# Patient Record
Sex: FEMALE | Race: WHITE | Employment: OTHER | URBAN - METROPOLITAN AREA
[De-identification: names, ages, dates, MRNs, and addresses within clinical notes are randomized per-mention and may not be internally consistent; named-entity substitution may affect disease eponyms.]

---

## 2017-07-02 PROBLEM — J30.2 SEASONAL ALLERGIC RHINITIS: Status: ACTIVE | Noted: 2017-07-02

## 2017-07-02 PROBLEM — Z13.820 OSTEOPOROSIS SCREENING: Status: ACTIVE | Noted: 2017-07-02

## 2017-07-18 PROBLEM — L30.9 DERMATITIS: Status: ACTIVE | Noted: 2017-07-18

## 2017-07-18 PROBLEM — T78.40XA ALLERGIC DRUG REACTION: Status: ACTIVE | Noted: 2017-07-18

## 2018-06-18 DIAGNOSIS — E03.9 ACQUIRED HYPOTHYROIDISM: ICD-10-CM

## 2018-06-18 RX ORDER — LEVOTHYROXINE SODIUM 0.1 MG/1
TABLET ORAL
Qty: 90 TABLET | Refills: 0 | Status: SHIPPED | OUTPATIENT
Start: 2018-06-18 | End: 2018-07-16 | Stop reason: SDUPTHER

## 2018-07-16 ENCOUNTER — HOSPITAL ENCOUNTER (OUTPATIENT)
Age: 73
Discharge: HOME | End: 2018-07-18
Payer: MEDICARE

## 2018-07-16 ENCOUNTER — OFFICE VISIT (OUTPATIENT)
Dept: FAMILY MEDICINE CLINIC | Age: 73
End: 2018-07-16
Payer: MEDICARE

## 2018-07-16 VITALS
HEIGHT: 62 IN | SYSTOLIC BLOOD PRESSURE: 120 MMHG | WEIGHT: 161 LBS | HEART RATE: 88 BPM | OXYGEN SATURATION: 95 % | DIASTOLIC BLOOD PRESSURE: 78 MMHG | BODY MASS INDEX: 29.63 KG/M2 | RESPIRATION RATE: 18 BRPM

## 2018-07-16 DIAGNOSIS — E03.9 ACQUIRED HYPOTHYROIDISM: ICD-10-CM

## 2018-07-16 DIAGNOSIS — I10 ESSENTIAL HYPERTENSION: ICD-10-CM

## 2018-07-16 DIAGNOSIS — R53.83 FATIGUE, UNSPECIFIED TYPE: ICD-10-CM

## 2018-07-16 DIAGNOSIS — M19.90 OSTEOARTHRITIS, UNSPECIFIED OSTEOARTHRITIS TYPE, UNSPECIFIED SITE: ICD-10-CM

## 2018-07-16 DIAGNOSIS — I10 ESSENTIAL HYPERTENSION: Primary | ICD-10-CM

## 2018-07-16 DIAGNOSIS — R73.01 IFG (IMPAIRED FASTING GLUCOSE): ICD-10-CM

## 2018-07-16 DIAGNOSIS — E78.2 MIXED HYPERLIPIDEMIA: ICD-10-CM

## 2018-07-16 LAB
ALBUMIN SERPL-MCNC: 4 G/DL (ref 3.5–5.2)
ALP BLD-CCNC: 118 U/L (ref 35–104)
ALT SERPL-CCNC: 12 U/L (ref 0–32)
ANION GAP SERPL CALCULATED.3IONS-SCNC: 16 MMOL/L (ref 7–16)
AST SERPL-CCNC: 13 U/L (ref 0–31)
BASOPHILS ABSOLUTE: 0.04 E9/L (ref 0–0.2)
BASOPHILS RELATIVE PERCENT: 0.4 % (ref 0–2)
BILIRUB SERPL-MCNC: 0.4 MG/DL (ref 0–1.2)
BUN BLDV-MCNC: 10 MG/DL (ref 8–23)
CALCIUM SERPL-MCNC: 9.3 MG/DL (ref 8.6–10.2)
CHLORIDE BLD-SCNC: 98 MMOL/L (ref 98–107)
CHOLESTEROL, TOTAL: 176 MG/DL (ref 0–199)
CO2: 27 MMOL/L (ref 22–29)
CREAT SERPL-MCNC: 0.6 MG/DL (ref 0.5–1)
EOSINOPHILS ABSOLUTE: 0.11 E9/L (ref 0.05–0.5)
EOSINOPHILS RELATIVE PERCENT: 1.2 % (ref 0–6)
GFR AFRICAN AMERICAN: >60
GFR NON-AFRICAN AMERICAN: >60 ML/MIN/1.73
GLUCOSE BLD-MCNC: 105 MG/DL (ref 74–109)
HBA1C MFR BLD: 6 % (ref 4–5.6)
HCT VFR BLD CALC: 36.7 % (ref 34–48)
HDLC SERPL-MCNC: 49 MG/DL
HEMOGLOBIN: 11.5 G/DL (ref 11.5–15.5)
IMMATURE GRANULOCYTES #: 0.04 E9/L
IMMATURE GRANULOCYTES %: 0.4 % (ref 0–5)
LDL CHOLESTEROL CALCULATED: 103 MG/DL (ref 0–99)
LYMPHOCYTES ABSOLUTE: 1.56 E9/L (ref 1.5–4)
LYMPHOCYTES RELATIVE PERCENT: 16.7 % (ref 20–42)
MCH RBC QN AUTO: 26 PG (ref 26–35)
MCHC RBC AUTO-ENTMCNC: 31.3 % (ref 32–34.5)
MCV RBC AUTO: 83 FL (ref 80–99.9)
MONOCYTES ABSOLUTE: 0.77 E9/L (ref 0.1–0.95)
MONOCYTES RELATIVE PERCENT: 8.3 % (ref 2–12)
NEUTROPHILS ABSOLUTE: 6.8 E9/L (ref 1.8–7.3)
NEUTROPHILS RELATIVE PERCENT: 73 % (ref 43–80)
PDW BLD-RTO: 13.2 FL (ref 11.5–15)
PLATELET # BLD: 352 E9/L (ref 130–450)
PMV BLD AUTO: 10.4 FL (ref 7–12)
POTASSIUM SERPL-SCNC: 3.9 MMOL/L (ref 3.5–5)
RBC # BLD: 4.42 E12/L (ref 3.5–5.5)
SODIUM BLD-SCNC: 141 MMOL/L (ref 132–146)
TOTAL PROTEIN: 7.2 G/DL (ref 6.4–8.3)
TRIGL SERPL-MCNC: 119 MG/DL (ref 0–149)
TSH SERPL DL<=0.05 MIU/L-ACNC: 0.14 UIU/ML (ref 0.27–4.2)
VLDLC SERPL CALC-MCNC: 24 MG/DL
WBC # BLD: 9.3 E9/L (ref 4.5–11.5)

## 2018-07-16 PROCEDURE — 3017F COLORECTAL CA SCREEN DOC REV: CPT | Performed by: FAMILY MEDICINE

## 2018-07-16 PROCEDURE — 1036F TOBACCO NON-USER: CPT | Performed by: FAMILY MEDICINE

## 2018-07-16 PROCEDURE — 84443 ASSAY THYROID STIM HORMONE: CPT

## 2018-07-16 PROCEDURE — 99214 OFFICE O/P EST MOD 30 MIN: CPT | Performed by: FAMILY MEDICINE

## 2018-07-16 PROCEDURE — G8399 PT W/DXA RESULTS DOCUMENT: HCPCS | Performed by: FAMILY MEDICINE

## 2018-07-16 PROCEDURE — 4040F PNEUMOC VAC/ADMIN/RCVD: CPT | Performed by: FAMILY MEDICINE

## 2018-07-16 PROCEDURE — 83036 HEMOGLOBIN GLYCOSYLATED A1C: CPT

## 2018-07-16 PROCEDURE — G8417 CALC BMI ABV UP PARAM F/U: HCPCS | Performed by: FAMILY MEDICINE

## 2018-07-16 PROCEDURE — 1123F ACP DISCUSS/DSCN MKR DOCD: CPT | Performed by: FAMILY MEDICINE

## 2018-07-16 PROCEDURE — 3014F SCREEN MAMMO DOC REV: CPT | Performed by: FAMILY MEDICINE

## 2018-07-16 PROCEDURE — 1090F PRES/ABSN URINE INCON ASSESS: CPT | Performed by: FAMILY MEDICINE

## 2018-07-16 PROCEDURE — 85025 COMPLETE CBC W/AUTO DIFF WBC: CPT

## 2018-07-16 PROCEDURE — 1101F PT FALLS ASSESS-DOCD LE1/YR: CPT | Performed by: FAMILY MEDICINE

## 2018-07-16 PROCEDURE — G8427 DOCREV CUR MEDS BY ELIG CLIN: HCPCS | Performed by: FAMILY MEDICINE

## 2018-07-16 PROCEDURE — 80061 LIPID PANEL: CPT

## 2018-07-16 PROCEDURE — 80053 COMPREHEN METABOLIC PANEL: CPT

## 2018-07-16 RX ORDER — AMLODIPINE BESYLATE AND ATORVASTATIN CALCIUM 5; 10 MG/1; MG/1
TABLET, FILM COATED ORAL
Qty: 90 TABLET | Refills: 1 | Status: SHIPPED | OUTPATIENT
Start: 2018-07-16 | End: 2019-02-11 | Stop reason: SDUPTHER

## 2018-07-16 RX ORDER — LEVOTHYROXINE SODIUM 0.1 MG/1
TABLET ORAL
Qty: 90 TABLET | Refills: 1 | Status: SHIPPED | OUTPATIENT
Start: 2018-07-16 | End: 2019-03-11 | Stop reason: SDUPTHER

## 2018-07-16 RX ORDER — ENALAPRIL MALEATE AND HYDROCHLOROTHIAZIDE 10; 25 MG/1; MG/1
TABLET ORAL
Qty: 180 TABLET | Refills: 1 | Status: SHIPPED | OUTPATIENT
Start: 2018-07-16 | End: 2019-05-20 | Stop reason: SDUPTHER

## 2018-07-19 ENCOUNTER — TELEPHONE (OUTPATIENT)
Dept: FAMILY MEDICINE CLINIC | Age: 73
End: 2018-07-19

## 2018-07-19 ASSESSMENT — ENCOUNTER SYMPTOMS
HEMOPTYSIS: 0
EYES NEGATIVE: 1
BLURRED VISION: 0
RESPIRATORY NEGATIVE: 1
WHEEZING: 0
DOUBLE VISION: 0
SHORTNESS OF BREATH: 0
DIARRHEA: 0
BACK PAIN: 0
COUGH: 0
EYE DISCHARGE: 0
GASTROINTESTINAL NEGATIVE: 1
ORTHOPNEA: 0
ABDOMINAL PAIN: 0
NAUSEA: 0
SPUTUM PRODUCTION: 0
EYE REDNESS: 0
STRIDOR: 0
CONSTIPATION: 0
SINUS PAIN: 0
PHOTOPHOBIA: 0
VOMITING: 0
HEARTBURN: 0
BLOOD IN STOOL: 0
EYE PAIN: 0
SORE THROAT: 0

## 2018-07-19 NOTE — PROGRESS NOTES
Neurological: Negative. Negative for dizziness, tingling, tremors, sensory change, speech change, focal weakness, seizures, loss of consciousness, weakness and headaches. Endo/Heme/Allergies: Negative for environmental allergies and polydipsia. Does not bruise/bleed easily. Pt has hypothyroid. Psychiatric/Behavioral: Negative for depression, hallucinations, memory loss, substance abuse and suicidal ideas. The patient is nervous/anxious. The patient does not have insomnia. Past Medical/Surgical Hx;  Reviewed with patient      Diagnosis Date    Glaucoma     Hyperlipidemia     Hypertension     Hypothyroidism      Past Surgical History:   Procedure Laterality Date    CATARACT REMOVAL WITH IMPLANT      CHOLECYSTECTOMY      HYSTERECTOMY         Past Family Hx:  Reviewed with patient  History reviewed. No pertinent family history. Social Hx:  Reviewed with patient  Social History   Substance Use Topics    Smoking status: Never Smoker    Smokeless tobacco: Never Used    Alcohol use No       OBJECTIVE  /78   Pulse 88   Resp 18   Ht 5' 2\" (1.575 m)   Wt 161 lb (73 kg)   LMP  (LMP Unknown)   SpO2 95%   Breastfeeding? No   BMI 29.45 kg/m²     Problem List:  Blaine Morocho  does not have any pertinent problems on file. PHYS EX:  Physical Exam   Constitutional: She is oriented to person, place, and time. She appears well-developed and well-nourished. HENT:   Head: Normocephalic and atraumatic. Right Ear: External ear normal.   Left Ear: External ear normal.   Nose: Nose normal.   Mouth/Throat: Oropharynx is clear and moist. No oropharyngeal exudate. Eyes: Conjunctivae and EOM are normal. Pupils are equal, round, and reactive to light. Right eye exhibits no discharge. Left eye exhibits no discharge. No scleral icterus. Neck: Normal range of motion. Neck supple. No JVD present. No tracheal deviation present. No thyromegaly present.    Cardiovascular: Normal rate, regular rhythm and Differential; Future  -     Comprehensive Metabolic Panel; Future  -     Lipid Panel; Future  Not controlled. Lab, low chol. Diet, statin. IFG (impaired fasting glucose)  -     CBC Auto Differential; Future  -     Comprehensive Metabolic Panel; Future  -     Lipid Panel; Future  -     HEMOGLOBIN A1C; Future  Instructed on lab. Fatigue, unspecified type  -     CBC Auto Differential; Future  -     Comprehensive Metabolic Panel; Future  -     TSH without Reflex; Future  Not controlled. Lab. Outpatient Encounter Prescriptions as of 7/16/2018   Medication Sig Dispense Refill    amLODIPine-atorvastatatin (CADUET) 5-10 MG per tablet TAKE ONE TABLET BY MOUTH DAILY 90 tablet 1    levothyroxine (SYNTHROID) 100 MCG tablet TAKE ONE TABLET BY MOUTH DAILY 90 tablet 1    enalapril-hydrochlorothiazide (VASERETIC) 10-25 MG per tablet TAKE ONE TABLET BY MOUTH TWO TIMES A  tablet 1    aspirin 81 MG tablet Take 1 tablet by mouth daily 90 tablet 1    Cyanocobalamin (B-12) 2500 MCG TABS Take 1 tablet by mouth daily       vitamin D (CHOLECALCIFEROL) 5000 UNITS CAPS capsule Take 5,000 Units by mouth daily      [DISCONTINUED] amLODIPine-atorvastatatin (CADUET) 5-10 MG per tablet TAKE ONE TABLET BY MOUTH DAILY 60 tablet 0    [DISCONTINUED] levothyroxine (SYNTHROID) 100 MCG tablet TAKE ONE TABLET BY MOUTH DAILY 90 tablet 0    [DISCONTINUED] enalapril-hydrochlorothiazide (VASERETIC) 10-25 MG per tablet TAKE ONE TABLET BY MOUTH TWO TIMES A  tablet 1    [DISCONTINUED] aspirin 81 MG tablet Take 1 tablet by mouth daily 30 tablet 0     No facility-administered encounter medications on file as of 7/16/2018. Return in about 6 months (around 1/16/2019).         Reviewed recent labs related to Mahsa's current problems      Discussed importance of regular Health Maintenance follow up  Health Maintenance   Topic    Hepatitis C screen     DTaP/Tdap/Td vaccine (1 - Tdap)    Shingles Vaccine (1 of 2 - 2 Dose Series)  Colon Cancer Screen FIT/FOBT     Flu vaccine (1)    Breast cancer screen     A1C test (Diabetic or Prediabetic)     TSH testing     Potassium monitoring     Creatinine monitoring     Lipid screen     DEXA (modify frequency per FRAX score)

## 2018-09-26 PROBLEM — Z13.820 OSTEOPOROSIS SCREENING: Status: RESOLVED | Noted: 2017-07-02 | Resolved: 2018-09-26

## 2018-10-09 ENCOUNTER — NURSE ONLY (OUTPATIENT)
Dept: FAMILY MEDICINE CLINIC | Age: 73
End: 2018-10-09
Payer: MEDICARE

## 2018-10-09 DIAGNOSIS — Z23 NEED FOR INFLUENZA VACCINATION: Primary | ICD-10-CM

## 2018-10-09 PROCEDURE — G0008 ADMIN INFLUENZA VIRUS VAC: HCPCS | Performed by: FAMILY MEDICINE

## 2018-10-09 PROCEDURE — 90662 IIV NO PRSV INCREASED AG IM: CPT | Performed by: FAMILY MEDICINE

## 2019-01-21 ENCOUNTER — OFFICE VISIT (OUTPATIENT)
Dept: FAMILY MEDICINE CLINIC | Age: 74
End: 2019-01-21
Payer: MEDICARE

## 2019-01-21 ENCOUNTER — HOSPITAL ENCOUNTER (OUTPATIENT)
Age: 74
Discharge: HOME | End: 2019-01-23
Payer: MEDICARE

## 2019-01-21 VITALS
WEIGHT: 162.8 LBS | RESPIRATION RATE: 18 BRPM | BODY MASS INDEX: 29.96 KG/M2 | SYSTOLIC BLOOD PRESSURE: 130 MMHG | OXYGEN SATURATION: 95 % | HEIGHT: 62 IN | HEART RATE: 98 BPM | DIASTOLIC BLOOD PRESSURE: 76 MMHG | TEMPERATURE: 98 F

## 2019-01-21 DIAGNOSIS — I10 ESSENTIAL HYPERTENSION: Primary | ICD-10-CM

## 2019-01-21 DIAGNOSIS — E03.9 ACQUIRED HYPOTHYROIDISM: ICD-10-CM

## 2019-01-21 DIAGNOSIS — Z23 NEED FOR PROPHYLACTIC VACCINATION AND INOCULATION AGAINST VARICELLA: ICD-10-CM

## 2019-01-21 DIAGNOSIS — Z00.00 ROUTINE GENERAL MEDICAL EXAMINATION AT A HEALTH CARE FACILITY: ICD-10-CM

## 2019-01-21 DIAGNOSIS — H61.23 CERUMEN DEBRIS ON TYMPANIC MEMBRANE OF BOTH EARS: ICD-10-CM

## 2019-01-21 DIAGNOSIS — Z12.11 SCREENING FOR COLON CANCER: ICD-10-CM

## 2019-01-21 LAB — TSH SERPL DL<=0.05 MIU/L-ACNC: 1.56 UIU/ML (ref 0.27–4.2)

## 2019-01-21 PROCEDURE — 84443 ASSAY THYROID STIM HORMONE: CPT

## 2019-01-21 PROCEDURE — G8482 FLU IMMUNIZE ORDER/ADMIN: HCPCS | Performed by: FAMILY MEDICINE

## 2019-01-21 PROCEDURE — 69209 REMOVE IMPACTED EAR WAX UNI: CPT | Performed by: FAMILY MEDICINE

## 2019-01-21 PROCEDURE — 4040F PNEUMOC VAC/ADMIN/RCVD: CPT | Performed by: FAMILY MEDICINE

## 2019-01-21 PROCEDURE — G0438 PPPS, INITIAL VISIT: HCPCS | Performed by: FAMILY MEDICINE

## 2019-01-21 RX ORDER — OMEGA-3/DHA/EPA/FISH OIL 60 MG-90MG
1 CAPSULE ORAL DAILY
COMMUNITY

## 2019-01-21 RX ORDER — VITAMIN E (DL,TOCOPHERYL ACET) 180 MG
1 CAPSULE ORAL DAILY
COMMUNITY
End: 2019-11-12

## 2019-01-21 ASSESSMENT — PATIENT HEALTH QUESTIONNAIRE - PHQ9
SUM OF ALL RESPONSES TO PHQ QUESTIONS 1-9: 0
SUM OF ALL RESPONSES TO PHQ QUESTIONS 1-9: 0

## 2019-01-21 ASSESSMENT — ANXIETY QUESTIONNAIRES: GAD7 TOTAL SCORE: 0

## 2019-01-21 ASSESSMENT — LIFESTYLE VARIABLES: HOW OFTEN DO YOU HAVE A DRINK CONTAINING ALCOHOL: 0

## 2019-01-23 PROBLEM — Z12.11 SCREENING FOR COLON CANCER: Status: ACTIVE | Noted: 2019-01-23

## 2019-02-22 ENCOUNTER — TELEPHONE (OUTPATIENT)
Dept: FAMILY MEDICINE CLINIC | Age: 74
End: 2019-02-22

## 2019-02-22 PROBLEM — Z12.11 SCREENING FOR COLON CANCER: Status: RESOLVED | Noted: 2019-01-23 | Resolved: 2019-02-22

## 2019-02-25 ENCOUNTER — TELEPHONE (OUTPATIENT)
Dept: FAMILY MEDICINE CLINIC | Age: 74
End: 2019-02-25

## 2019-03-11 DIAGNOSIS — E03.9 ACQUIRED HYPOTHYROIDISM: ICD-10-CM

## 2019-03-11 RX ORDER — LEVOTHYROXINE SODIUM 0.1 MG/1
TABLET ORAL
Qty: 90 TABLET | Refills: 0 | Status: SHIPPED | OUTPATIENT
Start: 2019-03-11 | End: 2019-06-17 | Stop reason: SDUPTHER

## 2019-04-25 ENCOUNTER — TELEPHONE (OUTPATIENT)
Dept: FAMILY MEDICINE CLINIC | Age: 74
End: 2019-04-25

## 2019-04-25 NOTE — TELEPHONE ENCOUNTER
MA attempted to contact pt regarding OC Fit Test. Pt did not answer. MA left message for pt inquiring if she still had the FIT test to complete, and if she was still interested in returning it to the office for completion. MA advised pt that if she no longer had the FIT test and was interested in doing it, she may contact the office for another test kit. MA advised pt that if she did have test and wished to complete it, she may return it to the office at any time Monday-Friday 8:00AM-4:00PM or mail it to the office. MA requested return phone call for any questions or concerns.     Electronically signed by Santosh Alarcon MA on 4/25/19 at 10:57 AM

## 2019-05-20 ENCOUNTER — TELEPHONE (OUTPATIENT)
Dept: FAMILY MEDICINE CLINIC | Age: 74
End: 2019-05-20

## 2019-05-20 DIAGNOSIS — I10 ESSENTIAL HYPERTENSION: ICD-10-CM

## 2019-05-20 RX ORDER — ENALAPRIL MALEATE AND HYDROCHLOROTHIAZIDE 10; 25 MG/1; MG/1
TABLET ORAL
Qty: 180 TABLET | Refills: 0 | Status: SHIPPED | OUTPATIENT
Start: 2019-05-20 | End: 2019-05-20 | Stop reason: DRUGHIGH

## 2019-05-20 RX ORDER — ENALAPRIL MALEATE AND HYDROCHLOROTHIAZIDE 10; 25 MG/1; MG/1
TABLET ORAL
Qty: 90 TABLET | Refills: 0 | Status: SHIPPED | OUTPATIENT
Start: 2019-05-20 | End: 2019-05-22 | Stop reason: SDUPTHER

## 2019-05-20 RX ORDER — ENALAPRIL MALEATE 10 MG/1
10 TABLET ORAL 2 TIMES DAILY
Qty: 60 TABLET | Refills: 3 | Status: CANCELLED | OUTPATIENT
Start: 2019-05-20

## 2019-05-20 RX ORDER — HYDROCHLOROTHIAZIDE 25 MG/1
25 TABLET ORAL EVERY MORNING
Qty: 30 TABLET | Refills: 3 | Status: CANCELLED | OUTPATIENT
Start: 2019-05-20

## 2019-05-20 NOTE — TELEPHONE ENCOUNTER
----- Message from Kvng Beach DO sent at 5/20/2019 11:02 AM EDT -----  Call pt and ask her if she is taking vaseretic 10/25  One table 2 times a day     Tell her I want to change that to 2 medications    Enalapril 10 mg BID and HCTZ 25 mg qd   I dont want her to take HCTZ 25 mg BID

## 2019-05-20 NOTE — TELEPHONE ENCOUNTER
Patient returned call to the office stating she has been taking the Vasoretic once daily for several months now because her blood pressure has been good. She checks it every day at home. Spoke with Dr. Edwige Pike who wants patient to continue taking the Vasoretic daily and come in for checkup. Patient scheduled.   Electronically signed by Macario Goldstein on 5/20/2019 at 3:55 PM

## 2019-05-22 ENCOUNTER — TELEPHONE (OUTPATIENT)
Dept: FAMILY MEDICINE CLINIC | Age: 74
End: 2019-05-22

## 2019-05-22 DIAGNOSIS — I10 ESSENTIAL HYPERTENSION: ICD-10-CM

## 2019-05-22 RX ORDER — ENALAPRIL MALEATE AND HYDROCHLOROTHIAZIDE 10; 25 MG/1; MG/1
TABLET ORAL
Qty: 90 TABLET | Refills: 1 | Status: SHIPPED | OUTPATIENT
Start: 2019-05-22 | End: 2019-12-02 | Stop reason: SDUPTHER

## 2019-05-22 NOTE — TELEPHONE ENCOUNTER
Patient stopped in office stating [facility] did not receive script for Vaseretic

## 2019-05-30 ENCOUNTER — TELEPHONE (OUTPATIENT)
Dept: FAMILY MEDICINE CLINIC | Age: 74
End: 2019-05-30

## 2019-05-30 NOTE — TELEPHONE ENCOUNTER
MA attempted to contact pt regarding OC Fit Test. Pt did not answer. MA left message for pt inquiring if she still had the FIT test to complete, and if she was still interested in returning it to the office for completion. MA advised pt that if she no longer had the FIT test and was interested in doing it, she may contact the office for another test kit. MA advised pt that if she did have test and wished to complete it, she may return it to the office at any time Monday-Friday 8:00AM-4:00PM or mail it to the office. MA requested return phone call for any questions or concerns.     Electronically signed by Cadence Ernandez MA on 5/30/19 at 8:51 AM

## 2019-06-17 ENCOUNTER — HOSPITAL ENCOUNTER (OUTPATIENT)
Age: 74
Discharge: HOME | End: 2019-06-19
Payer: MEDICARE

## 2019-06-17 ENCOUNTER — OFFICE VISIT (OUTPATIENT)
Dept: FAMILY MEDICINE CLINIC | Age: 74
End: 2019-06-17
Payer: MEDICARE

## 2019-06-17 VITALS
BODY MASS INDEX: 29.44 KG/M2 | HEIGHT: 62 IN | WEIGHT: 160 LBS | SYSTOLIC BLOOD PRESSURE: 124 MMHG | HEART RATE: 82 BPM | OXYGEN SATURATION: 96 % | RESPIRATION RATE: 18 BRPM | DIASTOLIC BLOOD PRESSURE: 74 MMHG

## 2019-06-17 DIAGNOSIS — R73.01 IFG (IMPAIRED FASTING GLUCOSE): ICD-10-CM

## 2019-06-17 DIAGNOSIS — R53.83 FATIGUE, UNSPECIFIED TYPE: ICD-10-CM

## 2019-06-17 DIAGNOSIS — I10 ESSENTIAL HYPERTENSION: ICD-10-CM

## 2019-06-17 DIAGNOSIS — Z12.39 SCREENING FOR BREAST CANCER: ICD-10-CM

## 2019-06-17 DIAGNOSIS — E03.9 ACQUIRED HYPOTHYROIDISM: ICD-10-CM

## 2019-06-17 DIAGNOSIS — E78.2 MIXED HYPERLIPIDEMIA: ICD-10-CM

## 2019-06-17 DIAGNOSIS — I10 ESSENTIAL HYPERTENSION: Primary | ICD-10-CM

## 2019-06-17 LAB
ALBUMIN SERPL-MCNC: 4 G/DL (ref 3.5–5.2)
ALP BLD-CCNC: 119 U/L (ref 35–104)
ALT SERPL-CCNC: 11 U/L (ref 0–32)
ANION GAP SERPL CALCULATED.3IONS-SCNC: 14 MMOL/L (ref 7–16)
ANISOCYTOSIS: ABNORMAL
AST SERPL-CCNC: 16 U/L (ref 0–31)
BASOPHILS ABSOLUTE: 0.06 E9/L (ref 0–0.2)
BASOPHILS RELATIVE PERCENT: 0.8 % (ref 0–2)
BILIRUB SERPL-MCNC: 0.3 MG/DL (ref 0–1.2)
BUN BLDV-MCNC: 12 MG/DL (ref 8–23)
CALCIUM SERPL-MCNC: 9.5 MG/DL (ref 8.6–10.2)
CHLORIDE BLD-SCNC: 103 MMOL/L (ref 98–107)
CHOLESTEROL, TOTAL: 170 MG/DL (ref 0–199)
CO2: 28 MMOL/L (ref 22–29)
CREAT SERPL-MCNC: 0.7 MG/DL (ref 0.5–1)
EOSINOPHILS ABSOLUTE: 0.24 E9/L (ref 0.05–0.5)
EOSINOPHILS RELATIVE PERCENT: 3 % (ref 0–6)
GFR AFRICAN AMERICAN: >60
GFR NON-AFRICAN AMERICAN: >60 ML/MIN/1.73
GLUCOSE BLD-MCNC: 104 MG/DL (ref 74–99)
HBA1C MFR BLD: 6.2 % (ref 4–5.6)
HCT VFR BLD CALC: 33.9 % (ref 34–48)
HDLC SERPL-MCNC: 50 MG/DL
HEMOGLOBIN: 9.8 G/DL (ref 11.5–15.5)
HYPOCHROMIA: ABNORMAL
IMMATURE GRANULOCYTES #: 0.03 E9/L
IMMATURE GRANULOCYTES %: 0.4 % (ref 0–5)
LDL CHOLESTEROL CALCULATED: 93 MG/DL (ref 0–99)
LYMPHOCYTES ABSOLUTE: 1.56 E9/L (ref 1.5–4)
LYMPHOCYTES RELATIVE PERCENT: 19.6 % (ref 20–42)
MCH RBC QN AUTO: 23 PG (ref 26–35)
MCHC RBC AUTO-ENTMCNC: 28.9 % (ref 32–34.5)
MCV RBC AUTO: 79.6 FL (ref 80–99.9)
MONOCYTES ABSOLUTE: 0.66 E9/L (ref 0.1–0.95)
MONOCYTES RELATIVE PERCENT: 8.3 % (ref 2–12)
NEUTROPHILS ABSOLUTE: 5.4 E9/L (ref 1.8–7.3)
NEUTROPHILS RELATIVE PERCENT: 67.9 % (ref 43–80)
OVALOCYTES: ABNORMAL
PDW BLD-RTO: 16.3 FL (ref 11.5–15)
PLATELET # BLD: 323 E9/L (ref 130–450)
PMV BLD AUTO: 10.7 FL (ref 7–12)
POIKILOCYTES: ABNORMAL
POLYCHROMASIA: ABNORMAL
POTASSIUM SERPL-SCNC: 4.1 MMOL/L (ref 3.5–5)
RBC # BLD: 4.26 E12/L (ref 3.5–5.5)
SODIUM BLD-SCNC: 145 MMOL/L (ref 132–146)
TOTAL PROTEIN: 7.2 G/DL (ref 6.4–8.3)
TRIGL SERPL-MCNC: 137 MG/DL (ref 0–149)
TSH SERPL DL<=0.05 MIU/L-ACNC: 1.16 UIU/ML (ref 0.27–4.2)
VLDLC SERPL CALC-MCNC: 27 MG/DL
WBC # BLD: 8 E9/L (ref 4.5–11.5)

## 2019-06-17 PROCEDURE — 1123F ACP DISCUSS/DSCN MKR DOCD: CPT | Performed by: FAMILY MEDICINE

## 2019-06-17 PROCEDURE — 80061 LIPID PANEL: CPT

## 2019-06-17 PROCEDURE — 80053 COMPREHEN METABOLIC PANEL: CPT

## 2019-06-17 PROCEDURE — G8427 DOCREV CUR MEDS BY ELIG CLIN: HCPCS | Performed by: FAMILY MEDICINE

## 2019-06-17 PROCEDURE — G8399 PT W/DXA RESULTS DOCUMENT: HCPCS | Performed by: FAMILY MEDICINE

## 2019-06-17 PROCEDURE — 83036 HEMOGLOBIN GLYCOSYLATED A1C: CPT

## 2019-06-17 PROCEDURE — 4040F PNEUMOC VAC/ADMIN/RCVD: CPT | Performed by: FAMILY MEDICINE

## 2019-06-17 PROCEDURE — 3017F COLORECTAL CA SCREEN DOC REV: CPT | Performed by: FAMILY MEDICINE

## 2019-06-17 PROCEDURE — 1036F TOBACCO NON-USER: CPT | Performed by: FAMILY MEDICINE

## 2019-06-17 PROCEDURE — 1090F PRES/ABSN URINE INCON ASSESS: CPT | Performed by: FAMILY MEDICINE

## 2019-06-17 PROCEDURE — 85025 COMPLETE CBC W/AUTO DIFF WBC: CPT

## 2019-06-17 PROCEDURE — G8417 CALC BMI ABV UP PARAM F/U: HCPCS | Performed by: FAMILY MEDICINE

## 2019-06-17 PROCEDURE — 99214 OFFICE O/P EST MOD 30 MIN: CPT | Performed by: FAMILY MEDICINE

## 2019-06-17 PROCEDURE — 84443 ASSAY THYROID STIM HORMONE: CPT

## 2019-06-17 RX ORDER — LEVOTHYROXINE SODIUM 0.1 MG/1
100 TABLET ORAL DAILY
Qty: 90 TABLET | Refills: 1 | Status: ON HOLD | OUTPATIENT
Start: 2019-06-17 | End: 2019-08-31 | Stop reason: HOSPADM

## 2019-06-19 ASSESSMENT — ENCOUNTER SYMPTOMS
WHEEZING: 0
RESPIRATORY NEGATIVE: 1
DOUBLE VISION: 0
RECTAL PAIN: 0
COUGH: 0
SINUS PRESSURE: 0
STRIDOR: 0
SPUTUM PRODUCTION: 0
PHOTOPHOBIA: 0
TROUBLE SWALLOWING: 0
VOMITING: 0
BACK PAIN: 0
RHINORRHEA: 0
ANAL BLEEDING: 0
EYE DISCHARGE: 0
EYE PAIN: 0
NAUSEA: 0
EYE REDNESS: 0
COLOR CHANGE: 0
EYES NEGATIVE: 1
ABDOMINAL PAIN: 0
HEARTBURN: 0
SHORTNESS OF BREATH: 0
SINUS PAIN: 0
SORE THROAT: 0
EYE ITCHING: 0
ORTHOPNEA: 0
BLURRED VISION: 0
GASTROINTESTINAL NEGATIVE: 1
DIARRHEA: 0
VOICE CHANGE: 0
FACIAL SWELLING: 0
CHEST TIGHTNESS: 0
HEMOPTYSIS: 0
CONSTIPATION: 0
BLOOD IN STOOL: 0

## 2019-06-19 NOTE — PROGRESS NOTES
SUBJECTIVE  Bjorn Jamil is a 68 y.o. female. HPI/Chief C/O:  Chief Complaint   Patient presents with    Hypertension     Pt here for check up and medication refill      Allergies   Allergen Reactions    Prevnar 13 [Pneumococcal 13-Kassidy Conj Vacc] Swelling     Redness and swelling in arm at site   this 67year old female presents with hypertension, hyperlipidemia, hypothyroid, and fatigue. Pt refuses FIT and colonoscopy. Pt states she feels good. ROS:  Review of Systems   Constitutional: Positive for fatigue and malaise/fatigue. Negative for activity change, appetite change, chills, diaphoresis, fever, unexpected weight change and weight loss. HENT: Negative for congestion, ear discharge, ear pain, facial swelling, hearing loss, mouth sores, nosebleeds, postnasal drip, rhinorrhea, sinus pressure, sinus pain, sneezing, sore throat, tinnitus, trouble swallowing and voice change. Eyes: Negative. Negative for blurred vision, double vision, photophobia, pain, discharge, redness, itching and visual disturbance. Respiratory: Negative. Negative for cough, hemoptysis, sputum production, chest tightness, shortness of breath, wheezing and stridor. Cardiovascular: Negative for chest pain, palpitations, orthopnea, claudication, leg swelling and PND. Pt has hypertension, and hyperlipidemia. Gastrointestinal: Negative. Negative for abdominal pain, anal bleeding, blood in stool, constipation, diarrhea, heartburn, melena, nausea, rectal pain and vomiting. Genitourinary: Negative. Negative for dysuria, flank pain, frequency, hematuria and urgency. Musculoskeletal: Positive for arthralgias. Negative for back pain, falls, gait problem, joint pain, joint swelling, myalgias, neck pain and neck stiffness. Skin: Negative. Negative for color change, itching, pallor, rash and wound. Neurological: Negative.   Negative for dizziness, tingling, tremors, sensory change, speech change, focal weakness, seizures, loss of consciousness, syncope, facial asymmetry, speech difficulty, weakness, light-headedness, numbness and headaches. Endo/Heme/Allergies: Negative for environmental allergies and polydipsia. Does not bruise/bleed easily. Pt has hypothyroid. Psychiatric/Behavioral: Negative. Negative for agitation, behavioral problems, confusion, decreased concentration, depression, dysphoric mood, hallucinations, memory loss, self-injury, sleep disturbance, substance abuse and suicidal ideas. The patient is not nervous/anxious, does not have insomnia and is not hyperactive. Past Medical/Surgical Hx;  Reviewed with patient      Diagnosis Date    Glaucoma     Hyperlipidemia     Hypertension     Hypothyroidism      Past Surgical History:   Procedure Laterality Date    CATARACT REMOVAL WITH IMPLANT      CHOLECYSTECTOMY      HYSTERECTOMY         Past Family Hx:  Reviewed with patient  History reviewed. No pertinent family history. Social Hx:  Reviewed with patient  Social History     Tobacco Use    Smoking status: Never Smoker    Smokeless tobacco: Never Used   Substance Use Topics    Alcohol use: No     Alcohol/week: 0.0 oz       OBJECTIVE  /74   Pulse 82   Resp 18   Ht 5' 2\" (1.575 m)   Wt 160 lb (72.6 kg)   LMP  (LMP Unknown)   SpO2 96%   Breastfeeding? No   BMI 29.26 kg/m²     Problem List:  Keshia Stanford does not have any pertinent problems on file. PHYS EX:  Physical Exam   Constitutional: She is oriented to person, place, and time. She appears well-developed and well-nourished. No distress. HENT:   Head: Normocephalic and atraumatic. Right Ear: External ear normal.   Left Ear: External ear normal.   Nose: Nose normal.   Mouth/Throat: Oropharynx is clear and moist. No oropharyngeal exudate. Eyes: Pupils are equal, round, and reactive to light. Conjunctivae and EOM are normal. Right eye exhibits no discharge. Left eye exhibits no discharge. No scleral icterus.    Neck: Normal range of motion. Neck supple. No JVD present. No tracheal deviation present. No thyromegaly present. Cardiovascular: Normal rate, regular rhythm, normal heart sounds and intact distal pulses. Exam reveals no gallop and no friction rub. No murmur heard. Pulmonary/Chest: Effort normal and breath sounds normal. No stridor. No respiratory distress. She has no wheezes. She has no rales. She exhibits no tenderness. Abdominal: Soft. Bowel sounds are normal. She exhibits no distension and no mass. There is no tenderness. There is no rebound and no guarding. No hernia. Musculoskeletal: She exhibits tenderness. She exhibits no edema or deformity. Pain and decreased ROM multiple joints. Lymphadenopathy:     She has no cervical adenopathy. Neurological: She is alert and oriented to person, place, and time. She has normal reflexes. She displays normal reflexes. No cranial nerve deficit or sensory deficit. She exhibits normal muscle tone. Coordination normal.   Skin: Skin is warm. No rash noted. She is not diaphoretic. No erythema. No pallor. Psychiatric: She has a normal mood and affect. Her behavior is normal. Judgment and thought content normal.   Nursing note and vitals reviewed. ASSESSMENT/PLAN  Shayne was seen today for check-up and health maintenance. Diagnoses and all orders for this visit:    Essential hypertension  -     CBC Auto Differential; Future  -     Comprehensive Metabolic Panel; Future  Controlled. Lab, caduet, vaseretic, aspirin, statin, low salt diet. Mixed hyperlipidemia  -     CBC Auto Differential; Future  -     Comprehensive Metabolic Panel; Future  -     Lipid Panel; Future  Not controlled. Lab, low chol. Diet, statin. Fatigue, unspecified type  -     CBC Auto Differential; Future  -     Comprehensive Metabolic Panel; Future  -     Vitamin D 25 Hydrox, D2 & D3; Future  Not controlled. Lab.   Acquired hypothyroidism  -     CBC Auto Differential; Future  -     Comprehensive Metabolic Panel; Future  -     TSH without Reflex; Future  Controlled. Lab, synthroid. IFG (impaired fasting glucose)  -     CBC Auto Differential; Future  -     Comprehensive Metabolic Panel; Future  -     Hemoglobin A1C; Future  Instructed on lab. Pt instructed if any worse go ED ASAP. Outpatient Encounter Medications as of 6/17/2019   Medication Sig Dispense Refill    levothyroxine (SYNTHROID) 100 MCG tablet Take 1 tablet by mouth Daily 90 tablet 1    Multiple Vitamins-Minerals (PRESERVISION AREDS PO) Take by mouth      enalapril-hydrochlorothiazide (VASERETIC) 10-25 MG per tablet TAKE 1 TABLET BY MOUTH ONCE DAILY 90 tablet 1    amLODIPine-atorvastatatin (CADUET) 5-10 MG per tablet TAKE 1 TABLET BY MOUTH ONCE DAILY 90 tablet 1    Magnesium Oxide 500 MG CAPS Take 1 capsule by mouth daily      Omega-3 Fatty Acids (FISH OIL) 500 MG CAPS Take 1 capsule by mouth daily      aspirin 81 MG tablet Take 1 tablet by mouth daily 90 tablet 1    Cyanocobalamin (B-12) 2500 MCG TABS Take 1 tablet by mouth daily       vitamin D (CHOLECALCIFEROL) 5000 UNITS CAPS capsule Take 5,000 Units by mouth daily      [DISCONTINUED] levothyroxine (SYNTHROID) 100 MCG tablet TAKE 1 TABLET BY MOUTH ONCE DAILY 30 tablet 0    [DISCONTINUED] zoster recombinant adjuvanted vaccine (SHINGRIX) 50 MCG/0.5ML SUSR injection 50 MCG IM then repeat 2-6 months. 0.5 mL 1     No facility-administered encounter medications on file as of 6/17/2019. Return in about 6 months (around 12/17/2019).         Reviewed recent labs related to Mahsa's current problems      Discussed importance of regular Health Maintenance follow up  Health Maintenance   Topic    Hepatitis C screen     DTaP/Tdap/Td vaccine (1 - Tdap)    Shingles Vaccine (1 of 2)    Annual Wellness Visit (AWV)     Colon Cancer Screen FIT/FOBT     Breast cancer screen     A1C test (Diabetic or Prediabetic)     TSH testing     Potassium monitoring     Creatinine

## 2019-06-28 ENCOUNTER — OFFICE VISIT (OUTPATIENT)
Dept: FAMILY MEDICINE CLINIC | Age: 74
End: 2019-06-28
Payer: MEDICARE

## 2019-06-28 ENCOUNTER — HOSPITAL ENCOUNTER (OUTPATIENT)
Age: 74
Discharge: HOME | End: 2019-06-30
Payer: MEDICARE

## 2019-06-28 ENCOUNTER — HOSPITAL ENCOUNTER (OUTPATIENT)
Dept: GENERAL RADIOLOGY | Age: 74
Discharge: HOME | End: 2019-06-30
Payer: MEDICARE

## 2019-06-28 VITALS
TEMPERATURE: 97.3 F | DIASTOLIC BLOOD PRESSURE: 68 MMHG | HEART RATE: 100 BPM | SYSTOLIC BLOOD PRESSURE: 136 MMHG | OXYGEN SATURATION: 95 % | WEIGHT: 158 LBS | RESPIRATION RATE: 18 BRPM | BODY MASS INDEX: 29.08 KG/M2 | HEIGHT: 62 IN

## 2019-06-28 DIAGNOSIS — Z12.39 SCREENING FOR BREAST CANCER: ICD-10-CM

## 2019-06-28 DIAGNOSIS — D64.9 ANEMIA, UNSPECIFIED TYPE: Primary | ICD-10-CM

## 2019-06-28 DIAGNOSIS — Z12.11 SCREENING FOR COLON CANCER: ICD-10-CM

## 2019-06-28 DIAGNOSIS — D64.9 ANEMIA, UNSPECIFIED TYPE: ICD-10-CM

## 2019-06-28 DIAGNOSIS — I10 ESSENTIAL HYPERTENSION: ICD-10-CM

## 2019-06-28 PROCEDURE — 1036F TOBACCO NON-USER: CPT | Performed by: FAMILY MEDICINE

## 2019-06-28 PROCEDURE — G8399 PT W/DXA RESULTS DOCUMENT: HCPCS | Performed by: FAMILY MEDICINE

## 2019-06-28 PROCEDURE — 1123F ACP DISCUSS/DSCN MKR DOCD: CPT | Performed by: FAMILY MEDICINE

## 2019-06-28 PROCEDURE — 71046 X-RAY EXAM CHEST 2 VIEWS: CPT

## 2019-06-28 PROCEDURE — 99214 OFFICE O/P EST MOD 30 MIN: CPT | Performed by: FAMILY MEDICINE

## 2019-06-28 PROCEDURE — 1090F PRES/ABSN URINE INCON ASSESS: CPT | Performed by: FAMILY MEDICINE

## 2019-06-28 PROCEDURE — G8417 CALC BMI ABV UP PARAM F/U: HCPCS | Performed by: FAMILY MEDICINE

## 2019-06-28 PROCEDURE — G8427 DOCREV CUR MEDS BY ELIG CLIN: HCPCS | Performed by: FAMILY MEDICINE

## 2019-06-28 PROCEDURE — 4040F PNEUMOC VAC/ADMIN/RCVD: CPT | Performed by: FAMILY MEDICINE

## 2019-06-28 PROCEDURE — 3017F COLORECTAL CA SCREEN DOC REV: CPT | Performed by: FAMILY MEDICINE

## 2019-07-01 ASSESSMENT — ENCOUNTER SYMPTOMS
TROUBLE SWALLOWING: 0
DIARRHEA: 0
RECTAL PAIN: 0
HEARTBURN: 0
RESPIRATORY NEGATIVE: 1
EYE ITCHING: 0
NAUSEA: 0
EYE DISCHARGE: 0
COUGH: 0
WHEEZING: 0
ANAL BLEEDING: 0
STRIDOR: 0
GASTROINTESTINAL NEGATIVE: 1
FACIAL SWELLING: 0
BLURRED VISION: 0
SINUS PAIN: 0
ABDOMINAL PAIN: 0
SPUTUM PRODUCTION: 0
VOICE CHANGE: 0
EYE PAIN: 0
PHOTOPHOBIA: 0
SINUS PRESSURE: 0
DOUBLE VISION: 0
EYES NEGATIVE: 1
BACK PAIN: 0
BLOOD IN STOOL: 0
HEMOPTYSIS: 0
CHOKING: 0
COLOR CHANGE: 0
SORE THROAT: 0
EYE REDNESS: 0
RHINORRHEA: 0
VOMITING: 0
SHORTNESS OF BREATH: 0
ORTHOPNEA: 0
CONSTIPATION: 0
CHEST TIGHTNESS: 0

## 2019-07-15 ENCOUNTER — INITIAL CONSULT (OUTPATIENT)
Dept: SURGERY | Age: 74
End: 2019-07-15
Payer: MEDICARE

## 2019-07-15 VITALS
BODY MASS INDEX: 28.35 KG/M2 | WEIGHT: 160 LBS | HEART RATE: 105 BPM | HEIGHT: 63 IN | SYSTOLIC BLOOD PRESSURE: 138 MMHG | DIASTOLIC BLOOD PRESSURE: 76 MMHG | TEMPERATURE: 98.4 F

## 2019-07-15 DIAGNOSIS — D50.9 IRON DEFICIENCY ANEMIA, UNSPECIFIED IRON DEFICIENCY ANEMIA TYPE: Primary | ICD-10-CM

## 2019-07-15 PROCEDURE — G8417 CALC BMI ABV UP PARAM F/U: HCPCS | Performed by: SURGERY

## 2019-07-15 PROCEDURE — G8399 PT W/DXA RESULTS DOCUMENT: HCPCS | Performed by: SURGERY

## 2019-07-15 PROCEDURE — G8427 DOCREV CUR MEDS BY ELIG CLIN: HCPCS | Performed by: SURGERY

## 2019-07-15 PROCEDURE — 3017F COLORECTAL CA SCREEN DOC REV: CPT | Performed by: SURGERY

## 2019-07-15 PROCEDURE — 1123F ACP DISCUSS/DSCN MKR DOCD: CPT | Performed by: SURGERY

## 2019-07-15 PROCEDURE — 1036F TOBACCO NON-USER: CPT | Performed by: SURGERY

## 2019-07-15 PROCEDURE — 4040F PNEUMOC VAC/ADMIN/RCVD: CPT | Performed by: SURGERY

## 2019-07-15 PROCEDURE — 99204 OFFICE O/P NEW MOD 45 MIN: CPT | Performed by: SURGERY

## 2019-07-15 PROCEDURE — 1090F PRES/ABSN URINE INCON ASSESS: CPT | Performed by: SURGERY

## 2019-07-15 NOTE — PROGRESS NOTES
72 hours. No results for input(s): NA, K, CO2, PHOS, BUN, CREATININE in the last 72 hours. Invalid input(s): CA    No results for input(s): PROT, INR, LIPASE, AMYLASE in the last 72 hours.     Labs:  CBC:   Lab Results   Component Value Date    WBC 8.0 06/17/2019    RBC 4.26 06/17/2019    HGB 9.8 06/17/2019    HCT 33.9 06/17/2019    MCV 79.6 06/17/2019    MCH 23.0 06/17/2019    MCHC 28.9 06/17/2019    RDW 16.3 06/17/2019     06/17/2019    MPV 10.7 06/17/2019       Review of Systems  Review of Systems -  General ROS: negative for - chills, fatigue or malaise  ENT ROS: negative for - hearing change, nasal congestion or nasal discharge  Allergy and Immunology ROS: negative for - hives, itchy/watery eyes or nasal congestion  Hematological and Lymphatic ROS: negative for - blood clots, blood transfusions, bruising or fatigue  Endocrine ROS: negative for - malaise/lethargy, mood swings, palpitations or polydipsia/polyuria  Respiratory ROS: negative for - sputum changes, stridor, tachypnea or wheezing  Cardiovascular ROS: negative for - irregular heartbeat, loss of consciousness, murmur or orthopnea  Gastrointestinal ROS: negative for - constipation, diarrhea, gas/bloating, heartburn or hematemesis  Genito-Urinary ROS: negative for -  genital discharge, genital ulcers or hematuria  Musculoskeletal ROS: negative for - gait disturbance, muscle pain or muscular weakness  Psych/Neuro ROS: negative for - visual or auditory hallucinations, suicidal ideation      Physical exam:   /76 (Site: Left Upper Arm, Position: Sitting, Cuff Size: Small Adult)   Pulse 105   Temp 98.4 °F (36.9 °C) (Oral)   Ht 5' 3\" (1.6 m)   Wt 160 lb (72.6 kg)   LMP  (LMP Unknown)   BMI 28.34 kg/m²   General appearance: AAO, NAD  Eyes: PERRL, EOMI, red conjunctiva  Lungs: Clear bilateral  Chest wall: atraumatic, no tenderness, no echymosis or abrasions  Heart: reg rate, no murmur  Abdomen: soft, nondistended,nontender  Extremities:

## 2019-07-16 ENCOUNTER — TELEPHONE (OUTPATIENT)
Dept: SURGERY | Age: 74
End: 2019-07-16

## 2019-07-18 ENCOUNTER — HOSPITAL ENCOUNTER (OUTPATIENT)
Dept: MAMMOGRAPHY | Age: 74
Discharge: HOME | End: 2019-07-20
Payer: MEDICARE

## 2019-07-18 DIAGNOSIS — Z12.39 SCREENING FOR BREAST CANCER: ICD-10-CM

## 2019-07-18 PROCEDURE — 77063 BREAST TOMOSYNTHESIS BI: CPT

## 2019-07-22 ENCOUNTER — TELEPHONE (OUTPATIENT)
Dept: SURGERY | Age: 74
End: 2019-07-22

## 2019-07-26 RX ORDER — SODIUM CHLORIDE, SODIUM LACTATE, POTASSIUM CHLORIDE, CALCIUM CHLORIDE 600; 310; 30; 20 MG/100ML; MG/100ML; MG/100ML; MG/100ML
INJECTION, SOLUTION INTRAVENOUS CONTINUOUS
Status: CANCELLED | OUTPATIENT
Start: 2019-07-29

## 2019-07-26 NOTE — PROGRESS NOTES
[id number]                                                                                                                    PRE OP INSTRUCTIONS FOR  Shola Kahn        Date: 7/26/2019    Date of surgery:    Arrival Time: Hospital will call you between 5pm and 7pm with your final arrival time for surgery    1. Do not eat or drink anything after midnight prior to surgery. This includes no water, chewing gum, mints or ice chips. 2. Take the following medications with a small sip of water on the morning of Surgery:  Caduet    3. Diabetics may take evening dose of insulin but none after midnight. If you feel symptomatic or low blood sugar morning of surgery drink 1-2 ounces of apple juice only. 4. Aspirin, Ibuprofen, Advil, Naproxen, Vitamin E and other Anti-inflammatory products should be stopped  before surgery  as directed by your physician. Take Tylenol only unless instructed otherwise by your surgeon. 5. Check with your Doctor regarding stopping Plavix, Coumadin, Lovenox, Eliquis, Effient, or other blood thinners. 6. Do not smoke,use illicit drugs and do not drink any alcoholic beverages 24 hours prior to surgery. 7. You may brush your teeth the morning of surgery. DO NOT SWALLOW WATER    8. You MUST make arrangements for a responsible adult to take you home after your surgery. You will not be allowed to leave alone or drive yourself home. It is strongly suggested someone stay with you the first 24 hrs. Your surgery will be cancelled if you do not have a ride home. 9. PEDIATRIC PATIENTS ONLY:  A parent/legal guardian must accompany a child scheduled for surgery and plan to stay at the hospital until the child is discharged. Please do not bring other children with you.     10. Please wear simple, loose fitting clothing to the hospital.  Jerel Pineda not bring valuables (money, credit cards, checkbooks, etc.) Do not wear any makeup (including no eye makeup) or nail polish on your fingers or toes. 11. DO NOT wear any jewelry or piercings on day of surgery. All body piercing jewelry must be removed. 12. Shower the night before surgery with _x__Antibacterial soap /JANIE WIPES________    13. TOTAL JOINT REPLACEMENT/HYSTERECTOMY PATIENTS ONLY---Remember to bring Blood Bank bracelet to the hospital on the day of surgery. 14. If you have a Living Will and Durable Power of  for Healthcare, please bring in a copy. 15. If appropriate bring crutches, inspirex, WALKER, CANE etc... 12. Notify your Surgeon if you develop any illness between now and surgery time, cough, cold, fever, sore throat, nausea, vomiting, etc.  Please notify your surgeon if you experience dizziness, shortness of breath or blurred vision between now & the time of your surgery. 17. If you have ___dentures, they will be removed before going to the OR; we will provide you a container. If you wear ___contact lenses or ___glasses, they will be removed; please bring a case for them. 18. To provide excellent care visitors will be limited to 2 in the room at any given time. 19. Please bring picture ID and insurance card. 20. Sleep apnea patients need to bring CPAP AND SETTINGS to hospital on day of surgery. 21. During flu season no children under the age of 15 are permitted in the hospital for the safety of all patients. 22. Other                  Please call AMBULATORY CARE if you have any further questions.    [address] [facility]     [address]

## 2019-07-27 ENCOUNTER — ANESTHESIA EVENT (OUTPATIENT)
Dept: ENDOSCOPY | Age: 74
End: 2019-07-27
Payer: MEDICARE

## 2019-07-29 ENCOUNTER — HOSPITAL ENCOUNTER (OUTPATIENT)
Age: 74
Setting detail: OUTPATIENT SURGERY
Discharge: HOME | End: 2019-07-29
Attending: SURGERY | Admitting: SURGERY
Payer: MEDICARE

## 2019-07-29 ENCOUNTER — ANESTHESIA (OUTPATIENT)
Dept: ENDOSCOPY | Age: 74
End: 2019-07-29
Payer: MEDICARE

## 2019-07-29 ENCOUNTER — TELEPHONE (OUTPATIENT)
Dept: SURGERY | Age: 74
End: 2019-07-29

## 2019-07-29 VITALS
DIASTOLIC BLOOD PRESSURE: 63 MMHG | BODY MASS INDEX: 27.02 KG/M2 | TEMPERATURE: 97.4 F | RESPIRATION RATE: 16 BRPM | OXYGEN SATURATION: 94 % | HEART RATE: 89 BPM | WEIGHT: 152.5 LBS | SYSTOLIC BLOOD PRESSURE: 132 MMHG | HEIGHT: 63 IN

## 2019-07-29 VITALS
SYSTOLIC BLOOD PRESSURE: 128 MMHG | DIASTOLIC BLOOD PRESSURE: 62 MMHG | RESPIRATION RATE: 18 BRPM | OXYGEN SATURATION: 99 %

## 2019-07-29 DIAGNOSIS — C18.2 MALIGNANT NEOPLASM OF ASCENDING COLON (HCC): ICD-10-CM

## 2019-07-29 DIAGNOSIS — K63.89 MASS OF COLON: Primary | ICD-10-CM

## 2019-07-29 PROCEDURE — 88305 TISSUE EXAM BY PATHOLOGIST: CPT

## 2019-07-29 PROCEDURE — 3700000000 HC ANESTHESIA ATTENDED CARE: Performed by: SURGERY

## 2019-07-29 PROCEDURE — 6360000002 HC RX W HCPCS: Performed by: NURSE ANESTHETIST, CERTIFIED REGISTERED

## 2019-07-29 PROCEDURE — 7100000011 HC PHASE II RECOVERY - ADDTL 15 MIN: Performed by: SURGERY

## 2019-07-29 PROCEDURE — 3609010300 HC COLONOSCOPY W/BIOPSY SINGLE/MULTIPLE: Performed by: SURGERY

## 2019-07-29 PROCEDURE — 45380 COLONOSCOPY AND BIOPSY: CPT | Performed by: SURGERY

## 2019-07-29 PROCEDURE — 2709999900 HC NON-CHARGEABLE SUPPLY: Performed by: SURGERY

## 2019-07-29 PROCEDURE — 7100000010 HC PHASE II RECOVERY - FIRST 15 MIN: Performed by: SURGERY

## 2019-07-29 PROCEDURE — 2580000003 HC RX 258: Performed by: SURGERY

## 2019-07-29 PROCEDURE — 2580000003 HC RX 258: Performed by: NURSE ANESTHETIST, CERTIFIED REGISTERED

## 2019-07-29 PROCEDURE — 3700000001 HC ADD 15 MINUTES (ANESTHESIA): Performed by: SURGERY

## 2019-07-29 RX ORDER — SODIUM CHLORIDE 9 MG/ML
INJECTION, SOLUTION INTRAVENOUS CONTINUOUS PRN
Status: DISCONTINUED | OUTPATIENT
Start: 2019-07-29 | End: 2019-07-29 | Stop reason: SDUPTHER

## 2019-07-29 RX ORDER — SODIUM CHLORIDE 0.9 % (FLUSH) 0.9 %
10 SYRINGE (ML) INJECTION PRN
Status: DISCONTINUED | OUTPATIENT
Start: 2019-07-29 | End: 2019-07-29 | Stop reason: HOSPADM

## 2019-07-29 RX ORDER — SODIUM CHLORIDE 0.9 % (FLUSH) 0.9 %
10 SYRINGE (ML) INJECTION EVERY 12 HOURS SCHEDULED
Status: DISCONTINUED | OUTPATIENT
Start: 2019-07-29 | End: 2019-07-29 | Stop reason: HOSPADM

## 2019-07-29 RX ORDER — PROPOFOL 10 MG/ML
INJECTION, EMULSION INTRAVENOUS PRN
Status: DISCONTINUED | OUTPATIENT
Start: 2019-07-29 | End: 2019-07-29 | Stop reason: SDUPTHER

## 2019-07-29 RX ORDER — FENTANYL CITRATE 50 UG/ML
INJECTION, SOLUTION INTRAMUSCULAR; INTRAVENOUS PRN
Status: DISCONTINUED | OUTPATIENT
Start: 2019-07-29 | End: 2019-07-29 | Stop reason: SDUPTHER

## 2019-07-29 RX ORDER — MIDAZOLAM HYDROCHLORIDE 1 MG/ML
INJECTION INTRAMUSCULAR; INTRAVENOUS PRN
Status: DISCONTINUED | OUTPATIENT
Start: 2019-07-29 | End: 2019-07-29 | Stop reason: SDUPTHER

## 2019-07-29 RX ORDER — SODIUM CHLORIDE 9 MG/ML
INJECTION, SOLUTION INTRAVENOUS CONTINUOUS
Status: DISCONTINUED | OUTPATIENT
Start: 2019-07-29 | End: 2019-07-29 | Stop reason: HOSPADM

## 2019-07-29 RX ORDER — PROPOFOL 10 MG/ML
INJECTION, EMULSION INTRAVENOUS CONTINUOUS PRN
Status: DISCONTINUED | OUTPATIENT
Start: 2019-07-29 | End: 2019-07-29 | Stop reason: SDUPTHER

## 2019-07-29 RX ADMIN — MIDAZOLAM 2 MG: 1 INJECTION INTRAMUSCULAR; INTRAVENOUS at 13:47

## 2019-07-29 RX ADMIN — SODIUM CHLORIDE: 9 INJECTION, SOLUTION INTRAVENOUS at 13:45

## 2019-07-29 RX ADMIN — FENTANYL CITRATE 50 MCG: 50 INJECTION, SOLUTION INTRAMUSCULAR; INTRAVENOUS at 13:53

## 2019-07-29 RX ADMIN — SODIUM CHLORIDE: 9 INJECTION, SOLUTION INTRAVENOUS at 13:37

## 2019-07-29 RX ADMIN — PROPOFOL 100 MCG/KG/MIN: 10 INJECTION, EMULSION INTRAVENOUS at 13:49

## 2019-07-29 RX ADMIN — PROPOFOL 50 MG: 10 INJECTION, EMULSION INTRAVENOUS at 13:49

## 2019-07-29 RX ADMIN — FENTANYL CITRATE 50 MCG: 50 INJECTION, SOLUTION INTRAMUSCULAR; INTRAVENOUS at 13:49

## 2019-07-29 ASSESSMENT — PAIN SCALES - GENERAL: PAINLEVEL_OUTOF10: 0

## 2019-07-29 ASSESSMENT — PAIN - FUNCTIONAL ASSESSMENT: PAIN_FUNCTIONAL_ASSESSMENT: 0-10

## 2019-07-29 NOTE — ANESTHESIA POSTPROCEDURE EVALUATION
Department of Anesthesiology  Postprocedure Note    Patient: Kailee Celeste  MRN: [id number]  Birthdate: [birth year]  Date of evaluation: 7/29/2019  Time:  2:39 PM     Procedure Summary     Date:  07/29/19 Room / Location:  [facility] 01 / [facility] ENDOSCOPY    Anesthesia Start:  6558 Anesthesia Stop:  4874    Procedure:  COLONOSCOPY WITH BIOPSY (N/A ) Diagnosis:  (ANEMIA)    Surgeon:  Harper Devine MD Responsible Provider:  Litzy Varghese MD    Anesthesia Type:  MAC ASA Status:  2          Anesthesia Type: MAC    Lesli Phase I: Lesli Score: 10    Lesli Phase II: Lesli Score: 10    Last vitals: Reviewed and per EMR flowsheets.        Anesthesia Post Evaluation    Patient location during evaluation: PACU  Patient participation: complete - patient participated  Level of consciousness: awake and alert  Airway patency: patent  Nausea & Vomiting: no vomiting and no nausea  Complications: no  Cardiovascular status: hemodynamically stable  Respiratory status: acceptable  Hydration status: stable

## 2019-07-30 DIAGNOSIS — R92.8 ABNORMAL MAMMOGRAM: Primary | ICD-10-CM

## 2019-07-30 NOTE — H&P
General Surgery History and Physical    Patient's Name/Date of Birth: Jacinta Mirza / [birth year]    Date: 7/29/2019     Surgeon: Eulalia Munguia M.D.    PCP: Zuleima Rodas DO     Chief Complaint: Anemia    HPI:   Jacinta Mirza is a 68 y.o. female who presents for evaluation of anemia and diagnostic colonoscopy. No history of hemorrhoids, denies history of rectal bleeding, melena, constipation, abdominal pain, abdominal operations. Denies dark or black stools. Denies reflux, heart burn. Denies history of colon cancer in their family. No history of recent weight loss. They are a nonsmoker. She is very reluctant for colonoscopy although he cannot tell me her concerns for avoiding it. I discussed at length the risks and alternatives. Her hgb has downtrended since 2016 from 12 to 9.8 with no explanation for the loss. She has never had a colonoscopy    Allergies   Allergen Reactions    Prevnar 13 [Pneumococcal 13-Kassidy Conj Vacc] Swelling     Redness and swelling in arm at site       Prior to Admission medications    Medication Sig Start Date End Date Taking?  Authorizing Provider   levothyroxine (SYNTHROID) 100 MCG tablet Take 1 tablet by mouth Daily 6/17/19  Yes Carola Staley, DO   Multiple Vitamins-Minerals (PRESERVISION AREDS PO) Take by mouth 2 times daily    Yes Historical Provider, MD   enalapril-hydrochlorothiazide (VASERETIC) 10-25 MG per tablet TAKE 1 TABLET BY MOUTH ONCE DAILY 5/22/19  Yes Levon Staley DO   amLODIPine-atorvastatatin (CADUET) 5-10 MG per tablet TAKE 1 TABLET BY MOUTH ONCE DAILY 2/11/19  Yes Carola Staley DO   Magnesium Oxide 500 MG CAPS Take 1 capsule by mouth daily   Yes Historical Provider, MD   Omega-3 Fatty Acids (FISH OIL) 500 MG CAPS Take 1 capsule by mouth daily   Yes Historical Provider, MD   aspirin 81 MG tablet Take 1 tablet by mouth daily 7/16/18  Yes Carola Staley, DO   Cyanocobalamin (B-12) 2500 MCG TABS Take 1 tablet by mouth three times a hours.    Invalid input(s): CA    No results for input(s): PROT, INR, LIPASE, AMYLASE in the last 72 hours.     Labs:  CBC:   Lab Results   Component Value Date    WBC 8.0 06/17/2019    RBC 4.26 06/17/2019    HGB 9.8 06/17/2019    HCT 33.9 06/17/2019    MCV 79.6 06/17/2019    MCH 23.0 06/17/2019    MCHC 28.9 06/17/2019    RDW 16.3 06/17/2019     06/17/2019    MPV 10.7 06/17/2019       Review of Systems  Review of Systems -  General ROS: negative for - chills, fatigue or malaise  ENT ROS: negative for - hearing change, nasal congestion or nasal discharge  Allergy and Immunology ROS: negative for - hives, itchy/watery eyes or nasal congestion  Hematological and Lymphatic ROS: negative for - blood clots, blood transfusions, bruising or fatigue  Endocrine ROS: negative for - malaise/lethargy, mood swings, palpitations or polydipsia/polyuria  Respiratory ROS: negative for - sputum changes, stridor, tachypnea or wheezing  Cardiovascular ROS: negative for - irregular heartbeat, loss of consciousness, murmur or orthopnea  Gastrointestinal ROS: negative for - constipation, diarrhea, gas/bloating, heartburn or hematemesis  Genito-Urinary ROS: negative for -  genital discharge, genital ulcers or hematuria  Musculoskeletal ROS: negative for - gait disturbance, muscle pain or muscular weakness  Psych/Neuro ROS: negative for - visual or auditory hallucinations, suicidal ideation      Physical exam:   /63   Pulse 89   Temp 97.4 °F (36.3 °C) (Temporal)   Resp 16   Ht 5' 3\" (1.6 m)   Wt 152 lb 8 oz (69.2 kg)   LMP  (LMP Unknown)   SpO2 94%   BMI 27.01 kg/m²   General appearance: AAO, NAD  Eyes: PERRL, EOMI, red conjunctiva  Lungs: Clear bilateral  Chest wall: atraumatic, no tenderness, no echymosis or abrasions  Heart: reg rate, no murmur  Abdomen: soft, nondistended,nontender  Extremities: full ROM all 4 ext  Pulses: 2+ distal  Skin: warm and dry  Neurologic: spontanous eye opening, purposeful, follows complex

## 2019-07-31 ENCOUNTER — HOSPITAL ENCOUNTER (OUTPATIENT)
Dept: CT IMAGING | Age: 74
Discharge: HOME | End: 2019-08-02
Payer: MEDICARE

## 2019-07-31 ENCOUNTER — TELEPHONE (OUTPATIENT)
Dept: GENERAL RADIOLOGY | Age: 74
End: 2019-07-31

## 2019-07-31 DIAGNOSIS — C18.2 MALIGNANT NEOPLASM OF ASCENDING COLON (HCC): ICD-10-CM

## 2019-07-31 DIAGNOSIS — K63.89 MASS OF COLON: ICD-10-CM

## 2019-07-31 PROCEDURE — 2580000003 HC RX 258: Performed by: RADIOLOGY

## 2019-07-31 PROCEDURE — 74177 CT ABD & PELVIS W/CONTRAST: CPT

## 2019-07-31 PROCEDURE — 71260 CT THORAX DX C+: CPT

## 2019-07-31 PROCEDURE — 6360000004 HC RX CONTRAST MEDICATION: Performed by: RADIOLOGY

## 2019-07-31 RX ORDER — SODIUM CHLORIDE 0.9 % (FLUSH) 0.9 %
10 SYRINGE (ML) INJECTION PRN
Status: DISCONTINUED | OUTPATIENT
Start: 2019-07-31 | End: 2019-08-03 | Stop reason: HOSPADM

## 2019-07-31 RX ADMIN — IOPAMIDOL 110 ML: 755 INJECTION, SOLUTION INTRAVENOUS at 19:30

## 2019-07-31 RX ADMIN — Medication 10 ML: at 19:30

## 2019-07-31 RX ADMIN — IOHEXOL 50 ML: 240 INJECTION, SOLUTION INTRATHECAL; INTRAVASCULAR; INTRAVENOUS; ORAL at 19:30

## 2019-07-31 NOTE — TELEPHONE ENCOUNTER
I notified Mary Son that her previous mammogram images arrived today and were reviewed by the radiologist.  Upon review, no additional imaging is recommended now. Instructed that she should have a screening mammogram in 1 year. She verbalized understanding and relief.  Electronically signed by Carlos Kirkpatrick RN, BSN on 7/31/2019 at 3:12 PM

## 2019-08-02 ENCOUNTER — TELEPHONE (OUTPATIENT)
Dept: FAMILY MEDICINE CLINIC | Age: 74
End: 2019-08-02

## 2019-08-02 DIAGNOSIS — F41.9 ANXIETY: Primary | ICD-10-CM

## 2019-08-02 RX ORDER — ALPRAZOLAM 0.25 MG/1
0.25 TABLET ORAL 3 TIMES DAILY PRN
Qty: 50 TABLET | Refills: 0 | Status: SHIPPED | OUTPATIENT
Start: 2019-08-02 | End: 2019-09-01

## 2019-08-05 ENCOUNTER — TELEPHONE (OUTPATIENT)
Dept: SURGERY | Age: 74
End: 2019-08-05

## 2019-08-05 ENCOUNTER — HOSPITAL ENCOUNTER (OUTPATIENT)
Age: 74
Discharge: HOME | End: 2019-08-05
Payer: MEDICARE

## 2019-08-05 ENCOUNTER — OFFICE VISIT (OUTPATIENT)
Dept: SURGERY | Age: 74
End: 2019-08-05
Payer: MEDICARE

## 2019-08-05 VITALS
SYSTOLIC BLOOD PRESSURE: 144 MMHG | TEMPERATURE: 98 F | HEIGHT: 63 IN | RESPIRATION RATE: 18 BRPM | WEIGHT: 152 LBS | HEART RATE: 118 BPM | DIASTOLIC BLOOD PRESSURE: 71 MMHG | BODY MASS INDEX: 26.93 KG/M2 | OXYGEN SATURATION: 97 %

## 2019-08-05 DIAGNOSIS — K63.89 MASS OF COLON: ICD-10-CM

## 2019-08-05 DIAGNOSIS — C18.2 MALIGNANT NEOPLASM OF ASCENDING COLON (HCC): ICD-10-CM

## 2019-08-05 DIAGNOSIS — C18.2 MALIGNANT NEOPLASM OF ASCENDING COLON (HCC): Primary | ICD-10-CM

## 2019-08-05 LAB — CEA: 3.8 NG/ML (ref 0–5.2)

## 2019-08-05 PROCEDURE — 1090F PRES/ABSN URINE INCON ASSESS: CPT | Performed by: SURGERY

## 2019-08-05 PROCEDURE — G8399 PT W/DXA RESULTS DOCUMENT: HCPCS | Performed by: SURGERY

## 2019-08-05 PROCEDURE — 82378 CARCINOEMBRYONIC ANTIGEN: CPT

## 2019-08-05 PROCEDURE — G8427 DOCREV CUR MEDS BY ELIG CLIN: HCPCS | Performed by: SURGERY

## 2019-08-05 PROCEDURE — 4040F PNEUMOC VAC/ADMIN/RCVD: CPT | Performed by: SURGERY

## 2019-08-05 PROCEDURE — 99213 OFFICE O/P EST LOW 20 MIN: CPT | Performed by: SURGERY

## 2019-08-05 PROCEDURE — 36415 COLL VENOUS BLD VENIPUNCTURE: CPT

## 2019-08-05 PROCEDURE — 1036F TOBACCO NON-USER: CPT | Performed by: SURGERY

## 2019-08-05 PROCEDURE — 1123F ACP DISCUSS/DSCN MKR DOCD: CPT | Performed by: SURGERY

## 2019-08-05 PROCEDURE — 3017F COLORECTAL CA SCREEN DOC REV: CPT | Performed by: SURGERY

## 2019-08-05 PROCEDURE — G8417 CALC BMI ABV UP PARAM F/U: HCPCS | Performed by: SURGERY

## 2019-08-05 NOTE — PROGRESS NOTES
swings, palpitations or polydipsia/polyuria  Respiratory ROS: negative for - sputum changes, stridor, tachypnea or wheezing  Cardiovascular ROS: negative for - irregular heartbeat, loss of consciousness, murmur or orthopnea  Gastrointestinal ROS: negative for - constipation, gas/bloating, heartburn or hematemesis  Genito-Urinary ROS: negative for -  genital discharge, genital ulcers or hematuria  Musculoskeletal ROS: negative for - gait disturbance, muscle pain or muscular weakness    Time spent reviewing past medical, surgical, social and family history, vitals, nursing assessment and images. CT abd/chest:  Impression   Solid pulmonary parenchymal nodule in the left lower lobe measures 12   x 11 mm, and lies just above the diaphragm. Size is suitable for PET   CT imaging correlation, which would also BE beneficial for evaluating   any potential hypermetabolic/metastatic findings elsewhere in the   body. Metastatic change in the left lower lung is the diagnosis of   exclusion. Colonoscopy: Total Colonoscopy to the cecum biopsy cecal mass, forecep polypectomy hepatic flexure    Pathology: Diagnosis:  A. Mass, cecum, biopsy: Invasive moderately differentiated  adenocarcinoma. B. Polyp, hepatic flexure of colon, biopsy: Tubular adenoma.     Assessment/Plan:  David Rodgers is a 68 y.o. female s/p colonoscopy with findings of large cecal cancer, poss lung metastasis    Referral to Oncology  Findings concerning for lung metastasis  PET scan per oncology  CEA ordered  Poss surgery next week pending PET finding for lap robot right hemicolectomy  She is having diarrhea and concerns for poss near obstructing lesion she may need resected regardless of metastatic findings  Await oncology eval    Physician Signature: Electronically signed by Dr. Gee Vivar  [phone] (p)  8/5/2019  9:30 AM

## 2019-08-12 ENCOUNTER — HOSPITAL ENCOUNTER (OUTPATIENT)
Dept: INFUSION THERAPY | Age: 74
Discharge: HOME | End: 2019-08-12
Payer: MEDICARE

## 2019-08-12 ENCOUNTER — OFFICE VISIT (OUTPATIENT)
Dept: ONCOLOGY | Age: 74
End: 2019-08-12
Payer: MEDICARE

## 2019-08-12 VITALS
HEART RATE: 117 BPM | WEIGHT: 154.6 LBS | DIASTOLIC BLOOD PRESSURE: 72 MMHG | HEIGHT: 63 IN | SYSTOLIC BLOOD PRESSURE: 150 MMHG | TEMPERATURE: 98.9 F | BODY MASS INDEX: 27.39 KG/M2

## 2019-08-12 DIAGNOSIS — I10 ESSENTIAL HYPERTENSION: ICD-10-CM

## 2019-08-12 DIAGNOSIS — C18.0 CECAL CANCER (HCC): Primary | ICD-10-CM

## 2019-08-12 DIAGNOSIS — E78.2 MIXED HYPERLIPIDEMIA: ICD-10-CM

## 2019-08-12 PROCEDURE — G8417 CALC BMI ABV UP PARAM F/U: HCPCS | Performed by: INTERNAL MEDICINE

## 2019-08-12 PROCEDURE — G8427 DOCREV CUR MEDS BY ELIG CLIN: HCPCS | Performed by: INTERNAL MEDICINE

## 2019-08-12 PROCEDURE — 99204 OFFICE O/P NEW MOD 45 MIN: CPT | Performed by: INTERNAL MEDICINE

## 2019-08-12 PROCEDURE — 1090F PRES/ABSN URINE INCON ASSESS: CPT | Performed by: INTERNAL MEDICINE

## 2019-08-12 PROCEDURE — 99214 OFFICE O/P EST MOD 30 MIN: CPT | Performed by: INTERNAL MEDICINE

## 2019-08-12 RX ORDER — AMLODIPINE BESYLATE AND ATORVASTATIN CALCIUM 5; 10 MG/1; MG/1
TABLET, FILM COATED ORAL
Qty: 90 TABLET | Refills: 1 | Status: SHIPPED
Start: 2019-08-12 | End: 2020-02-10

## 2019-08-12 SDOH — ECONOMIC STABILITY: HOUSING INSECURITY: PLEASE ASSESS YOUR PATIENT'S LEVEL OF DISTRESS CONCERNING HOUSING (SCALE FROM 1-10): 0 (NONE)

## 2019-08-12 NOTE — PROGRESS NOTES
Department of [facility] Oncology  Attending Consult Note    Reason for Visit:  Consultation on a patient with Cecal Cancer    Referring Physician: Mraio Rosario MD    PCP:  Carlos Rich DO    History of Present Illness:  67 y/o female, never smoker, hx of HTN, hyperlipidemia, hypothyroidism, who presented to see general surgery team for evaluation of anemia and diagnostic colonoscopy. Colonoscopy on 07/29/2019: The ileocecal valve was obscured by large cecal fungating mass, 5cm in diameter. 1cm polyp was at the proximal transverse just passed the hepatic flexure  A. Mass, cecum, biopsy: Invasive moderately differentiated  adenocarcinoma. B. Polyp, hepatic flexure of colon, biopsy: Tubular adenoma. CT chest 07/31/2019:   Solid pulmonary parenchymal nodule in the left lower lobe measures 12 x 11 mm, and lies just above diaphragm. CT abdomen/pelvis 07/31/2019: There is a cecal mural mass on the medial aspect of the cecum and proximal ascending colon with perpendicular diameter measurements of .2 x 5.7 x 2.8 cm. It appears to be confined to the bowel wall. There is an enlarged right adrenal lesion measuring 2 cm diameter    CEA 3.8 on 08/05/2019. Referred to our clinic for further evaluation and treatment. Review of Systems;  CONSTITUTIONAL: No fever, chills. Fair appetite. Positive for fatigue. ENMT: Eyes: No diplopia; Nose: No epistaxis. Mouth: No sore throat. RESPIRATORY: No hemoptysis, shortness of breath, cough. CARDIOVASCULAR: No chest pain, palpitations. GASTROINTESTINAL: No nausea/vomiting, abdominal pain. GENITOURINARY: No dysuria, urinary frequency, hematuria. NEURO: No syncope, presyncope, headache.   Remainder:  ROS NEGATIVE    Past Medical History:      Diagnosis Date    Glaucoma     Hyperlipidemia     Hypertension     Hypothyroidism      Past Surgical History:      Procedure Laterality Date    CATARACT REMOVAL WITH IMPLANT      CHOLECYSTECTOMY     

## 2019-08-14 ENCOUNTER — TELEPHONE (OUTPATIENT)
Dept: ADMINISTRATIVE | Age: 74
End: 2019-08-14

## 2019-08-14 DIAGNOSIS — C18.0 CECAL CANCER (HCC): Primary | ICD-10-CM

## 2019-08-15 ENCOUNTER — HOSPITAL ENCOUNTER (OUTPATIENT)
Dept: PET IMAGING | Age: 74
Discharge: HOME | End: 2019-08-17
Payer: MEDICARE

## 2019-08-15 DIAGNOSIS — C18.0 CECAL CANCER (HCC): ICD-10-CM

## 2019-08-15 LAB — METER GLUCOSE: 101 MG/DL (ref 74–99)

## 2019-08-15 PROCEDURE — A9552 F18 FDG: HCPCS | Performed by: RADIOLOGY

## 2019-08-15 PROCEDURE — 3430000000 HC RX DIAGNOSTIC RADIOPHARMACEUTICAL: Performed by: RADIOLOGY

## 2019-08-15 PROCEDURE — 78815 PET IMAGE W/CT SKULL-THIGH: CPT

## 2019-08-15 PROCEDURE — 82962 GLUCOSE BLOOD TEST: CPT

## 2019-08-15 RX ORDER — FLUDEOXYGLUCOSE F 18 200 MCI/ML
15 INJECTION, SOLUTION INTRAVENOUS
Status: COMPLETED | OUTPATIENT
Start: 2019-08-15 | End: 2019-08-15

## 2019-08-15 RX ADMIN — FLUDEOXYGLUCOSE F 18 15 MILLICURIE: 200 INJECTION, SOLUTION INTRAVENOUS at 08:16

## 2019-08-16 ENCOUNTER — OFFICE VISIT (OUTPATIENT)
Dept: PULMONOLOGY | Age: 74
End: 2019-08-16
Payer: MEDICARE

## 2019-08-16 DIAGNOSIS — R91.1 LUNG NODULE: ICD-10-CM

## 2019-08-16 PROCEDURE — 1123F ACP DISCUSS/DSCN MKR DOCD: CPT | Performed by: INTERNAL MEDICINE

## 2019-08-16 PROCEDURE — 99204 OFFICE O/P NEW MOD 45 MIN: CPT | Performed by: INTERNAL MEDICINE

## 2019-08-16 PROCEDURE — G8399 PT W/DXA RESULTS DOCUMENT: HCPCS | Performed by: INTERNAL MEDICINE

## 2019-08-16 PROCEDURE — G8427 DOCREV CUR MEDS BY ELIG CLIN: HCPCS | Performed by: INTERNAL MEDICINE

## 2019-08-16 PROCEDURE — G8417 CALC BMI ABV UP PARAM F/U: HCPCS | Performed by: INTERNAL MEDICINE

## 2019-08-16 PROCEDURE — 4040F PNEUMOC VAC/ADMIN/RCVD: CPT | Performed by: INTERNAL MEDICINE

## 2019-08-16 PROCEDURE — 1036F TOBACCO NON-USER: CPT | Performed by: INTERNAL MEDICINE

## 2019-08-16 PROCEDURE — 3017F COLORECTAL CA SCREEN DOC REV: CPT | Performed by: INTERNAL MEDICINE

## 2019-08-16 PROCEDURE — 1090F PRES/ABSN URINE INCON ASSESS: CPT | Performed by: INTERNAL MEDICINE

## 2019-08-16 PROCEDURE — 99203 OFFICE O/P NEW LOW 30 MIN: CPT | Performed by: INTERNAL MEDICINE

## 2019-08-19 ENCOUNTER — OFFICE VISIT (OUTPATIENT)
Dept: ENDOCRINOLOGY | Age: 74
End: 2019-08-19
Payer: MEDICARE

## 2019-08-19 ENCOUNTER — TELEPHONE (OUTPATIENT)
Dept: ENDOCRINOLOGY | Age: 74
End: 2019-08-19

## 2019-08-19 VITALS
RESPIRATION RATE: 16 BRPM | OXYGEN SATURATION: 96 % | WEIGHT: 155.2 LBS | DIASTOLIC BLOOD PRESSURE: 90 MMHG | BODY MASS INDEX: 27.5 KG/M2 | SYSTOLIC BLOOD PRESSURE: 148 MMHG | HEIGHT: 63 IN | HEART RATE: 110 BPM

## 2019-08-19 DIAGNOSIS — E55.9 VITAMIN D DEFICIENCY: ICD-10-CM

## 2019-08-19 DIAGNOSIS — E04.2 MULTINODULAR GOITER: ICD-10-CM

## 2019-08-19 DIAGNOSIS — E27.8 ADRENAL INCIDENTALOMA (HCC): Primary | ICD-10-CM

## 2019-08-19 PROCEDURE — 1036F TOBACCO NON-USER: CPT | Performed by: INTERNAL MEDICINE

## 2019-08-19 PROCEDURE — 3017F COLORECTAL CA SCREEN DOC REV: CPT | Performed by: INTERNAL MEDICINE

## 2019-08-19 PROCEDURE — G8427 DOCREV CUR MEDS BY ELIG CLIN: HCPCS | Performed by: INTERNAL MEDICINE

## 2019-08-19 PROCEDURE — 99205 OFFICE O/P NEW HI 60 MIN: CPT | Performed by: INTERNAL MEDICINE

## 2019-08-19 PROCEDURE — 1123F ACP DISCUSS/DSCN MKR DOCD: CPT | Performed by: INTERNAL MEDICINE

## 2019-08-19 PROCEDURE — 1090F PRES/ABSN URINE INCON ASSESS: CPT | Performed by: INTERNAL MEDICINE

## 2019-08-19 PROCEDURE — G8399 PT W/DXA RESULTS DOCUMENT: HCPCS | Performed by: INTERNAL MEDICINE

## 2019-08-19 PROCEDURE — G8417 CALC BMI ABV UP PARAM F/U: HCPCS | Performed by: INTERNAL MEDICINE

## 2019-08-19 PROCEDURE — 4040F PNEUMOC VAC/ADMIN/RCVD: CPT | Performed by: INTERNAL MEDICINE

## 2019-08-19 RX ORDER — DEXAMETHASONE 1 MG
1 TABLET ORAL ONCE
Qty: 1 TABLET | Refills: 0 | Status: SHIPPED | OUTPATIENT
Start: 2019-08-19 | End: 2019-08-19

## 2019-08-20 ENCOUNTER — HOSPITAL ENCOUNTER (OUTPATIENT)
Age: 74
Discharge: HOME | End: 2019-08-20
Payer: MEDICARE

## 2019-08-20 DIAGNOSIS — E27.8 ADRENAL INCIDENTALOMA (HCC): ICD-10-CM

## 2019-08-20 LAB — CORTISOL TOTAL: 3.32 MCG/DL (ref 2.68–18.4)

## 2019-08-20 PROCEDURE — 36415 COLL VENOUS BLD VENIPUNCTURE: CPT

## 2019-08-20 PROCEDURE — 82533 TOTAL CORTISOL: CPT

## 2019-08-21 ENCOUNTER — HOSPITAL ENCOUNTER (OUTPATIENT)
Dept: ULTRASOUND IMAGING | Age: 74
Discharge: HOME | End: 2019-08-23
Payer: MEDICARE

## 2019-08-21 DIAGNOSIS — E04.2 MULTINODULAR GOITER: ICD-10-CM

## 2019-08-21 PROCEDURE — 76536 US EXAM OF HEAD AND NECK: CPT

## 2019-08-25 PROBLEM — E27.8 ADRENAL INCIDENTALOMA (HCC): Status: ACTIVE | Noted: 2019-08-25

## 2019-08-25 PROBLEM — E55.9 VITAMIN D DEFICIENCY: Status: ACTIVE | Noted: 2019-08-25

## 2019-08-25 PROBLEM — E04.2 MULTINODULAR GOITER: Status: ACTIVE | Noted: 2019-08-25

## 2019-08-26 ENCOUNTER — HOSPITAL ENCOUNTER (OUTPATIENT)
Dept: INFUSION THERAPY | Age: 74
Discharge: HOME | End: 2019-08-26
Payer: MEDICARE

## 2019-08-26 ENCOUNTER — OFFICE VISIT (OUTPATIENT)
Dept: ONCOLOGY | Age: 74
End: 2019-08-26
Payer: MEDICARE

## 2019-08-26 VITALS
WEIGHT: 154.2 LBS | TEMPERATURE: 97.7 F | DIASTOLIC BLOOD PRESSURE: 73 MMHG | HEIGHT: 63 IN | HEART RATE: 110 BPM | SYSTOLIC BLOOD PRESSURE: 165 MMHG | BODY MASS INDEX: 27.32 KG/M2

## 2019-08-26 DIAGNOSIS — C18.0 CECAL CANCER (HCC): Primary | ICD-10-CM

## 2019-08-26 DIAGNOSIS — Z09 FOLLOW UP: Primary | ICD-10-CM

## 2019-08-26 DIAGNOSIS — C18.0 CECAL CANCER (HCC): ICD-10-CM

## 2019-08-26 LAB
ALBUMIN SERPL-MCNC: 4 G/DL (ref 3.5–5.2)
ALP BLD-CCNC: 114 U/L (ref 35–104)
ALT SERPL-CCNC: 15 U/L (ref 0–32)
ANION GAP SERPL CALCULATED.3IONS-SCNC: 12 MMOL/L (ref 7–16)
AST SERPL-CCNC: 18 U/L (ref 0–31)
BASOPHILS ABSOLUTE: 0.04 E9/L (ref 0–0.2)
BASOPHILS RELATIVE PERCENT: 0.3 % (ref 0–2)
BILIRUB SERPL-MCNC: <0.2 MG/DL (ref 0–1.2)
BUN BLDV-MCNC: 9 MG/DL (ref 8–23)
CALCIUM SERPL-MCNC: 9.2 MG/DL (ref 8.6–10.2)
CHLORIDE BLD-SCNC: 100 MMOL/L (ref 98–107)
CO2: 30 MMOL/L (ref 22–29)
CREAT SERPL-MCNC: 0.7 MG/DL (ref 0.5–1)
EOSINOPHILS ABSOLUTE: 0.08 E9/L (ref 0.05–0.5)
EOSINOPHILS RELATIVE PERCENT: 0.7 % (ref 0–6)
GFR AFRICAN AMERICAN: >60
GFR NON-AFRICAN AMERICAN: >60 ML/MIN/1.73
GLUCOSE BLD-MCNC: 119 MG/DL (ref 74–99)
HCT VFR BLD CALC: 33.3 % (ref 34–48)
HEMOGLOBIN: 9.9 G/DL (ref 11.5–15.5)
IMMATURE GRANULOCYTES #: 0.08 E9/L
IMMATURE GRANULOCYTES %: 0.7 % (ref 0–5)
LYMPHOCYTES ABSOLUTE: 1.46 E9/L (ref 1.5–4)
LYMPHOCYTES RELATIVE PERCENT: 12.5 % (ref 20–42)
MCH RBC QN AUTO: 22.4 PG (ref 26–35)
MCHC RBC AUTO-ENTMCNC: 29.7 % (ref 32–34.5)
MCV RBC AUTO: 75.5 FL (ref 80–99.9)
MONOCYTES ABSOLUTE: 0.91 E9/L (ref 0.1–0.95)
MONOCYTES RELATIVE PERCENT: 7.8 % (ref 2–12)
NEUTROPHILS ABSOLUTE: 9.13 E9/L (ref 1.8–7.3)
NEUTROPHILS RELATIVE PERCENT: 78 % (ref 43–80)
PDW BLD-RTO: 16.9 FL (ref 11.5–15)
PLATELET # BLD: 360 E9/L (ref 130–450)
PMV BLD AUTO: 10.1 FL (ref 7–12)
POTASSIUM SERPL-SCNC: 4.2 MMOL/L (ref 3.5–5)
RBC # BLD: 4.41 E12/L (ref 3.5–5.5)
SODIUM BLD-SCNC: 142 MMOL/L (ref 132–146)
TOTAL PROTEIN: 7.5 G/DL (ref 6.4–8.3)
WBC # BLD: 11.7 E9/L (ref 4.5–11.5)

## 2019-08-26 PROCEDURE — 99214 OFFICE O/P EST MOD 30 MIN: CPT | Performed by: INTERNAL MEDICINE

## 2019-08-26 PROCEDURE — 99212 OFFICE O/P EST SF 10 MIN: CPT

## 2019-08-26 PROCEDURE — G8417 CALC BMI ABV UP PARAM F/U: HCPCS | Performed by: INTERNAL MEDICINE

## 2019-08-26 PROCEDURE — 1036F TOBACCO NON-USER: CPT | Performed by: INTERNAL MEDICINE

## 2019-08-26 PROCEDURE — 36415 COLL VENOUS BLD VENIPUNCTURE: CPT

## 2019-08-26 PROCEDURE — G8399 PT W/DXA RESULTS DOCUMENT: HCPCS | Performed by: INTERNAL MEDICINE

## 2019-08-26 PROCEDURE — 80053 COMPREHEN METABOLIC PANEL: CPT

## 2019-08-26 PROCEDURE — 1123F ACP DISCUSS/DSCN MKR DOCD: CPT | Performed by: INTERNAL MEDICINE

## 2019-08-26 PROCEDURE — 3017F COLORECTAL CA SCREEN DOC REV: CPT | Performed by: INTERNAL MEDICINE

## 2019-08-26 PROCEDURE — G8427 DOCREV CUR MEDS BY ELIG CLIN: HCPCS | Performed by: INTERNAL MEDICINE

## 2019-08-26 PROCEDURE — 85025 COMPLETE CBC W/AUTO DIFF WBC: CPT

## 2019-08-26 PROCEDURE — 1090F PRES/ABSN URINE INCON ASSESS: CPT | Performed by: INTERNAL MEDICINE

## 2019-08-26 PROCEDURE — 4040F PNEUMOC VAC/ADMIN/RCVD: CPT | Performed by: INTERNAL MEDICINE

## 2019-08-26 NOTE — PROGRESS NOTES
SW met with pt, daughter, and  today to introduce self and role. Distress screening high with specific concerns related to financial and family issues. Pt presents as overwhelmed today but cites excellent family support and independence. She is awaiting treatment plan, but is primarily concerned about her finances in affording treatment. SW advised her of financial navigator and that she has already completed a benefits investigation. SW provided brief overview of financial assistance program and advised that when treatment plan is determined, financial navigator and other key staff would be updated if assistance is needed. Pt appreciative of this. She does not have any immediate concerns at this time as she is waiting for plan from oncologist during visit today. SW answered questions to her satisfaction and provided contact information should future concerns arise.  Brigido Liang, MSW, LISW-S, OSW-C  Oncology Social Worker
measuring 2 cm diameter    CEA 3.8 on 08/05/2019. PET/CT scan 08/15/2019  No FDG avid uptake is identified which exceeds the threshold SUV in the left pulmonary nodule. Pulmonary team (Dr. Chikis Lyons) consult appreciated; repeat CT chest in 3 months. No uptake in adrenal glands. Abnormal tracer uptake in the right thyroid which is focal. Thyroid U/S 08/21/2019 noted thyroid nodules. Endocrinology team consult (Dr. Niki Rose) appreciated. Abnormal tracer uptake at the level the cecum which exceeds the threshold SUV. Proceed with Right hemicolectomy by Dr. Praneeth Lin at this time. RTC 2-3 weeks post-operatively to discuss surgical pathology.     Cecilio Pa MD   5/21/9355  Board Certified Medical Oncologist

## 2019-08-27 ENCOUNTER — TELEPHONE (OUTPATIENT)
Dept: ENDOCRINOLOGY | Age: 74
End: 2019-08-27

## 2019-08-27 ENCOUNTER — TELEPHONE (OUTPATIENT)
Dept: SURGERY | Age: 74
End: 2019-08-27

## 2019-08-27 DIAGNOSIS — C18.2 MALIGNANT NEOPLASM OF ASCENDING COLON (HCC): Primary | ICD-10-CM

## 2019-08-27 RX ORDER — NEOMYCIN SULFATE 500 MG/1
1000 TABLET ORAL SEE ADMIN INSTRUCTIONS
Qty: 6 TABLET | Refills: 0 | Status: SHIPPED | OUTPATIENT
Start: 2019-08-27 | End: 2019-08-28

## 2019-08-27 RX ORDER — ERYTHROMYCIN 500 MG/1
1000 TABLET, COATED ORAL SEE ADMIN INSTRUCTIONS
Qty: 6 TABLET | Refills: 0 | Status: ON HOLD | OUTPATIENT
Start: 2019-08-28 | End: 2019-08-30 | Stop reason: HOSPADM

## 2019-08-28 ENCOUNTER — TELEPHONE (OUTPATIENT)
Dept: ENDOCRINOLOGY | Age: 74
End: 2019-08-28

## 2019-08-28 ENCOUNTER — HOSPITAL ENCOUNTER (OUTPATIENT)
Dept: PREADMISSION TESTING | Age: 74
Discharge: HOME | DRG: 331 | End: 2019-08-28
Payer: MEDICARE

## 2019-08-28 VITALS
HEIGHT: 63 IN | BODY MASS INDEX: 26.58 KG/M2 | DIASTOLIC BLOOD PRESSURE: 68 MMHG | WEIGHT: 150 LBS | RESPIRATION RATE: 16 BRPM | OXYGEN SATURATION: 97 % | HEART RATE: 97 BPM | SYSTOLIC BLOOD PRESSURE: 137 MMHG | TEMPERATURE: 98.8 F

## 2019-08-28 DIAGNOSIS — I10 HYPERTENSION, UNSPECIFIED TYPE: Primary | ICD-10-CM

## 2019-08-28 DIAGNOSIS — Z01.818 PRE-OP TESTING: ICD-10-CM

## 2019-08-28 PROCEDURE — 87081 CULTURE SCREEN ONLY: CPT

## 2019-08-28 PROCEDURE — 93005 ELECTROCARDIOGRAM TRACING: CPT | Performed by: ANESTHESIOLOGY

## 2019-08-28 ASSESSMENT — PAIN SCALES - GENERAL: PAINLEVEL_OUTOF10: 0

## 2019-08-29 ENCOUNTER — ANESTHESIA EVENT (OUTPATIENT)
Dept: OPERATING ROOM | Age: 74
DRG: 331 | End: 2019-08-29
Payer: MEDICARE

## 2019-08-29 ENCOUNTER — HOSPITAL ENCOUNTER (INPATIENT)
Age: 74
LOS: 2 days | Discharge: HOME | DRG: 331 | End: 2019-08-31
Attending: SURGERY | Admitting: SURGERY
Payer: MEDICARE

## 2019-08-29 ENCOUNTER — ANESTHESIA (OUTPATIENT)
Dept: OPERATING ROOM | Age: 74
DRG: 331 | End: 2019-08-29
Payer: MEDICARE

## 2019-08-29 VITALS
DIASTOLIC BLOOD PRESSURE: 91 MMHG | SYSTOLIC BLOOD PRESSURE: 156 MMHG | TEMPERATURE: 96.8 F | OXYGEN SATURATION: 100 % | RESPIRATION RATE: 4 BRPM

## 2019-08-29 PROBLEM — C18.9 COLON CANCER (HCC): Status: ACTIVE | Noted: 2019-08-29

## 2019-08-29 LAB — MRSA CULTURE ONLY: NORMAL

## 2019-08-29 PROCEDURE — 1200000000 HC SEMI PRIVATE

## 2019-08-29 PROCEDURE — 2580000003 HC RX 258: Performed by: SURGERY

## 2019-08-29 PROCEDURE — 2709999900 HC NON-CHARGEABLE SUPPLY: Performed by: SURGERY

## 2019-08-29 PROCEDURE — 6370000000 HC RX 637 (ALT 250 FOR IP): Performed by: SURGERY

## 2019-08-29 PROCEDURE — 3700000000 HC ANESTHESIA ATTENDED CARE: Performed by: SURGERY

## 2019-08-29 PROCEDURE — 3600000009 HC SURGERY ROBOT BASE: Performed by: SURGERY

## 2019-08-29 PROCEDURE — 88309 TISSUE EXAM BY PATHOLOGIST: CPT

## 2019-08-29 PROCEDURE — S2900 ROBOTIC SURGICAL SYSTEM: HCPCS | Performed by: SURGERY

## 2019-08-29 PROCEDURE — 0DNB4ZZ RELEASE ILEUM, PERCUTANEOUS ENDOSCOPIC APPROACH: ICD-10-PCS | Performed by: SURGERY

## 2019-08-29 PROCEDURE — 2500000003 HC RX 250 WO HCPCS

## 2019-08-29 PROCEDURE — 7100000000 HC PACU RECOVERY - FIRST 15 MIN: Performed by: SURGERY

## 2019-08-29 PROCEDURE — 2500000003 HC RX 250 WO HCPCS: Performed by: SURGERY

## 2019-08-29 PROCEDURE — 7100000001 HC PACU RECOVERY - ADDTL 15 MIN: Performed by: SURGERY

## 2019-08-29 PROCEDURE — 2720000010 HC SURG SUPPLY STERILE: Performed by: SURGERY

## 2019-08-29 PROCEDURE — 6360000002 HC RX W HCPCS: Performed by: SURGERY

## 2019-08-29 PROCEDURE — 0DTF4ZZ RESECTION OF RIGHT LARGE INTESTINE, PERCUTANEOUS ENDOSCOPIC APPROACH: ICD-10-PCS | Performed by: SURGERY

## 2019-08-29 PROCEDURE — 8E0W4CZ ROBOTIC ASSISTED PROCEDURE OF TRUNK REGION, PERCUTANEOUS ENDOSCOPIC APPROACH: ICD-10-PCS | Performed by: SURGERY

## 2019-08-29 PROCEDURE — 44207 L COLECTOMY/COLOPROCTOSTOMY: CPT | Performed by: SURGERY

## 2019-08-29 PROCEDURE — 3600000019 HC SURGERY ROBOT ADDTL 15MIN: Performed by: SURGERY

## 2019-08-29 PROCEDURE — 6360000002 HC RX W HCPCS

## 2019-08-29 PROCEDURE — 2580000003 HC RX 258

## 2019-08-29 PROCEDURE — 3700000001 HC ADD 15 MINUTES (ANESTHESIA): Performed by: SURGERY

## 2019-08-29 RX ORDER — OXYCODONE HYDROCHLORIDE 5 MG/1
10 TABLET ORAL EVERY 4 HOURS PRN
Status: DISCONTINUED | OUTPATIENT
Start: 2019-08-29 | End: 2019-08-31 | Stop reason: HOSPADM

## 2019-08-29 RX ORDER — MEPERIDINE HYDROCHLORIDE 25 MG/ML
12.5 INJECTION INTRAMUSCULAR; INTRAVENOUS; SUBCUTANEOUS EVERY 5 MIN PRN
Status: DISCONTINUED | OUTPATIENT
Start: 2019-08-29 | End: 2019-08-29 | Stop reason: HOSPADM

## 2019-08-29 RX ORDER — KETOROLAC TROMETHAMINE 30 MG/ML
INJECTION, SOLUTION INTRAMUSCULAR; INTRAVENOUS PRN
Status: DISCONTINUED | OUTPATIENT
Start: 2019-08-29 | End: 2019-08-29 | Stop reason: SDUPTHER

## 2019-08-29 RX ORDER — DEXAMETHASONE SODIUM PHOSPHATE 10 MG/ML
INJECTION, SOLUTION INTRAMUSCULAR; INTRAVENOUS PRN
Status: DISCONTINUED | OUTPATIENT
Start: 2019-08-29 | End: 2019-08-29 | Stop reason: SDUPTHER

## 2019-08-29 RX ORDER — GLYCOPYRROLATE 1 MG/5 ML
SYRINGE (ML) INTRAVENOUS PRN
Status: DISCONTINUED | OUTPATIENT
Start: 2019-08-29 | End: 2019-08-29 | Stop reason: SDUPTHER

## 2019-08-29 RX ORDER — LEVOTHYROXINE SODIUM 0.1 MG/1
100 TABLET ORAL DAILY
Status: DISCONTINUED | OUTPATIENT
Start: 2019-08-29 | End: 2019-08-31

## 2019-08-29 RX ORDER — SODIUM CHLORIDE 0.9 % (FLUSH) 0.9 %
10 SYRINGE (ML) INJECTION PRN
Status: DISCONTINUED | OUTPATIENT
Start: 2019-08-29 | End: 2019-08-31 | Stop reason: HOSPADM

## 2019-08-29 RX ORDER — PROPOFOL 10 MG/ML
INJECTION, EMULSION INTRAVENOUS PRN
Status: DISCONTINUED | OUTPATIENT
Start: 2019-08-29 | End: 2019-08-29 | Stop reason: SDUPTHER

## 2019-08-29 RX ORDER — SODIUM CHLORIDE, SODIUM LACTATE, POTASSIUM CHLORIDE, CALCIUM CHLORIDE 600; 310; 30; 20 MG/100ML; MG/100ML; MG/100ML; MG/100ML
INJECTION, SOLUTION INTRAVENOUS CONTINUOUS PRN
Status: DISCONTINUED | OUTPATIENT
Start: 2019-08-29 | End: 2019-08-29 | Stop reason: SDUPTHER

## 2019-08-29 RX ORDER — ONDANSETRON 2 MG/ML
4 INJECTION INTRAMUSCULAR; INTRAVENOUS EVERY 6 HOURS PRN
Status: DISCONTINUED | OUTPATIENT
Start: 2019-08-29 | End: 2019-08-31 | Stop reason: HOSPADM

## 2019-08-29 RX ORDER — NEOSTIGMINE METHYLSULFATE 1 MG/ML
INJECTION, SOLUTION INTRAVENOUS PRN
Status: DISCONTINUED | OUTPATIENT
Start: 2019-08-29 | End: 2019-08-29 | Stop reason: SDUPTHER

## 2019-08-29 RX ORDER — SODIUM CHLORIDE, SODIUM LACTATE, POTASSIUM CHLORIDE, CALCIUM CHLORIDE 600; 310; 30; 20 MG/100ML; MG/100ML; MG/100ML; MG/100ML
INJECTION, SOLUTION INTRAVENOUS CONTINUOUS
Status: DISCONTINUED | OUTPATIENT
Start: 2019-08-29 | End: 2019-08-30

## 2019-08-29 RX ORDER — SODIUM CHLORIDE 0.9 % (FLUSH) 0.9 %
10 SYRINGE (ML) INJECTION EVERY 12 HOURS SCHEDULED
Status: DISCONTINUED | OUTPATIENT
Start: 2019-08-29 | End: 2019-08-29 | Stop reason: HOSPADM

## 2019-08-29 RX ORDER — SODIUM CHLORIDE 0.9 % (FLUSH) 0.9 %
10 SYRINGE (ML) INJECTION PRN
Status: DISCONTINUED | OUTPATIENT
Start: 2019-08-29 | End: 2019-08-29 | Stop reason: HOSPADM

## 2019-08-29 RX ORDER — BUPIVACAINE HYDROCHLORIDE 5 MG/ML
INJECTION, SOLUTION EPIDURAL; INTRACAUDAL PRN
Status: DISCONTINUED | OUTPATIENT
Start: 2019-08-29 | End: 2019-08-29 | Stop reason: ALTCHOICE

## 2019-08-29 RX ORDER — HYDROMORPHONE HYDROCHLORIDE 1 MG/ML
0.25 INJECTION, SOLUTION INTRAMUSCULAR; INTRAVENOUS; SUBCUTANEOUS EVERY 5 MIN PRN
Status: DISCONTINUED | OUTPATIENT
Start: 2019-08-29 | End: 2019-08-29 | Stop reason: HOSPADM

## 2019-08-29 RX ORDER — ALPRAZOLAM 0.25 MG/1
0.25 TABLET ORAL 3 TIMES DAILY PRN
Status: DISCONTINUED | OUTPATIENT
Start: 2019-08-29 | End: 2019-08-31 | Stop reason: HOSPADM

## 2019-08-29 RX ORDER — HYDROMORPHONE HYDROCHLORIDE 1 MG/ML
0.5 INJECTION, SOLUTION INTRAMUSCULAR; INTRAVENOUS; SUBCUTANEOUS EVERY 5 MIN PRN
Status: DISCONTINUED | OUTPATIENT
Start: 2019-08-29 | End: 2019-08-29 | Stop reason: HOSPADM

## 2019-08-29 RX ORDER — ACETAMINOPHEN 325 MG/1
650 TABLET ORAL EVERY 4 HOURS PRN
Status: DISCONTINUED | OUTPATIENT
Start: 2019-08-29 | End: 2019-08-31 | Stop reason: HOSPADM

## 2019-08-29 RX ORDER — FENTANYL CITRATE 50 UG/ML
INJECTION, SOLUTION INTRAMUSCULAR; INTRAVENOUS PRN
Status: DISCONTINUED | OUTPATIENT
Start: 2019-08-29 | End: 2019-08-29 | Stop reason: SDUPTHER

## 2019-08-29 RX ORDER — SODIUM CHLORIDE, SODIUM LACTATE, POTASSIUM CHLORIDE, CALCIUM CHLORIDE 600; 310; 30; 20 MG/100ML; MG/100ML; MG/100ML; MG/100ML
INJECTION, SOLUTION INTRAVENOUS CONTINUOUS
Status: DISCONTINUED | OUTPATIENT
Start: 2019-08-29 | End: 2019-08-29

## 2019-08-29 RX ORDER — MORPHINE SULFATE 2 MG/ML
2 INJECTION, SOLUTION INTRAMUSCULAR; INTRAVENOUS
Status: DISCONTINUED | OUTPATIENT
Start: 2019-08-29 | End: 2019-08-31 | Stop reason: HOSPADM

## 2019-08-29 RX ORDER — ROCURONIUM BROMIDE 10 MG/ML
INJECTION, SOLUTION INTRAVENOUS PRN
Status: DISCONTINUED | OUTPATIENT
Start: 2019-08-29 | End: 2019-08-29 | Stop reason: SDUPTHER

## 2019-08-29 RX ORDER — MIDAZOLAM HYDROCHLORIDE 1 MG/ML
INJECTION INTRAMUSCULAR; INTRAVENOUS PRN
Status: DISCONTINUED | OUTPATIENT
Start: 2019-08-29 | End: 2019-08-29 | Stop reason: SDUPTHER

## 2019-08-29 RX ORDER — SODIUM CHLORIDE 0.9 % (FLUSH) 0.9 %
10 SYRINGE (ML) INJECTION EVERY 12 HOURS SCHEDULED
Status: DISCONTINUED | OUTPATIENT
Start: 2019-08-29 | End: 2019-08-31 | Stop reason: HOSPADM

## 2019-08-29 RX ORDER — DOCUSATE SODIUM 100 MG/1
100 CAPSULE, LIQUID FILLED ORAL 2 TIMES DAILY
Status: DISCONTINUED | OUTPATIENT
Start: 2019-08-29 | End: 2019-08-31 | Stop reason: HOSPADM

## 2019-08-29 RX ORDER — LABETALOL 20 MG/4 ML (5 MG/ML) INTRAVENOUS SYRINGE
5 EVERY 10 MIN PRN
Status: DISCONTINUED | OUTPATIENT
Start: 2019-08-29 | End: 2019-08-29 | Stop reason: HOSPADM

## 2019-08-29 RX ORDER — OXYCODONE HYDROCHLORIDE 5 MG/1
5 TABLET ORAL EVERY 4 HOURS PRN
Status: DISCONTINUED | OUTPATIENT
Start: 2019-08-29 | End: 2019-08-31 | Stop reason: HOSPADM

## 2019-08-29 RX ORDER — LIDOCAINE HYDROCHLORIDE 20 MG/ML
INJECTION, SOLUTION INTRAVENOUS PRN
Status: DISCONTINUED | OUTPATIENT
Start: 2019-08-29 | End: 2019-08-29 | Stop reason: SDUPTHER

## 2019-08-29 RX ORDER — ONDANSETRON 2 MG/ML
INJECTION INTRAMUSCULAR; INTRAVENOUS PRN
Status: DISCONTINUED | OUTPATIENT
Start: 2019-08-29 | End: 2019-08-29 | Stop reason: SDUPTHER

## 2019-08-29 RX ORDER — PROMETHAZINE HYDROCHLORIDE 25 MG/ML
25 INJECTION, SOLUTION INTRAMUSCULAR; INTRAVENOUS PRN
Status: DISCONTINUED | OUTPATIENT
Start: 2019-08-29 | End: 2019-08-29 | Stop reason: HOSPADM

## 2019-08-29 RX ORDER — HEPARIN SODIUM 5000 [USP'U]/ML
5000 INJECTION, SOLUTION INTRAVENOUS; SUBCUTANEOUS ONCE
Status: COMPLETED | OUTPATIENT
Start: 2019-08-29 | End: 2019-08-29

## 2019-08-29 RX ADMIN — DOCUSATE SODIUM 100 MG: 100 CAPSULE, LIQUID FILLED ORAL at 21:22

## 2019-08-29 RX ADMIN — Medication 3 MG: at 12:45

## 2019-08-29 RX ADMIN — Medication 0.6 MG: at 12:45

## 2019-08-29 RX ADMIN — DEXAMETHASONE SODIUM PHOSPHATE 10 MG: 10 INJECTION, SOLUTION INTRAMUSCULAR; INTRAVENOUS at 11:11

## 2019-08-29 RX ADMIN — KETOROLAC TROMETHAMINE 30 MG: 30 INJECTION, SOLUTION INTRAMUSCULAR; INTRAVENOUS at 12:25

## 2019-08-29 RX ADMIN — SODIUM CHLORIDE, POTASSIUM CHLORIDE, SODIUM LACTATE AND CALCIUM CHLORIDE: 600; 310; 30; 20 INJECTION, SOLUTION INTRAVENOUS at 15:54

## 2019-08-29 RX ADMIN — PROPOFOL 180 MG: 10 INJECTION, EMULSION INTRAVENOUS at 10:59

## 2019-08-29 RX ADMIN — PHENYLEPHRINE HYDROCHLORIDE 50 MCG: 10 INJECTION INTRAVENOUS at 11:11

## 2019-08-29 RX ADMIN — SODIUM CHLORIDE, POTASSIUM CHLORIDE, SODIUM LACTATE AND CALCIUM CHLORIDE: 600; 310; 30; 20 INJECTION, SOLUTION INTRAVENOUS at 09:33

## 2019-08-29 RX ADMIN — FAMOTIDINE 20 MG: 10 INJECTION, SOLUTION INTRAVENOUS at 21:23

## 2019-08-29 RX ADMIN — FENTANYL CITRATE 50 MCG: 50 INJECTION, SOLUTION INTRAMUSCULAR; INTRAVENOUS at 11:42

## 2019-08-29 RX ADMIN — SODIUM CHLORIDE, POTASSIUM CHLORIDE, SODIUM LACTATE AND CALCIUM CHLORIDE: 600; 310; 30; 20 INJECTION, SOLUTION INTRAVENOUS at 12:26

## 2019-08-29 RX ADMIN — SODIUM CHLORIDE, POTASSIUM CHLORIDE, SODIUM LACTATE AND CALCIUM CHLORIDE: 600; 310; 30; 20 INJECTION, SOLUTION INTRAVENOUS at 10:56

## 2019-08-29 RX ADMIN — Medication 5 MG: at 12:01

## 2019-08-29 RX ADMIN — PHENYLEPHRINE HYDROCHLORIDE 100 MCG: 10 INJECTION INTRAVENOUS at 12:39

## 2019-08-29 RX ADMIN — FENTANYL CITRATE 50 MCG: 50 INJECTION, SOLUTION INTRAMUSCULAR; INTRAVENOUS at 10:59

## 2019-08-29 RX ADMIN — HEPARIN SODIUM 5000 UNITS: 5000 INJECTION, SOLUTION INTRAVENOUS; SUBCUTANEOUS at 09:26

## 2019-08-29 RX ADMIN — PHENYLEPHRINE HYDROCHLORIDE 100 MCG: 10 INJECTION INTRAVENOUS at 12:10

## 2019-08-29 RX ADMIN — Medication 35 MG: at 11:00

## 2019-08-29 RX ADMIN — MIDAZOLAM 1 MG: 1 INJECTION INTRAMUSCULAR; INTRAVENOUS at 10:56

## 2019-08-29 RX ADMIN — PHENYLEPHRINE HYDROCHLORIDE 100 MCG: 10 INJECTION INTRAVENOUS at 11:15

## 2019-08-29 RX ADMIN — PHENYLEPHRINE HYDROCHLORIDE 100 MCG: 10 INJECTION INTRAVENOUS at 11:38

## 2019-08-29 RX ADMIN — LIDOCAINE HYDROCHLORIDE 100 MG: 20 INJECTION, SOLUTION INTRAVENOUS at 10:59

## 2019-08-29 RX ADMIN — ONDANSETRON HYDROCHLORIDE 4 MG: 2 INJECTION, SOLUTION INTRAMUSCULAR; INTRAVENOUS at 12:25

## 2019-08-29 RX ADMIN — ENOXAPARIN SODIUM 40 MG: 40 INJECTION SUBCUTANEOUS at 21:23

## 2019-08-29 RX ADMIN — PHENYLEPHRINE HYDROCHLORIDE 100 MCG: 10 INJECTION INTRAVENOUS at 12:00

## 2019-08-29 RX ADMIN — PHENYLEPHRINE HYDROCHLORIDE 150 MCG: 10 INJECTION INTRAVENOUS at 11:18

## 2019-08-29 RX ADMIN — PHENYLEPHRINE HYDROCHLORIDE 100 MCG: 10 INJECTION INTRAVENOUS at 12:50

## 2019-08-29 RX ADMIN — CEFEPIME HYDROCHLORIDE 2 G: 2 INJECTION, POWDER, FOR SOLUTION INTRAVENOUS at 11:04

## 2019-08-29 RX ADMIN — PHENYLEPHRINE HYDROCHLORIDE 100 MCG: 10 INJECTION INTRAVENOUS at 11:20

## 2019-08-29 RX ADMIN — PHENYLEPHRINE HYDROCHLORIDE 100 MCG: 10 INJECTION INTRAVENOUS at 11:55

## 2019-08-29 ASSESSMENT — PULMONARY FUNCTION TESTS
PIF_VALUE: 23
PIF_VALUE: 14
PIF_VALUE: 23
PIF_VALUE: 19
PIF_VALUE: 25
PIF_VALUE: 22
PIF_VALUE: 22
PIF_VALUE: 14
PIF_VALUE: 22
PIF_VALUE: 23
PIF_VALUE: 14
PIF_VALUE: 22
PIF_VALUE: 13
PIF_VALUE: 21
PIF_VALUE: 15
PIF_VALUE: 22
PIF_VALUE: 23
PIF_VALUE: 13
PIF_VALUE: 23
PIF_VALUE: 2
PIF_VALUE: 23
PIF_VALUE: 24
PIF_VALUE: 25
PIF_VALUE: 17
PIF_VALUE: 22
PIF_VALUE: 23
PIF_VALUE: 17
PIF_VALUE: 1
PIF_VALUE: 14
PIF_VALUE: 15
PIF_VALUE: 14
PIF_VALUE: 2
PIF_VALUE: 15
PIF_VALUE: 23
PIF_VALUE: 2
PIF_VALUE: 22
PIF_VALUE: 23
PIF_VALUE: 14
PIF_VALUE: 2
PIF_VALUE: 24
PIF_VALUE: 23
PIF_VALUE: 13
PIF_VALUE: 15
PIF_VALUE: 13
PIF_VALUE: 13
PIF_VALUE: 22
PIF_VALUE: 23
PIF_VALUE: 15
PIF_VALUE: 14
PIF_VALUE: 23
PIF_VALUE: 15
PIF_VALUE: 24
PIF_VALUE: 24
PIF_VALUE: 1
PIF_VALUE: 23
PIF_VALUE: 3
PIF_VALUE: 23
PIF_VALUE: 15
PIF_VALUE: 3
PIF_VALUE: 23
PIF_VALUE: 22
PIF_VALUE: 13
PIF_VALUE: 13
PIF_VALUE: 23
PIF_VALUE: 13
PIF_VALUE: 17
PIF_VALUE: 23
PIF_VALUE: 23
PIF_VALUE: 1
PIF_VALUE: 24
PIF_VALUE: 16
PIF_VALUE: 14
PIF_VALUE: 23
PIF_VALUE: 24
PIF_VALUE: 17
PIF_VALUE: 13
PIF_VALUE: 23
PIF_VALUE: 25
PIF_VALUE: 13
PIF_VALUE: 2
PIF_VALUE: 17
PIF_VALUE: 13
PIF_VALUE: 22
PIF_VALUE: 22
PIF_VALUE: 13
PIF_VALUE: 17
PIF_VALUE: 17
PIF_VALUE: 23
PIF_VALUE: 21
PIF_VALUE: 15
PIF_VALUE: 23
PIF_VALUE: 23
PIF_VALUE: 22
PIF_VALUE: 23
PIF_VALUE: 21
PIF_VALUE: 24
PIF_VALUE: 14
PIF_VALUE: 24
PIF_VALUE: 22
PIF_VALUE: 13
PIF_VALUE: 2
PIF_VALUE: 2
PIF_VALUE: 13
PIF_VALUE: 4
PIF_VALUE: 12
PIF_VALUE: 15
PIF_VALUE: 15
PIF_VALUE: 24
PIF_VALUE: 23
PIF_VALUE: 19
PIF_VALUE: 1
PIF_VALUE: 13
PIF_VALUE: 17
PIF_VALUE: 16
PIF_VALUE: 24
PIF_VALUE: 2
PIF_VALUE: 25
PIF_VALUE: 24
PIF_VALUE: 23
PIF_VALUE: 17
PIF_VALUE: 14
PIF_VALUE: 14
PIF_VALUE: 23
PIF_VALUE: 17
PIF_VALUE: 2
PIF_VALUE: 24
PIF_VALUE: 22
PIF_VALUE: 22

## 2019-08-29 ASSESSMENT — PAIN SCALES - GENERAL
PAINLEVEL_OUTOF10: 0

## 2019-08-29 ASSESSMENT — PAIN - FUNCTIONAL ASSESSMENT: PAIN_FUNCTIONAL_ASSESSMENT: 0-10

## 2019-08-29 NOTE — BRIEF OP NOTE
Brief Postoperative Note  ______________________________________________________________    Patient: David Rodgers  YOB: 1945  MRN: [id number]  Date of Procedure: 8/29/2019    Pre-Op Diagnosis: MALIGNANT NEOPLASM OF ASCENDING COLON    Post-Op Diagnosis: Same       Procedure(s):  RIGHT HEMICOLECTOMY, LAPAROSCOPIC, ROBOTIC XI ASSISTED    Anesthesia: General    Surgeon(s):  Fletcher Berkowitz MD    Assistant: Garrett Fatima    Estimated Blood Loss (mL): 50    Complications: None    Specimens:   ID Type Source Tests Collected by Time Destination   A : RIGHT COLON Tissue Tissue SURGICAL PATHOLOGY Fletcher Berkowitz MD 8/29/2019 1234        Implants:  * No implants in log *      Drains:   Urethral Catheter Straight-tip; Non-latex 16 fr (Active)       Findings: see op note        [id number]    Fletcher Berkowitz MD  Date: 8/29/2019  Time: 12:52 PM

## 2019-08-29 NOTE — CONSULTS
Name: Kailee Celeste  :   MRN: [id number]  Room: [facility] [place]/0314-01  DOS: 2019  PCP: Art Kelly DO  Admitting Physician: Harper Devine MD        Internal Medicine Resident Consult    Chief Complaint:  S/p hemicolectomy    History of Present Illness:  Doyle Bell is a 76year old female presenting after a right hemicolectomy for adenocarcinoma of the colon. Patient is drinking and reports that abdominal pain is well controlled. Patient was still groggy after surgery but otherwise doing well. Patient has no other concerns or questions. Labs:  Lab Results   Component Value Date    WBC 11.7 (H) 2019    HGB 9.9 (L) 2019    HCT 33.3 (L) 2019     2019     2019    K 4.2 2019     2019    CREATININE 0.7 2019    BUN 9 2019    CO2 30 (H) 2019    GLUCOSE 119 (H) 2019    ALT 15 2019    AST 18 2019     No results found for: CKTOTAL, CKMB, CKMBINDEX, TROPONINI  No results for input(s): BNP in the last 72 hours. Radiology:  Ct Chest W Contrast    Result Date: 2019  Patient Name:  Jovana Harden Patient MRN:  [id number] Patient :  1945 Patient Age:  68 years Patient Gender:  Female Order Date:2019 5:50 PM EXAM:  CT CHEST W CONTRAST, CT ABDOMEN PELVIS W IV CONTRAST CT CHEST: NUMBER OF IMAGES:  276 INDICATION:  Colon cancer staging COMPARISON: None. TECHNIQUE:  Axial images were obtained from the apices of the lungs through the upper abdomen in conjunction with the intravenous administration of 110 mL of Isovue-370. Sagittal and coronal reconstructions performed for aid in interpretation of the study. Low-dose CT  acquisition technique included one of following options; 1 . Automated exposure control, 2. Adjustment of MA and or KV according to patient's size or 3. Use of iterative reconstruction.  FINDINGS: Lung window images: Lung window images show subtle interstitial granulomatous dimension of 6.2 cm. There is no definite extraluminal extension, and only small cecal mesenteric lymph nodes are identified. PELVIS: There is no dependent free pelvic fluid or pelvic adenopathy. LYMPHATICS: No retroperitoneal adenopathy is identified. Morna Rout VASCULAR: There is no evidence of acute vascular pathology involving mesenteric or retroperitoneal great vessels. Atherosclerotic aortoiliac calcification is documented. SKELETAL: Mild degenerative changes of the hips and spine are noted. IMPRESSION: There is a cecal mural mass on the medial aspect of the cecum and proximal ascending colon with perpendicular diameter measurements of 6.2 x 5.7 x 2.8 cm. It appears to be confined to the bowel wall. There is an enlarged adrenal measuring 2 cm diameter. It could be further characterized by PET CT baseline imaging, which could identify other foci of possible metastatic disease, including a 12 mm diameter soft tissue nodule in the left lower lung. ALERT:  THIS IS AN ABNORMAL REPORT-cecal colon cancer with enlarged right adrenal and left lower lobe soft tissue nodule that could be characterized by PET CT imaging. Pet Ct Skull Base To Mid Thigh    Addendum Date: 8/15/2019    This study was interpreted without the benefit of reconstruction software \"Oasis\". The system was unavailable due to hardware failure. Result Date: 8/15/2019  Patient MRN: [id number] : 1945 Age:  68 years Gender: Female Order Date: 8/15/2019 8:16 AM Exam: PET CT SKULL BASE TO MID THIGH Number of Images: [id number] views Indication:  C18.0 Cecal cancer (HCC) cecal cancer Comparison: CT scan of the chest 2019 and CT scan of the abdomen 2019 Findings: PET images were obtained from skull base to the mid thigh. Nondiagnostic CT images were obtained for the purposes of attenuation correction. 15.1 mCi of F-18 glucose was injected. Images reveal abnormal tracer uptake in the right thyroid.  This is asymmetric and focal. The pulmonary nodule CATARACT REMOVAL WITH IMPLANT Bilateral     CHOLECYSTECTOMY      COLONOSCOPY N/A 7/29/2019    COLONOSCOPY WITH BIOPSY performed by Marian Sherman MD at [address]         Family Hx:  Family History   Problem Relation Age of Onset    Early Death Father    Santiago Cancer Sister     Lung Cancer Sister     Cancer Brother     Lung Cancer Brother         Home Medications:  Prior to Admission medications    Medication Sig Start Date End Date Taking? Authorizing Provider   erythromycin base (E-MYCIN) 500 MG tablet Take 2 tablets by mouth See Admin Instructions for 1 day Take 2 tablets by mouth at 1:00pm, 2:00pm, and 10:00pm the day before surgery 8/28/19 8/29/19 Yes Marian Sherman MD   amLODIPine-atorvastatatin (CADUET) 5-10 MG per tablet TAKE 1 TABLET BY MOUTH ONCE DAILY 8/12/19  Yes Carola Staley, DO   ALPRAZolam (XANAX) 0.25 MG tablet Take 1 tablet by mouth 3 times daily as needed for Anxiety for up to 30 days.  8/2/19 9/1/19 Yes Carola Staley, DO   levothyroxine (SYNTHROID) 100 MCG tablet Take 1 tablet by mouth Daily 6/17/19  Yes Carola Peñaterlin, DO   Multiple Vitamins-Minerals (PRESERVISION AREDS PO) Take by mouth 2 times daily    Yes Historical Provider, MD   enalapril-hydrochlorothiazide (VASERETIC) 10-25 MG per tablet TAKE 1 TABLET BY MOUTH ONCE DAILY 5/22/19  Yes Connor Staley, DO   Magnesium Oxide 500 MG CAPS Take 1 capsule by mouth daily   Yes Historical Provider, MD   Omega-3 Fatty Acids (FISH OIL) 500 MG CAPS Take 1 capsule by mouth daily   Yes Historical Provider, MD   aspirin 81 MG tablet Take 1 tablet by mouth daily 7/16/18  Yes Carola Staley, DO   vitamin D (CHOLECALCIFEROL) 5000 UNITS CAPS capsule Take 5,000 Units by mouth daily   Yes Historical Provider, MD   magnesium citrate solution Take 296 mLs by mouth See Admin Instructions for 2 days Drink one bottle between 4:00 and 6:00 pm followed by at least 8oz water 2 days before procedure  Drink one bottle

## 2019-08-29 NOTE — ANESTHESIA PRE PROCEDURE
auscultation                             Cardiovascular:  Exercise tolerance: good (>4 METS),   (+) hypertension: moderate, hyperlipidemia        Rhythm: regular  Rate: normal           Beta Blocker:  Not on Beta Blocker         Neuro/Psych:   Negative Neuro/Psych ROS              GI/Hepatic/Renal: Neg GI/Hepatic/Renal ROS            Endo/Other:    (+) hypothyroidism, blood dyscrasia: anemia:., malignancy/cancer (MALIGNANT NEOPLASM OF ASCENDING COLON). ROS comment: Glaucoma. Abdominal:           Vascular: negative vascular ROS. Anesthesia Plan      general     ASA 3       Induction: intravenous. MIPS: Postoperative opioids intended and Prophylactic antiemetics administered. Anesthetic plan and risks discussed with patient. Plan discussed with CRNA.                   Kym Gutierrez MD   8/29/2019

## 2019-08-30 LAB
ALBUMIN SERPL-MCNC: 3.4 G/DL (ref 3.5–5.2)
ALP BLD-CCNC: 101 U/L (ref 35–104)
ALT SERPL-CCNC: 10 U/L (ref 0–32)
ANION GAP SERPL CALCULATED.3IONS-SCNC: 11 MMOL/L (ref 7–16)
AST SERPL-CCNC: 12 U/L (ref 0–31)
BASOPHILS ABSOLUTE: 0.02 E9/L (ref 0–0.2)
BASOPHILS RELATIVE PERCENT: 0.1 % (ref 0–2)
BILIRUB SERPL-MCNC: 0.3 MG/DL (ref 0–1.2)
BILIRUBIN DIRECT: <0.2 MG/DL (ref 0–0.3)
BILIRUBIN, INDIRECT: ABNORMAL MG/DL (ref 0–1)
BUN BLDV-MCNC: 12 MG/DL (ref 8–23)
CALCIUM SERPL-MCNC: 8.7 MG/DL (ref 8.6–10.2)
CHLORIDE BLD-SCNC: 102 MMOL/L (ref 98–107)
CO2: 23 MMOL/L (ref 22–29)
CREAT SERPL-MCNC: 0.6 MG/DL (ref 0.5–1)
EKG ATRIAL RATE: 87 BPM
EKG P AXIS: 50 DEGREES
EKG P-R INTERVAL: 138 MS
EKG Q-T INTERVAL: 404 MS
EKG QRS DURATION: 94 MS
EKG QTC CALCULATION (BAZETT): 486 MS
EKG R AXIS: 0 DEGREES
EKG T AXIS: -10 DEGREES
EKG VENTRICULAR RATE: 87 BPM
EOSINOPHILS ABSOLUTE: 0 E9/L (ref 0.05–0.5)
EOSINOPHILS RELATIVE PERCENT: 0 % (ref 0–6)
GFR AFRICAN AMERICAN: >60
GFR NON-AFRICAN AMERICAN: >60 ML/MIN/1.73
GLUCOSE BLD-MCNC: 147 MG/DL (ref 74–99)
HCT VFR BLD CALC: 27.3 % (ref 34–48)
HEMOGLOBIN: 8.4 G/DL (ref 11.5–15.5)
IMMATURE GRANULOCYTES #: 0.09 E9/L
IMMATURE GRANULOCYTES %: 0.6 % (ref 0–5)
LYMPHOCYTES ABSOLUTE: 0.73 E9/L (ref 1.5–4)
LYMPHOCYTES RELATIVE PERCENT: 4.9 % (ref 20–42)
MAGNESIUM: 1.7 MG/DL (ref 1.6–2.6)
MCH RBC QN AUTO: 22.4 PG (ref 26–35)
MCHC RBC AUTO-ENTMCNC: 30.8 % (ref 32–34.5)
MCV RBC AUTO: 72.8 FL (ref 80–99.9)
MONOCYTES ABSOLUTE: 0.45 E9/L (ref 0.1–0.95)
MONOCYTES RELATIVE PERCENT: 3 % (ref 2–12)
NEUTROPHILS ABSOLUTE: 13.71 E9/L (ref 1.8–7.3)
NEUTROPHILS RELATIVE PERCENT: 91.4 % (ref 43–80)
PDW BLD-RTO: 16.5 FL (ref 11.5–15)
PHOSPHORUS: 3.4 MG/DL (ref 2.5–4.5)
PLATELET # BLD: 325 E9/L (ref 130–450)
PMV BLD AUTO: 9 FL (ref 7–12)
POTASSIUM REFLEX MAGNESIUM: 4.2 MMOL/L (ref 3.5–5)
RBC # BLD: 3.75 E12/L (ref 3.5–5.5)
SODIUM BLD-SCNC: 136 MMOL/L (ref 132–146)
TOTAL PROTEIN: 6.2 G/DL (ref 6.4–8.3)
TSH SERPL DL<=0.05 MIU/L-ACNC: 0.21 UIU/ML (ref 0.27–4.2)
WBC # BLD: 15 E9/L (ref 4.5–11.5)

## 2019-08-30 PROCEDURE — 84443 ASSAY THYROID STIM HORMONE: CPT

## 2019-08-30 PROCEDURE — 1200000000 HC SEMI PRIVATE

## 2019-08-30 PROCEDURE — 36415 COLL VENOUS BLD VENIPUNCTURE: CPT

## 2019-08-30 PROCEDURE — 80048 BASIC METABOLIC PNL TOTAL CA: CPT

## 2019-08-30 PROCEDURE — 84100 ASSAY OF PHOSPHORUS: CPT

## 2019-08-30 PROCEDURE — 85025 COMPLETE CBC W/AUTO DIFF WBC: CPT

## 2019-08-30 PROCEDURE — 83735 ASSAY OF MAGNESIUM: CPT

## 2019-08-30 PROCEDURE — 80076 HEPATIC FUNCTION PANEL: CPT

## 2019-08-30 PROCEDURE — 2580000003 HC RX 258: Performed by: SURGERY

## 2019-08-30 PROCEDURE — 6370000000 HC RX 637 (ALT 250 FOR IP): Performed by: SURGERY

## 2019-08-30 PROCEDURE — 2500000003 HC RX 250 WO HCPCS: Performed by: SURGERY

## 2019-08-30 PROCEDURE — 6360000002 HC RX W HCPCS: Performed by: SURGERY

## 2019-08-30 RX ORDER — SODIUM CHLORIDE, SODIUM LACTATE, POTASSIUM CHLORIDE, CALCIUM CHLORIDE 600; 310; 30; 20 MG/100ML; MG/100ML; MG/100ML; MG/100ML
INJECTION, SOLUTION INTRAVENOUS CONTINUOUS
Status: DISCONTINUED | OUTPATIENT
Start: 2019-08-30 | End: 2019-08-30

## 2019-08-30 RX ORDER — KETOROLAC TROMETHAMINE 15 MG/ML
15 INJECTION, SOLUTION INTRAMUSCULAR; INTRAVENOUS EVERY 6 HOURS PRN
Status: DISCONTINUED | OUTPATIENT
Start: 2019-08-30 | End: 2019-08-31 | Stop reason: HOSPADM

## 2019-08-30 RX ORDER — IBUPROFEN 800 MG/1
800 TABLET ORAL EVERY 6 HOURS PRN
Qty: 90 TABLET | Refills: 1 | Status: SHIPPED | OUTPATIENT
Start: 2019-08-30 | End: 2019-12-02 | Stop reason: CLARIF

## 2019-08-30 RX ADMIN — DOCUSATE SODIUM 100 MG: 100 CAPSULE, LIQUID FILLED ORAL at 08:12

## 2019-08-30 RX ADMIN — ENOXAPARIN SODIUM 40 MG: 40 INJECTION SUBCUTANEOUS at 18:40

## 2019-08-30 RX ADMIN — FAMOTIDINE 20 MG: 10 INJECTION, SOLUTION INTRAVENOUS at 08:12

## 2019-08-30 RX ADMIN — DOCUSATE SODIUM 100 MG: 100 CAPSULE, LIQUID FILLED ORAL at 19:50

## 2019-08-30 RX ADMIN — SODIUM CHLORIDE, POTASSIUM CHLORIDE, SODIUM LACTATE AND CALCIUM CHLORIDE: 600; 310; 30; 20 INJECTION, SOLUTION INTRAVENOUS at 00:28

## 2019-08-30 RX ADMIN — FAMOTIDINE 20 MG: 10 INJECTION, SOLUTION INTRAVENOUS at 19:50

## 2019-08-30 RX ADMIN — Medication 10 ML: at 19:51

## 2019-08-30 RX ADMIN — Medication 10 ML: at 08:12

## 2019-08-30 RX ADMIN — LEVOTHYROXINE SODIUM 100 MCG: 100 TABLET ORAL at 06:35

## 2019-08-30 RX ADMIN — SODIUM CHLORIDE, POTASSIUM CHLORIDE, SODIUM LACTATE AND CALCIUM CHLORIDE: 600; 310; 30; 20 INJECTION, SOLUTION INTRAVENOUS at 08:17

## 2019-08-30 ASSESSMENT — PAIN SCALES - GENERAL
PAINLEVEL_OUTOF10: 0

## 2019-08-30 NOTE — OP NOTE
[address]                                OPERATIVE REPORT    PATIENT NAME: Alisha Gregg                    :        1945  MED REC NO:   [id number]                            ROOM:       3199  ACCOUNT NO:   [de-identified]                           ADMIT DATE: 2019  PROVIDER:     Fletcher Berkowitz MD    DATE OF PROCEDURE:  2019    PREOPERATIVE DIAGNOSES:  Malignant neoplasm of the ascending colon,  adenocarcinoma. POSTOPERATIVE DIAGNOSES:  Malignant neoplasm of the ascending colon,  adenocarcinoma. PROCEDURE PERFORMED:  Laparoscopic robotic-assisted right hemicolectomy  with intracorporeal anastomosis. ANESTHESIA:  General endotracheal.    SURGEON:  Fletcher Berkowitz MD    ASSISTANT:  Dr. Garrett Fatima. COMPLICATIONS:  None. ESTIMATED BLOOD LOSS:  50 mL. FLUIDS:  Crystalloid. SPECIMEN:  Right colon. DESCRIPTION OF PROCEDURE:  This is a 59-year-old female with a history  of a large cecal mass identified in colonoscopy, biopsy-proven  adenocarcinoma. She had a staging workup that was consistent with the  potential thyroid and lung lesions, the fact that it was likely near  obstructing and the fact that it had created such significant anemia,  the patient was consented for colectomy. After being explained risks  and benefits, she agreed. She was taken to operating room, placed  supine, administered general anesthesia, intubated. Once airway was  secured, she was adequately sedated, prepped and draped in normal  sterile fashion. Time-out was performed to confirm surgical site and  the patient's name. She received preoperative antibiotics in the form  of cefepime. She also had a mechanical and chemical bowel prep. She  had heparin subcu as well. Collins catheter was placed by the surgical  team.  We then prepped and draped.   I initially made an 8-mm incision  just to the left layered  fashion with 3-0 Vicryl dermal stitches and surgical glue, each one of  the suture and trocar sites were closed with 4-0 Monocryl and surgical  glue. The patient was then awoken, extubated, transferred to  postoperative care unit in stable condition. All instrument counts, lap  counts, needle counts were correct at the completion of procedure.         Kenroy Maldonado MD    D: 08/29/2019 14:04:17       T: 08/29/2019 14:12:15     [email or web address]  Job#: [id number]     Doc#: [id number]    CC:  MD Marlon Danielle MD

## 2019-08-30 NOTE — PLAN OF CARE
Problem: Falls - Risk of:  Goal: Will remain free from falls  Description  Will remain free from falls  8/30/2019 1802 by Hong De RN  Outcome: Met This Shift     Problem: Falls - Risk of:  Goal: Absence of physical injury  Description  Absence of physical injury  Outcome: Met This Shift     Problem: Bowel/Gastric:  Goal: Control of bowel function will improve  Description  Control of bowel function will improve  8/30/2019 1802 by Hong De RN  Outcome: Met This Shift     Problem:  Bowel/Gastric:  Goal: Ability to achieve a regular elimination pattern will improve  Description  Ability to achieve a regular elimination pattern will improve  Outcome: Met This Shift     Problem: Pain:  Goal: Pain level will decrease  Description  Pain level will decrease  8/30/2019 1802 by Hong De RN  Outcome: Met This Shift     Problem: Skin Integrity:  Goal: Risk for impaired skin integrity will decrease  Description  Risk for impaired skin integrity will decrease  8/30/2019 1802 by Hong De RN  Outcome: Met This Shift

## 2019-08-30 NOTE — PLAN OF CARE
Problem: Falls - Risk of:  Goal: Will remain free from falls  Description  Will remain free from falls  8/30/2019 1033 by Alka De Guzman RN  Outcome: Met This Shift  Note:   No falls  8/30/2019 0106 by Dara Baca RN  Outcome: Met This Shift     Problem: Bowel/Gastric:  Goal: Control of bowel function will improve  Description  Control of bowel function will improve  Outcome: Met This Shift  Note:   Able to pass flatus, have active bowel sounds     Problem: Skin Integrity:  Goal: Risk for impaired skin integrity will decrease  Description  Risk for impaired skin integrity will decrease  Outcome: Met This Shift  Note:   No skin breakdown, incisions will show signs of healing     Problem: Pain:  Description  Pain management should include both nonpharmacologic and pharmacologic interventions.   Goal: Pain level will decrease  Description  Pain level will decrease  Outcome: Met This Shift  Note:   Rate pain less than 5

## 2019-08-31 VITALS
DIASTOLIC BLOOD PRESSURE: 69 MMHG | SYSTOLIC BLOOD PRESSURE: 135 MMHG | WEIGHT: 148 LBS | HEART RATE: 76 BPM | RESPIRATION RATE: 16 BRPM | TEMPERATURE: 98.5 F | HEIGHT: 63 IN | OXYGEN SATURATION: 94 % | BODY MASS INDEX: 26.22 KG/M2

## 2019-08-31 LAB — T4 FREE: 1.98 NG/DL (ref 0.93–1.7)

## 2019-08-31 PROCEDURE — 84439 ASSAY OF FREE THYROXINE: CPT

## 2019-08-31 PROCEDURE — 2500000003 HC RX 250 WO HCPCS: Performed by: SURGERY

## 2019-08-31 PROCEDURE — 6370000000 HC RX 637 (ALT 250 FOR IP): Performed by: SURGERY

## 2019-08-31 PROCEDURE — 36415 COLL VENOUS BLD VENIPUNCTURE: CPT

## 2019-08-31 PROCEDURE — 2580000003 HC RX 258: Performed by: SURGERY

## 2019-08-31 RX ORDER — LEVOTHYROXINE SODIUM 0.07 MG/1
75 TABLET ORAL DAILY
Qty: 30 TABLET | Refills: 3 | Status: SHIPPED | OUTPATIENT
Start: 2019-09-01 | End: 2019-12-02 | Stop reason: SDUPTHER

## 2019-08-31 RX ORDER — LEVOTHYROXINE SODIUM 0.07 MG/1
75 TABLET ORAL DAILY
Status: DISCONTINUED | OUTPATIENT
Start: 2019-09-01 | End: 2019-08-31 | Stop reason: HOSPADM

## 2019-08-31 RX ADMIN — FAMOTIDINE 20 MG: 10 INJECTION, SOLUTION INTRAVENOUS at 10:39

## 2019-08-31 RX ADMIN — Medication 10 ML: at 10:40

## 2019-08-31 RX ADMIN — DOCUSATE SODIUM 100 MG: 100 CAPSULE, LIQUID FILLED ORAL at 10:38

## 2019-08-31 RX ADMIN — LEVOTHYROXINE SODIUM 100 MCG: 100 TABLET ORAL at 07:45

## 2019-08-31 ASSESSMENT — PAIN SCALES - GENERAL
PAINLEVEL_OUTOF10: 0

## 2019-08-31 NOTE — PROGRESS NOTES
[facility] Quality Flow/Interdisciplinary Rounds Progress Note        Quality Flow Rounds held on August 30, 2019    Disciplines Attending:  Bedside Nurse, ,  and Nursing Unit Leadership    Jose Enrique Montoya was admitted on 8/29/2019  7:58 AM    Anticipated Discharge Date:  Expected Discharge Date: 09/05/19    Disposition:    Wilton Score:  Wilton Scale Score: 20    Readmission Risk              Risk of Unplanned Readmission:        10           Discussed patient goal for the day, patient clinical progression, and barriers to discharge.   The following Goal(s) of the Day/Commitment(s) have been identified:  Continue diet and increase activity      Aureliano Dacosta  August 30, 2019
advanced at the discretion of the surgical team.  Home medications will be resumed. Her postoperative pain is well controlled. We continue to encourage the use of the incentive spirometer as well as working with the physical therapy team.  She will continue to follow-up with the oncologic team as an outpatient. Continue current therapy. See orders for further plan of care. More than 50% of my time was spent at the bedside counseling/coordinating care with the patient and/or family with face to face contact. This time was spent reviewing notes and laboratory data as well as instructing and counseling the patient. Time I spent with the family or surrogate(s) is included only if the patient was incapable of providing the necessary information or participating in medical decisions. I also discussed the differential diagnosis and all of the proposed management plans with the patient and individuals accompanying the patient. I am readily available for any further decision-making and intervention.        Laci Hutchison DO, F.A.C.O.I.  8/30/2019  8:37 AM
complication. She is acceptable for discharge home from internal medicine standpoint. I have cut back her Synthroid therapy in the setting of her thyroid indices. This will need to be monitored closely as an outpatient. More than 50% of my time was spent at the bedside counseling/coordinating care with the patient and/or family with face to face contact. This time was spent reviewing notes and laboratory data as well as instructing and counseling the patient. Time I spent with the family or surrogate(s) is included only if the patient was incapable of providing the necessary information or participating in medical decisions. I also discussed the differential diagnosis and all of the proposed management plans with the patient and individuals accompanying the patient. I am readily available for any further decision-making and intervention.        Yvon Hernández DO, ADRIAN.C.O.I.  8/31/2019  7:57 AM

## 2019-09-05 ENCOUNTER — TELEPHONE (OUTPATIENT)
Dept: INFUSION THERAPY | Age: 74
End: 2019-09-05

## 2019-09-11 ENCOUNTER — TELEPHONE (OUTPATIENT)
Dept: SURGERY | Age: 74
End: 2019-09-11

## 2019-09-11 ENCOUNTER — OFFICE VISIT (OUTPATIENT)
Dept: SURGERY | Age: 74
End: 2019-09-11

## 2019-09-11 VITALS
TEMPERATURE: 98.2 F | WEIGHT: 143 LBS | OXYGEN SATURATION: 97 % | BODY MASS INDEX: 25.34 KG/M2 | RESPIRATION RATE: 18 BRPM | HEIGHT: 63 IN | DIASTOLIC BLOOD PRESSURE: 65 MMHG | SYSTOLIC BLOOD PRESSURE: 138 MMHG | HEART RATE: 103 BPM

## 2019-09-11 DIAGNOSIS — R91.1 LUNG NODULE: Primary | ICD-10-CM

## 2019-09-11 DIAGNOSIS — C18.9 ADENOCARCINOMA, COLON (HCC): ICD-10-CM

## 2019-09-11 PROCEDURE — 99024 POSTOP FOLLOW-UP VISIT: CPT | Performed by: SURGERY

## 2019-09-13 ENCOUNTER — OFFICE VISIT (OUTPATIENT)
Dept: FAMILY MEDICINE CLINIC | Age: 74
End: 2019-09-13
Payer: MEDICARE

## 2019-09-13 VITALS
DIASTOLIC BLOOD PRESSURE: 66 MMHG | HEIGHT: 63 IN | BODY MASS INDEX: 27.32 KG/M2 | TEMPERATURE: 97.8 F | HEART RATE: 103 BPM | SYSTOLIC BLOOD PRESSURE: 120 MMHG | WEIGHT: 154.2 LBS | OXYGEN SATURATION: 96 % | RESPIRATION RATE: 18 BRPM

## 2019-09-13 DIAGNOSIS — E04.1 THYROID NODULE: ICD-10-CM

## 2019-09-13 DIAGNOSIS — C18.2 MALIGNANT NEOPLASM OF ASCENDING COLON (HCC): ICD-10-CM

## 2019-09-13 DIAGNOSIS — R91.1 PULMONARY NODULE: ICD-10-CM

## 2019-09-13 DIAGNOSIS — E78.2 MIXED HYPERLIPIDEMIA: ICD-10-CM

## 2019-09-13 DIAGNOSIS — E03.9 HYPOTHYROIDISM, UNSPECIFIED TYPE: ICD-10-CM

## 2019-09-13 DIAGNOSIS — I10 ESSENTIAL HYPERTENSION: Primary | ICD-10-CM

## 2019-09-13 PROCEDURE — 3017F COLORECTAL CA SCREEN DOC REV: CPT | Performed by: FAMILY MEDICINE

## 2019-09-13 PROCEDURE — G8417 CALC BMI ABV UP PARAM F/U: HCPCS | Performed by: FAMILY MEDICINE

## 2019-09-13 PROCEDURE — 1090F PRES/ABSN URINE INCON ASSESS: CPT | Performed by: FAMILY MEDICINE

## 2019-09-13 PROCEDURE — G8399 PT W/DXA RESULTS DOCUMENT: HCPCS | Performed by: FAMILY MEDICINE

## 2019-09-13 PROCEDURE — 99214 OFFICE O/P EST MOD 30 MIN: CPT | Performed by: FAMILY MEDICINE

## 2019-09-13 PROCEDURE — 1123F ACP DISCUSS/DSCN MKR DOCD: CPT | Performed by: FAMILY MEDICINE

## 2019-09-13 PROCEDURE — 1111F DSCHRG MED/CURRENT MED MERGE: CPT | Performed by: FAMILY MEDICINE

## 2019-09-13 PROCEDURE — G8427 DOCREV CUR MEDS BY ELIG CLIN: HCPCS | Performed by: FAMILY MEDICINE

## 2019-09-13 PROCEDURE — 4040F PNEUMOC VAC/ADMIN/RCVD: CPT | Performed by: FAMILY MEDICINE

## 2019-09-13 PROCEDURE — 1036F TOBACCO NON-USER: CPT | Performed by: FAMILY MEDICINE

## 2019-09-17 ENCOUNTER — INITIAL CONSULT (OUTPATIENT)
Dept: CARDIOTHORACIC SURGERY | Age: 74
End: 2019-09-17
Payer: MEDICARE

## 2019-09-17 VITALS
WEIGHT: 143 LBS | HEART RATE: 92 BPM | BODY MASS INDEX: 25.34 KG/M2 | HEIGHT: 63 IN | SYSTOLIC BLOOD PRESSURE: 142 MMHG | DIASTOLIC BLOOD PRESSURE: 63 MMHG

## 2019-09-17 DIAGNOSIS — C18.2 MALIGNANT NEOPLASM OF ASCENDING COLON (HCC): Primary | ICD-10-CM

## 2019-09-17 DIAGNOSIS — R91.1 LEFT LOWER LOBE PULMONARY NODULE: ICD-10-CM

## 2019-09-17 PROCEDURE — 99204 OFFICE O/P NEW MOD 45 MIN: CPT | Performed by: THORACIC SURGERY (CARDIOTHORACIC VASCULAR SURGERY)

## 2019-09-17 PROCEDURE — G8417 CALC BMI ABV UP PARAM F/U: HCPCS | Performed by: THORACIC SURGERY (CARDIOTHORACIC VASCULAR SURGERY)

## 2019-09-17 PROCEDURE — G8427 DOCREV CUR MEDS BY ELIG CLIN: HCPCS | Performed by: THORACIC SURGERY (CARDIOTHORACIC VASCULAR SURGERY)

## 2019-09-17 PROCEDURE — 1123F ACP DISCUSS/DSCN MKR DOCD: CPT | Performed by: THORACIC SURGERY (CARDIOTHORACIC VASCULAR SURGERY)

## 2019-09-17 PROCEDURE — 1036F TOBACCO NON-USER: CPT | Performed by: THORACIC SURGERY (CARDIOTHORACIC VASCULAR SURGERY)

## 2019-09-17 PROCEDURE — 1090F PRES/ABSN URINE INCON ASSESS: CPT | Performed by: THORACIC SURGERY (CARDIOTHORACIC VASCULAR SURGERY)

## 2019-09-17 PROCEDURE — 4040F PNEUMOC VAC/ADMIN/RCVD: CPT | Performed by: THORACIC SURGERY (CARDIOTHORACIC VASCULAR SURGERY)

## 2019-09-17 PROCEDURE — G8399 PT W/DXA RESULTS DOCUMENT: HCPCS | Performed by: THORACIC SURGERY (CARDIOTHORACIC VASCULAR SURGERY)

## 2019-09-17 PROCEDURE — 1111F DSCHRG MED/CURRENT MED MERGE: CPT | Performed by: THORACIC SURGERY (CARDIOTHORACIC VASCULAR SURGERY)

## 2019-09-17 PROCEDURE — 3017F COLORECTAL CA SCREEN DOC REV: CPT | Performed by: THORACIC SURGERY (CARDIOTHORACIC VASCULAR SURGERY)

## 2019-09-17 ASSESSMENT — ENCOUNTER SYMPTOMS
SHORTNESS OF BREATH: 0
COUGH: 0

## 2019-09-18 ASSESSMENT — ENCOUNTER SYMPTOMS
RHINORRHEA: 0
ALLERGIC/IMMUNOLOGIC NEGATIVE: 1
BACK PAIN: 0
STRIDOR: 0
SINUS PRESSURE: 0
PHOTOPHOBIA: 0
SHORTNESS OF BREATH: 0
EYE ITCHING: 0
RESPIRATORY NEGATIVE: 1
BLOOD IN STOOL: 0
CONSTIPATION: 0
WHEEZING: 0
VOICE CHANGE: 0
EYES NEGATIVE: 1
EYE PAIN: 0
NAUSEA: 0
VOMITING: 0
FACIAL SWELLING: 0
ABDOMINAL PAIN: 0
RECTAL PAIN: 0
CHEST TIGHTNESS: 0
EYE REDNESS: 0
COUGH: 0
CHOKING: 0
ABDOMINAL DISTENTION: 0
EYE DISCHARGE: 0
ANAL BLEEDING: 0
DIARRHEA: 0
COLOR CHANGE: 0
TROUBLE SWALLOWING: 0
SINUS PAIN: 0
SORE THROAT: 0

## 2019-09-19 ENCOUNTER — TELEPHONE (OUTPATIENT)
Dept: CARDIOTHORACIC SURGERY | Age: 74
End: 2019-09-19

## 2019-09-19 ENCOUNTER — HOSPITAL ENCOUNTER (OUTPATIENT)
Dept: INFUSION THERAPY | Age: 74
Discharge: HOME | End: 2019-09-19
Payer: MEDICARE

## 2019-09-19 ENCOUNTER — OFFICE VISIT (OUTPATIENT)
Dept: ONCOLOGY | Age: 74
End: 2019-09-19
Payer: MEDICARE

## 2019-09-19 ENCOUNTER — HOSPITAL ENCOUNTER (OUTPATIENT)
Age: 74
Discharge: HOME | End: 2019-09-19
Payer: MEDICARE

## 2019-09-19 VITALS
BODY MASS INDEX: 27.05 KG/M2 | HEIGHT: 63 IN | DIASTOLIC BLOOD PRESSURE: 65 MMHG | TEMPERATURE: 97.6 F | SYSTOLIC BLOOD PRESSURE: 143 MMHG | WEIGHT: 152.7 LBS | HEART RATE: 99 BPM

## 2019-09-19 DIAGNOSIS — E55.9 VITAMIN D DEFICIENCY: ICD-10-CM

## 2019-09-19 DIAGNOSIS — C18.0 CECAL CANCER (HCC): Primary | ICD-10-CM

## 2019-09-19 DIAGNOSIS — C18.0 CECAL CANCER (HCC): ICD-10-CM

## 2019-09-19 DIAGNOSIS — E27.8 ADRENAL INCIDENTALOMA (HCC): ICD-10-CM

## 2019-09-19 LAB
ALBUMIN SERPL-MCNC: 4.5 G/DL (ref 3.5–5.2)
ALP BLD-CCNC: 135 U/L (ref 35–104)
ALT SERPL-CCNC: 14 U/L (ref 0–32)
ANION GAP SERPL CALCULATED.3IONS-SCNC: 11 MMOL/L (ref 7–16)
ANION GAP SERPL CALCULATED.3IONS-SCNC: 13 MMOL/L (ref 7–16)
AST SERPL-CCNC: 14 U/L (ref 0–31)
BASOPHILS ABSOLUTE: 0.04 E9/L (ref 0–0.2)
BASOPHILS RELATIVE PERCENT: 0.4 % (ref 0–2)
BILIRUB SERPL-MCNC: 0.4 MG/DL (ref 0–1.2)
BUN BLDV-MCNC: 8 MG/DL (ref 8–23)
BUN BLDV-MCNC: 9 MG/DL (ref 8–23)
CALCIUM SERPL-MCNC: 9.4 MG/DL (ref 8.6–10.2)
CALCIUM SERPL-MCNC: 9.9 MG/DL (ref 8.6–10.2)
CEA: 1.2 NG/ML (ref 0–5.2)
CHLORIDE BLD-SCNC: 97 MMOL/L (ref 98–107)
CHLORIDE BLD-SCNC: 98 MMOL/L (ref 98–107)
CO2: 28 MMOL/L (ref 22–29)
CO2: 32 MMOL/L (ref 22–29)
CREAT SERPL-MCNC: 0.6 MG/DL (ref 0.5–1)
CREAT SERPL-MCNC: 0.6 MG/DL (ref 0.5–1)
EOSINOPHILS ABSOLUTE: 0.05 E9/L (ref 0.05–0.5)
EOSINOPHILS RELATIVE PERCENT: 0.5 % (ref 0–6)
GFR AFRICAN AMERICAN: >60
GFR AFRICAN AMERICAN: >60
GFR NON-AFRICAN AMERICAN: >60 ML/MIN/1.73
GFR NON-AFRICAN AMERICAN: >60 ML/MIN/1.73
GLUCOSE BLD-MCNC: 112 MG/DL (ref 74–99)
GLUCOSE BLD-MCNC: 113 MG/DL (ref 74–99)
HCT VFR BLD CALC: 33.3 % (ref 34–48)
HEMOGLOBIN: 9.8 G/DL (ref 11.5–15.5)
IMMATURE GRANULOCYTES #: 0.05 E9/L
IMMATURE GRANULOCYTES %: 0.5 % (ref 0–5)
LYMPHOCYTES ABSOLUTE: 1.3 E9/L (ref 1.5–4)
LYMPHOCYTES RELATIVE PERCENT: 13.2 % (ref 20–42)
MCH RBC QN AUTO: 22 PG (ref 26–35)
MCHC RBC AUTO-ENTMCNC: 29.4 % (ref 32–34.5)
MCV RBC AUTO: 74.7 FL (ref 80–99.9)
MONOCYTES ABSOLUTE: 0.86 E9/L (ref 0.1–0.95)
MONOCYTES RELATIVE PERCENT: 8.7 % (ref 2–12)
NEUTROPHILS ABSOLUTE: 7.56 E9/L (ref 1.8–7.3)
NEUTROPHILS RELATIVE PERCENT: 76.7 % (ref 43–80)
PDW BLD-RTO: 17.1 FL (ref 11.5–15)
PLATELET # BLD: 378 E9/L (ref 130–450)
PMV BLD AUTO: 10 FL (ref 7–12)
POTASSIUM SERPL-SCNC: 3.6 MMOL/L (ref 3.5–5)
POTASSIUM SERPL-SCNC: 3.8 MMOL/L (ref 3.5–5)
RBC # BLD: 4.46 E12/L (ref 3.5–5.5)
SODIUM BLD-SCNC: 138 MMOL/L (ref 132–146)
SODIUM BLD-SCNC: 141 MMOL/L (ref 132–146)
TOTAL PROTEIN: 8.1 G/DL (ref 6.4–8.3)
VITAMIN D 25-HYDROXY: 37 NG/ML (ref 30–100)
WBC # BLD: 9.9 E9/L (ref 4.5–11.5)

## 2019-09-19 PROCEDURE — 1090F PRES/ABSN URINE INCON ASSESS: CPT | Performed by: INTERNAL MEDICINE

## 2019-09-19 PROCEDURE — 4040F PNEUMOC VAC/ADMIN/RCVD: CPT | Performed by: INTERNAL MEDICINE

## 2019-09-19 PROCEDURE — 82306 VITAMIN D 25 HYDROXY: CPT

## 2019-09-19 PROCEDURE — 1036F TOBACCO NON-USER: CPT | Performed by: INTERNAL MEDICINE

## 2019-09-19 PROCEDURE — 99215 OFFICE O/P EST HI 40 MIN: CPT | Performed by: INTERNAL MEDICINE

## 2019-09-19 PROCEDURE — 84244 ASSAY OF RENIN: CPT

## 2019-09-19 PROCEDURE — 80048 BASIC METABOLIC PNL TOTAL CA: CPT

## 2019-09-19 PROCEDURE — 36415 COLL VENOUS BLD VENIPUNCTURE: CPT | Performed by: INTERNAL MEDICINE

## 2019-09-19 PROCEDURE — G8417 CALC BMI ABV UP PARAM F/U: HCPCS | Performed by: INTERNAL MEDICINE

## 2019-09-19 PROCEDURE — 83835 ASSAY OF METANEPHRINES: CPT

## 2019-09-19 PROCEDURE — 1123F ACP DISCUSS/DSCN MKR DOCD: CPT | Performed by: INTERNAL MEDICINE

## 2019-09-19 PROCEDURE — 36415 COLL VENOUS BLD VENIPUNCTURE: CPT

## 2019-09-19 PROCEDURE — G8399 PT W/DXA RESULTS DOCUMENT: HCPCS | Performed by: INTERNAL MEDICINE

## 2019-09-19 PROCEDURE — 80053 COMPREHEN METABOLIC PANEL: CPT

## 2019-09-19 PROCEDURE — 82378 CARCINOEMBRYONIC ANTIGEN: CPT

## 2019-09-19 PROCEDURE — 99212 OFFICE O/P EST SF 10 MIN: CPT | Performed by: INTERNAL MEDICINE

## 2019-09-19 PROCEDURE — 1111F DSCHRG MED/CURRENT MED MERGE: CPT | Performed by: INTERNAL MEDICINE

## 2019-09-19 PROCEDURE — 82088 ASSAY OF ALDOSTERONE: CPT

## 2019-09-19 PROCEDURE — 85025 COMPLETE CBC W/AUTO DIFF WBC: CPT

## 2019-09-19 PROCEDURE — G8427 DOCREV CUR MEDS BY ELIG CLIN: HCPCS | Performed by: INTERNAL MEDICINE

## 2019-09-19 PROCEDURE — 3017F COLORECTAL CA SCREEN DOC REV: CPT | Performed by: INTERNAL MEDICINE

## 2019-09-19 NOTE — PROGRESS NOTES
SW met with pt and spouse today to follow up on tx plan by Dr. Estelle Lawson and their financial concerns. Pt informed today that she would be observation and not require treatment. Pt pleased with this information. She did confirm that she still has questions on outstanding bills from other [employer]. Will refer to financial navigator. She does not currently have bills but will contact when she does receive these.  Juliane Perez, MSW, LISW-S, OSW-C  Oncology Social Worker
chemotherapy; To be followed with History & Physical and blood work including CEA every 3 months for 2 years, then every 6 months for a total of 5 years. CT scan chest/abdomen/pelvis yearly for 5 years; colonoscopy in one year    RTC 3 months with labs (CBC, CMP CEA).     Mili Islas MD   [id number]  Board Certified Medical Oncologist

## 2019-09-24 LAB
Lab: NORMAL
METANEPH/PLASMA INTERP: NORMAL
METANEPHRINE FREE PLASMA: 0.21 NMOL/L (ref 0–0.49)
NORMETANEPHRINE FREE PLASMA: 0.74 NMOL/L (ref 0–0.89)
REPORT: NORMAL
THIS TEST SENT TO: NORMAL

## 2019-10-03 ENCOUNTER — NURSE ONLY (OUTPATIENT)
Dept: FAMILY MEDICINE CLINIC | Age: 74
End: 2019-10-03
Payer: MEDICARE

## 2019-10-03 DIAGNOSIS — Z23 NEED FOR INFLUENZA VACCINATION: Primary | ICD-10-CM

## 2019-10-03 PROCEDURE — G0008 ADMIN INFLUENZA VIRUS VAC: HCPCS | Performed by: FAMILY MEDICINE

## 2019-10-03 PROCEDURE — 90653 IIV ADJUVANT VACCINE IM: CPT | Performed by: FAMILY MEDICINE

## 2019-10-07 ENCOUNTER — TELEPHONE (OUTPATIENT)
Dept: PULMONOLOGY | Age: 74
End: 2019-10-07

## 2019-10-10 ENCOUNTER — TELEPHONE (OUTPATIENT)
Dept: FAMILY MEDICINE CLINIC | Age: 74
End: 2019-10-10

## 2019-11-11 ENCOUNTER — HOSPITAL ENCOUNTER (OUTPATIENT)
Dept: CT IMAGING | Age: 74
Discharge: HOME | End: 2019-11-13
Payer: MEDICARE

## 2019-11-11 DIAGNOSIS — R91.1 LUNG NODULE: ICD-10-CM

## 2019-11-11 PROCEDURE — 71250 CT THORAX DX C-: CPT

## 2019-11-12 ENCOUNTER — OFFICE VISIT (OUTPATIENT)
Dept: ENDOCRINOLOGY | Age: 74
End: 2019-11-12
Payer: MEDICARE

## 2019-11-12 VITALS
HEIGHT: 63 IN | BODY MASS INDEX: 26.51 KG/M2 | SYSTOLIC BLOOD PRESSURE: 136 MMHG | RESPIRATION RATE: 16 BRPM | DIASTOLIC BLOOD PRESSURE: 82 MMHG | WEIGHT: 149.6 LBS | HEART RATE: 93 BPM | OXYGEN SATURATION: 96 %

## 2019-11-12 DIAGNOSIS — E27.8 ADRENAL INCIDENTALOMA (HCC): Primary | ICD-10-CM

## 2019-11-12 DIAGNOSIS — E04.2 MULTINODULAR GOITER: ICD-10-CM

## 2019-11-12 PROCEDURE — 4040F PNEUMOC VAC/ADMIN/RCVD: CPT | Performed by: INTERNAL MEDICINE

## 2019-11-12 PROCEDURE — G8399 PT W/DXA RESULTS DOCUMENT: HCPCS | Performed by: INTERNAL MEDICINE

## 2019-11-12 PROCEDURE — 1123F ACP DISCUSS/DSCN MKR DOCD: CPT | Performed by: INTERNAL MEDICINE

## 2019-11-12 PROCEDURE — 1036F TOBACCO NON-USER: CPT | Performed by: INTERNAL MEDICINE

## 2019-11-12 PROCEDURE — 1090F PRES/ABSN URINE INCON ASSESS: CPT | Performed by: INTERNAL MEDICINE

## 2019-11-12 PROCEDURE — G8427 DOCREV CUR MEDS BY ELIG CLIN: HCPCS | Performed by: INTERNAL MEDICINE

## 2019-11-12 PROCEDURE — 99214 OFFICE O/P EST MOD 30 MIN: CPT | Performed by: INTERNAL MEDICINE

## 2019-11-12 PROCEDURE — G8482 FLU IMMUNIZE ORDER/ADMIN: HCPCS | Performed by: INTERNAL MEDICINE

## 2019-11-12 PROCEDURE — 3017F COLORECTAL CA SCREEN DOC REV: CPT | Performed by: INTERNAL MEDICINE

## 2019-11-12 PROCEDURE — G8417 CALC BMI ABV UP PARAM F/U: HCPCS | Performed by: INTERNAL MEDICINE

## 2019-11-19 ENCOUNTER — OFFICE VISIT (OUTPATIENT)
Dept: PULMONOLOGY | Age: 74
End: 2019-11-19
Payer: MEDICARE

## 2019-11-19 VITALS
DIASTOLIC BLOOD PRESSURE: 78 MMHG | OXYGEN SATURATION: 97 % | SYSTOLIC BLOOD PRESSURE: 157 MMHG | RESPIRATION RATE: 18 BRPM | HEART RATE: 100 BPM

## 2019-11-19 DIAGNOSIS — R91.1 LUNG NODULE: ICD-10-CM

## 2019-11-19 PROCEDURE — 1090F PRES/ABSN URINE INCON ASSESS: CPT | Performed by: INTERNAL MEDICINE

## 2019-11-19 PROCEDURE — 4040F PNEUMOC VAC/ADMIN/RCVD: CPT | Performed by: INTERNAL MEDICINE

## 2019-11-19 PROCEDURE — G8482 FLU IMMUNIZE ORDER/ADMIN: HCPCS | Performed by: INTERNAL MEDICINE

## 2019-11-19 PROCEDURE — 3017F COLORECTAL CA SCREEN DOC REV: CPT | Performed by: INTERNAL MEDICINE

## 2019-11-19 PROCEDURE — G8417 CALC BMI ABV UP PARAM F/U: HCPCS | Performed by: INTERNAL MEDICINE

## 2019-11-19 PROCEDURE — 99213 OFFICE O/P EST LOW 20 MIN: CPT | Performed by: INTERNAL MEDICINE

## 2019-11-19 PROCEDURE — G8427 DOCREV CUR MEDS BY ELIG CLIN: HCPCS | Performed by: INTERNAL MEDICINE

## 2019-11-19 PROCEDURE — 1036F TOBACCO NON-USER: CPT | Performed by: INTERNAL MEDICINE

## 2019-11-19 PROCEDURE — G8399 PT W/DXA RESULTS DOCUMENT: HCPCS | Performed by: INTERNAL MEDICINE

## 2019-11-19 PROCEDURE — 1123F ACP DISCUSS/DSCN MKR DOCD: CPT | Performed by: INTERNAL MEDICINE

## 2019-11-19 PROCEDURE — 99214 OFFICE O/P EST MOD 30 MIN: CPT | Performed by: INTERNAL MEDICINE

## 2019-11-20 ENCOUNTER — TELEPHONE (OUTPATIENT)
Dept: FAMILY MEDICINE CLINIC | Age: 74
End: 2019-11-20

## 2019-12-02 ENCOUNTER — OFFICE VISIT (OUTPATIENT)
Dept: FAMILY MEDICINE CLINIC | Age: 74
End: 2019-12-02
Payer: MEDICARE

## 2019-12-02 ENCOUNTER — HOSPITAL ENCOUNTER (OUTPATIENT)
Age: 74
Discharge: HOME | End: 2019-12-04
Payer: MEDICARE

## 2019-12-02 VITALS
HEIGHT: 63 IN | BODY MASS INDEX: 26.58 KG/M2 | OXYGEN SATURATION: 96 % | RESPIRATION RATE: 18 BRPM | WEIGHT: 150 LBS | SYSTOLIC BLOOD PRESSURE: 124 MMHG | HEART RATE: 80 BPM | TEMPERATURE: 98.5 F | DIASTOLIC BLOOD PRESSURE: 74 MMHG

## 2019-12-02 DIAGNOSIS — E03.9 ACQUIRED HYPOTHYROIDISM: ICD-10-CM

## 2019-12-02 DIAGNOSIS — R73.01 IFG (IMPAIRED FASTING GLUCOSE): ICD-10-CM

## 2019-12-02 DIAGNOSIS — C18.2 MALIGNANT NEOPLASM OF ASCENDING COLON (HCC): Primary | ICD-10-CM

## 2019-12-02 DIAGNOSIS — E78.2 MIXED HYPERLIPIDEMIA: ICD-10-CM

## 2019-12-02 DIAGNOSIS — I10 ESSENTIAL HYPERTENSION: ICD-10-CM

## 2019-12-02 LAB
HBA1C MFR BLD: 5.8 % (ref 4–5.6)
TSH SERPL DL<=0.05 MIU/L-ACNC: 2.1 UIU/ML (ref 0.27–4.2)

## 2019-12-02 PROCEDURE — 1123F ACP DISCUSS/DSCN MKR DOCD: CPT | Performed by: FAMILY MEDICINE

## 2019-12-02 PROCEDURE — 4040F PNEUMOC VAC/ADMIN/RCVD: CPT | Performed by: FAMILY MEDICINE

## 2019-12-02 PROCEDURE — G8417 CALC BMI ABV UP PARAM F/U: HCPCS | Performed by: FAMILY MEDICINE

## 2019-12-02 PROCEDURE — G8482 FLU IMMUNIZE ORDER/ADMIN: HCPCS | Performed by: FAMILY MEDICINE

## 2019-12-02 PROCEDURE — 83036 HEMOGLOBIN GLYCOSYLATED A1C: CPT

## 2019-12-02 PROCEDURE — 3017F COLORECTAL CA SCREEN DOC REV: CPT | Performed by: FAMILY MEDICINE

## 2019-12-02 PROCEDURE — 1036F TOBACCO NON-USER: CPT | Performed by: FAMILY MEDICINE

## 2019-12-02 PROCEDURE — G8399 PT W/DXA RESULTS DOCUMENT: HCPCS | Performed by: FAMILY MEDICINE

## 2019-12-02 PROCEDURE — 84443 ASSAY THYROID STIM HORMONE: CPT

## 2019-12-02 PROCEDURE — G8427 DOCREV CUR MEDS BY ELIG CLIN: HCPCS | Performed by: FAMILY MEDICINE

## 2019-12-02 PROCEDURE — 99214 OFFICE O/P EST MOD 30 MIN: CPT | Performed by: FAMILY MEDICINE

## 2019-12-02 PROCEDURE — 1090F PRES/ABSN URINE INCON ASSESS: CPT | Performed by: FAMILY MEDICINE

## 2019-12-02 RX ORDER — LEVOTHYROXINE SODIUM 0.07 MG/1
75 TABLET ORAL DAILY
Qty: 90 TABLET | Refills: 1 | Status: ON HOLD
Start: 2019-12-02 | End: 2020-02-22 | Stop reason: SDUPTHER

## 2019-12-02 RX ORDER — ENALAPRIL MALEATE AND HYDROCHLOROTHIAZIDE 10; 25 MG/1; MG/1
1 TABLET ORAL DAILY
Qty: 90 TABLET | Refills: 1 | Status: SHIPPED
Start: 2019-12-02 | End: 2020-05-06 | Stop reason: SDUPTHER

## 2019-12-04 ASSESSMENT — ENCOUNTER SYMPTOMS
RESPIRATORY NEGATIVE: 1
EYE DISCHARGE: 0
SINUS PRESSURE: 0
EYE ITCHING: 0
STRIDOR: 0
EYE REDNESS: 0
COLOR CHANGE: 0
EYE PAIN: 0
CONSTIPATION: 0
ANAL BLEEDING: 0
CHOKING: 0
VOMITING: 0
COUGH: 0
SINUS PAIN: 0
SORE THROAT: 0
ABDOMINAL DISTENTION: 0
BACK PAIN: 0
RHINORRHEA: 0
FACIAL SWELLING: 0
ABDOMINAL PAIN: 0
ALLERGIC/IMMUNOLOGIC NEGATIVE: 1
NAUSEA: 0
TROUBLE SWALLOWING: 0
EYES NEGATIVE: 1
SHORTNESS OF BREATH: 0
VOICE CHANGE: 0
DIARRHEA: 0
RECTAL PAIN: 0
BLOOD IN STOOL: 0
CHEST TIGHTNESS: 0
PHOTOPHOBIA: 0
WHEEZING: 0

## 2019-12-10 ENCOUNTER — HOSPITAL ENCOUNTER (OUTPATIENT)
Dept: ULTRASOUND IMAGING | Age: 74
Discharge: HOME | End: 2019-12-12
Payer: MEDICARE

## 2019-12-10 DIAGNOSIS — E04.2 MULTINODULAR GOITER: ICD-10-CM

## 2019-12-10 PROCEDURE — 88173 CYTOPATH EVAL FNA REPORT: CPT

## 2019-12-10 PROCEDURE — 10005 FNA BX W/US GDN 1ST LES: CPT

## 2019-12-10 PROCEDURE — 10006 FNA BX W/US GDN EA ADDL: CPT

## 2019-12-10 PROCEDURE — 88305 TISSUE EXAM BY PATHOLOGIST: CPT

## 2019-12-13 ENCOUNTER — TELEPHONE (OUTPATIENT)
Dept: ENDOCRINOLOGY | Age: 74
End: 2019-12-13

## 2019-12-19 ENCOUNTER — TELEPHONE (OUTPATIENT)
Dept: ENDOCRINOLOGY | Age: 74
End: 2019-12-19

## 2019-12-19 ENCOUNTER — OFFICE VISIT (OUTPATIENT)
Dept: ONCOLOGY | Age: 74
End: 2019-12-19
Payer: MEDICARE

## 2019-12-19 ENCOUNTER — HOSPITAL ENCOUNTER (OUTPATIENT)
Dept: INFUSION THERAPY | Age: 74
Discharge: HOME | End: 2019-12-19
Payer: MEDICARE

## 2019-12-19 ENCOUNTER — TELEPHONE (OUTPATIENT)
Dept: CASE MANAGEMENT | Age: 74
End: 2019-12-19

## 2019-12-19 VITALS
HEIGHT: 63 IN | DIASTOLIC BLOOD PRESSURE: 79 MMHG | BODY MASS INDEX: 26.44 KG/M2 | SYSTOLIC BLOOD PRESSURE: 166 MMHG | HEART RATE: 98 BPM | OXYGEN SATURATION: 97 % | TEMPERATURE: 98.3 F | WEIGHT: 149.2 LBS

## 2019-12-19 DIAGNOSIS — C18.0 CECAL CANCER (HCC): ICD-10-CM

## 2019-12-19 DIAGNOSIS — C73 THYROID CANCER (HCC): Primary | ICD-10-CM

## 2019-12-19 DIAGNOSIS — C18.0 CECAL CANCER (HCC): Primary | ICD-10-CM

## 2019-12-19 LAB
ALBUMIN SERPL-MCNC: 4.2 G/DL (ref 3.5–5.2)
ALP BLD-CCNC: 123 U/L (ref 35–104)
ALT SERPL-CCNC: 11 U/L (ref 0–32)
ANION GAP SERPL CALCULATED.3IONS-SCNC: 13 MMOL/L (ref 7–16)
AST SERPL-CCNC: 13 U/L (ref 0–31)
BASOPHILS ABSOLUTE: 0.05 E9/L (ref 0–0.2)
BASOPHILS RELATIVE PERCENT: 0.7 % (ref 0–2)
BILIRUB SERPL-MCNC: 0.3 MG/DL (ref 0–1.2)
BUN BLDV-MCNC: 12 MG/DL (ref 8–23)
CALCIUM SERPL-MCNC: 9.7 MG/DL (ref 8.6–10.2)
CEA: 0.7 NG/ML (ref 0–5.2)
CHLORIDE BLD-SCNC: 99 MMOL/L (ref 98–107)
CO2: 29 MMOL/L (ref 22–29)
CREAT SERPL-MCNC: 0.6 MG/DL (ref 0.5–1)
EOSINOPHILS ABSOLUTE: 0.1 E9/L (ref 0.05–0.5)
EOSINOPHILS RELATIVE PERCENT: 1.4 % (ref 0–6)
GFR AFRICAN AMERICAN: >60
GFR NON-AFRICAN AMERICAN: >60 ML/MIN/1.73
GLUCOSE BLD-MCNC: 95 MG/DL (ref 74–99)
HCT VFR BLD CALC: 37.5 % (ref 34–48)
HEMOGLOBIN: 11.1 G/DL (ref 11.5–15.5)
IMMATURE GRANULOCYTES #: 0.03 E9/L
IMMATURE GRANULOCYTES %: 0.4 % (ref 0–5)
LYMPHOCYTES ABSOLUTE: 1.43 E9/L (ref 1.5–4)
LYMPHOCYTES RELATIVE PERCENT: 19.6 % (ref 20–42)
MCH RBC QN AUTO: 22.3 PG (ref 26–35)
MCHC RBC AUTO-ENTMCNC: 29.6 % (ref 32–34.5)
MCV RBC AUTO: 75.3 FL (ref 80–99.9)
MONOCYTES ABSOLUTE: 0.75 E9/L (ref 0.1–0.95)
MONOCYTES RELATIVE PERCENT: 10.3 % (ref 2–12)
NEUTROPHILS ABSOLUTE: 4.93 E9/L (ref 1.8–7.3)
NEUTROPHILS RELATIVE PERCENT: 67.6 % (ref 43–80)
PDW BLD-RTO: 18.8 FL (ref 11.5–15)
PLATELET # BLD: 296 E9/L (ref 130–450)
PMV BLD AUTO: 10.5 FL (ref 7–12)
POTASSIUM SERPL-SCNC: 3.8 MMOL/L (ref 3.5–5)
RBC # BLD: 4.98 E12/L (ref 3.5–5.5)
SODIUM BLD-SCNC: 141 MMOL/L (ref 132–146)
TOTAL PROTEIN: 7.9 G/DL (ref 6.4–8.3)
WBC # BLD: 7.3 E9/L (ref 4.5–11.5)

## 2019-12-19 PROCEDURE — 99212 OFFICE O/P EST SF 10 MIN: CPT | Performed by: INTERNAL MEDICINE

## 2019-12-19 PROCEDURE — 82378 CARCINOEMBRYONIC ANTIGEN: CPT

## 2019-12-19 PROCEDURE — 85025 COMPLETE CBC W/AUTO DIFF WBC: CPT

## 2019-12-19 PROCEDURE — 36415 COLL VENOUS BLD VENIPUNCTURE: CPT | Performed by: INTERNAL MEDICINE

## 2019-12-19 PROCEDURE — G8399 PT W/DXA RESULTS DOCUMENT: HCPCS | Performed by: INTERNAL MEDICINE

## 2019-12-19 PROCEDURE — G8482 FLU IMMUNIZE ORDER/ADMIN: HCPCS | Performed by: INTERNAL MEDICINE

## 2019-12-19 PROCEDURE — 1090F PRES/ABSN URINE INCON ASSESS: CPT | Performed by: INTERNAL MEDICINE

## 2019-12-19 PROCEDURE — 1123F ACP DISCUSS/DSCN MKR DOCD: CPT | Performed by: INTERNAL MEDICINE

## 2019-12-19 PROCEDURE — G8417 CALC BMI ABV UP PARAM F/U: HCPCS | Performed by: INTERNAL MEDICINE

## 2019-12-19 PROCEDURE — 99215 OFFICE O/P EST HI 40 MIN: CPT | Performed by: INTERNAL MEDICINE

## 2019-12-19 PROCEDURE — 4040F PNEUMOC VAC/ADMIN/RCVD: CPT | Performed by: INTERNAL MEDICINE

## 2019-12-19 PROCEDURE — 80053 COMPREHEN METABOLIC PANEL: CPT

## 2019-12-19 PROCEDURE — 1036F TOBACCO NON-USER: CPT | Performed by: INTERNAL MEDICINE

## 2019-12-19 PROCEDURE — G8427 DOCREV CUR MEDS BY ELIG CLIN: HCPCS | Performed by: INTERNAL MEDICINE

## 2019-12-19 PROCEDURE — 3017F COLORECTAL CA SCREEN DOC REV: CPT | Performed by: INTERNAL MEDICINE

## 2019-12-20 ENCOUNTER — OFFICE VISIT (OUTPATIENT)
Dept: ENT CLINIC | Age: 74
End: 2019-12-20
Payer: MEDICARE

## 2019-12-20 VITALS
HEIGHT: 62 IN | SYSTOLIC BLOOD PRESSURE: 130 MMHG | RESPIRATION RATE: 20 BRPM | WEIGHT: 149.7 LBS | HEART RATE: 96 BPM | BODY MASS INDEX: 27.55 KG/M2 | DIASTOLIC BLOOD PRESSURE: 60 MMHG

## 2019-12-20 DIAGNOSIS — C73 PAPILLARY THYROID CARCINOMA (HCC): Primary | ICD-10-CM

## 2019-12-20 PROCEDURE — 3017F COLORECTAL CA SCREEN DOC REV: CPT | Performed by: OTOLARYNGOLOGY

## 2019-12-20 PROCEDURE — 1036F TOBACCO NON-USER: CPT | Performed by: OTOLARYNGOLOGY

## 2019-12-20 PROCEDURE — 99204 OFFICE O/P NEW MOD 45 MIN: CPT | Performed by: OTOLARYNGOLOGY

## 2019-12-20 PROCEDURE — G8399 PT W/DXA RESULTS DOCUMENT: HCPCS | Performed by: OTOLARYNGOLOGY

## 2019-12-20 PROCEDURE — G8427 DOCREV CUR MEDS BY ELIG CLIN: HCPCS | Performed by: OTOLARYNGOLOGY

## 2019-12-20 PROCEDURE — 1090F PRES/ABSN URINE INCON ASSESS: CPT | Performed by: OTOLARYNGOLOGY

## 2019-12-20 PROCEDURE — G8482 FLU IMMUNIZE ORDER/ADMIN: HCPCS | Performed by: OTOLARYNGOLOGY

## 2019-12-20 PROCEDURE — G8417 CALC BMI ABV UP PARAM F/U: HCPCS | Performed by: OTOLARYNGOLOGY

## 2019-12-20 PROCEDURE — 4040F PNEUMOC VAC/ADMIN/RCVD: CPT | Performed by: OTOLARYNGOLOGY

## 2019-12-20 PROCEDURE — 1123F ACP DISCUSS/DSCN MKR DOCD: CPT | Performed by: OTOLARYNGOLOGY

## 2019-12-29 ASSESSMENT — ENCOUNTER SYMPTOMS
SHORTNESS OF BREATH: 0
RHINORRHEA: 0
COUGH: 0
VOMITING: 0
VOICE CHANGE: 0
TROUBLE SWALLOWING: 0

## 2019-12-30 ENCOUNTER — TELEPHONE (OUTPATIENT)
Dept: ENDOCRINOLOGY | Age: 74
End: 2019-12-30

## 2019-12-30 NOTE — TELEPHONE ENCOUNTER
Did you want any further testing (affirma) on pt's thyroid aspiration specimen? [facility] called to check , they still have her specimen on hold from  dec 10th.

## 2020-02-06 ENCOUNTER — TELEPHONE (OUTPATIENT)
Dept: ENT CLINIC | Age: 75
End: 2020-02-06

## 2020-02-06 NOTE — PROGRESS NOTES
[address] PRE-ADMISSION TESTING GENERAL INSTRUCTIONS- [facility]-phone number:[phone]    GENERAL INSTRUCTIONS  [x] Antibacterial Soap shower Night before and/or AM of Surger  [x] Nothing by mouth after midnight, including gum, candy, mints, or water. [x] You may brush your teeth, gargle, but do NOT swallow water. [x]No smoking, chewing tobacco, illegal drugs, or alcohol within 24 hours of your surgery. [x] Jewelry, valuables or body piercing's should not be brought to the hospital. All body and/or tongue piercing's must be removed prior to arriving to hospital.  ALL hair pins must be removed. [x] Do not wear makeup, lotions, powders, deodorant. Nail polish as directed by the nurse. [x] Arrange transportation with a responsible adult  to and from the hospital. If you do not have a responsible adult  to transport you, you will need to make arrangements with a medical transportation company (i.e. [employer]. A Uber/taxi/bus is not appropriate unless you are accompanied by a responsible adult ). Arrange for someone to be with you for the remainder of the day and for 24 hours after your procedure due to having had anesthesia. Who will be your  for transportation?___MERLYN_______________   Who will be staying with you for 24 hrs after your procedure? __FAMILY________________  [x] Bring insurance card and photo ID. [x] Transfusion Bracelet: Please bring with you to hospital, day of surgery  [x] Bring copy of living will or healthcare power of  papers to be placed in your electronic record. PARKING INSTRUCTIONS:   [x] Arrival Time:___02/20/2020 AT 1000__________  · [x] Parking lot '\"I\"  is located on [facility] (the corner of [facility]). To enter, press the button and the gate will lift. A free token will be provided to exit the lot. One car per patient is allowed to park in this lot.  All other cars are to park on [address] [place] [address] either in the parking garage or the handicap lot. [] To reach the [place] [facility] from [address] [place] [address], upon entering the hospital, take elevator B to the 3rd floor. EDUCATION INSTRUCTIONS        [x] Pre-admission Testing educational folder given  [x] Incentive Spirometry,coughing & deep breathing exercises reviewed. [x]Medication information sheet(s)   [x]Fluoroscopy-Xray used in surgery reviewed with patient. Educational pamphlet placed in chart. [x]Pain: Post-op pain is normal and to be expected. You will be asked to rate your pain from 0-10(a zero is not acceptable-education is needed). Your post-op pain goal is:  [x] Ask your nurse for your pain medication. _________________    MEDICATION INSTRUCTIONS:   [x]Bring a complete list of your medications, please write the last time you took the medicine, give this list to the nurse. [x] Take the following medications the morning of surgery with 1-2 ounces of water:  REFER TO MED SHEET                                                                                                                                                            [x] Stop herbal supplements and vitamins 5 days before your surgery      [x] Follow physician instructions regarding any blood thinners you may be taking. WHAT TO EXPECT:  [x] The day of surgery you will be greeted and checked in by the [employer].  In addition, you will be registered in the [facility] by a Patient Access Representative. Please bring your photo ID and insurance card. A nurse will greet you in accordance to the time you are needed in the pre-op area to prepare you for surgery. Please do not be discouraged if you are not greeted in the order you arrive as there are many variables that are involved in patient preparation. Your patience is greatly appreciated as you wait for your nurse.   Please bring in items such as: books, magazines, newspapers, electronics, or any other items  to occupy your time in the waiting area. [x]  Delays may occur with surgery and staff will make a sincere effort to keep you informed of delays. If any delays occur with your procedure, we apologize ahead of time for your inconvenience as we recognize the value of your time.

## 2020-02-10 RX ORDER — AMLODIPINE BESYLATE AND ATORVASTATIN CALCIUM 5; 10 MG/1; MG/1
TABLET, FILM COATED ORAL
Qty: 90 TABLET | Refills: 0 | Status: SHIPPED
Start: 2020-02-10 | End: 2020-05-06 | Stop reason: SDUPTHER

## 2020-02-13 ENCOUNTER — TELEPHONE (OUTPATIENT)
Dept: ENT CLINIC | Age: 75
End: 2020-02-13

## 2020-02-13 ENCOUNTER — TELEPHONE (OUTPATIENT)
Dept: FAMILY MEDICINE CLINIC | Age: 75
End: 2020-02-13

## 2020-02-13 ENCOUNTER — ANESTHESIA EVENT (OUTPATIENT)
Dept: OPERATING ROOM | Age: 75
End: 2020-02-13
Payer: MEDICARE

## 2020-02-13 ENCOUNTER — HOSPITAL ENCOUNTER (OUTPATIENT)
Dept: PREADMISSION TESTING | Age: 75
Discharge: HOME | End: 2020-02-13
Payer: MEDICARE

## 2020-02-13 VITALS
WEIGHT: 151 LBS | RESPIRATION RATE: 20 BRPM | OXYGEN SATURATION: 96 % | HEIGHT: 62 IN | HEART RATE: 90 BPM | TEMPERATURE: 97.9 F | BODY MASS INDEX: 27.79 KG/M2

## 2020-02-13 LAB
ABO/RH: NORMAL
ANION GAP SERPL CALCULATED.3IONS-SCNC: 16 MMOL/L (ref 7–16)
ANTIBODY SCREEN: NORMAL
BUN BLDV-MCNC: 11 MG/DL (ref 8–23)
CALCIUM SERPL-MCNC: 9.6 MG/DL (ref 8.6–10.2)
CHLORIDE BLD-SCNC: 98 MMOL/L (ref 98–107)
CO2: 27 MMOL/L (ref 22–29)
CREAT SERPL-MCNC: 0.6 MG/DL (ref 0.5–1)
GFR AFRICAN AMERICAN: >60
GFR NON-AFRICAN AMERICAN: >60 ML/MIN/1.73
GLUCOSE BLD-MCNC: 132 MG/DL (ref 74–99)
HCT VFR BLD CALC: 40.3 % (ref 34–48)
HEMOGLOBIN: 12.6 G/DL (ref 11.5–15.5)
MCH RBC QN AUTO: 24.5 PG (ref 26–35)
MCHC RBC AUTO-ENTMCNC: 31.3 % (ref 32–34.5)
MCV RBC AUTO: 78.4 FL (ref 80–99.9)
PDW BLD-RTO: 18 FL (ref 11.5–15)
PLATELET # BLD: 262 E9/L (ref 130–450)
PMV BLD AUTO: 10.3 FL (ref 7–12)
POTASSIUM SERPL-SCNC: 2.8 MMOL/L (ref 3.5–5)
RBC # BLD: 5.14 E12/L (ref 3.5–5.5)
SODIUM BLD-SCNC: 141 MMOL/L (ref 132–146)
WBC # BLD: 6.8 E9/L (ref 4.5–11.5)

## 2020-02-13 PROCEDURE — 87081 CULTURE SCREEN ONLY: CPT

## 2020-02-13 PROCEDURE — 36415 COLL VENOUS BLD VENIPUNCTURE: CPT

## 2020-02-13 PROCEDURE — 85027 COMPLETE CBC AUTOMATED: CPT

## 2020-02-13 PROCEDURE — 86850 RBC ANTIBODY SCREEN: CPT

## 2020-02-13 PROCEDURE — 86900 BLOOD TYPING SEROLOGIC ABO: CPT

## 2020-02-13 PROCEDURE — 86901 BLOOD TYPING SEROLOGIC RH(D): CPT

## 2020-02-13 PROCEDURE — 80048 BASIC METABOLIC PNL TOTAL CA: CPT

## 2020-02-13 NOTE — TELEPHONE ENCOUNTER
Dr Shawn Trimble office called stating the patients potassium was low, 2.8. Would like for you to monitor and handle it.

## 2020-02-13 NOTE — TELEPHONE ENCOUNTER
Called the office and spoke to [facility] and gave her the information on this patients  lab results. K being 2.8 and that Dr Cherie Brenner needs to be aware of this and to take care of it. Zuleima said that she would make sure she tells Dr Cherie Brenner.

## 2020-02-13 NOTE — ANESTHESIA PRE PROCEDURE
Department of Anesthesiology  Preprocedure Note       Name:  Radha Sánchez   Age:  76 y.o.  :  1945                                          MRN:  [id number]         Date:  2020      Surgeon: Christa Boast):  Leatha Corbett DO    Procedure: TOTAL THYROIDECTOMY WITH ANTERIOR NECK DISSECTION-NEEDS NERVE INTEGRITY MONITOR (Bilateral )    Medications prior to admission:   Prior to Admission medications    Medication Sig Start Date End Date Taking?  Authorizing Provider   amLODIPine-atorvastatatin (CADUET) 5-10 MG per tablet TAKE 1 TABLET BY MOUTH ONCE DAILY 2/10/20   Isreal Sutton Catterlin, DO   levothyroxine (SYNTHROID) 75 MCG tablet Take 1 tablet by mouth Daily 19  Carola P Catterlin, DO   enalapril-hydrochlorothiazide (VASERETIC) 10-25 MG per tablet Take 1 tablet by mouth daily  Patient taking differently: Take 1 tablet by mouth every morning  19  Carola P Rebeka, DO   Multiple Vitamins-Minerals (PRESERVISION AREDS PO) Take by mouth 2 times daily     Historical Provider, MD   Omega-3 Fatty Acids (FISH OIL) 500 MG CAPS Take 1 capsule by mouth daily    Historical Provider, MD   aspirin 81 MG tablet Take 1 tablet by mouth daily 18   Carola Staley, DO   Cyanocobalamin (B-12) 2500 MCG TABS Take 1 tablet by mouth three times a week     Historical Provider, MD   vitamin D (CHOLECALCIFEROL) 5000 UNITS CAPS capsule Take 5,000 Units by mouth daily    Historical Provider, MD       Current medications:    Current Outpatient Medications   Medication Sig Dispense Refill    amLODIPine-atorvastatatin (CADUET) 5-10 MG per tablet TAKE 1 TABLET BY MOUTH ONCE DAILY 90 tablet 0    levothyroxine (SYNTHROID) 75 MCG tablet Take 1 tablet by mouth Daily 90 tablet 1    enalapril-hydrochlorothiazide (VASERETIC) 10-25 MG per tablet Take 1 tablet by mouth daily (Patient taking differently: Take 1 tablet by mouth every morning ) 90 tablet 1    Multiple Vitamins-Minerals (PRESERVISION AREDS PO) Take (68.5 kg)   Height: 5' 2\" (1.575 m)                                              BP Readings from Last 3 Encounters:   12/20/19 130/60   12/19/19 (!) 166/79   12/02/19 124/74       NPO Status:  instructed to be NPO at MN before OR                                                                               BMI:   Wt Readings from Last 3 Encounters:   02/13/20 151 lb (68.5 kg)   12/20/19 149 lb 11.2 oz (67.9 kg)   12/19/19 149 lb 3.2 oz (67.7 kg)     Body mass index is 27.62 kg/m². CBC:   Lab Results   Component Value Date    WBC 7.3 12/19/2019    RBC 4.98 12/19/2019    HGB 11.1 12/19/2019    HCT 37.5 12/19/2019    MCV 75.3 12/19/2019    RDW 18.8 12/19/2019     12/19/2019       CMP:   Lab Results   Component Value Date     12/19/2019    K 3.8 12/19/2019    K 4.2 08/30/2019    CL 99 12/19/2019    CO2 29 12/19/2019    BUN 12 12/19/2019    CREATININE 0.6 12/19/2019    GFRAA >60 12/19/2019    LABGLOM >60 12/19/2019    GLUCOSE 95 12/19/2019    PROT 7.9 12/19/2019    CALCIUM 9.7 12/19/2019    BILITOT 0.3 12/19/2019    ALKPHOS 123 12/19/2019    AST 13 12/19/2019    ALT 11 12/19/2019       POC Tests: No results for input(s): POCGLU, POCNA, POCK, POCCL, POCBUN, POCHEMO, POCHCT in the last 72 hours.     Coags: No results found for: PROTIME, INR, APTT    HCG (If Applicable): No results found for: PREGTESTUR, PREGSERUM, HCG, HCGQUANT     ABGs: No results found for: PHART, PO2ART, IJW6DID, MIW1JFL, BEART, V2QACAZZ     Type & Screen (If Applicable):  No results found for: LABABO, LABRH     EKG 8/28/19  Normal sinus rhythm  Minimal voltage criteria for LVH, may be normal variant  Nonspecific ST and T wave abnormality  Prolonged QT  Abnormal ECG    Anesthesia Evaluation  Patient summary reviewed and Nursing notes reviewed no history of anesthetic complications:   Airway: Mallampati: III  TM distance: >3 FB   Neck ROM: full  Mouth opening: > = 3 FB Dental:          Pulmonary: breath sounds clear to

## 2020-02-14 LAB — MRSA CULTURE ONLY: NORMAL

## 2020-02-14 RX ORDER — POTASSIUM CHLORIDE 750 MG/1
10 TABLET, EXTENDED RELEASE ORAL 2 TIMES DAILY
Qty: 180 TABLET | Refills: 0 | Status: SHIPPED
Start: 2020-02-14 | End: 2020-05-06 | Stop reason: SDUPTHER

## 2020-02-19 NOTE — H&P
[facility] Otolaryngology  Dr. Micah Rock. DESMOND Moreno Ms.Ed. New Consult         Patient Name:  Berta Larkin  :        CHIEF C/O:         Chief Complaint   Patient presents with    New Patient       thyroid         HISTORY OBTAINED FROM:  patient     HISTORY OF PRESENT ILLNESS:       Kashmir Rod is a 76y.o. year old female, here today for well from endocrinology for history of biopsy-proven papillary thyroid carcinoma. Patient states she was undergoing a routine work-up and physical exam found to have thyroid nodularity on ultrasound and subsequently underwent FNA biopsy proving papillary thyroid carcinoma largest nodule at 1.4 cm. Patient has no family history of thyroid carcinoma, no shortness of breath stridor or change in voice no prior history of neck head or neck surgery.   Thyroid laboratory findings have been consistent with hypothyroidism, no history of hyperthyroidism no other complaints today of fevers chills nausea vomiting neck swelling hearing loss tinnitus vertigo or hoarseness.        Past Medical History        Past Medical History:   Diagnosis Date    Cancer ([address] 2019     colon    Glaucoma      Hyperlipidemia      Hypertension      Hypothyroidism           Past Surgical History         Past Surgical History:   Procedure Laterality Date    CATARACT REMOVAL WITH IMPLANT Bilateral      CHOLECYSTECTOMY        COLONOSCOPY N/A 2019     COLONOSCOPY WITH BIOPSY performed by Candace Schwartz [facility] at [facility] 2019     RIGHT HEMICOLECTOMY, LAPAROSCOPIC, ROBOTIC XI ASSISTED performed by Candace Schwartz MD at [facility] [address]           Current Medication      Current Outpatient Medications:     levothyroxine (SYNTHROID) 75 MCG tablet, Take 1 tablet by mouth Daily, Disp: 90 tablet, Rfl: 1    enalapril-hydrochlorothiazide (VASERETIC) 10-25 MG per tablet, Take 1 tablet by mouth daily, Disp: 90 tablet, Rfl: 1   amLODIPine-atorvastatatin (CADUET) 5-10 MG per tablet, TAKE 1 TABLET BY MOUTH ONCE DAILY, Disp: 90 tablet, Rfl: 1    Multiple Vitamins-Minerals (PRESERVISION AREDS PO), Take by mouth 2 times daily , Disp: , Rfl:     Omega-3 Fatty Acids (FISH OIL) 500 MG CAPS, Take 1 capsule by mouth daily, Disp: , Rfl:     aspirin 81 MG tablet, Take 1 tablet by mouth daily, Disp: 90 tablet, Rfl: 1    Cyanocobalamin (B-12) 2500 MCG TABS, Take 1 tablet by mouth three times a week , Disp: , Rfl:     vitamin D (CHOLECALCIFEROL) 5000 UNITS CAPS capsule, Take 5,000 Units by mouth daily, Disp: , Rfl:      Prevnar 13 [pneumococcal 13-sera conj vacc]  Social History            Tobacco Use    Smoking status: Never Smoker    Smokeless tobacco: Never Used   Substance Use Topics    Alcohol use: No       Alcohol/week: 0.0 standard drinks    Drug use: No      Family History         Family History   Problem Relation Age of Onset    Early Death Father      Cancer Sister      Lung Cancer Sister      Cancer Brother      Lung Cancer Brother              Review of Systems   Constitutional: Negative for activity change, chills and fever. HENT: Negative for congestion, dental problem, ear discharge, hearing loss, postnasal drip, rhinorrhea, sneezing, trouble swallowing and voice change. Respiratory: Negative for cough and shortness of breath. Cardiovascular: Negative for chest pain and palpitations. Gastrointestinal: Negative for vomiting. Skin: Negative for rash. Allergic/Immunologic: Negative for environmental allergies. Neurological: Negative for dizziness and headaches. Hematological: Does not bruise/bleed easily. All other systems reviewed and are negative.        /60   Pulse 96   Resp 20   Ht 5' 2\" (1.575 m)   Wt 149 lb 11.2 oz (67.9 kg)   LMP  (LMP Unknown)   BMI 27.38 kg/m²   Physical Exam  Vitals signs and nursing note reviewed. Constitutional:       Appearance: She is well-developed.    HENT: examined for history of papillary thyroid carcinoma status post FNA biopsy proven. Recommendation for the patient undergo total thyroidectomy with ear neck dissection. Risk and benefits of procedure including bleeding, infection, recurrent laryngeal nerve injury, hoarseness, tracheostomy, hypoparathyroidism and permanent hypocalcemia. Patient understands risk and benefits, and follow-up in accordance.     Electronically signed by Greg Grewal DO on 2/19/2020 at 12:28 PM

## 2020-02-20 ENCOUNTER — HOSPITAL ENCOUNTER (OUTPATIENT)
Age: 75
Setting detail: OBSERVATION
Discharge: HOME | End: 2020-02-22
Attending: OTOLARYNGOLOGY | Admitting: OTOLARYNGOLOGY
Payer: MEDICARE

## 2020-02-20 ENCOUNTER — ANESTHESIA (OUTPATIENT)
Dept: OPERATING ROOM | Age: 75
End: 2020-02-20
Payer: MEDICARE

## 2020-02-20 VITALS — OXYGEN SATURATION: 98 % | SYSTOLIC BLOOD PRESSURE: 126 MMHG | TEMPERATURE: 97.3 F | DIASTOLIC BLOOD PRESSURE: 69 MMHG

## 2020-02-20 PROBLEM — E89.0 S/P COMPLETE THYROIDECTOMY: Status: ACTIVE | Noted: 2020-02-20

## 2020-02-20 PROBLEM — Z98.890 S/P COMPLETE THYROIDECTOMY: Status: ACTIVE | Noted: 2020-02-20

## 2020-02-20 PROBLEM — Z90.89 S/P COMPLETE THYROIDECTOMY: Status: ACTIVE | Noted: 2020-02-20

## 2020-02-20 LAB
CALCIUM IONIZED: 1.18 MMOL/L (ref 1.15–1.33)
CALCIUM SERPL-MCNC: 8.5 MG/DL (ref 8.6–10.2)
PARATHYROID HORMONE INTACT: 4 PG/ML (ref 15–65)
PARATHYROID HORMONE INTACT: 5 PG/ML (ref 15–65)
POTASSIUM SERPL-SCNC: 3.1 MMOL/L (ref 3.5–5)

## 2020-02-20 PROCEDURE — G0378 HOSPITAL OBSERVATION PER HR: HCPCS

## 2020-02-20 PROCEDURE — 6360000002 HC RX W HCPCS

## 2020-02-20 PROCEDURE — 7100000001 HC PACU RECOVERY - ADDTL 15 MIN: Performed by: OTOLARYNGOLOGY

## 2020-02-20 PROCEDURE — 2580000003 HC RX 258: Performed by: STUDENT IN AN ORGANIZED HEALTH CARE EDUCATION/TRAINING PROGRAM

## 2020-02-20 PROCEDURE — 84132 ASSAY OF SERUM POTASSIUM: CPT

## 2020-02-20 PROCEDURE — 7100000000 HC PACU RECOVERY - FIRST 15 MIN: Performed by: OTOLARYNGOLOGY

## 2020-02-20 PROCEDURE — 6360000002 HC RX W HCPCS: Performed by: OTOLARYNGOLOGY

## 2020-02-20 PROCEDURE — 83970 ASSAY OF PARATHORMONE: CPT

## 2020-02-20 PROCEDURE — 2720000010 HC SURG SUPPLY STERILE: Performed by: OTOLARYNGOLOGY

## 2020-02-20 PROCEDURE — 3600000004 HC SURGERY LEVEL 4 BASE: Performed by: OTOLARYNGOLOGY

## 2020-02-20 PROCEDURE — 82310 ASSAY OF CALCIUM: CPT

## 2020-02-20 PROCEDURE — 36415 COLL VENOUS BLD VENIPUNCTURE: CPT

## 2020-02-20 PROCEDURE — 82330 ASSAY OF CALCIUM: CPT

## 2020-02-20 PROCEDURE — 2500000003 HC RX 250 WO HCPCS

## 2020-02-20 PROCEDURE — 88307 TISSUE EXAM BY PATHOLOGIST: CPT

## 2020-02-20 PROCEDURE — 6370000000 HC RX 637 (ALT 250 FOR IP): Performed by: STUDENT IN AN ORGANIZED HEALTH CARE EDUCATION/TRAINING PROGRAM

## 2020-02-20 PROCEDURE — 2500000003 HC RX 250 WO HCPCS: Performed by: OTOLARYNGOLOGY

## 2020-02-20 PROCEDURE — 2709999900 HC NON-CHARGEABLE SUPPLY: Performed by: OTOLARYNGOLOGY

## 2020-02-20 PROCEDURE — 3700000001 HC ADD 15 MINUTES (ANESTHESIA): Performed by: OTOLARYNGOLOGY

## 2020-02-20 PROCEDURE — 3600000014 HC SURGERY LEVEL 4 ADDTL 15MIN: Performed by: OTOLARYNGOLOGY

## 2020-02-20 PROCEDURE — 3700000000 HC ANESTHESIA ATTENDED CARE: Performed by: OTOLARYNGOLOGY

## 2020-02-20 PROCEDURE — 2580000003 HC RX 258

## 2020-02-20 RX ORDER — CEFAZOLIN SODIUM 2 G/50ML
2 SOLUTION INTRAVENOUS
Status: COMPLETED | OUTPATIENT
Start: 2020-02-20 | End: 2020-02-20

## 2020-02-20 RX ORDER — SUCCINYLCHOLINE/SOD CL,ISO/PF 200MG/10ML
SYRINGE (ML) INTRAVENOUS PRN
Status: DISCONTINUED | OUTPATIENT
Start: 2020-02-20 | End: 2020-02-20 | Stop reason: SDUPTHER

## 2020-02-20 RX ORDER — ONDANSETRON 2 MG/ML
INJECTION INTRAMUSCULAR; INTRAVENOUS PRN
Status: DISCONTINUED | OUTPATIENT
Start: 2020-02-20 | End: 2020-02-20 | Stop reason: SDUPTHER

## 2020-02-20 RX ORDER — PROPOFOL 10 MG/ML
INJECTION, EMULSION INTRAVENOUS PRN
Status: DISCONTINUED | OUTPATIENT
Start: 2020-02-20 | End: 2020-02-20 | Stop reason: SDUPTHER

## 2020-02-20 RX ORDER — MIDAZOLAM HYDROCHLORIDE 1 MG/ML
INJECTION INTRAMUSCULAR; INTRAVENOUS PRN
Status: DISCONTINUED | OUTPATIENT
Start: 2020-02-20 | End: 2020-02-20 | Stop reason: SDUPTHER

## 2020-02-20 RX ORDER — MORPHINE SULFATE 4 MG/ML
4 INJECTION, SOLUTION INTRAMUSCULAR; INTRAVENOUS
Status: DISCONTINUED | OUTPATIENT
Start: 2020-02-20 | End: 2020-02-22 | Stop reason: HOSPADM

## 2020-02-20 RX ORDER — LIDOCAINE HYDROCHLORIDE 20 MG/ML
INJECTION, SOLUTION INTRAVENOUS PRN
Status: DISCONTINUED | OUTPATIENT
Start: 2020-02-20 | End: 2020-02-20 | Stop reason: SDUPTHER

## 2020-02-20 RX ORDER — SODIUM CHLORIDE 0.9 % (FLUSH) 0.9 %
10 SYRINGE (ML) INJECTION PRN
Status: DISCONTINUED | OUTPATIENT
Start: 2020-02-20 | End: 2020-02-20 | Stop reason: HOSPADM

## 2020-02-20 RX ORDER — ONDANSETRON 2 MG/ML
4 INJECTION INTRAMUSCULAR; INTRAVENOUS EVERY 6 HOURS PRN
Status: DISCONTINUED | OUTPATIENT
Start: 2020-02-20 | End: 2020-02-22 | Stop reason: HOSPADM

## 2020-02-20 RX ORDER — ONDANSETRON 4 MG/1
4 TABLET, ORALLY DISINTEGRATING ORAL EVERY 8 HOURS PRN
Status: DISCONTINUED | OUTPATIENT
Start: 2020-02-20 | End: 2020-02-22 | Stop reason: HOSPADM

## 2020-02-20 RX ORDER — FENTANYL CITRATE 50 UG/ML
INJECTION, SOLUTION INTRAMUSCULAR; INTRAVENOUS PRN
Status: DISCONTINUED | OUTPATIENT
Start: 2020-02-20 | End: 2020-02-20 | Stop reason: SDUPTHER

## 2020-02-20 RX ORDER — LIDOCAINE HYDROCHLORIDE AND EPINEPHRINE 10; 10 MG/ML; UG/ML
INJECTION, SOLUTION INFILTRATION; PERINEURAL PRN
Status: DISCONTINUED | OUTPATIENT
Start: 2020-02-20 | End: 2020-02-20 | Stop reason: ALTCHOICE

## 2020-02-20 RX ORDER — ACETAMINOPHEN 325 MG/1
650 TABLET ORAL EVERY 6 HOURS
Status: DISCONTINUED | OUTPATIENT
Start: 2020-02-20 | End: 2020-02-22 | Stop reason: HOSPADM

## 2020-02-20 RX ORDER — SODIUM CHLORIDE 0.9 % (FLUSH) 0.9 %
10 SYRINGE (ML) INJECTION EVERY 12 HOURS SCHEDULED
Status: DISCONTINUED | OUTPATIENT
Start: 2020-02-20 | End: 2020-02-22 | Stop reason: HOSPADM

## 2020-02-20 RX ORDER — LEVOTHYROXINE SODIUM 0.07 MG/1
75 TABLET ORAL DAILY
Status: DISCONTINUED | OUTPATIENT
Start: 2020-02-20 | End: 2020-02-22 | Stop reason: HOSPADM

## 2020-02-20 RX ORDER — SODIUM CHLORIDE 0.9 % (FLUSH) 0.9 %
10 SYRINGE (ML) INJECTION PRN
Status: DISCONTINUED | OUTPATIENT
Start: 2020-02-20 | End: 2020-02-22 | Stop reason: HOSPADM

## 2020-02-20 RX ORDER — SENNA AND DOCUSATE SODIUM 50; 8.6 MG/1; MG/1
1 TABLET, FILM COATED ORAL 2 TIMES DAILY
Status: DISCONTINUED | OUTPATIENT
Start: 2020-02-20 | End: 2020-02-22 | Stop reason: HOSPADM

## 2020-02-20 RX ORDER — SODIUM CHLORIDE 0.9 % (FLUSH) 0.9 %
10 SYRINGE (ML) INJECTION EVERY 12 HOURS SCHEDULED
Status: DISCONTINUED | OUTPATIENT
Start: 2020-02-20 | End: 2020-02-20 | Stop reason: HOSPADM

## 2020-02-20 RX ORDER — TRAMADOL HYDROCHLORIDE 50 MG/1
100 TABLET ORAL EVERY 6 HOURS PRN
Status: DISCONTINUED | OUTPATIENT
Start: 2020-02-20 | End: 2020-02-22 | Stop reason: HOSPADM

## 2020-02-20 RX ORDER — SODIUM CHLORIDE 9 MG/ML
INJECTION, SOLUTION INTRAVENOUS CONTINUOUS PRN
Status: DISCONTINUED | OUTPATIENT
Start: 2020-02-20 | End: 2020-02-20 | Stop reason: SDUPTHER

## 2020-02-20 RX ORDER — TRAMADOL HYDROCHLORIDE 50 MG/1
50 TABLET ORAL EVERY 6 HOURS PRN
Status: DISCONTINUED | OUTPATIENT
Start: 2020-02-20 | End: 2020-02-22 | Stop reason: HOSPADM

## 2020-02-20 RX ORDER — TRAMADOL HYDROCHLORIDE 50 MG/1
50 TABLET ORAL EVERY 6 HOURS PRN
Qty: 20 TABLET | Refills: 0 | Status: SHIPPED | OUTPATIENT
Start: 2020-02-20 | End: 2020-02-25

## 2020-02-20 RX ORDER — DEXAMETHASONE SODIUM PHOSPHATE 10 MG/ML
INJECTION INTRAMUSCULAR; INTRAVENOUS PRN
Status: DISCONTINUED | OUTPATIENT
Start: 2020-02-20 | End: 2020-02-20 | Stop reason: SDUPTHER

## 2020-02-20 RX ORDER — MORPHINE SULFATE 2 MG/ML
2 INJECTION, SOLUTION INTRAMUSCULAR; INTRAVENOUS
Status: DISCONTINUED | OUTPATIENT
Start: 2020-02-20 | End: 2020-02-22 | Stop reason: HOSPADM

## 2020-02-20 RX ORDER — SODIUM CHLORIDE, SODIUM LACTATE, POTASSIUM CHLORIDE, CALCIUM CHLORIDE 600; 310; 30; 20 MG/100ML; MG/100ML; MG/100ML; MG/100ML
INJECTION, SOLUTION INTRAVENOUS CONTINUOUS PRN
Status: DISCONTINUED | OUTPATIENT
Start: 2020-02-20 | End: 2020-02-20 | Stop reason: SDUPTHER

## 2020-02-20 RX ADMIN — DEXAMETHASONE SODIUM PHOSPHATE 10 MG: 10 INJECTION INTRAMUSCULAR; INTRAVENOUS at 10:34

## 2020-02-20 RX ADMIN — CEFAZOLIN SODIUM 2 G: 2 SOLUTION INTRAVENOUS at 10:30

## 2020-02-20 RX ADMIN — SODIUM CHLORIDE: 9 INJECTION, SOLUTION INTRAVENOUS at 11:10

## 2020-02-20 RX ADMIN — SODIUM CHLORIDE: 9 INJECTION, SOLUTION INTRAVENOUS at 10:23

## 2020-02-20 RX ADMIN — PHENYLEPHRINE HYDROCHLORIDE 100 MCG: 10 INJECTION INTRAVENOUS at 11:06

## 2020-02-20 RX ADMIN — PHENYLEPHRINE HYDROCHLORIDE 20 MCG/MIN: 10 INJECTION, SOLUTION INTRAMUSCULAR; INTRAVENOUS; SUBCUTANEOUS at 11:41

## 2020-02-20 RX ADMIN — PHENYLEPHRINE HYDROCHLORIDE 200 MCG: 10 INJECTION INTRAVENOUS at 10:58

## 2020-02-20 RX ADMIN — SODIUM CHLORIDE: 9 INJECTION, SOLUTION INTRAVENOUS at 11:50

## 2020-02-20 RX ADMIN — LIDOCAINE HYDROCHLORIDE 100 MG: 20 INJECTION, SOLUTION INTRAVENOUS at 10:34

## 2020-02-20 RX ADMIN — Medication 10 ML: at 22:59

## 2020-02-20 RX ADMIN — SENNOSIDES AND DOCUSATE SODIUM 1 TABLET: 8.6; 5 TABLET ORAL at 22:16

## 2020-02-20 RX ADMIN — ACETAMINOPHEN 650 MG: 325 TABLET ORAL at 22:16

## 2020-02-20 RX ADMIN — FENTANYL CITRATE 50 MCG: 50 INJECTION, SOLUTION INTRAMUSCULAR; INTRAVENOUS at 10:50

## 2020-02-20 RX ADMIN — PROPOFOL 130 MG: 10 INJECTION, EMULSION INTRAVENOUS at 10:34

## 2020-02-20 RX ADMIN — MIDAZOLAM 2 MG: 1 INJECTION INTRAMUSCULAR; INTRAVENOUS at 10:23

## 2020-02-20 RX ADMIN — FENTANYL CITRATE 50 MCG: 50 INJECTION, SOLUTION INTRAMUSCULAR; INTRAVENOUS at 10:52

## 2020-02-20 RX ADMIN — FENTANYL CITRATE 100 MCG: 50 INJECTION, SOLUTION INTRAMUSCULAR; INTRAVENOUS at 10:34

## 2020-02-20 RX ADMIN — ONDANSETRON HYDROCHLORIDE 4 MG: 2 INJECTION, SOLUTION INTRAMUSCULAR; INTRAVENOUS at 13:04

## 2020-02-20 RX ADMIN — PHENYLEPHRINE HYDROCHLORIDE 100 MCG: 10 INJECTION INTRAVENOUS at 11:16

## 2020-02-20 RX ADMIN — Medication 150 MG: at 10:34

## 2020-02-20 RX ADMIN — SODIUM CHLORIDE, POTASSIUM CHLORIDE, SODIUM LACTATE AND CALCIUM CHLORIDE: 600; 310; 30; 20 INJECTION, SOLUTION INTRAVENOUS at 10:52

## 2020-02-20 ASSESSMENT — PULMONARY FUNCTION TESTS
PIF_VALUE: 18
PIF_VALUE: 18
PIF_VALUE: 6
PIF_VALUE: 18
PIF_VALUE: 18
PIF_VALUE: 1
PIF_VALUE: 18
PIF_VALUE: 17
PIF_VALUE: 18
PIF_VALUE: 1
PIF_VALUE: 20
PIF_VALUE: 18
PIF_VALUE: 18
PIF_VALUE: 21
PIF_VALUE: 19
PIF_VALUE: 18
PIF_VALUE: 16
PIF_VALUE: 0
PIF_VALUE: 18
PIF_VALUE: 1
PIF_VALUE: 18
PIF_VALUE: 29
PIF_VALUE: 17
PIF_VALUE: 18
PIF_VALUE: 21
PIF_VALUE: 19
PIF_VALUE: 21
PIF_VALUE: 18
PIF_VALUE: 16
PIF_VALUE: 35
PIF_VALUE: 0
PIF_VALUE: 18
PIF_VALUE: 21
PIF_VALUE: 18
PIF_VALUE: 21
PIF_VALUE: 18
PIF_VALUE: 21
PIF_VALUE: 0
PIF_VALUE: 17
PIF_VALUE: 27
PIF_VALUE: 18
PIF_VALUE: 18
PIF_VALUE: 16
PIF_VALUE: 18
PIF_VALUE: 3
PIF_VALUE: 17
PIF_VALUE: 18
PIF_VALUE: 17
PIF_VALUE: 15
PIF_VALUE: 17
PIF_VALUE: 23
PIF_VALUE: 21
PIF_VALUE: 17
PIF_VALUE: 18
PIF_VALUE: 19
PIF_VALUE: 18
PIF_VALUE: 13
PIF_VALUE: 18
PIF_VALUE: 18
PIF_VALUE: 19
PIF_VALUE: 17
PIF_VALUE: 13
PIF_VALUE: 16
PIF_VALUE: 18
PIF_VALUE: 15
PIF_VALUE: 18
PIF_VALUE: 18
PIF_VALUE: 19
PIF_VALUE: 18
PIF_VALUE: 18
PIF_VALUE: 19
PIF_VALUE: 18
PIF_VALUE: 1
PIF_VALUE: 18
PIF_VALUE: 18
PIF_VALUE: 17
PIF_VALUE: 18
PIF_VALUE: 19
PIF_VALUE: 18
PIF_VALUE: 16
PIF_VALUE: 18
PIF_VALUE: 16
PIF_VALUE: 18
PIF_VALUE: 18
PIF_VALUE: 19
PIF_VALUE: 16
PIF_VALUE: 1
PIF_VALUE: 20
PIF_VALUE: 18
PIF_VALUE: 13
PIF_VALUE: 18
PIF_VALUE: 24
PIF_VALUE: 1
PIF_VALUE: 17
PIF_VALUE: 16
PIF_VALUE: 18
PIF_VALUE: 17
PIF_VALUE: 19
PIF_VALUE: 18
PIF_VALUE: 18
PIF_VALUE: 19
PIF_VALUE: 17
PIF_VALUE: 21
PIF_VALUE: 18
PIF_VALUE: 18
PIF_VALUE: 16
PIF_VALUE: 16
PIF_VALUE: 18
PIF_VALUE: 18
PIF_VALUE: 7
PIF_VALUE: 35
PIF_VALUE: 16
PIF_VALUE: 19
PIF_VALUE: 18
PIF_VALUE: 14
PIF_VALUE: 18
PIF_VALUE: 20
PIF_VALUE: 18
PIF_VALUE: 16
PIF_VALUE: 21
PIF_VALUE: 18
PIF_VALUE: 3
PIF_VALUE: 18
PIF_VALUE: 21
PIF_VALUE: 18
PIF_VALUE: 17
PIF_VALUE: 16
PIF_VALUE: 18
PIF_VALUE: 18
PIF_VALUE: 16
PIF_VALUE: 18
PIF_VALUE: 18
PIF_VALUE: 19
PIF_VALUE: 18
PIF_VALUE: 22
PIF_VALUE: 18
PIF_VALUE: 18
PIF_VALUE: 19
PIF_VALUE: 18
PIF_VALUE: 17
PIF_VALUE: 18
PIF_VALUE: 21
PIF_VALUE: 18
PIF_VALUE: 19
PIF_VALUE: 4
PIF_VALUE: 16
PIF_VALUE: 17
PIF_VALUE: 18
PIF_VALUE: 19
PIF_VALUE: 1
PIF_VALUE: 18

## 2020-02-20 ASSESSMENT — PAIN SCALES - GENERAL
PAINLEVEL_OUTOF10: 0
PAINLEVEL_OUTOF10: 3
PAINLEVEL_OUTOF10: 0
PAINLEVEL_OUTOF10: 0

## 2020-02-20 ASSESSMENT — PAIN DESCRIPTION - PAIN TYPE: TYPE: SURGICAL PAIN

## 2020-02-20 ASSESSMENT — PAIN DESCRIPTION - LOCATION: LOCATION: NECK

## 2020-02-20 ASSESSMENT — PAIN DESCRIPTION - DESCRIPTORS: DESCRIPTORS: DISCOMFORT;SORE

## 2020-02-20 ASSESSMENT — PAIN - FUNCTIONAL ASSESSMENT
PAIN_FUNCTIONAL_ASSESSMENT: 0-10
PAIN_FUNCTIONAL_ASSESSMENT: ACTIVITIES ARE NOT PREVENTED

## 2020-02-20 NOTE — BRIEF OP NOTE
Brief Postoperative Note  ______________________________________________________________    Patient: Joanna Vargas  YOB: 1945  MRN: [id number]  Date of Procedure: 2/20/2020    Pre-Op Diagnosis: PAPILLARY THYROID CARCINOMA    Post-Op Diagnosis: Same       Procedure(s):  TOTAL THYROIDECTOMY WITH ANTERIOR NECK DISSECTION-NEEDS NERVE INTEGRITY MONITOR    Anesthesia: General    Surgeon(s):  Sarah Jewell DO    Assistant: DO Kallie Torres DO    Estimated Blood Loss (mL): less than 50     Complications: None    Specimens:   ID Type Source Tests Collected by Time Destination   A : TOTAL THYROID (SUTURE SERGEY RIGHT SUPERIOR) Tissue Tissue SURGICAL PATHOLOGY Sarah Jewell DO 2/20/2020 1250    B : LEVEL 6 NECK DISSECTION Tissue Tissue SURGICAL PATHOLOGY Sarah Jewell DO 2/20/2020 1259        Implants:  * No implants in log *      Drains:   Closed/Suction Drain Anterior Neck Bulb 15 [place] (Active)       Findings: Multiple thyroid nodules     Bobby Cui DO  Date: 2/20/2020  Time: 1:15 PM

## 2020-02-20 NOTE — PROGRESS NOTES
Patient signed out per anesthesia and waiting for a general floor bed. Family updated on patient status and no bed available at this time.

## 2020-02-20 NOTE — PROGRESS NOTES
Patient admitted to PACU and placed on appropriate monitors. Patient on 40% face mask. Airway patent at this time. Report obtained from CRNA. Warm blankets applied.

## 2020-02-21 LAB
CALCIUM IONIZED: 1.08 MMOL/L (ref 1.15–1.33)
CALCIUM IONIZED: 1.11 MMOL/L (ref 1.15–1.33)
CALCIUM IONIZED: 1.11 MMOL/L (ref 1.15–1.33)
CALCIUM IONIZED: 1.13 MMOL/L (ref 1.15–1.33)
CALCIUM SERPL-MCNC: 7.9 MG/DL (ref 8.6–10.2)
CALCIUM SERPL-MCNC: 8 MG/DL (ref 8.6–10.2)
CALCIUM SERPL-MCNC: 8 MG/DL (ref 8.6–10.2)
CALCIUM SERPL-MCNC: 8.5 MG/DL (ref 8.6–10.2)

## 2020-02-21 PROCEDURE — 6370000000 HC RX 637 (ALT 250 FOR IP): Performed by: STUDENT IN AN ORGANIZED HEALTH CARE EDUCATION/TRAINING PROGRAM

## 2020-02-21 PROCEDURE — 82330 ASSAY OF CALCIUM: CPT

## 2020-02-21 PROCEDURE — 82310 ASSAY OF CALCIUM: CPT

## 2020-02-21 PROCEDURE — G0378 HOSPITAL OBSERVATION PER HR: HCPCS

## 2020-02-21 PROCEDURE — 2580000003 HC RX 258: Performed by: STUDENT IN AN ORGANIZED HEALTH CARE EDUCATION/TRAINING PROGRAM

## 2020-02-21 PROCEDURE — 36415 COLL VENOUS BLD VENIPUNCTURE: CPT

## 2020-02-21 PROCEDURE — 6370000000 HC RX 637 (ALT 250 FOR IP): Performed by: OTOLARYNGOLOGY

## 2020-02-21 RX ORDER — CALCITRIOL 0.25 UG/1
0.25 CAPSULE, LIQUID FILLED ORAL 2 TIMES DAILY
Status: DISCONTINUED | OUTPATIENT
Start: 2020-02-21 | End: 2020-02-22 | Stop reason: HOSPADM

## 2020-02-21 RX ORDER — OYSTER SHELL CALCIUM WITH VITAMIN D 500; 200 MG/1; [IU]/1
2 TABLET, FILM COATED ORAL 2 TIMES DAILY
Status: DISCONTINUED | OUTPATIENT
Start: 2020-02-21 | End: 2020-02-22 | Stop reason: HOSPADM

## 2020-02-21 RX ADMIN — SENNOSIDES AND DOCUSATE SODIUM 1 TABLET: 8.6; 5 TABLET ORAL at 08:33

## 2020-02-21 RX ADMIN — CALCITRIOL 0.25 MCG: 0.25 CAPSULE ORAL at 20:17

## 2020-02-21 RX ADMIN — CALCIUM CARBONATE-VITAMIN D TAB 500 MG-200 UNIT 2 TABLET: 500-200 TAB at 08:32

## 2020-02-21 RX ADMIN — ACETAMINOPHEN 650 MG: 325 TABLET ORAL at 20:17

## 2020-02-21 RX ADMIN — LEVOTHYROXINE SODIUM 75 MCG: 0.07 TABLET ORAL at 05:29

## 2020-02-21 RX ADMIN — SENNOSIDES AND DOCUSATE SODIUM 1 TABLET: 8.6; 5 TABLET ORAL at 20:17

## 2020-02-21 RX ADMIN — ACETAMINOPHEN 650 MG: 325 TABLET ORAL at 08:33

## 2020-02-21 RX ADMIN — CALCITRIOL 0.25 MCG: 0.25 CAPSULE ORAL at 08:32

## 2020-02-21 RX ADMIN — CALCIUM CARBONATE-VITAMIN D TAB 500 MG-200 UNIT 2 TABLET: 500-200 TAB at 20:17

## 2020-02-21 RX ADMIN — Medication 10 ML: at 08:33

## 2020-02-21 RX ADMIN — ACETAMINOPHEN 650 MG: 325 TABLET ORAL at 13:21

## 2020-02-21 RX ADMIN — Medication 10 ML: at 20:17

## 2020-02-21 ASSESSMENT — PAIN DESCRIPTION - ORIENTATION: ORIENTATION: MID

## 2020-02-21 ASSESSMENT — PAIN SCALES - GENERAL
PAINLEVEL_OUTOF10: 0
PAINLEVEL_OUTOF10: 2
PAINLEVEL_OUTOF10: 0
PAINLEVEL_OUTOF10: 0
PAINLEVEL_OUTOF10: 3

## 2020-02-21 ASSESSMENT — PAIN DESCRIPTION - PAIN TYPE: TYPE: SURGICAL PAIN

## 2020-02-21 ASSESSMENT — PAIN DESCRIPTION - LOCATION: LOCATION: NECK

## 2020-02-21 ASSESSMENT — PAIN DESCRIPTION - DESCRIPTORS: DESCRIPTORS: ACHING;DISCOMFORT;SORE;TENDER

## 2020-02-21 NOTE — CARE COORDINATION
· Patient is POD#1 Total thyroidectomy with central neck dissection with nerve integrity monitor. Labs - calcium and PTH low. Per otolaryngology note today, Will pull Drain likely tomorrow; Continue hospitalization for Calcium monitoring and treatment. Met with patient and  in room to explain role and discuss transition of care. Patient said she is tolerating her general diet and has no needs for discharge. Her  said he will transport his wife home at time of discharge.   Geovanna Castillo RN CM

## 2020-02-21 NOTE — PROGRESS NOTES
OTOLARYNGOLOGY  DAILY PROGRESS NOTE  2020    Subjective:     Patient is doing well today. Tolerating  diet. Afebrile over night. No issues overnight. Labs - calcium and PTH low. Questions answered. Objective:     BP (!) 142/65   Pulse 77   Temp 97.9 °F (36.6 °C)   Resp 18   Ht 5' 2\" (1.575 m)   Wt 151 lb (68.5 kg)   LMP  (LMP Unknown)   SpO2 94%   BMI 27.62 kg/m²   PULSE OXIMETRY RANGE: SpO2  Av.6 %  Min: 92 %  Max: 100 %  I/O last 3 completed shifts: In: 1100 [I.V.:1100]  Out: 25 [Blood:25]    GENERAL:  Laying in bed, awake, alert, cooperative, no apparent distress  HENT: Normocephalic, no nasal drainage, mucous membranes are pink and mouist   NEURO: CN II-XII intact, minimal hoarseness, nochange in voice   NECK: Incision is clean, dry, and intact, drain with 50 cc serosanguinous ouput  EYES: No sclera icterus, pupils equal round reactive  LUNGS:  No increased work of breathing, no respiratory distress or stridor   CARDIOVASCULAR: Normal rate     CBC  No results for input(s): WBC, HGB, HCT, PLT in the last 72 hours. BMP  Recent Labs     20  0855  20  0226   K 3.1*  --   --    CALCIUM  --    < > 7.9*    < > = values in this interval not displayed. PT-INR  No results for input(s): INR, PTT in the last 72 hours. Invalid input(s): PT  CALCIUM  Recent Labs     20  1355 20  0226   CALCIUM 8.5* 7.9*       Assessment/Plan:     76 y.o. female POD #1 s/p total thyroidectomy with central neck dissection for papillary thyroid carincoma     - pathology pending, further recs to follow in clinic  · Ca and PTH low.  Will continue to monitor Q6hrs and replace calcium with Calcitriol and [place].  · General diet as tolerated, encourage hydration   · Pain and nausea control PRN  · Ambulate and out of bed   · SCDs   · Will pull Drain likely tomorrow    Continue inpatient stay for Calcium monitoring and treatment       Will discuss with attending    Electronically signed by Yoseph Whitmore DO Rk on 2/21/2020 at 5:37 AM

## 2020-02-21 NOTE — PLAN OF CARE
Problem: Falls - Risk of:  Goal: Will remain free from falls  Description  Will remain free from falls  2/21/2020 1619 by Basilio Casas RN  Outcome: Met This Shift     Problem: Pain:  Goal: Pain level will decrease  Description  Pain level will decrease  2/21/2020 1619 by Basilio Casas RN  Outcome: Met This Shift

## 2020-02-21 NOTE — PROGRESS NOTES
Tramadol Script given to patient's  to take to [facility] to be filled for discharge possible tomorrow.

## 2020-02-21 NOTE — ANESTHESIA POSTPROCEDURE EVALUATION
Department of Anesthesiology  Postprocedure Note    Patient: Papi Castillo  MRN: [id number]  Birthdate: [birth year]  Date of evaluation: 2/21/2020  Time:  8:58 AM     Procedure Summary     Date:  02/20/20 Room / Location:  [address] [place] [address] OR 06 / [facility]    Anesthesia Start:  3715 Anesthesia Stop:  2336    Procedure:  TOTAL THYROIDECTOMY WITH ANTERIOR NECK DISSECTION-NEEDS NERVE INTEGRITY MONITOR (Bilateral Neck) Diagnosis:  (PAPILLARY THYROID CARCINOMA)    Surgeon:  Levon Granado DO Responsible Provider:  Helga Asif MD    Anesthesia Type:  general ASA Status:  3          Anesthesia Type: general    Lesli Phase I: Lesli Score: 8    Elsli Phase II:      Last vitals: Reviewed and per EMR flowsheets.        Anesthesia Post Evaluation    Patient location during evaluation: PACU  Patient participation: complete - patient participated  Level of consciousness: awake and alert  Airway patency: patent  Nausea & Vomiting: no nausea and no vomiting  Complications: no  Cardiovascular status: hemodynamically stable  Respiratory status: acceptable  Hydration status: euvolemic

## 2020-02-21 NOTE — OP NOTE
[address]                  [address] [facility],  [facility]                                OPERATIVE REPORT    PATIENT NAME: Jonelle Barbour                    :        1945  MED REC NO:   [id number]                            ROOM:       84  ACCOUNT NO:   [de-identified]                           ADMIT DATE: 2020  PROVIDER:     Anatoly Brantley    DATE OF PROCEDURE:  2020    PREOPERATIVE DIAGNOSIS:  Papillary thyroid carcinoma. POSTOPERATIVE DIAGNOSIS:  Papillary thyroid carcinoma. OPERATION PERFORMED:  Total thyroidectomy with central neck dissection  with nerve integrity monitor. SURGEON:  Oneil Rodriguez DO    ASSISTANTS:  Stephanie Jonas DO; and Anatoly Brantley. ANESTHESIA:  General.    EBL:  Less than 50 mL. COMPLICATIONS:  None. SPECIMENS:  1. Total thyroid. 2.  Central neck contents. BRIEF MEDICAL HISTORY:  This is a 44-year-old female who had undergone  prior evaluations for multiple nodules. Had several FNAs performed, one  that was suspicious for malignancy and one that was positive for  papillary thyroid carcinoma. All benefits, risks, alternatives, and  questions were answered to her satisfaction in the clinical setting  prior to being consented appropriately for total thyroidectomy and  central neck dissection. DESCRIPTION OF THE PROCEDURE:  The patient was seen and examined in the  preop holding area by Dr. Oneil Rodriguez. All questions and concerns  were answered to her satisfaction. She was brought back into the OR  under the care of the anesthesia team, placed in the supine position,  underwent general anesthesia with GlideScope and an endotracheal nerve  integrity monitor tube without complications. SCDs were placed and  functioning. Perioperative antibiotics were administered. Nerve  integrity monitor was hooked up and functioning appropriately.   Timeout  was performed in which the patient's Next, attention was then placed at the inferior pole where  there appeared to be a parathyroid. This was then able to be dissected  on the lateral margin and the inferior pole vessels were then isolated  and ligated with Harmonic. Prior to any excision of tissue, the nerve  integrity monitor was used to stimulate the tissue to confirm no  presence of recurrent laryngeal nerve. I was able to dissect along the  lateral border where the middle thyroid vein was identified, isolated  and ligated and dissection just deep to this plane and what appeared to  be a parathyroid as well was the recurrent laryngeal nerve. This was  kept intact and dissection was then carried down just superficial to  this and allowed the dissection from Flynn's ligament. The remaining  portion of Flynn's ligament was then dissected off the trachea and the  specimen was given for permanent pathology with a marking stitch. Recurrent laryngeal nerves bilaterally were identified and stimulated  with the nerve integrity monitor. There appeared to be two parathyroids  on the right and one on the left, were intact and within the tissue bed. Next, attention was then placed to the central neck contents where the  anterior border of the sternocleidomastoid was dissected free. The  tissue medial to this was then skeletonized and dissected. Careful  attention not to disrupt the recurrent laryngeal nerve as well as  of the parathyroid. This was dissected down to the thymus and all  tissue was then given for permanent pathology. Hemostasis was  controlled using bipolar cautery. Valsalva was placed. A closed  suction drain was placed. The shafts were reapproximated using a  running 2-0 Vicryl stitch. The platysma was then closed in a 3-0 deep  dermal Vicryl stitch as well as the dermal layer. Skin was then closed  using a running subcuticular 4-0 Monocryl stitch with Mastisol and  Steri-Strips.   The patient was handed back to Anesthesia for appropriate  awakening. All counts, sharps, and laps were correct. Dr. Román Kim was  present and scrubbed for the entire case.         Jabari Mcnally    D: 02/20/2020 13:22:27       T: 02/20/2020 16:09:01     [email or web address]  Job#: [id number]     Doc#: [id number]    CC:

## 2020-02-22 VITALS
WEIGHT: 151 LBS | HEART RATE: 74 BPM | RESPIRATION RATE: 18 BRPM | OXYGEN SATURATION: 93 % | BODY MASS INDEX: 27.79 KG/M2 | TEMPERATURE: 98 F | HEIGHT: 62 IN | SYSTOLIC BLOOD PRESSURE: 167 MMHG | DIASTOLIC BLOOD PRESSURE: 81 MMHG

## 2020-02-22 PROBLEM — G89.18 POST-OPERATIVE PAIN: Status: ACTIVE | Noted: 2020-02-22

## 2020-02-22 LAB
CALCIUM IONIZED: 1.21 MMOL/L (ref 1.15–1.33)
CALCIUM SERPL-MCNC: 8.5 MG/DL (ref 8.6–10.2)

## 2020-02-22 PROCEDURE — 2580000003 HC RX 258: Performed by: STUDENT IN AN ORGANIZED HEALTH CARE EDUCATION/TRAINING PROGRAM

## 2020-02-22 PROCEDURE — 82310 ASSAY OF CALCIUM: CPT

## 2020-02-22 PROCEDURE — G0378 HOSPITAL OBSERVATION PER HR: HCPCS

## 2020-02-22 PROCEDURE — 36415 COLL VENOUS BLD VENIPUNCTURE: CPT

## 2020-02-22 PROCEDURE — 6370000000 HC RX 637 (ALT 250 FOR IP): Performed by: OTOLARYNGOLOGY

## 2020-02-22 PROCEDURE — 6370000000 HC RX 637 (ALT 250 FOR IP): Performed by: STUDENT IN AN ORGANIZED HEALTH CARE EDUCATION/TRAINING PROGRAM

## 2020-02-22 PROCEDURE — 82330 ASSAY OF CALCIUM: CPT

## 2020-02-22 RX ORDER — CEPHALEXIN 500 MG/1
500 CAPSULE ORAL 3 TIMES DAILY
Qty: 21 CAPSULE | Refills: 0 | Status: SHIPPED | OUTPATIENT
Start: 2020-02-22 | End: 2020-02-29

## 2020-02-22 RX ORDER — LEVOTHYROXINE SODIUM 0.1 MG/1
75 TABLET ORAL DAILY
Qty: 30 TABLET | Refills: 2 | Status: SHIPPED | OUTPATIENT
Start: 2020-02-22 | End: 2020-04-21 | Stop reason: ALTCHOICE

## 2020-02-22 RX ORDER — OYSTER SHELL CALCIUM WITH VITAMIN D 500; 200 MG/1; [IU]/1
2 TABLET, FILM COATED ORAL 2 TIMES DAILY
Qty: 30 TABLET | Refills: 1 | Status: SHIPPED | OUTPATIENT
Start: 2020-02-22 | End: 2020-03-09 | Stop reason: CLARIF

## 2020-02-22 RX ADMIN — ACETAMINOPHEN 650 MG: 325 TABLET ORAL at 08:51

## 2020-02-22 RX ADMIN — CALCITRIOL 0.25 MCG: 0.25 CAPSULE ORAL at 08:52

## 2020-02-22 RX ADMIN — CALCIUM CARBONATE-VITAMIN D TAB 500 MG-200 UNIT 2 TABLET: 500-200 TAB at 08:52

## 2020-02-22 RX ADMIN — ACETAMINOPHEN 650 MG: 325 TABLET ORAL at 03:07

## 2020-02-22 RX ADMIN — Medication 10 ML: at 08:51

## 2020-02-22 RX ADMIN — SENNOSIDES AND DOCUSATE SODIUM 1 TABLET: 8.6; 5 TABLET ORAL at 08:52

## 2020-02-22 RX ADMIN — LEVOTHYROXINE SODIUM 75 MCG: 0.07 TABLET ORAL at 06:32

## 2020-02-22 ASSESSMENT — PAIN SCALES - GENERAL
PAINLEVEL_OUTOF10: 3
PAINLEVEL_OUTOF10: 0
PAINLEVEL_OUTOF10: 2

## 2020-02-22 NOTE — DISCHARGE SUMMARY
Physician Discharge Summary     Rosie Wilson  [id number]    Admit date: 2/20/2020    Discharge date and time: No discharge date for patient encounter. Admitting Physician: Lonnie Haider DO     Admission Diagnoses:   Patient Active Problem List   Diagnosis    Essential hypertension    Mixed hyperlipidemia    Acquired hypothyroidism    Fatigue    Seasonal allergic rhinitis    Allergic drug reaction    Dermatitis    [facility]    Multinodular goiter    Vitamin D deficiency    Colon cancer ([facility]    S/P complete thyroidectomy    Post-operative pain       Discharge Diagnoses:   Patient Active Problem List   Diagnosis    Essential hypertension    Mixed hyperlipidemia    Acquired hypothyroidism    Fatigue    Seasonal allergic rhinitis    Allergic drug reaction    Dermatitis    Adrenal incidentaloma ([facility]    Multinodular goiter    Vitamin D deficiency    Colon cancer ([facility]    S/P complete thyroidectomy    Post-operative pain         Hospital Course: Rosie Wilson is a 76 y.o. female who ENT performed total thyroidectomy. Some mild hypocalcemia post-operatively. She had an otherwise uneventful course and progressed well. Pain was controlled. She was tolerating a regular diet with no nausea or vomiting, was ambulating well, and was in a suitable condition for discharge to home.      Lab Results   Component Value Date    WBC 6.8 02/13/2020    HGB 12.6 02/13/2020     02/13/2020     02/13/2020    CL 98 02/13/2020    K 3.1 02/20/2020    K 4.2 08/30/2019    BUN 11 02/13/2020    CREATININE 0.6 02/13/2020    GLUCOSE 132 02/13/2020    LABGLOM >60 02/13/2020    LABALBU 4.2 12/19/2019    PROT 7.9 12/19/2019    CALCIUM 8.5 02/21/2020    MG 1.7 08/30/2019    PHOS 3.4 08/30/2019    BILITOT 0.3 12/19/2019    BILIDIR <0.2 08/30/2019    ALKPHOS 123 12/19/2019    AST 13 12/19/2019    ALT 11 12/19/2019       Discharge Exam:   VITALS: BP (!) 167/81   Pulse 74   Temp 98 °F (36.7 °C) (Temporal)   Resp 18   Ht 5' 2\" (1.575 m)   Wt 151 lb (68.5 kg)   LMP  (LMP Unknown)   SpO2 93%   BMI 27.62 kg/m²     General appearance: alert, appears stated age and cooperative  Head: Normocephalic, without obvious abnormality, atraumatic  Neck: Incision is clean, dry, intact. No evidence of hematoma or infection. Lungs: clear to auscultation bilaterally  Heart: regular rate and rhythm  Abdomen: soft, non-tender; bowel sounds normal; no masses,  no organomegaly  Extremities: extremities normal, atraumatic, no cyanosis or edema    Disposition: home       Medication List      START taking these medications    calcium-vitamin D 500-200 MG-UNIT per tablet  Commonly known as:  OSCAL-500  Take 2 tablets by mouth 2 times daily     cephALEXin 500 MG capsule  Commonly known as:  KEFLEX  Take 1 capsule by mouth 3 times daily for 7 days     traMADol 50 MG tablet  Commonly known as:  Ultram  Take 1 tablet by mouth every 6 hours as needed for Pain for up to 5 days.         CHANGE how you take these medications    enalapril-hydrochlorothiazide 10-25 MG per tablet  Commonly known as:  VASERETIC  Take 1 tablet by mouth daily  What changed:  when to take this     levothyroxine 100 MCG tablet  Commonly known as:  SYNTHROID  Take 1 tablet by mouth Daily  What changed:    · medication strength  · how much to take        CONTINUE taking these medications    amLODIPine-atorvastatatin 5-10 MG per tablet  Commonly known as:  CADUET  TAKE 1 TABLET BY MOUTH ONCE DAILY     aspirin 81 MG tablet  Take 1 tablet by mouth daily     B-12 2500 MCG Tabs     Fish Oil 500 MG Caps     potassium chloride 10 MEQ extended release tablet  Commonly known as:  KLOR-CON M  Take 1 tablet by mouth 2 times daily     PRESERVISION AREDS PO     vitamin D 125 MCG (5000 UT) Caps capsule  Commonly known as:  CHOLECALCIFEROL           Where to Get Your Medications      These medications were sent to [facility] [address] [facility] [address] [facility], [address] [place] [address] AVENUE - P [phone] - F [phone]  [address] [facility] [address]    Phone:  [phone]   · calcium-vitamin D 500-200 MG-UNIT per tablet  · cephALEXin 500 MG capsule  · levothyroxine 100 MCG tablet     You can get these medications from any pharmacy    Bring a paper prescription for each of these medications  · traMADol 50 MG tablet         Patient Instructions: Activity: no strenuous activity until cleared by physician at post-op appointment   Diet: regular diet  Wound Care: If incision is open to air, okay to use antibiotic ointment. If there are Steri-Strips, there is we will follow-up in 1 week. Do not soak incision, okay to shower 48 hours after surgery.     Follow-up with Dr. Jeet Miles in 1 week        Signed:  Jesus Davison  2/22/2020  8:48 AM

## 2020-02-22 NOTE — DISCHARGE INSTR - COC
Continuity of Care Form    Patient Name: Sunil August   :    MRN:  [id number]    Admit date:  2020  Discharge date:  ***    Code Status Order: Full Code   Advance Directives:   Advance Care Flowsheet Documentation     Date/Time Healthcare Directive Type of Healthcare Directive Copy in [address] Agent's Name Healthcare Agent's Phone Number    20  Yes, patient has an advance directive for healthcare treatment  --  No, copy requested from family  --  --  --    20 1054  Yes, patient has an advance directive for healthcare treatment  --  No, copy requested from family  --  --  --          Admitting Physician:  Pieter Browning DO  PCP: Kan Cifuentes DO    Discharging Nurse: [facility] Unit/Room#: 8784/9157-J  Discharging Unit Phone Number: ***    Emergency Contact:   Extended Emergency Contact Information  [facility] [address] Phone: [phone]  Mobile Phone: [phone]  Relation: Child  Secondary Emergency Contact: Zev Serrano  Address: [address] [place] [address], [address] [place] [address] [address] Phone: [phone]  Relation: Other    Past Surgical History:  Past Surgical History:   Procedure Laterality Date    CATARACT REMOVAL WITH IMPLANT Bilateral     CHOLECYSTECTOMY      COLONOSCOPY N/A 2019    [facility]    RIGHT HEMICOLECTOMY, LAPAROSCOPIC, ROBOTIC XI ASSISTED performed by Jimbo Finley MD at [address] [facility] Bilateral 2020    [address] performed by Pieter Browning DO at [facility] OR       Immunization History:   Immunization History   Administered Date(s) Administered    Influenza, High Dose (Fluzone 65 yrs and older) 01/23/2018, 10/09/2018    Influenza, Triv, inactivated, subunit, adjuvanted, IM (Fluad 65 yrs and older) 10/03/2019    Pneumococcal Conjugate 13-valent (Olamide Urrutia) 07/11/2017       Active Problems:  Patient Active Problem List   Diagnosis Code    Essential hypertension I10    Mixed hyperlipidemia E78.2    Acquired hypothyroidism E03.9    Fatigue R53.83    Seasonal allergic rhinitis J30.2    Allergic drug reaction T78.40XA    Dermatitis L30.9    Adrenal incidentaloma (HCC) E27.8    Multinodular goiter E04.2    Vitamin D deficiency E55.9    Colon cancer (HCC) C18.9    S/P complete thyroidectomy E89.0    Post-operative pain G89.18       Isolation/Infection:   Isolation          No Isolation        Patient Infection Status     None to display          Nurse Assessment:  Last Vital Signs: BP (!) 167/81   Pulse 74   Temp 98 °F (36.7 °C) (Temporal)   Resp 18   Ht 5' 2\" (1.575 m)   Wt 151 lb (68.5 kg)   LMP  (LMP Unknown)   SpO2 93%   BMI 27.62 kg/m²     Last documented pain score (0-10 scale): Pain Level: 0  Last Weight:   Wt Readings from Last 1 Encounters:   02/20/20 151 lb (68.5 kg)     Mental Status:  {IP PT MENTAL STATUS:85476}    IV Access:  { [facility] IV ACCESS:214212695}    Nursing Mobility/ADLs:  Walking   {[facility] DME FYLO:698124009}  Transfer  {[facility] DME CZUT:707556619}  Bathing  {[facility] DME HFEQ:352037914}  Dressing  {[facility] DME QQTK:098237108}  Toileting  {[facility] DME NLCN:860102873}  Feeding  {[facility] DME IHHE:956457748}  Med Admin  {[facility] DME DWNB:635920496}  Med Delivery   { GU[facility] MED Delivery:111075171}    Wound Care Documentation and Therapy:        Elimination:  Continence:   · Bowel: {YES / JJ:78151}  · Bladder: {YES / QO:88416}  Urinary Catheter: {Urinary Catheter:448586002}   Colostomy/Ileostomy/Ileal Conduit: {YES / BK:12403}       Date of Last BM: ***    Intake/Output Summary (Last 24 hours) at 2/22/2020 1023  Last data filed at 2/22/2020 0635  Gross per 24 hour   Intake 590 ml   Output 41 ml   Net 549 ml     I/O last 3 completed shifts:   In: 0 [P.O.:590]  Out: 39 [Drains:40; Stool:1]    Safety Concerns:     [address] GUAD[facility]PE Safety Concerns:728784424}    Impairments/Disabilities:      [address] GUAD[facility]PE Impairments/Disabilities:841978954}    Nutrition Therapy:  Current Nutrition Therapy:   [address] GUAD[facility]PE Diet List:967003429}    Routes of Feeding: {CHP DME Other Feedings:019436654}  Liquids: {Slp liquid thickness:61233}  Daily Fluid Restriction: {CHP DME Yes amt example:713709570}  Last Modified Barium Swallow with Video (Video Swallowing Test): {Done Not Done EAAW:051406851}    Treatments at the Time of Hospital Discharge:   Respiratory Treatments: ***  Oxygen Therapy:  {Therapy; copd oxygen:00876}  Ventilator:    { CC Vent PQFM:233508358}    Rehab Therapies: {THERAPEUTIC INTERVENTION:4368211016}  Weight Bearing Status/Restrictions: {[facility] Weight Bearin}  Other Medical Equipment (for information only, NOT a DME order):  {EQUIPMENT:246742450}  Other Treatments: ***    Patient's personal belongings (please select all that are sent with patient):  {[facility] DME Belongings:275702593}    RN SIGNATURE:  {Esignature:568017785}    CASE MANAGEMENT/SOCIAL WORK SECTION    Inpatient Status Date: ***    Readmission Risk Assessment Score:  Readmission Risk              Risk of Unplanned Readmission:        11           Discharging to Facility/ Agency   · Name:   · Address:  · Phone:  · Fax:    Dialysis Facility (if applicable)   · Name:  · Address:  · Dialysis Schedule:  · Phone:  · Fax:    / signature: {Esignature:324000456}    PHYSICIAN SECTION    Prognosis: {Prognosis:8268576939}    Condition at Discharge: [address] Patient Condition:800093069}    Rehab Potential (if transferring to Rehab): {Prognosis:3415215064}    Recommended Labs or Other Treatments After Discharge: ***    Physician Certification: I certify the above information and transfer of Charlie Leung  is necessary for the continuing treatment of the diagnosis listed and that she requires {Admit to Appropriate Level of Care:88832} for {GREATER/LESS:926240624} 30 days.      Update Admission H&P: {CHP DME Changes in FCDVE:219358413}    PHYSICIAN SIGNATURE:  {Esignature:131675444}

## 2020-02-22 NOTE — PROGRESS NOTES
OTOLARYNGOLOGY  DAILY PROGRESS NOTE  2020    Subjective:     Patient is doing well today. Tolerating  diet. Afebrile over night. No issues overnight. Questions answered. Objective:     BP (!) 167/81   Pulse 74   Temp 98 °F (36.7 °C) (Temporal)   Resp 18   Ht 5' 2\" (1.575 m)   Wt 151 lb (68.5 kg)   LMP  (LMP Unknown)   SpO2 93%   BMI 27.62 kg/m²   PULSE OXIMETRY RANGE: SpO2  Av.3 %  Min: 93 %  Max: 97 %  I/O last 3 completed shifts: In: 0 [P.O.:590]  Out: 39 [Drains:40; Stool:1]    GENERAL:  Laying in bed, awake, alert, cooperative, no apparent distress  HENT: Normocephalic, no nasal drainage, mucous membranes are pink and mouist   NEURO: CN II-XII intact, minimal hoarseness, nochange in voice   NECK: Incision is clean, dry, and intact, drain with 40 cc serosanguinous ouput  EYES: No sclera icterus, pupils equal round reactive  LUNGS:  No increased work of breathing, no respiratory distress or stridor   CARDIOVASCULAR: Normal rate     CBC  No results for input(s): WBC, HGB, HCT, PLT in the last 72 hours. BMP  Recent Labs     20  0855  20  1425   K 3.1*  --   --    CALCIUM  --    < > 8.5*    < > = values in this interval not displayed. PT-INR  No results for input(s): INR, PTT in the last 72 hours.     Invalid input(s): PT  CALCIUM  Recent Labs     20  0756 20  1142 20  1425   CALCIUM 8.0* 8.0* 8.5*       Assessment/Plan:     76 y.o. female POD #2 s/p total thyroidectomy with central neck dissection for papillary thyroid carincoma     - pathology pending, further recs to follow in clinic  · Ca stablized, asymptomatic  · General diet as tolerated, encourage hydration   · Pain and nausea control PRN  · Ambulate and out of bed   · SCDs   · Drain pulled  · DC today      Will discuss with attending    Electronically signed by Farhana Gould DO on 2020 at 9:58 AM

## 2020-02-26 ENCOUNTER — TELEPHONE (OUTPATIENT)
Dept: ENT CLINIC | Age: 75
End: 2020-02-26

## 2020-02-28 PROBLEM — C73 HURTHLE CELL CARCINOMA OF THYROID (HCC): Status: ACTIVE | Noted: 2020-02-28

## 2020-03-03 ENCOUNTER — HOSPITAL ENCOUNTER (OUTPATIENT)
Age: 75
Discharge: HOME | End: 2020-03-05
Payer: MEDICARE

## 2020-03-03 ENCOUNTER — TELEPHONE (OUTPATIENT)
Dept: ENT CLINIC | Age: 75
End: 2020-03-03

## 2020-03-03 ENCOUNTER — OFFICE VISIT (OUTPATIENT)
Dept: ENT CLINIC | Age: 75
End: 2020-03-03

## 2020-03-03 VITALS
BODY MASS INDEX: 27.98 KG/M2 | WEIGHT: 153 LBS | HEART RATE: 85 BPM | SYSTOLIC BLOOD PRESSURE: 150 MMHG | DIASTOLIC BLOOD PRESSURE: 80 MMHG

## 2020-03-03 LAB
CALCIUM IONIZED: 1.08 MMOL/L (ref 1.15–1.33)
PARATHYROID HORMONE INTACT: 16 PG/ML (ref 15–65)

## 2020-03-03 PROCEDURE — 99024 POSTOP FOLLOW-UP VISIT: CPT | Performed by: OTOLARYNGOLOGY

## 2020-03-03 PROCEDURE — 83970 ASSAY OF PARATHORMONE: CPT

## 2020-03-03 PROCEDURE — 36415 COLL VENOUS BLD VENIPUNCTURE: CPT

## 2020-03-03 PROCEDURE — 82330 ASSAY OF CALCIUM: CPT

## 2020-03-03 RX ORDER — ACETAMINOPHEN 325 MG/1
650 TABLET ORAL EVERY 6 HOURS PRN
COMMUNITY
End: 2020-03-09 | Stop reason: CLARIF

## 2020-03-03 RX ORDER — B-COMPLEX WITH VITAMIN C
1 TABLET ORAL DAILY
Qty: 30 TABLET | Refills: 0 | Status: SHIPPED
Start: 2020-03-03 | End: 2020-03-09 | Stop reason: CLARIF

## 2020-03-03 ASSESSMENT — ENCOUNTER SYMPTOMS
EYES NEGATIVE: 1
ALLERGIC/IMMUNOLOGIC NEGATIVE: 1

## 2020-03-03 NOTE — PROGRESS NOTES
Department of Otolaryngology  Office Consult Note  3/3/20          Subjective:        Chief Complaint:  had concerns including Post-Op Check (thyroid- no problems). Patient ID: Denise Easley is a 76 y.o. female. HPI: Patient presents as  post-op for 1 week for total thyroidectomy with central neck dissection. Patient doing well post-op, pain controlled. Denies dysphagia, dyspnea, change in voice, hoarseness of voice, fever. Post-op course complicated by  Hypocalcemia/hypoparathyroidism,took calcium supplement with no symptoms. Review of Systems   Constitutional: Negative. HENT: Negative. Eyes: Negative. Skin: Negative. Allergic/Immunologic: Negative. Neurological: Negative. Hematological: Negative. Psychiatric/Behavioral: Negative.           Past Medical History:   Diagnosis Date    Cancer ([facility] 07/29/2019    colon    Glaucoma     Hyperlipidemia     Hypertension     Hypothyroidism      Past Surgical History:   Procedure Laterality Date    CATARACT REMOVAL WITH IMPLANT Bilateral     CHOLECYSTECTOMY      COLONOSCOPY N/A 7/29/2019    COLONOSCOPY WITH BIOPSY performed by Aminah Stone MD at [address] Right 8/29/2019    RIGHT HEMICOLECTOMY, LAPAROSCOPIC, ROBOTIC XI ASSISTED performed by Aminah Stone MD at [address] [facility] Bilateral 2/20/2020    TOTAL THYROIDECTOMY WITH ANTERIOR NECK DISSECTION-NEEDS NERVE INTEGRITY MONITOR performed by Kobe Celis DO at [facility] OR       Current Outpatient Medications:     acetaminophen (TYLENOL) 325 MG tablet, Take 650 mg by mouth every 6 hours as needed for Pain, Disp: , Rfl:     Calcium Carbonate-Vitamin D (OYSTER SHELL CALCIUM/D) 500-200 MG-UNIT TABS, Take 1 tablet by mouth daily, Disp: 30 tablet, Rfl: 0    calcium-vitamin D (OSCAL-500) 500-200 MG-UNIT per tablet, Take 2 tablets by mouth 2 times daily, Disp: 30 tablet, Rfl: 1    levothyroxine (SYNTHROID) 100 MCG tablet, Take 1 tablet by mouth Daily, Disp: 30 tablet, Rfl: 2    potassium chloride (KLOR-CON M) 10 MEQ extended release tablet, Take 1 tablet by mouth 2 times daily, Disp: 180 tablet, Rfl: 0    amLODIPine-atorvastatatin (CADUET) 5-10 MG per tablet, TAKE 1 TABLET BY MOUTH ONCE DAILY, Disp: 90 tablet, Rfl: 0    enalapril-hydrochlorothiazide (VASERETIC) 10-25 MG per tablet, Take 1 tablet by mouth daily (Patient taking differently: Take 1 tablet by mouth every morning ), Disp: 90 tablet, Rfl: 1    Multiple Vitamins-Minerals (PRESERVISION AREDS PO), Take by mouth 2 times daily , Disp: , Rfl:     Omega-3 Fatty Acids (FISH OIL) 500 MG CAPS, Take 1 capsule by mouth daily, Disp: , Rfl:     aspirin 81 MG tablet, Take 1 tablet by mouth daily, Disp: 90 tablet, Rfl: 1    Cyanocobalamin (B-12) 2500 MCG TABS, Take 1 tablet by mouth three times a week , Disp: , Rfl:     vitamin D (CHOLECALCIFEROL) 5000 UNITS CAPS capsule, Take 5,000 Units by mouth daily, Disp: , Rfl:   5-alpha reductase inhibitors and Prevnar 13 [pneumococcal 13-sera conj vacc]  Social History     Tobacco Use    Smoking status: Never Smoker    Smokeless tobacco: Never Used   Substance Use Topics    Alcohol use: No     Alcohol/week: 0.0 standard drinks    Drug use: No     Family History   Problem Relation Age of Onset    Early Death Father     Cancer Sister     Lung Cancer Sister     Cancer Brother     Lung Cancer Brother            Objective:   BP (!) 150/80 (Site: Left Upper Arm, Position: Sitting, Cuff Size: Medium Adult)   Pulse 85   Wt 153 lb (69.4 kg)   LMP  (LMP Unknown)   BMI 27.98 kg/m²     Physical Exam  Constitutional:       Appearance: Normal appearance. HENT:      Head: Normocephalic.       Right Ear: Tympanic membrane, ear canal and external ear normal.      Left Ear: Tympanic membrane, ear canal and external ear normal.      Nose: Nose normal.      Mouth/Throat:      Mouth: Mucous membranes are moist.   Eyes:      Extraocular

## 2020-03-09 ENCOUNTER — OFFICE VISIT (OUTPATIENT)
Dept: FAMILY MEDICINE CLINIC | Age: 75
End: 2020-03-09
Payer: MEDICARE

## 2020-03-09 VITALS
BODY MASS INDEX: 27.6 KG/M2 | DIASTOLIC BLOOD PRESSURE: 84 MMHG | SYSTOLIC BLOOD PRESSURE: 126 MMHG | RESPIRATION RATE: 18 BRPM | TEMPERATURE: 98 F | HEART RATE: 87 BPM | OXYGEN SATURATION: 97 % | WEIGHT: 150 LBS | HEIGHT: 62 IN

## 2020-03-09 PROCEDURE — 3017F COLORECTAL CA SCREEN DOC REV: CPT | Performed by: FAMILY MEDICINE

## 2020-03-09 PROCEDURE — G0439 PPPS, SUBSEQ VISIT: HCPCS | Performed by: FAMILY MEDICINE

## 2020-03-09 PROCEDURE — 4040F PNEUMOC VAC/ADMIN/RCVD: CPT | Performed by: FAMILY MEDICINE

## 2020-03-09 PROCEDURE — 1123F ACP DISCUSS/DSCN MKR DOCD: CPT | Performed by: FAMILY MEDICINE

## 2020-03-09 PROCEDURE — G8482 FLU IMMUNIZE ORDER/ADMIN: HCPCS | Performed by: FAMILY MEDICINE

## 2020-03-09 ASSESSMENT — PATIENT HEALTH QUESTIONNAIRE - PHQ9
SUM OF ALL RESPONSES TO PHQ QUESTIONS 1-9: 1
SUM OF ALL RESPONSES TO PHQ QUESTIONS 1-9: 1

## 2020-03-09 ASSESSMENT — LIFESTYLE VARIABLES: HOW OFTEN DO YOU HAVE A DRINK CONTAINING ALCOHOL: 0

## 2020-03-11 ENCOUNTER — TELEPHONE (OUTPATIENT)
Dept: FAMILY MEDICINE CLINIC | Age: 75
End: 2020-03-11

## 2020-03-13 NOTE — PROGRESS NOTES
Medicare Annual Wellness Visit  Name: Kalin Handy Date: 3/13/2020   MRN: [id number] Sex: Female   Age: 76 y.o. Ethnicity: Non-/Non    : 1945 Race: Lauren Villarreal is here for Medicare AWV (AWV); Post-Op Check (Pt here to follow up from thyroidectomy on ); and Health Maintenance (Reviewed with pt - declined Rx for shingrix)     Screenings for behavioral, psychosocial and functional/safety risks, and cognitive dysfunction are all negative except as indicated below. These results, as well as other patient data from the [address] form, are documented in Flowsheets linked to this Encounter. Allergies   Allergen Reactions    5-Alpha Reductase Inhibitors     Prevnar 13 [Pneumococcal 13-Kassidy Conj Vacc] Swelling     Redness and swelling in arm at site       Prior to Visit Medications    Medication Sig Taking?  Authorizing Provider   levothyroxine (SYNTHROID) 100 MCG tablet Take 1 tablet by mouth Daily Yes Mesha Hernadez, DO   potassium chloride (KLOR-CON M) 10 MEQ extended release tablet Take 1 tablet by mouth 2 times daily Yes Carola P Catterlin, DO   amLODIPine-atorvastatatin (CADUET) 5-10 MG per tablet TAKE 1 TABLET BY MOUTH ONCE DAILY Yes Carola P Catterlin, DO   enalapril-hydrochlorothiazide (VASERETIC) 10-25 MG per tablet Take 1 tablet by mouth daily  Patient taking differently: Take 1 tablet by mouth every morning  Yes Carola NORAMN Catterlin, DO   Multiple Vitamins-Minerals (PRESERVISION AREDS PO) Take by mouth 2 times daily  Yes Historical Provider, MD   Omega-3 Fatty Acids (FISH OIL) 500 MG CAPS Take 1 capsule by mouth daily Yes Historical Provider, MD   aspirin 81 MG tablet Take 1 tablet by mouth daily Yes Carola AUSTIN Cattermilan, DO   Cyanocobalamin (B-12) 2500 MCG TABS Take 1 tablet by mouth three times a week  Yes Historical Provider, MD   vitamin D (CHOLECALCIFEROL) 5000 UNITS CAPS capsule Take 5,000 Units by mouth daily Yes Historical Provider, MD ear and ear canal normal bilaterally, oropharynx clear and moist with normal mucous membranes pt has broken dentures. 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 Pulmonary/Chest: clear to auscultation bilaterally- no wheezes, rales or rhonchi, normal air movement, no respiratory distress  Cardiovascular: normal rate, regular rhythm and no murmurs  Abdomen: pt has malignant neoplasm of ascending colon ([address] Pt follows with general surgeon and oncology. Extremities: no cyanosis and no clubbing  Musculoskeletal: Pain and decreased ROM multiple joints. Neurologic: gait and coordination normal and speech normal    Patient's complete Health Risk Assessment and screening values have been reviewed and are found in Flowsheets. The following problems were reviewed today and where indicated follow up appointments were made and/or referrals ordered. Positive Risk Factor Screenings with Interventions:     Health Habits/Nutrition:  Health Habits/Nutrition  Do you exercise for at least 20 minutes 2-3 times per week?: (!) No  Have you lost any weight without trying in the past 3 months?: (!) Yes  Do you eat fewer than 2 meals per day?: No  Have you seen a dentist within the past year?: (!) No  Body mass index is 27.44 kg/m². Health Habits/Nutrition Interventions:  · pt instructed on healthy diet. Pt has dental appointment set up.      Personalized Preventive Plan   Current Health Maintenance Status  Immunization History   Administered Date(s) Administered    Influenza, High Dose (Fluzone 65 yrs and older) 01/23/2018, 10/09/2018    Influenza, Triv, inactivated, subunit, adjuvanted, IM (Fluad 65 yrs and older) 10/03/2019    Pneumococcal Conjugate 13-valent (Glenetta Oh) 07/11/2017        Health Maintenance   Topic Date Due    Hepatitis C screen  1945    DTaP/Tdap/Td vaccine (1 - Tdap) 08/16/1964    Shingles Vaccine (1 of 2) 08/16/1995    Annual Wellness Visit (AWV) 05/29/2019    Lipid screen  06/17/2020    A1C test (Diabetic or Prediabetic)  12/02/2020    TSH testing  12/02/2020    Creatinine monitoring  02/13/2021    Potassium monitoring  02/20/2021    Breast cancer screen  07/18/2021    Colon cancer screen colonoscopy  07/29/2029    DEXA (modify frequency per FRAX score)  Completed    Flu vaccine  Completed    Hepatitis A vaccine  Aged Out    Hepatitis B vaccine  Aged Out    Hib vaccine  Aged Out    Meningococcal (ACWY) vaccine  Aged Out     Recommendations for [employer] Due: see orders and patient instructions/AVS.  . Recommended screening schedule for the next 5-10 years is provided to the patient in written form: see Patient Sandy Cortez was seen today for medicare awv, post-op check and health maintenance. Diagnoses and all orders for this visit:    Malignant neoplasm of ascending colon ([facility]  -     CBC WITH AUTO DIFFERENTIAL; Future  -     Comprehensive Metabolic Panel; Future    Hurthle cell carcinoma of thyroid (HCC)  -     CBC WITH AUTO DIFFERENTIAL; Future  -     Comprehensive Metabolic Panel; Future    Essential hypertension  -     CBC WITH AUTO DIFFERENTIAL; Future  -     Comprehensive Metabolic Panel; Future    S/P complete thyroidectomy  -     CBC WITH AUTO DIFFERENTIAL; Future  -     Comprehensive Metabolic Panel;  Future

## 2020-03-19 ENCOUNTER — OFFICE VISIT (OUTPATIENT)
Dept: ONCOLOGY | Age: 75
End: 2020-03-19
Payer: MEDICARE

## 2020-03-19 ENCOUNTER — HOSPITAL ENCOUNTER (OUTPATIENT)
Dept: INFUSION THERAPY | Age: 75
Discharge: HOME | End: 2020-03-19
Payer: MEDICARE

## 2020-03-19 VITALS
SYSTOLIC BLOOD PRESSURE: 160 MMHG | BODY MASS INDEX: 27.6 KG/M2 | TEMPERATURE: 98.2 F | WEIGHT: 150 LBS | OXYGEN SATURATION: 97 % | DIASTOLIC BLOOD PRESSURE: 73 MMHG | HEIGHT: 62 IN | HEART RATE: 90 BPM

## 2020-03-19 DIAGNOSIS — C18.0 CECAL CANCER (HCC): ICD-10-CM

## 2020-03-19 DIAGNOSIS — C73 HURTHLE CELL CARCINOMA OF THYROID (HCC): ICD-10-CM

## 2020-03-19 LAB
ALBUMIN SERPL-MCNC: 4.3 G/DL (ref 3.5–5.2)
ALP BLD-CCNC: 109 U/L (ref 35–104)
ALT SERPL-CCNC: 11 U/L (ref 0–32)
ANION GAP SERPL CALCULATED.3IONS-SCNC: 11 MMOL/L (ref 7–16)
AST SERPL-CCNC: 14 U/L (ref 0–31)
BASOPHILS ABSOLUTE: 0.03 E9/L (ref 0–0.2)
BASOPHILS RELATIVE PERCENT: 0.5 % (ref 0–2)
BILIRUB SERPL-MCNC: 0.4 MG/DL (ref 0–1.2)
BUN BLDV-MCNC: 7 MG/DL (ref 8–23)
CALCIUM SERPL-MCNC: 9.1 MG/DL (ref 8.6–10.2)
CEA: 1.1 NG/ML (ref 0–5.2)
CHLORIDE BLD-SCNC: 98 MMOL/L (ref 98–107)
CO2: 30 MMOL/L (ref 22–29)
CREAT SERPL-MCNC: 0.6 MG/DL (ref 0.5–1)
EOSINOPHILS ABSOLUTE: 0.13 E9/L (ref 0.05–0.5)
EOSINOPHILS RELATIVE PERCENT: 2.1 % (ref 0–6)
GFR AFRICAN AMERICAN: >60
GFR NON-AFRICAN AMERICAN: >60 ML/MIN/1.73
GLUCOSE BLD-MCNC: 115 MG/DL (ref 74–99)
HCT VFR BLD CALC: 38.6 % (ref 34–48)
HEMOGLOBIN: 12.2 G/DL (ref 11.5–15.5)
IMMATURE GRANULOCYTES #: 0.02 E9/L
IMMATURE GRANULOCYTES %: 0.3 % (ref 0–5)
LYMPHOCYTES ABSOLUTE: 1.13 E9/L (ref 1.5–4)
LYMPHOCYTES RELATIVE PERCENT: 18 % (ref 20–42)
MCH RBC QN AUTO: 25.6 PG (ref 26–35)
MCHC RBC AUTO-ENTMCNC: 31.6 % (ref 32–34.5)
MCV RBC AUTO: 80.9 FL (ref 80–99.9)
MONOCYTES ABSOLUTE: 0.56 E9/L (ref 0.1–0.95)
MONOCYTES RELATIVE PERCENT: 8.9 % (ref 2–12)
NEUTROPHILS ABSOLUTE: 4.42 E9/L (ref 1.8–7.3)
NEUTROPHILS RELATIVE PERCENT: 70.2 % (ref 43–80)
PDW BLD-RTO: 16.5 FL (ref 11.5–15)
PLATELET # BLD: 239 E9/L (ref 130–450)
PMV BLD AUTO: 10.2 FL (ref 7–12)
POTASSIUM SERPL-SCNC: 3.6 MMOL/L (ref 3.5–5)
RBC # BLD: 4.77 E12/L (ref 3.5–5.5)
SODIUM BLD-SCNC: 139 MMOL/L (ref 132–146)
TOTAL PROTEIN: 7.5 G/DL (ref 6.4–8.3)
WBC # BLD: 6.3 E9/L (ref 4.5–11.5)

## 2020-03-19 PROCEDURE — 99214 OFFICE O/P EST MOD 30 MIN: CPT | Performed by: INTERNAL MEDICINE

## 2020-03-19 PROCEDURE — 4040F PNEUMOC VAC/ADMIN/RCVD: CPT | Performed by: INTERNAL MEDICINE

## 2020-03-19 PROCEDURE — 80053 COMPREHEN METABOLIC PANEL: CPT

## 2020-03-19 PROCEDURE — 1123F ACP DISCUSS/DSCN MKR DOCD: CPT | Performed by: INTERNAL MEDICINE

## 2020-03-19 PROCEDURE — 82378 CARCINOEMBRYONIC ANTIGEN: CPT

## 2020-03-19 PROCEDURE — G8399 PT W/DXA RESULTS DOCUMENT: HCPCS | Performed by: INTERNAL MEDICINE

## 2020-03-19 PROCEDURE — 36415 COLL VENOUS BLD VENIPUNCTURE: CPT

## 2020-03-19 PROCEDURE — 3017F COLORECTAL CA SCREEN DOC REV: CPT | Performed by: INTERNAL MEDICINE

## 2020-03-19 PROCEDURE — G8417 CALC BMI ABV UP PARAM F/U: HCPCS | Performed by: INTERNAL MEDICINE

## 2020-03-19 PROCEDURE — 99212 OFFICE O/P EST SF 10 MIN: CPT

## 2020-03-19 PROCEDURE — 1036F TOBACCO NON-USER: CPT | Performed by: INTERNAL MEDICINE

## 2020-03-19 PROCEDURE — G8482 FLU IMMUNIZE ORDER/ADMIN: HCPCS | Performed by: INTERNAL MEDICINE

## 2020-03-19 PROCEDURE — 85025 COMPLETE CBC W/AUTO DIFF WBC: CPT

## 2020-03-19 PROCEDURE — 1090F PRES/ABSN URINE INCON ASSESS: CPT | Performed by: INTERNAL MEDICINE

## 2020-03-19 PROCEDURE — G8428 CUR MEDS NOT DOCUMENT: HCPCS | Performed by: INTERNAL MEDICINE

## 2020-03-19 NOTE — PROGRESS NOTES
thyroidectomy: Hurthle cell carcinoma involving surgical margin and papillary carcinoma  B.  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  5 lymph nodes negative for malignancy  CANCER CASE SUMMARY (PAPILLARY CARCINOMA)  Procedure-total thyroidectomy  Tumor focality-unifocal  Tumor site-right lobe  Tumor size-1.2 cm  Histologic type-papillary carcinoma, classic  Margins-uninvolved by carcinoma  Angioinvasion-not identified  Lymphatic invasion-not identified  Extrathyroidal extension-not identified  Regional lymph nodes-negative for malignancy       Number of lymph nodes involved: 0       Number of lymph nodes examined: 5       Lucas levels examined: Level 6  TNM classification (AJCC eighth edition)-pT1b N0 MX       CANCER CASE SUMMARY (HURTHLE CELL CARCINOMA)  Procedure-total thyroidectomy  Tumor focality-multifocal  Tumor site-right and left lobes  Tumor size-cannot be determined (see above comment)  Histologic type-Hurthle cell carcinoma  Margins-right and left inferior margins involved by carcinoma; left  superior margin involved by carcinoma  Angioinvasion-present  Lymphatic invasion-not identified  Extrathyroidal extension-not identified  Regional lymph nodes-negative for malignancy       Number of lymph nodes involved: 0       Number of lymph nodes examined: 5       Lucas levels examined: Level 6  TNM classification (AJCC eighth edition)-at least pT2 N0 MX    Referred back to endocrine team for radioactive iodine. Presented today 03/19/2020 for follow-up. No fever chills. No nausea vomiting abdominal pain. No diarrhea. No melena or hematochezia. Fair appetite and energy level. No chest pain or SOB. Review of Systems;  CONSTITUTIONAL: No fever, chills. Fair appetite and energy level. ENMT: Eyes: No diplopia; Nose: No epistaxis. Mouth: No sore throat. RESPIRATORY: No hemoptysis, shortness of breath, cough.    CARDIOVASCULAR: No chest pain, palpitations. GASTROINTESTINAL: No nausea/vomiting, abdominal pain. No melena or hematochezia. GENITOURINARY: No dysuria, urinary frequency, hematuria. NEURO: No syncope, presyncope, headache. Remainder:  ROS NEGATIVE    Past Medical History:      Diagnosis Date    Cancer ([address] 07/29/2019    colon    Glaucoma     Hyperlipidemia     Hypertension     Hypothyroidism      Medications:  Reviewed and reconciled. Allergies: Allergies   Allergen Reactions    5-Alpha Reductase Inhibitors     Prevnar 13 [Pneumococcal 13-Kassidy Conj Vacc] Swelling     Redness and swelling in arm at site     Physical Exam:  BP (!) 160/73   Pulse 90   Temp 98.2 °F (36.8 °C) (Temporal)   Ht 5' 2\" (1.575 m)   Wt 150 lb (68 kg)   LMP  (LMP Unknown)   SpO2 97%   BMI 27.44 kg/m²   GENERAL: Alert, oriented x 3, not in acute distress  HEENT: PERRLA; EOMI. Oropharynx clear. NECK: Supple. Without lymphadenopathy. LUNGS: Good air entry bilaterally. No wheezing, crackles or ronchi. CARDIOVASCULAR: Regular rate. No murmurs, rubs or gallops. ABDOMEN: Soft. Non-tender, non-distended. Positive bowel sounds. EXTREMITIES: Without clubbing, cyanosis, or edema. NEUROLOGIC: No focal deficits. 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 1cm polyp was at the proximal transverse just passed the hepatic flexure  A. Mass, cecum, biopsy: Invasive moderately differentiated adenocarcinoma. B. Polyp, hepatic flexure of colon, biopsy: Tubular adenoma. CT chest 07/31/2019:   Solid pulmonary parenchymal nodule in the left lower lobe measures 12 x 11 mm, and lies just above diaphragm. CT abdomen/pelvis 07/31/2019: There is a cecal mural mass on the medial aspect of the cecum and proximal ascending colon with perpendicular diameter measurements of 6.2 x 5.7 x 2.8 cm.  It appears to be confined to

## 2020-03-31 ENCOUNTER — TELEPHONE (OUTPATIENT)
Dept: PULMONOLOGY | Age: 75
End: 2020-03-31

## 2020-03-31 NOTE — TELEPHONE ENCOUNTER
Mailed a letter to patient informing her that her CT Chest is scheduled for 5-15-20 at 11:00 am at the [facility].  She must arrive by 10:30 am. There is no prep for this test

## 2020-04-15 ENCOUNTER — HOSPITAL ENCOUNTER (OUTPATIENT)
Age: 75
Discharge: HOME | End: 2020-04-15
Payer: MEDICARE

## 2020-04-15 LAB
ALBUMIN SERPL-MCNC: 4.6 G/DL (ref 3.5–5.2)
ALP BLD-CCNC: 109 U/L (ref 35–104)
ALT SERPL-CCNC: 10 U/L (ref 0–32)
ANION GAP SERPL CALCULATED.3IONS-SCNC: 15 MMOL/L (ref 7–16)
AST SERPL-CCNC: 16 U/L (ref 0–31)
BILIRUB SERPL-MCNC: 0.5 MG/DL (ref 0–1.2)
BUN BLDV-MCNC: 13 MG/DL (ref 8–23)
CALCIUM SERPL-MCNC: 9.1 MG/DL (ref 8.6–10.2)
CHLORIDE BLD-SCNC: 95 MMOL/L (ref 98–107)
CO2: 28 MMOL/L (ref 22–29)
CREAT SERPL-MCNC: 0.7 MG/DL (ref 0.5–1)
GFR AFRICAN AMERICAN: >60
GFR NON-AFRICAN AMERICAN: >60 ML/MIN/1.73
GLUCOSE BLD-MCNC: 152 MG/DL (ref 74–99)
POTASSIUM SERPL-SCNC: 3.5 MMOL/L (ref 3.5–5)
SODIUM BLD-SCNC: 138 MMOL/L (ref 132–146)
T4 FREE: 1.38 NG/DL (ref 0.93–1.7)
TOTAL PROTEIN: 8 G/DL (ref 6.4–8.3)
TSH SERPL DL<=0.05 MIU/L-ACNC: 6.26 UIU/ML (ref 0.27–4.2)

## 2020-04-15 PROCEDURE — 84439 ASSAY OF FREE THYROXINE: CPT

## 2020-04-15 PROCEDURE — 36415 COLL VENOUS BLD VENIPUNCTURE: CPT

## 2020-04-15 PROCEDURE — 80053 COMPREHEN METABOLIC PANEL: CPT

## 2020-04-15 PROCEDURE — 84443 ASSAY THYROID STIM HORMONE: CPT

## 2020-04-17 ENCOUNTER — TELEPHONE (OUTPATIENT)
Dept: ENT CLINIC | Age: 75
End: 2020-04-17

## 2020-04-20 RX ORDER — LEVOTHYROXINE SODIUM 112 MCG
112 TABLET ORAL DAILY
Qty: 30 TABLET | Refills: 3
Start: 2020-04-20 | End: 2020-04-20 | Stop reason: CLARIF

## 2020-04-20 RX ORDER — LEVOTHYROXINE SODIUM 112 UG/1
112 TABLET ORAL DAILY
Qty: 30 TABLET | Refills: 3 | OUTPATIENT
Start: 2020-04-20 | End: 2020-08-26

## 2020-04-20 NOTE — TELEPHONE ENCOUNTER
Pt informed of dosage and where it went. also that we do lab work 6 weeks after dosage change. Rx did not go to the pharmacy .  Called Rx into [facility] at the [facility]

## 2020-04-21 ENCOUNTER — OFFICE VISIT (OUTPATIENT)
Dept: ENT CLINIC | Age: 75
End: 2020-04-21

## 2020-04-21 VITALS — WEIGHT: 153 LBS | HEIGHT: 62 IN | BODY MASS INDEX: 28.16 KG/M2

## 2020-04-21 PROCEDURE — 99024 POSTOP FOLLOW-UP VISIT: CPT | Performed by: OTOLARYNGOLOGY

## 2020-04-21 ASSESSMENT — ENCOUNTER SYMPTOMS
EYE REDNESS: 0
ALLERGIC/IMMUNOLOGIC NEGATIVE: 1
EYE DISCHARGE: 0
COLOR CHANGE: 0
SHORTNESS OF BREATH: 0
SORE THROAT: 0
COUGH: 0
NAUSEA: 0
VOMITING: 0
TROUBLE SWALLOWING: 0
STRIDOR: 0
VOICE CHANGE: 0
EYES NEGATIVE: 1

## 2020-04-21 NOTE — PROGRESS NOTES
Department of Otolaryngology  Office Note  3/3/20          Subjective:        Chief Complaint:  had concerns including Post-Op Check (6 week thyroidectomy and neck dissection no problems labs done 4/15). Patient ID: Edwin Laura is a 76 y.o. female. HPI: Patient presentsfor 6 week follow up for total thyroidectomy with central neck dissection. Patient doing well post-op, pain controlled. Denies dysphagia, dyspnea, change in voice, hoarseness of voice, fever. Post-op course complicated by  Hypocalcemia/hypoparathyroidism,took calcium supplement with no symptoms. Recently adjusted Synthroid to 112 mcg. Review of Systems   Constitutional: Negative. Negative for chills, fatigue and fever. HENT: Negative. Negative for sore throat, trouble swallowing and voice change. Eyes: Negative. Negative for discharge and redness. Respiratory: Negative for cough, shortness of breath and stridor. Gastrointestinal: Negative for nausea and vomiting. Endocrine: Negative. Genitourinary: Negative. Musculoskeletal: Negative. Skin: Negative. Negative for color change and rash. Allergic/Immunologic: Negative. Neurological: Negative. Negative for dizziness, speech difficulty and headaches. Hematological: Negative. Psychiatric/Behavioral: Negative. Negative for agitation and confusion. All other systems reviewed and are negative.         Past Medical History:   Diagnosis Date    Cancer ([facility] [address] 07/29/2019    colon    Glaucoma     Hyperlipidemia     Hypertension     Hypothyroidism      Past Surgical History:   Procedure Laterality Date    CATARACT REMOVAL WITH IMPLANT Bilateral     CHOLECYSTECTOMY      COLONOSCOPY N/A 7/29/2019    COLONOSCOPY WITH BIOPSY performed by Conner Collazo MD at [address] [facility] Right 8/29/2019    RIGHT HEMICOLECTOMY, LAPAROSCOPIC, ROBOTIC XI ASSISTED performed by Conner Collazo MD at [facility] Left Ear: Tympanic membrane, ear canal and external ear normal.      Nose: Nose normal.      Mouth/Throat:      Mouth: Mucous membranes are moist.   Eyes:      Extraocular Movements: Extraocular movements intact. Pupils: Pupils are equal, round, and reactive to light. Neck:      Musculoskeletal: Normal range of motion and neck supple. Cardiovascular:      Rate and Rhythm: Normal rate. Lymphadenopathy:      Cervical: No cervical adenopathy. Skin:     Capillary Refill: Capillary refill takes less than 2 seconds. Neurological:      Mental Status: She is alert and oriented to person, place, and time. CANCER CASE SUMMARY (PAPILLARY CARCINOMA)  Procedure-total thyroidectomy  Tumor focality-unifocal  Tumor site-right lobe  Tumor size-1.2 cm  Histologic type-papillary carcinoma, classic  Margins-uninvolved by carcinoma  Angioinvasion-not identified  Lymphatic invasion-not identified  Extrathyroidal extension-not identified  Regional lymph nodes-negative for malignancy       Number of lymph nodes involved: 0       Number of lymph nodes examined: 5       Lucas levels examined: Level 6  TNM classification (AJCC eighth edition)-pT1b N0 MX      CANCER CASE SUMMARY (HURTHLE CELL CARCINOMA)  Procedure-total thyroidectomy  Tumor focality-multifocal  Tumor site-right and left lobes  Tumor size-cannot be determined (see above comment)  Histologic type-Hurthle cell carcinoma  Margins-right and left inferior margins involved by carcinoma; left  superior margin involved by carcinoma  Angioinvasion-present  Lymphatic invasion-not identified  Extrathyroidal extension-not identified  Regional lymph nodes-negative for malignancy       Number of lymph nodes involved: 0       Number of lymph nodes examined: 5       Lucas levels examined: Level 6  TNM classification (AJCC eighth edition)-at least pT2 N0 MX    Assessment:            Plan:         Total thyroidectomy with central neck dissection 2/20/20  1.2 cm right papillary thyroid carcinoma, [id number]- complete excision with negative margin  Hurthle cell carcinoma with angioinvasion, bilateral lobes, T2 N0M0- Endocrine appointment next in few weeks to discuss radioactive iodine   Recheck TSH in 6 weeks  Neck U/S In 4 months  Follow up in 4 months   Seen, examined, discussed with Dr. Shar Camacho    Electronically signed by Andrzej Crowell DO on 4/21/2020 at 8:51 AM                Travis Chavez  [birth year]      I have discussed the case, including pertinent history and exam findings with the resident. I have seen and examined the patient and the key elements of the encounter have been performed by me. I agree with the assessment, plan and orders as documented by the resident. Patient here for follow up of medical problems. Remainder of medical problems as per resident note.       [address],   4/27/20

## 2020-05-05 ENCOUNTER — TELEPHONE (OUTPATIENT)
Dept: ADMINISTRATIVE | Age: 75
End: 2020-05-05

## 2020-05-05 NOTE — TELEPHONE ENCOUNTER
Dr. Fransisco Palencia is seeing patients in the office now and her boyfriend can be scheduled in office as long as he is not sick. Thanks.

## 2020-05-05 NOTE — TELEPHONE ENCOUNTER
Pt called in and wants to make an appointment for a check-up with Dr Ino Vazquez, I can make the appointment however, pt wants to have boyfriend pt Silvana Peters come in at the same time to see Dr Elaina Flores I explained that Dr Elaina Flores is only seeing pt's via vv visit and she said that they have no way to do that, and that she does not want to make an appointment until they both can come in together for check-ups. I asked if he would want to see Dr Ino Vazquez and pt said no his Dr is Dr Elaina Flores. Please advise.

## 2020-05-06 ENCOUNTER — OFFICE VISIT (OUTPATIENT)
Dept: FAMILY MEDICINE CLINIC | Age: 75
End: 2020-05-06
Payer: MEDICARE

## 2020-05-06 VITALS
BODY MASS INDEX: 28.34 KG/M2 | HEIGHT: 62 IN | DIASTOLIC BLOOD PRESSURE: 84 MMHG | WEIGHT: 154 LBS | HEART RATE: 97 BPM | OXYGEN SATURATION: 97 % | RESPIRATION RATE: 18 BRPM | TEMPERATURE: 98 F | SYSTOLIC BLOOD PRESSURE: 144 MMHG

## 2020-05-06 PROCEDURE — 99214 OFFICE O/P EST MOD 30 MIN: CPT | Performed by: FAMILY MEDICINE

## 2020-05-06 PROCEDURE — 1090F PRES/ABSN URINE INCON ASSESS: CPT | Performed by: FAMILY MEDICINE

## 2020-05-06 PROCEDURE — 3017F COLORECTAL CA SCREEN DOC REV: CPT | Performed by: FAMILY MEDICINE

## 2020-05-06 PROCEDURE — G8427 DOCREV CUR MEDS BY ELIG CLIN: HCPCS | Performed by: FAMILY MEDICINE

## 2020-05-06 PROCEDURE — 4040F PNEUMOC VAC/ADMIN/RCVD: CPT | Performed by: FAMILY MEDICINE

## 2020-05-06 PROCEDURE — 1036F TOBACCO NON-USER: CPT | Performed by: FAMILY MEDICINE

## 2020-05-06 PROCEDURE — G8417 CALC BMI ABV UP PARAM F/U: HCPCS | Performed by: FAMILY MEDICINE

## 2020-05-06 PROCEDURE — G8399 PT W/DXA RESULTS DOCUMENT: HCPCS | Performed by: FAMILY MEDICINE

## 2020-05-06 PROCEDURE — 1123F ACP DISCUSS/DSCN MKR DOCD: CPT | Performed by: FAMILY MEDICINE

## 2020-05-06 RX ORDER — AMLODIPINE BESYLATE AND ATORVASTATIN CALCIUM 5; 10 MG/1; MG/1
TABLET, FILM COATED ORAL
Qty: 90 TABLET | Refills: 1 | Status: SHIPPED
Start: 2020-05-06 | End: 2020-10-05

## 2020-05-06 RX ORDER — POTASSIUM CHLORIDE 750 MG/1
10 TABLET, EXTENDED RELEASE ORAL 2 TIMES DAILY
Qty: 180 TABLET | Refills: 1 | Status: SHIPPED
Start: 2020-05-06 | End: 2020-11-16

## 2020-05-06 RX ORDER — ENALAPRIL MALEATE AND HYDROCHLOROTHIAZIDE 10; 25 MG/1; MG/1
1 TABLET ORAL DAILY
Qty: 90 TABLET | Refills: 1 | Status: SHIPPED
Start: 2020-05-06 | End: 2020-11-16

## 2020-05-11 ASSESSMENT — ENCOUNTER SYMPTOMS
RESPIRATORY NEGATIVE: 1
NAUSEA: 0
STRIDOR: 0
ANAL BLEEDING: 0
RHINORRHEA: 0
TROUBLE SWALLOWING: 0
BLOOD IN STOOL: 0
BACK PAIN: 0
ABDOMINAL PAIN: 0
DIARRHEA: 0
EYE DISCHARGE: 0
EYE PAIN: 0
FACIAL SWELLING: 0
PHOTOPHOBIA: 0
WHEEZING: 0
SINUS PAIN: 0
EYE ITCHING: 0
EYES NEGATIVE: 1
RECTAL PAIN: 0
EYE REDNESS: 0
SORE THROAT: 0
CONSTIPATION: 0
SINUS PRESSURE: 0
ABDOMINAL DISTENTION: 0
COLOR CHANGE: 0
VOMITING: 0
CHEST TIGHTNESS: 0
CHOKING: 0
COUGH: 0
SHORTNESS OF BREATH: 0
ALLERGIC/IMMUNOLOGIC NEGATIVE: 1
VOICE CHANGE: 0

## 2020-05-11 NOTE — PROGRESS NOTES
abdominal tenderness. There is no guarding or rebound. Hernia: No hernia is present. Musculoskeletal: Normal range of motion. General: No swelling, tenderness, deformity or signs of injury. Lymphadenopathy:      Cervical: No cervical adenopathy. Skin:     General: Skin is warm. Coloration: Skin is not jaundiced or pale. Findings: No bruising, erythema, lesion or rash. Neurological:      General: No focal deficit present. Mental Status: She is alert and oriented to person, place, and time. Cranial Nerves: No cranial nerve deficit. Sensory: No sensory deficit. Motor: No weakness or abnormal muscle tone. Coordination: Coordination normal.      Gait: Gait normal.      Deep Tendon Reflexes: Reflexes are normal and symmetric. Reflexes normal.   Psychiatric:         Mood and Affect: Mood normal.         Behavior: Behavior normal.         Thought Content: Thought content normal.         Judgment: Judgment normal.         ASSESSMENT/PLAN  Winn Aschoff was seen today for post-op check. Diagnoses and all orders for this visit:    Essential hypertension  Controlled. Caduet, vaseretic, fish oil, aspirin, low salt diet. Malignant neoplasm of ascending colon (HCC)  Stable. General surgeon. Mixed hyperlipidemia  Stable. Low chol. Diet, statin. Hypothyroidism, unspecified type  -     TSH without Reflex; Future  Stable. Synthroid, specialist.   Pulmonary nodule  Stable. Pulmonology. Hurtle thyroid cancer ([facility] [address]  Stable. Specialist.     Pt instructed if any worse go ED ASAP.           Outpatient Encounter Medications as of 5/6/2020   Medication Sig Dispense Refill    potassium chloride (KLOR-CON M) 10 MEQ extended release tablet Take 1 tablet by mouth 2 times daily 180 tablet 1    amLODIPine-atorvastatatin (CADUET) 5-10 MG per tablet TAKE 1 TABLET BY MOUTH ONCE DAILY 90 tablet 1    enalapril-hydrochlorothiazide (VASERETIC) 10-25 MG per tablet Take 1 tablet by mouth daily 90 tablet 1    levothyroxine (SYNTHROID) 112 MCG tablet Take 1 tablet by mouth daily (Patient taking differently: Take 110 mcg by mouth daily ) 30 tablet 3    Multiple Vitamins-Minerals (PRESERVISION AREDS PO) Take by mouth 2 times daily       Omega-3 Fatty Acids (FISH OIL) 500 MG CAPS Take 1 capsule by mouth daily      aspirin 81 MG tablet Take 1 tablet by mouth daily 90 tablet 1    Cyanocobalamin (B-12) 2500 MCG TABS Take 1 tablet by mouth three times a week       vitamin D (CHOLECALCIFEROL) 5000 UNITS CAPS capsule Take 5,000 Units by mouth daily      [DISCONTINUED] potassium chloride (KLOR-CON M) 10 MEQ extended release tablet Take 1 tablet by mouth 2 times daily 180 tablet 0    [DISCONTINUED] amLODIPine-atorvastatatin (CADUET) 5-10 MG per tablet TAKE 1 TABLET BY MOUTH ONCE DAILY 90 tablet 0    [DISCONTINUED] enalapril-hydrochlorothiazide (VASERETIC) 10-25 MG per tablet Take 1 tablet by mouth daily (Patient taking differently: Take 1 tablet by mouth every morning ) 90 tablet 1     No facility-administered encounter medications on file as of 5/6/2020. No follow-ups on file.         Reviewed recent labs related to Mahsa's current problems      Discussed importance of regular Health Maintenance follow up  Health Maintenance   Topic    Hepatitis C screen     DTaP/Tdap/Td vaccine (1 - Tdap)    Shingles Vaccine (1 of 2)    Lipid screen     A1C test (Diabetic or Prediabetic)     Annual Wellness Visit (AWV)     TSH testing     Potassium monitoring     Creatinine monitoring     Breast cancer screen     Colon cancer screen colonoscopy     DEXA (modify frequency per FRAX score)     Flu vaccine     Hepatitis A vaccine     Hepatitis B vaccine     Hib vaccine     Meningococcal (ACWY) vaccine

## 2020-05-15 ENCOUNTER — HOSPITAL ENCOUNTER (OUTPATIENT)
Dept: CT IMAGING | Age: 75
Discharge: HOME | End: 2020-05-17
Payer: MEDICARE

## 2020-05-15 PROCEDURE — 2580000003 HC RX 258: Performed by: RADIOLOGY

## 2020-05-15 PROCEDURE — 74177 CT ABD & PELVIS W/CONTRAST: CPT

## 2020-05-15 PROCEDURE — 6360000004 HC RX CONTRAST MEDICATION: Performed by: RADIOLOGY

## 2020-05-15 PROCEDURE — 71250 CT THORAX DX C-: CPT

## 2020-05-15 RX ORDER — SODIUM CHLORIDE 0.9 % (FLUSH) 0.9 %
10 SYRINGE (ML) INJECTION PRN
Status: DISCONTINUED | OUTPATIENT
Start: 2020-05-15 | End: 2020-05-18 | Stop reason: HOSPADM

## 2020-05-15 RX ADMIN — Medication 10 ML: at 12:18

## 2020-05-15 RX ADMIN — IOPAMIDOL 100 ML: 755 INJECTION, SOLUTION INTRAVENOUS at 12:18

## 2020-05-15 RX ADMIN — IOHEXOL 50 ML: 240 INJECTION, SOLUTION INTRATHECAL; INTRAVASCULAR; INTRAVENOUS; ORAL at 12:18

## 2020-05-18 ENCOUNTER — TELEPHONE (OUTPATIENT)
Dept: FAMILY MEDICINE CLINIC | Age: 75
End: 2020-05-18

## 2020-05-18 NOTE — TELEPHONE ENCOUNTER
Patient wishes to know what type of thyroid cancer she was diagnosed with. Advised her problem list shows Hurthle cell carcinoma of the thyroid.   Electronically signed by  Even on 5/18/2020 at 11:59 AM

## 2020-06-05 ENCOUNTER — TELEPHONE (OUTPATIENT)
Dept: ENDOCRINOLOGY | Age: 75
End: 2020-06-05

## 2020-06-05 NOTE — TELEPHONE ENCOUNTER
Why she cancelled her cliff. She is a thyroid cancer and had surgery in 3/2020.  I need to see her soon (next week) for thyroid cancer

## 2020-06-12 ENCOUNTER — VIRTUAL VISIT (OUTPATIENT)
Dept: ENDOCRINOLOGY | Age: 75
End: 2020-06-12
Payer: MEDICARE

## 2020-06-12 PROCEDURE — 99442 PR PHYS/QHP TELEPHONE EVALUATION 11-20 MIN: CPT | Performed by: INTERNAL MEDICINE

## 2020-06-12 NOTE — PROGRESS NOTES
Virtual visit Phone call     Date of Service: 6/12/2020  Patient name: Elvin Mantilla  YOB: 1945  Primary Care Physician: Alina Cortes DO  Provider: Duke Shukla MD    Consent:  Patient is aware that may receive a bill for this telephone service, depending on the insurance coverage, and has provided verbal consent to proceed:     Documentation:  I communicated with the patient about thyroid cancer     Pt underwent total thyroidectomy on 2/20/2020  Path report --> two morphologically distinct carcinomas within the thyroid.  The papillary thyroid carcinoma is located in the right lobe and measures 1.2 cm in maximum dimension.  This tumor is completely excised and is limited to the thyroid.  The second tumor is a Hurthle cell carcinoma which shows extensive evidence of angioinvasion.  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  The approximate width of tissue in each block is 4 mm. The overall tumor size cannot be accurately assessed due to the presence of tumor in nonsequential sections but may be considerably larger than what is measured on 1 slide     Will proceed with DAN ablation and see her again at my office in mid July     The above issues were reviewed with the patient who understood and agreed with the plan    I affirm this is a Patient Initiated Episode with an Established Patient who has not had a related appointment within my department in the past 7 days or scheduled within the next 24 hours. Phone call duration  15 minutes     Duke Shukla MD  Endocrinologist, [facility]   [address] [place] [id number]   Phone: [phone]  Fax: [phone]  --------------------------------  An electronic signature was used to authenticate this note.  Alonso Arboleda MD on 6/12/2020 at 9:08 AM

## 2020-06-16 ENCOUNTER — TELEPHONE (OUTPATIENT)
Dept: PULMONOLOGY | Age: 75
End: 2020-06-16

## 2020-06-18 ENCOUNTER — HOSPITAL ENCOUNTER (OUTPATIENT)
Dept: INFUSION THERAPY | Age: 75
Discharge: HOME | End: 2020-06-18
Payer: MEDICARE

## 2020-06-18 ENCOUNTER — OFFICE VISIT (OUTPATIENT)
Dept: ONCOLOGY | Age: 75
End: 2020-06-18
Payer: MEDICARE

## 2020-06-18 VITALS
HEART RATE: 100 BPM | WEIGHT: 150 LBS | TEMPERATURE: 98 F | HEIGHT: 62 IN | DIASTOLIC BLOOD PRESSURE: 78 MMHG | BODY MASS INDEX: 27.6 KG/M2 | OXYGEN SATURATION: 97 % | SYSTOLIC BLOOD PRESSURE: 166 MMHG

## 2020-06-18 DIAGNOSIS — C18.0 CECAL CANCER (HCC): ICD-10-CM

## 2020-06-18 DIAGNOSIS — C73 HURTHLE CELL CARCINOMA OF THYROID (HCC): ICD-10-CM

## 2020-06-18 LAB
ALBUMIN SERPL-MCNC: 4.5 G/DL (ref 3.5–5.2)
ALP BLD-CCNC: 106 U/L (ref 35–104)
ALT SERPL-CCNC: 12 U/L (ref 0–32)
ANION GAP SERPL CALCULATED.3IONS-SCNC: 16 MMOL/L (ref 7–16)
AST SERPL-CCNC: 16 U/L (ref 0–31)
BASOPHILS ABSOLUTE: 0.05 E9/L (ref 0–0.2)
BASOPHILS RELATIVE PERCENT: 0.6 % (ref 0–2)
BILIRUB SERPL-MCNC: 0.5 MG/DL (ref 0–1.2)
BUN BLDV-MCNC: 5 MG/DL (ref 8–23)
CALCIUM SERPL-MCNC: 9.2 MG/DL (ref 8.6–10.2)
CEA: 1.1 NG/ML (ref 0–5.2)
CHLORIDE BLD-SCNC: 94 MMOL/L (ref 98–107)
CO2: 28 MMOL/L (ref 22–29)
CREAT SERPL-MCNC: 0.7 MG/DL (ref 0.5–1)
EOSINOPHILS ABSOLUTE: 0.08 E9/L (ref 0.05–0.5)
EOSINOPHILS RELATIVE PERCENT: 1 % (ref 0–6)
GFR AFRICAN AMERICAN: >60
GFR NON-AFRICAN AMERICAN: >60 ML/MIN/1.73
GLUCOSE BLD-MCNC: 128 MG/DL (ref 74–99)
HCT VFR BLD CALC: 43 % (ref 34–48)
HEMOGLOBIN: 14.2 G/DL (ref 11.5–15.5)
IMMATURE GRANULOCYTES #: 0.03 E9/L
IMMATURE GRANULOCYTES %: 0.4 % (ref 0–5)
LYMPHOCYTES ABSOLUTE: 1.56 E9/L (ref 1.5–4)
LYMPHOCYTES RELATIVE PERCENT: 18.7 % (ref 20–42)
MCH RBC QN AUTO: 27.7 PG (ref 26–35)
MCHC RBC AUTO-ENTMCNC: 33 % (ref 32–34.5)
MCV RBC AUTO: 84 FL (ref 80–99.9)
MONOCYTES ABSOLUTE: 0.72 E9/L (ref 0.1–0.95)
MONOCYTES RELATIVE PERCENT: 8.6 % (ref 2–12)
NEUTROPHILS ABSOLUTE: 5.89 E9/L (ref 1.8–7.3)
NEUTROPHILS RELATIVE PERCENT: 70.7 % (ref 43–80)
PDW BLD-RTO: 14.1 FL (ref 11.5–15)
PLATELET # BLD: 304 E9/L (ref 130–450)
PMV BLD AUTO: 9.8 FL (ref 7–12)
POTASSIUM SERPL-SCNC: 3.5 MMOL/L (ref 3.5–5)
RBC # BLD: 5.12 E12/L (ref 3.5–5.5)
SODIUM BLD-SCNC: 138 MMOL/L (ref 132–146)
TOTAL PROTEIN: 8 G/DL (ref 6.4–8.3)
WBC # BLD: 8.3 E9/L (ref 4.5–11.5)

## 2020-06-18 PROCEDURE — 3017F COLORECTAL CA SCREEN DOC REV: CPT | Performed by: INTERNAL MEDICINE

## 2020-06-18 PROCEDURE — 4040F PNEUMOC VAC/ADMIN/RCVD: CPT | Performed by: INTERNAL MEDICINE

## 2020-06-18 PROCEDURE — 85025 COMPLETE CBC W/AUTO DIFF WBC: CPT

## 2020-06-18 PROCEDURE — 80053 COMPREHEN METABOLIC PANEL: CPT

## 2020-06-18 PROCEDURE — 99214 OFFICE O/P EST MOD 30 MIN: CPT | Performed by: INTERNAL MEDICINE

## 2020-06-18 PROCEDURE — 1123F ACP DISCUSS/DSCN MKR DOCD: CPT | Performed by: INTERNAL MEDICINE

## 2020-06-18 PROCEDURE — G8417 CALC BMI ABV UP PARAM F/U: HCPCS | Performed by: INTERNAL MEDICINE

## 2020-06-18 PROCEDURE — 1090F PRES/ABSN URINE INCON ASSESS: CPT | Performed by: INTERNAL MEDICINE

## 2020-06-18 PROCEDURE — 99212 OFFICE O/P EST SF 10 MIN: CPT

## 2020-06-18 PROCEDURE — 36415 COLL VENOUS BLD VENIPUNCTURE: CPT

## 2020-06-18 PROCEDURE — G8427 DOCREV CUR MEDS BY ELIG CLIN: HCPCS | Performed by: INTERNAL MEDICINE

## 2020-06-18 PROCEDURE — G8399 PT W/DXA RESULTS DOCUMENT: HCPCS | Performed by: INTERNAL MEDICINE

## 2020-06-18 PROCEDURE — 82378 CARCINOEMBRYONIC ANTIGEN: CPT

## 2020-06-18 PROCEDURE — 1036F TOBACCO NON-USER: CPT | Performed by: INTERNAL MEDICINE

## 2020-06-18 NOTE — PROGRESS NOTES
measurements of 6.2 x 5.7 x 2.8 cm. It appears to be confined to the bowel wall. Only small cecal mesenteric LN are identified. There is an enlarged right adrenal lesion measuring 2 cm diameter    CEA 3.8 on 08/05/2019. PET/CT scan 08/15/2019  No FDG avid uptake is identified which exceeds the threshold SUV in the left pulmonary nodule. Pulmonary team (Dr. James Sánchez) consult appreciated; repeat CT chest in 3 months. No uptake in adrenal glands. Abnormal tracer uptake in the right thyroid which is focal. Thyroid U/S 08/21/2019 noted thyroid nodules. Endocrinology team consult (Dr. Merlin Pleas) appreciated. Abnormal tracer uptake at the level the cecum which exceeds the threshold SUV. Right hemicolectomy on 08/29/2019. Procedure: Right hemicolectomy. Tumor site: Cecum. Tumor size: Greatest dimension is 6.5 cm; additional dimensions 4.5 x 1.8 cm. Macroscopic tumor perforation: Not identified. Histologic type: Adenocarcinoma  Histologic grade: G2 (moderately differentiated). Tumor extension: Tumor invades through the muscularis propria into pericolorectal tissue. Margin status: All margins are uninvolved by invasive carcinoma, high grade dysplasia, intramucosal adenocarcinoma and adenoma.     - Margins examined: Proximal, distal and radial/soft tissue surgical margins.     - Distance of invasive carcinoma from closest margin:  7 cm (radial/soft tissue surgical margin). Treatment effect: No known presurgical therapy. Lymphovascular invasion:  Not identified. Perineural invasion: Not identified. Tumor deposits:  Not identified. Regional lymph nodes:     - Number of lymph nodes involved: 0     - Number of lymph nodes examined: 18  Pathologic stage classification (pTNM, AJCC 8th edition)     - Primary tumor (pT): pT3 -- tumor invades through the muscularis       propria into pericolorectal tissues.     - Regional lymph nodes (pN):  pN0 -- no regional lymph node metastasis.     Mismatch Repair (MMR) IHC

## 2020-06-19 ENCOUNTER — TELEPHONE (OUTPATIENT)
Dept: PULMONOLOGY | Age: 75
End: 2020-06-19

## 2020-06-24 ENCOUNTER — TELEPHONE (OUTPATIENT)
Dept: PULMONOLOGY | Age: 75
End: 2020-06-24

## 2020-06-24 NOTE — TELEPHONE ENCOUNTER
Patient declined virtual video visit and requested a virtual phone call visit on 6/25/2020 with Dr Jannet Vargas      We want to confirm that, for purposes of billing, this is a virtual visit with your provider for which we will submit a claim for reimbursement with your insurance company. You will be responsible for any copays, coinsurance amounts or other amounts not covered by your insurance company. If you do not accept this, unfortunately we will not be able to schedule a virtual visit with the provider. Do you accept? yes    Patient verbalized understanding

## 2020-06-25 ENCOUNTER — VIRTUAL VISIT (OUTPATIENT)
Dept: PULMONOLOGY | Age: 75
End: 2020-06-25
Payer: MEDICARE

## 2020-06-25 PROCEDURE — 99214 OFFICE O/P EST MOD 30 MIN: CPT | Performed by: INTERNAL MEDICINE

## 2020-06-25 NOTE — PROGRESS NOTES
Department of Internal Medicine  Division of Pulmonary, Critical Care & Sleep Medicine  Pulmonary [address] [facility]                                             Pulmonary Clinic Consult     I had the pleasure of seeing  Blas Romo in the [facility] regarding their Lung nodule     Follow up of lung nodule       HISTORY OF PRESENT ILLNESS:    Blas Romo is a 76y.o. year old  Who never smoke       She has been doing ok until she developed anemia and she underwent colonoscopy which shows cecal mass for which she requested to have surgery    During the work-up for colon cancer she had an CAT scan of the chest, the chest CAT scan shows left lower lobe lung nodule about 1.1 x 1 cm, which in further evaluation did not light up on the PET scan and it was not avid to FDG    The patient also has some thyroid    She denies any shortness of breath, hemoptysis, weight loss, or any other associated symptoms    She never smoke or exposure to smoking, no industrial exposure    Today Visit  She has been doing great  She was diagnosed with thyroid cancer since last visit   No Complain  Had CT chest and shows stable Lung nodule LLL since July 2019     ALLERGIES:    Allergies   Allergen Reactions    5-Alpha Reductase Inhibitors     Prevnar 13 [Pneumococcal 13-Kassidy Conj Vacc] Swelling     Redness and swelling in arm at site       PAST MEDICAL HISTORY:       Diagnosis Date    Cancer ([facility] [address] 07/29/2019    colon    Glaucoma     Hyperlipidemia     Hypertension     Hypothyroidism        MEDICATIONS:   Current Outpatient Medications   Medication Sig Dispense Refill    potassium chloride (KLOR-CON M) 10 MEQ extended release tablet Take 1 tablet by mouth 2 times daily 180 tablet 1    amLODIPine-atorvastatatin (CADUET) 5-10 MG per tablet TAKE 1 TABLET BY MOUTH ONCE DAILY 90 tablet 1    enalapril-hydrochlorothiazide (VASERETIC) 10-25 MG per tablet Take 1 tablet by mouth daily 90

## 2020-06-29 ENCOUNTER — TELEPHONE (OUTPATIENT)
Dept: FAMILY MEDICINE CLINIC | Age: 75
End: 2020-06-29

## 2020-06-29 ENCOUNTER — TELEPHONE (OUTPATIENT)
Dept: ENDOCRINOLOGY | Age: 75
End: 2020-06-29

## 2020-06-29 NOTE — TELEPHONE ENCOUNTER
The pt is scheduled for a DAN treatment. They will stop their thyroid medication and start a low iodine  on 20. The labs and diagnostic dose will be on 20. The diagnostic scan will be on 20, and the treatment will be on Friday 7/10/20. The patient will start back on their normal diet and thyroid medication on 20. The follow-up whole body scan will be on 20. All of these will be done at [place] [address] at 1:00pm in their nuclear medicine department. The pt is aware. I also mailed the patient the schedule as well as the low iodine diet and DAN precautions.

## 2020-07-06 ENCOUNTER — HOSPITAL ENCOUNTER (OUTPATIENT)
Age: 75
Discharge: HOME | End: 2020-07-06
Payer: MEDICARE

## 2020-07-06 ENCOUNTER — HOSPITAL ENCOUNTER (OUTPATIENT)
Dept: NUCLEAR MEDICINE | Age: 75
Discharge: HOME | End: 2020-07-08
Payer: MEDICARE

## 2020-07-06 LAB
T4 FREE: 0.63 NG/DL (ref 0.93–1.7)
TSH SERPL DL<=0.05 MIU/L-ACNC: 22.77 UIU/ML (ref 0.27–4.2)

## 2020-07-06 PROCEDURE — 86800 THYROGLOBULIN ANTIBODY: CPT

## 2020-07-06 PROCEDURE — 78018 THYROID MET IMAGING BODY: CPT

## 2020-07-06 PROCEDURE — 84439 ASSAY OF FREE THYROXINE: CPT

## 2020-07-06 PROCEDURE — A9517 I131 IODIDE CAP, RX: HCPCS | Performed by: RADIOLOGY

## 2020-07-06 PROCEDURE — 83018 HEAVY METAL QUAN EACH NES: CPT

## 2020-07-06 PROCEDURE — 3430000000 HC RX DIAGNOSTIC RADIOPHARMACEUTICAL: Performed by: RADIOLOGY

## 2020-07-06 PROCEDURE — 36415 COLL VENOUS BLD VENIPUNCTURE: CPT

## 2020-07-06 PROCEDURE — 84443 ASSAY THYROID STIM HORMONE: CPT

## 2020-07-06 PROCEDURE — 84432 ASSAY OF THYROGLOBULIN: CPT

## 2020-07-06 RX ADMIN — SODIUM IODIDE I 131 2.9 MILLICURIE: 1 CAPSULE, GELATIN COATED ORAL at 11:03

## 2020-07-08 ENCOUNTER — HOSPITAL ENCOUNTER (OUTPATIENT)
Dept: NUCLEAR MEDICINE | Age: 75
Discharge: HOME | End: 2020-07-10
Payer: MEDICARE

## 2020-07-08 LAB
THYROGLOBULIN AB: <0.9 IU/ML (ref 0–4)
THYROGLOBULIN BY LC-MS/MS, SERUM/PLASMA: ABNORMAL NG/ML (ref 1.3–31.8)
THYROGLOBULIN, SERUM OR PLASMA: 1.1 NG/ML (ref 1.3–31.8)

## 2020-07-10 ENCOUNTER — HOSPITAL ENCOUNTER (OUTPATIENT)
Dept: NUCLEAR MEDICINE | Age: 75
Discharge: HOME | End: 2020-07-12
Payer: MEDICARE

## 2020-07-10 PROCEDURE — 3430000000 HC RX DIAGNOSTIC RADIOPHARMACEUTICAL: Performed by: RADIOLOGY

## 2020-07-10 PROCEDURE — 79005 NUCLEAR RX ORAL ADMIN: CPT

## 2020-07-10 PROCEDURE — A9517 I131 IODIDE CAP, RX: HCPCS | Performed by: RADIOLOGY

## 2020-07-10 RX ADMIN — SODIUM IODIDE I 131 105.3 MILLICURIE: 1 CAPSULE, GELATIN COATED ORAL at 09:47

## 2020-07-15 ENCOUNTER — TELEPHONE (OUTPATIENT)
Dept: ENDOCRINOLOGY | Age: 75
End: 2020-07-15

## 2020-07-15 LAB
Lab: NORMAL
REPORT: NORMAL
THIS TEST SENT TO: NORMAL

## 2020-07-17 ENCOUNTER — HOSPITAL ENCOUNTER (OUTPATIENT)
Dept: NUCLEAR MEDICINE | Age: 75
Discharge: HOME | End: 2020-07-19
Payer: MEDICARE

## 2020-07-17 PROCEDURE — 78018 THYROID MET IMAGING BODY: CPT

## 2020-08-14 ENCOUNTER — OFFICE VISIT (OUTPATIENT)
Dept: ENDOCRINOLOGY | Age: 75
End: 2020-08-14
Payer: MEDICARE

## 2020-08-14 VITALS
DIASTOLIC BLOOD PRESSURE: 80 MMHG | OXYGEN SATURATION: 98 % | TEMPERATURE: 97.8 F | SYSTOLIC BLOOD PRESSURE: 132 MMHG | HEIGHT: 62 IN | RESPIRATION RATE: 16 BRPM | BODY MASS INDEX: 27.46 KG/M2 | WEIGHT: 149.2 LBS | HEART RATE: 92 BPM

## 2020-08-14 PROCEDURE — 99214 OFFICE O/P EST MOD 30 MIN: CPT | Performed by: INTERNAL MEDICINE

## 2020-08-14 PROCEDURE — G8417 CALC BMI ABV UP PARAM F/U: HCPCS | Performed by: INTERNAL MEDICINE

## 2020-08-14 PROCEDURE — G8427 DOCREV CUR MEDS BY ELIG CLIN: HCPCS | Performed by: INTERNAL MEDICINE

## 2020-08-14 PROCEDURE — 4040F PNEUMOC VAC/ADMIN/RCVD: CPT | Performed by: INTERNAL MEDICINE

## 2020-08-14 PROCEDURE — 3017F COLORECTAL CA SCREEN DOC REV: CPT | Performed by: INTERNAL MEDICINE

## 2020-08-14 PROCEDURE — 1090F PRES/ABSN URINE INCON ASSESS: CPT | Performed by: INTERNAL MEDICINE

## 2020-08-14 PROCEDURE — 1036F TOBACCO NON-USER: CPT | Performed by: INTERNAL MEDICINE

## 2020-08-14 PROCEDURE — 1123F ACP DISCUSS/DSCN MKR DOCD: CPT | Performed by: INTERNAL MEDICINE

## 2020-08-14 PROCEDURE — G8399 PT W/DXA RESULTS DOCUMENT: HCPCS | Performed by: INTERNAL MEDICINE

## 2020-08-14 NOTE — PROGRESS NOTES
[address] [place] [address] Department of Endocrinology Diabetes and Metabolism   [address] [facility] [address]   Phone: [phone]  Fax: [phone]  Date of Service: 8/14/2020     Primary Care Physician: Patience Rome DO  Provider: Yassine Tubbs MD     Reason for the visit:   Papillary thyroid carcinoma, hurtle cell carcinoma     History of Present Illness: The history is provided by the patient. No  was used. Accuracy of the patient data is excellent. Rodolfo Vann is a very pleasant 76y.o. year-old female who was found to have a 2 cm cm left adrenal mass on computed tomography (CT) imaging obtained as part of the work-up of recently diagnosed Colon cancer   CT chest/Abdomen 7/31/2019  There is a cecal mural mass on the medial aspect of the cecum and proximal ascending colon with perpendicular diameter measurements of 6.2 x 5.7 x 2.8 cm. It appears to be confined to the bowel wall. The right adrenal is enlarged, with a globular appearance and a diameter of 2 cm, and nonspecific high solid attenuation characteristics. Metastatic disease is the diagnosis of exclusion  PET CT scan 8/15/2019:   1.  Abnormal tracer uptake in the right thyroid which is focal and asymmetrical   2.  Abnormal tracer uptake at the level the cecum which exceeds the threshold SUV, this corresponds to the findings on the CT scan of the abdomen, compatible with neoplasm  3. There is no other abnormal tracer uptake seen    Thyroid US 8/21/2019   Rt lobe measures 3.4 x 1.9 x 0.9 cm --> there is 1.4 cm isoechoic nodule (at location of abnormal PET uptake)   Lt lobe measures 3.4 x 1.1 x 0.9 cm --> 9 mm nodule   Isthmus measures 7 mm ---> hypoechoic 1.9 cm with internal vascularity     Pt underwent total thyroidectomy on 2/20/2020  Path report --> two morphologically distinct carcinomas within the thyroid.   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  The approximate width of tissue in each block is 4 mm. The overall tumor size cannot be accurately assessed due to the presence of tumor in nonsequential sections but may be considerably larger than what is measured on 1 slide     7/10/2020 - received 105.3 mCi DAN ablation without complications     7/62/8849 - posttherapy scan negative for mets     Pt currently on levothyroxine 112 mcg daily. Patient takes levothyroxine in the morning at empty stomach, wait one hour before eating , avoid multivitamins containing calcium  or iron with it. Geovanna Grajeda denies any new lumps, bumps in her neck, change in her voice, or shortness of breath. No family history of thyroid cancer. No prior history of radiation to head or neck region. PAST MEDICAL HISTORY   Past Medical History:   Diagnosis Date    Cancer ([address] 07/29/2019    colon    Glaucoma     Hyperlipidemia     Hypertension     Hypothyroidism       PAST SURGICAL HISTORY   Past Surgical History:   Procedure Laterality Date    CATARACT REMOVAL WITH IMPLANT Bilateral     CHOLECYSTECTOMY      COLONOSCOPY N/A 7/29/2019    COLONOSCOPY WITH BIOPSY performed by Bernardino Pearson MD at [address] 8/29/2019    RIGHT HEMICOLECTOMY, LAPAROSCOPIC, ROBOTIC XI ASSISTED performed by Bernardino Pearson MD at [address] [facility] Bilateral 2/20/2020    TOTAL THYROIDECTOMY WITH ANTERIOR NECK DISSECTION-NEEDS NERVE INTEGRITY MONITOR performed by Hillary Prado DO at [facility] [place]      SOCIAL HISTORY   Tobacco:   reports that she has never smoked.  She has never used smokeless any rash, striae ulcer, Hirsute, or hyperpigmentation. MSK: denied any joint deformity, joint pain/swelling, muscle pain, or back pain. Neuro: no numbess, no tingling, no weakness,     OBJECTIVE   /80   Pulse 92   Temp 97.8 °F (36.6 °C)   Resp 16   Ht 5' 2\" (1.575 m)   Wt 149 lb 3.2 oz (67.7 kg)   LMP  (LMP Unknown)   SpO2 98%   BMI 27.29 kg/m²    BP Readings from Last 4 Encounters:   08/14/20 132/80   06/18/20 (!) 166/78   05/06/20 (!) 144/84   03/19/20 (!) 160/73      Wt Readings from Last 6 Encounters:   08/14/20 149 lb 3.2 oz (67.7 kg)   06/18/20 150 lb (68 kg)   05/06/20 154 lb (69.9 kg)   04/21/20 153 lb (69.4 kg)   03/19/20 150 lb (68 kg)   03/09/20 150 lb (68 kg)        Physical examination:   General: awake alert, oriented x3, no abnormal position or movements. HEENT: normocephalic non traumatic, no exophthalmos   Neck: supple, no LN enlargement, s/p thyroidectomy, no JVD. Pulm: Clear equal air entry no added sounds, no wheezing or rhonchi   CVS: S1 + S2, no murmur, no heave. Dorsalis pedis pulse palpable   Abd: soft lax, no tenderness, no organomegaly, audible bowel sounds.    Skin: warm, no lesions, no rash  Musculoskeletal: No back tenderness, no kyphosis/scoliosis   Neuro: CN intact, sensation normal, muscle power normal. No tremors   Psych: normal mood, and affect     Review of Laboratory Data:   I personally reviewed the following labs:    Lab Results   Component Value Date/Time    WBC 8.3 06/18/2020 09:00 AM    RBC 5.12 06/18/2020 09:00 AM    HGB 14.2 06/18/2020 09:00 AM    HCT 43.0 06/18/2020 09:00 AM    MCV 84.0 06/18/2020 09:00 AM    MCH 27.7 06/18/2020 09:00 AM    MCHC 33.0 06/18/2020 09:00 AM    RDW 14.1 06/18/2020 09:00 AM     06/18/2020 09:00 AM    MPV 9.8 06/18/2020 09:00 AM      Lab Results   Component Value Date/Time     06/18/2020 09:00 AM    K 3.5 06/18/2020 09:00 AM    K 4.2 08/30/2019 03:26 AM    CO2 28 06/18/2020 09:00 AM    BUN 5 (L) 06/18/2020 09:00 AM    CREATININE 0.7 06/18/2020 09:00 AM    CALCIUM 9.2 06/18/2020 09:00 AM    LABGLOM >60 06/18/2020 09:00 AM    GFRAA >60 06/18/2020 09:00 AM      Lab Results   Component Value Date/Time    TSH 22.770 (H) 07/06/2020 10:39 AM    T4FREE 0.63 (L) 07/06/2020 10:39 AM    THGAB <0.9 07/06/2020 10:39 AM     Lab Results   Component Value Date    LABA1C 5.8 12/02/2019    GLUCOSE 128 06/18/2020     Lab Results   Component Value Date    CHOL 170 06/17/2019    CHOL 176 07/16/2018    TRIG 137 06/17/2019    TRIG 119 07/16/2018    HDL 50 06/17/2019    HDL 49 07/16/2018     No results found for: ALDODIRENCAL   No results found for: ALPRRATIO   Lab Results   Component Value Date/Time    CHOL 170 06/17/2019 12:00 PM    HDL 50 06/17/2019 12:00 PM    TRIG 137 06/17/2019 12:00 PM      Lab Results   Component Value Date/Time    PTH 16 03/03/2020 10:03 AM      Lab Results   Component Value Date/Time    VITD25 37 09/19/2019 09:19 AM        Medical Records/Labs/Images review:   I personally reviewed and summarized previous records   All labs and imaging studies were independently reviewed     [address] Lion[address] Goff, a 76 y.o.-old female seen in for the following issues     Papillary thyroid cancer/Hurtle cell carcinoma   · S/p total thyroidectomy 2/20/2020, DAN ablation 7/2020 (105.3 mCi)   · Scheduled for thyroid US in few weeks  · Due for Thyroglobulin in few weeks      Postsurgical hypothyroidism   · On levothyroxine 112 mcg daily   · Check TFT and adjust the dose   · Goal to keep TSH <2     Rt adrenal enlargement   · Adrenal mass is found in at least 3-5% of persons older than 50 years. · CT scan 7/2019 --> The right adrenal is enlarged, with a globular appearance and a diameter of 2 cm.  PET CT scan 8/15/2019 didn't show any abnormal uptake at adrenals which is in favor of benign etiology  · Previous work up showed normal plasma metanephrines, and Hi/renin   · 1mg DST was in 8/2019 was 3.3 (<5)  · To repeat adrenal hormones are next ov     Return in about 4 months (around 12/14/2020) for thyroid cancer. The above issues were reviewed with the patient who understood and agreed with the plan. Thank you for allowing us to participate in the care of this patient. Please do not hesitate to contact us with any additional questions. Diagnosis Orders   1. Thyroid cancer ([address]  TSH without Reflex    T4, Free    Thyroglobulin   2. Vitamin D deficiency  Basic Metabolic Panel    Vitamin D 25 Hydroxy   3. Hypothyroidism, unspecified type  TSH without Reflex    T4, Free     Roman Schumacher MD   Endocrinologist, [facility]   [address] [place] [address],[facility] [address] [id number]   Phone: [phone]   Fax: [phone]   --------------------------  An electronic signature was used to authenticate this note.  Rachele Sandifer, MD on 8/14/2020 at 9:40 PM

## 2020-08-20 ENCOUNTER — TELEPHONE (OUTPATIENT)
Dept: FAMILY MEDICINE CLINIC | Age: 75
End: 2020-08-20

## 2020-08-24 ENCOUNTER — HOSPITAL ENCOUNTER (OUTPATIENT)
Age: 75
Discharge: HOME | End: 2020-08-26
Payer: MEDICARE

## 2020-08-24 LAB
ANION GAP SERPL CALCULATED.3IONS-SCNC: 20 MMOL/L (ref 7–16)
BUN BLDV-MCNC: 8 MG/DL (ref 8–23)
CALCIUM SERPL-MCNC: 9.1 MG/DL (ref 8.6–10.2)
CHLORIDE BLD-SCNC: 96 MMOL/L (ref 98–107)
CO2: 24 MMOL/L (ref 22–29)
CREAT SERPL-MCNC: 0.6 MG/DL (ref 0.5–1)
GFR AFRICAN AMERICAN: >60
GFR NON-AFRICAN AMERICAN: >60 ML/MIN/1.73
GLUCOSE BLD-MCNC: 97 MG/DL (ref 74–99)
POTASSIUM SERPL-SCNC: 4.1 MMOL/L (ref 3.5–5)
SODIUM BLD-SCNC: 140 MMOL/L (ref 132–146)
T4 FREE: 2.03 NG/DL (ref 0.93–1.7)
TSH SERPL DL<=0.05 MIU/L-ACNC: 2.12 UIU/ML (ref 0.27–4.2)
VITAMIN D 25-HYDROXY: 42 NG/ML (ref 30–100)

## 2020-08-24 PROCEDURE — 84439 ASSAY OF FREE THYROXINE: CPT

## 2020-08-24 PROCEDURE — 82306 VITAMIN D 25 HYDROXY: CPT

## 2020-08-24 PROCEDURE — 80048 BASIC METABOLIC PNL TOTAL CA: CPT

## 2020-08-24 PROCEDURE — 36415 COLL VENOUS BLD VENIPUNCTURE: CPT

## 2020-08-24 PROCEDURE — 86800 THYROGLOBULIN ANTIBODY: CPT

## 2020-08-24 PROCEDURE — 84443 ASSAY THYROID STIM HORMONE: CPT

## 2020-08-24 PROCEDURE — 84432 ASSAY OF THYROGLOBULIN: CPT

## 2020-08-25 ENCOUNTER — OFFICE VISIT (OUTPATIENT)
Dept: ENT CLINIC | Age: 75
End: 2020-08-25
Payer: MEDICARE

## 2020-08-25 VITALS
WEIGHT: 150.6 LBS | BODY MASS INDEX: 29.57 KG/M2 | SYSTOLIC BLOOD PRESSURE: 122 MMHG | HEIGHT: 60 IN | DIASTOLIC BLOOD PRESSURE: 82 MMHG

## 2020-08-25 PROCEDURE — 1036F TOBACCO NON-USER: CPT | Performed by: OTOLARYNGOLOGY

## 2020-08-25 PROCEDURE — 1090F PRES/ABSN URINE INCON ASSESS: CPT | Performed by: OTOLARYNGOLOGY

## 2020-08-25 PROCEDURE — G8417 CALC BMI ABV UP PARAM F/U: HCPCS | Performed by: OTOLARYNGOLOGY

## 2020-08-25 PROCEDURE — 1123F ACP DISCUSS/DSCN MKR DOCD: CPT | Performed by: OTOLARYNGOLOGY

## 2020-08-25 PROCEDURE — G8399 PT W/DXA RESULTS DOCUMENT: HCPCS | Performed by: OTOLARYNGOLOGY

## 2020-08-25 PROCEDURE — 3017F COLORECTAL CA SCREEN DOC REV: CPT | Performed by: OTOLARYNGOLOGY

## 2020-08-25 PROCEDURE — 4040F PNEUMOC VAC/ADMIN/RCVD: CPT | Performed by: OTOLARYNGOLOGY

## 2020-08-25 PROCEDURE — G8427 DOCREV CUR MEDS BY ELIG CLIN: HCPCS | Performed by: OTOLARYNGOLOGY

## 2020-08-25 PROCEDURE — 99213 OFFICE O/P EST LOW 20 MIN: CPT | Performed by: OTOLARYNGOLOGY

## 2020-08-25 NOTE — PROGRESS NOTES
Take by mouth 2 times daily , Disp: , Rfl:     Omega-3 Fatty Acids (FISH OIL) 500 MG CAPS, Take 1 capsule by mouth daily, Disp: , Rfl:     Cyanocobalamin (B-12) 2500 MCG TABS, Take 1 tablet by mouth three times a week , Disp: , Rfl:     vitamin D (CHOLECALCIFEROL) 5000 UNITS CAPS capsule, Take 5,000 Units by mouth daily, Disp: , Rfl:     EUTHYROX 112 MCG tablet, Take 1 tablet by mouth once daily, Disp: 30 tablet, Rfl: 0    aspirin 81 MG tablet, Take 1 tablet by mouth daily (Patient not taking: Reported on 8/25/2020), Disp: 90 tablet, Rfl: 1  5-alpha reductase inhibitors and Prevnar 13 [pneumococcal 13-sera conj vacc]  Social History     Tobacco Use    Smoking status: Never Smoker    Smokeless tobacco: Never Used   Substance Use Topics    Alcohol use: No     Alcohol/week: 0.0 standard drinks    Drug use: No     Family History   Problem Relation Age of Onset    Early Death Father     Cancer Sister     Lung Cancer Sister     Cancer Brother     Lung Cancer Brother        Review of Systems   Constitutional: Negative for chills and fever. HENT: Negative for congestion, ear discharge, hearing loss, nosebleeds, rhinorrhea, sneezing, tinnitus and voice change. Respiratory: Negative for cough and shortness of breath. Cardiovascular: Negative for chest pain and palpitations. Gastrointestinal: Negative for vomiting. Skin: Negative for rash. Allergic/Immunologic: Negative for environmental allergies. Neurological: Negative for dizziness and headaches. Hematological: Does not bruise/bleed easily. All other systems reviewed and are negative. /82 (Site: Left Upper Arm, Position: Sitting, Cuff Size: Medium Adult)   Ht 5' (1.524 m)   Wt 150 lb 9.6 oz (68.3 kg)   LMP  (LMP Unknown)   BMI 29.41 kg/m²   Physical Exam  Vitals signs and nursing note reviewed. Constitutional:       Appearance: She is well-developed. HENT:      Head: Normocephalic.       Right Ear: Tympanic membrane, ear canal and external ear normal.      Left Ear: Tympanic membrane, ear canal and external ear normal.      Nose: No nasal deformity, septal deviation or signs of injury. Right Nostril: No foreign body or epistaxis. Left Nostril: No foreign body or epistaxis. Eyes:      Pupils: Pupils are equal, round, and reactive to light. Neck:      Musculoskeletal: Normal range of motion and neck supple. Thyroid: No thyromegaly. Trachea: No tracheal deviation. Cardiovascular:      Rate and Rhythm: Normal rate. Pulmonary:      Effort: Pulmonary effort is normal. No respiratory distress. Musculoskeletal: Normal range of motion. General: No tenderness. Skin:     General: Skin is warm and dry. Neurological:      Mental Status: She is alert. Cranial Nerves: No cranial nerve deficit. IMPRESSION/PLAN:  Patient seen and examined, ultrasound is negative for any concerning recurrent or persistent disease, will repeat lab work in 4 to 6 months and follow-up in that timeframe or sooner if any new masses lesions neck swelling difficulty swallowing or changes in voice is recur plate. Dr. Vanesa Zarate.  Otolaryngology Facial Plastic Surgery  :  [facility]  Associate Clinical Professor:  Netta Barajas, [facility]

## 2020-08-27 ENCOUNTER — HOSPITAL ENCOUNTER (OUTPATIENT)
Dept: MAMMOGRAPHY | Age: 75
Discharge: HOME | End: 2020-08-29
Payer: MEDICARE

## 2020-08-27 LAB
THYROGLOBULIN AB: <0.9 IU/ML (ref 0–4)
THYROGLOBULIN BY LC-MS/MS, SERUM/PLASMA: ABNORMAL NG/ML (ref 1.3–31.8)
THYROGLOBULIN, SERUM OR PLASMA: 0.5 NG/ML (ref 1.3–31.8)

## 2020-08-27 PROCEDURE — 77063 BREAST TOMOSYNTHESIS BI: CPT

## 2020-08-30 ENCOUNTER — TELEPHONE (OUTPATIENT)
Dept: ENDOCRINOLOGY | Age: 75
End: 2020-08-30

## 2020-08-30 ASSESSMENT — ENCOUNTER SYMPTOMS
VOICE CHANGE: 0
COUGH: 0
VOMITING: 0
RHINORRHEA: 0
SHORTNESS OF BREATH: 0

## 2020-08-30 NOTE — TELEPHONE ENCOUNTER
Notify pt,  I have reviewed your recent results    Great!, thyroid hormones results are at goal and thyroid tumor marked was very low. There is no need for a change in your therapy at this time.

## 2020-09-09 ENCOUNTER — TELEPHONE (OUTPATIENT)
Dept: SURGERY | Age: 75
End: 2020-09-09

## 2020-09-09 ENCOUNTER — OFFICE VISIT (OUTPATIENT)
Dept: SURGERY | Age: 75
End: 2020-09-09
Payer: MEDICARE

## 2020-09-09 VITALS
TEMPERATURE: 98.1 F | HEIGHT: 62 IN | OXYGEN SATURATION: 94 % | HEART RATE: 95 BPM | SYSTOLIC BLOOD PRESSURE: 153 MMHG | DIASTOLIC BLOOD PRESSURE: 87 MMHG | BODY MASS INDEX: 27.6 KG/M2 | WEIGHT: 150 LBS

## 2020-09-09 PROCEDURE — 99213 OFFICE O/P EST LOW 20 MIN: CPT | Performed by: SURGERY

## 2020-09-09 PROCEDURE — 1090F PRES/ABSN URINE INCON ASSESS: CPT | Performed by: SURGERY

## 2020-09-09 PROCEDURE — G8427 DOCREV CUR MEDS BY ELIG CLIN: HCPCS | Performed by: SURGERY

## 2020-09-09 PROCEDURE — G8417 CALC BMI ABV UP PARAM F/U: HCPCS | Performed by: SURGERY

## 2020-09-09 PROCEDURE — 4040F PNEUMOC VAC/ADMIN/RCVD: CPT | Performed by: SURGERY

## 2020-09-09 PROCEDURE — 1036F TOBACCO NON-USER: CPT | Performed by: SURGERY

## 2020-09-09 PROCEDURE — 3017F COLORECTAL CA SCREEN DOC REV: CPT | Performed by: SURGERY

## 2020-09-09 PROCEDURE — G8399 PT W/DXA RESULTS DOCUMENT: HCPCS | Performed by: SURGERY

## 2020-09-09 PROCEDURE — 1123F ACP DISCUSS/DSCN MKR DOCD: CPT | Performed by: SURGERY

## 2020-09-09 RX ORDER — POLYETHYLENE GLYCOL 3350, SODIUM SULFATE ANHYDROUS, SODIUM BICARBONATE, SODIUM CHLORIDE, POTASSIUM CHLORIDE 236; 22.74; 6.74; 5.86; 2.97 G/4L; G/4L; G/4L; G/4L; G/4L
4 POWDER, FOR SOLUTION ORAL ONCE
Qty: 4000 ML | Refills: 0 | Status: SHIPPED | OUTPATIENT
Start: 2020-09-09 | End: 2020-09-09

## 2020-09-09 NOTE — TELEPHONE ENCOUNTER
Golytely bowel prep instructions provided and discussed. Prior Authorization Form:  DEMOGRAPHICS:                     Patient Name:  Augusto Essex  Patient :  1945        Insurance:  Payor: Christa Mao / Plan: Barre / Product Type: *No Product type* /   Insurance ID Number:    Payor/Plan Subscr  Sex Relation Sub. Ins. ID Effective Group Num   1. [address] [place] [address],  [address]  Female  [id number]                                    [address] [id number]   2.  [place] -* ADALID JONES A  Female Self [id number] 3/3/20                                    [address]     DIAGNOSIS & PROCEDURE:                       Procedure/Operation: Colonoscopy possible biopsy or polpyectomy           CPT Code: 19092    Diagnosis:  History of colon cancer, screening    ICD10 Code: C18.9    Location:  [address] [place] [address]    Surgeon:  Ailyn Bentley INFORMATION:                          Date: 20    Time: TBD              Anesthesia:  MAC/TIVA                                                       Status:  Outpatient        Special Comments:         Electronically signed by Fior Cody RN on 2020 at 2:29 PM

## 2020-09-09 NOTE — PROGRESS NOTES
General Surgery History and Physical    Patient's Name/Date of Birth: Trent West / [birth year]    Date: 9/9/2020     Surgeon: Corwin Daugherty M.D.    PCP: Carter Griggs DO     Chief Complaint: Colon cancer    HPI:   Trent West is a 76 y.o. female who presents for evaluation of screening colonoscopy. Denies history of hemorrhoids, denies history of rectal bleeding, melena, constipation, abdominal pain, abdominal operations. Denies dark or black stools. Denies reflux, heart burn. Stage IIA colon CA last year, 1 yr postop lap right hemicolectomy. No history of recent weight loss. They are a nonsmoker. Allergies   Allergen Reactions    5-Alpha Reductase Inhibitors     Prevnar 13 [Pneumococcal 13-Kassidy Conj Vacc] Swelling     Redness and swelling in arm at site       Prior to Admission medications    Medication Sig Start Date End Date Taking?  Authorizing Provider   EUTHYROX 112 MCG tablet Take 1 tablet by mouth once daily 8/26/20  Yes Jose M Moreno, DO   potassium chloride (KLOR-CON M) 10 MEQ extended release tablet Take 1 tablet by mouth 2 times daily 5/6/20 11/2/20 Yes Carola P Catterlin, DO   amLODIPine-atorvastatatin (CADUET) 5-10 MG per tablet TAKE 1 TABLET BY MOUTH ONCE DAILY 5/6/20  Yes Carola P Catterlin, DO   enalapril-hydrochlorothiazide (VASERETIC) 10-25 MG per tablet Take 1 tablet by mouth daily 5/6/20 11/2/20 Yes Carola P Catterlin, DO   Multiple Vitamins-Minerals (PRESERVISION AREDS PO) Take by mouth 2 times daily    Yes Historical Provider, MD   Omega-3 Fatty Acids (FISH OIL) 500 MG CAPS Take 1 capsule by mouth daily   Yes Historical Provider, MD   Cyanocobalamin (B-12) 2500 MCG TABS Take 1 tablet by mouth three times a week    Yes Historical Provider, MD   vitamin D (CHOLECALCIFEROL) 5000 UNITS CAPS capsule Take 5,000 Units by mouth daily   Yes Historical Provider, MD   aspirin 81 MG tablet Take 1 tablet by mouth daily  Patient not taking: Reported on 8/25/2020 7/16/18   Nel AUSTIN DO Rebeka       Past Medical History:   Diagnosis Date    Cancer ([facility] [address] 07/29/2019    colon    Glaucoma     Hyperlipidemia     Hypertension     Hypothyroidism        Past Surgical History:   Procedure Laterality Date    CATARACT REMOVAL WITH IMPLANT Bilateral     CHOLECYSTECTOMY      COLONOSCOPY N/A 7/29/2019    COLONOSCOPY WITH BIOPSY performed by Juanita Banks MD at [address] Right 8/29/2019    RIGHT HEMICOLECTOMY, LAPAROSCOPIC, ROBOTIC XI ASSISTED performed by Juanita Banks MD at [facility] Bilateral 2/20/2020    TOTAL THYROIDECTOMY WITH ANTERIOR NECK DISSECTION-NEEDS NERVE INTEGRITY MONITOR performed by Alycia Hamman, DO at [facility] OR       Social History     Socioeconomic History    Marital status: Single     Spouse name: Not on file    Number of children: Not on file    Years of education: Not on file    Highest education level: Not on file   Occupational History    Not on file   Social Needs    Financial resource strain: Not on file    Food insecurity     Worry: Not on file     Inability: Not on file    Transportation needs     Medical: Not on file     Non-medical: Not on file   Tobacco Use    Smoking status: Never Smoker    Smokeless tobacco: Never Used   Substance and Sexual Activity    Alcohol use: No     Alcohol/week: 0.0 standard drinks    Drug use: No    Sexual activity: Not Currently   Lifestyle    Physical activity     Days per week: Not on file     Minutes per session: Not on file    Stress: Not on file   Relationships    Social connections     Talks on phone: Not on file     Gets together: Not on file     Attends Rastafarian service: Not on file     Active member of club or organization: Not on file     Attends meetings of clubs or organizations: Not on file     Relationship status: Not on file    Intimate partner violence     Fear of current or ex partner: Not on file     Emotionally abused: Not on file     Physically abused: Not on file     Forced sexual activity: Not on file   Other Topics Concern    Not on file   Social History Narrative    Not on file       No results for input(s): WBC, HGB, HCT, MCV, PLT in the last 72 hours. No results for input(s): NA, K, CO2, PHOS, BUN, CREATININE in the last 72 hours. Invalid input(s): CA    No results for input(s): PROT, INR, LIPASE, AMYLASE in the last 72 hours.     Labs:      Review of Systems  Review of Systems -  General ROS: negative for - chills, fatigue or malaise  ENT ROS: negative for - hearing change, nasal congestion or nasal discharge  Allergy and Immunology ROS: negative for - hives, itchy/watery eyes or nasal congestion  Hematological and Lymphatic ROS: negative for - blood clots, blood transfusions, bruising or fatigue  Endocrine ROS: negative for - malaise/lethargy, mood swings, palpitations or polydipsia/polyuria  Respiratory ROS: negative for - sputum changes, stridor, tachypnea or wheezing  Cardiovascular ROS: negative for - irregular heartbeat, loss of consciousness, murmur or orthopnea  Gastrointestinal ROS: negative for - constipation, diarrhea, gas/bloating, heartburn or hematemesis  Genito-Urinary ROS: negative for -  genital discharge, genital ulcers or hematuria  Musculoskeletal ROS: negative for - gait disturbance, muscle pain or muscular weakness  Psych/Neuro ROS: negative for - visual or auditory hallucinations, suicidal ideation      Physical exam:   BP (!) 153/87   Pulse 95   Temp 98.1 °F (36.7 °C) (Temporal)   Ht 5' 2\" (1.575 m)   Wt 150 lb (68 kg)   LMP  (LMP Unknown)   SpO2 94%   BMI 27.44 kg/m²   General appearance: AAO, NAD  Eyes: PERRL, EOMI, red conjunctiva  Lungs: Clear bilateral  Chest wall: atraumatic, no tenderness, no echymosis or abrasions  Heart: reg rate, no murmur  Abdomen: soft, nondistended, nontender  Extremities: full ROM all 4 ext  Pulses: 2+ distal  Skin: warm and dry  Neurologic: spontanous eye opening, purposeful, follows complex commands        Assessment:  76 y.o. female presents for colon cancer screening, stage IIA colon CA, s/p lap right nikita    Patient Active Problem List   Diagnosis    Essential hypertension    Mixed hyperlipidemia    Acquired hypothyroidism    Fatigue    Seasonal allergic rhinitis    Allergic drug reaction    Dermatitis    Adrenal incidentaloma (HCC)    Multinodular goiter    Vitamin D deficiency    Colon cancer ([facility] [address]    S/P complete thyroidectomy    Post-operative pain    Hurthle cell carcinoma of thyroid ([facility] [address]         Plan:  I recommended high risk screening colonoscopy with possible biopsy or polypectomy and explained the risk, benefits, expected outcome, and alternatives to the procedure. Risks included but are not limited to bleeding, infection, respiratory distress, hypotension, and perforation of the colon. The patient understands and is in agreement.      Golytely prep      Physician Signature: Electronically signed by Dr. Christiano Berry

## 2020-09-11 NOTE — TELEPHONE ENCOUNTER
Per Dr. Merline Bingham, patient is scheduled for COLONOSCOPY POSS BIOPSY OR POLYPECTOMY at [address] [place] [address] on 9/28/20. Surgery scheduling form faxed with confirmation, surgeon's calendar updated. Dr. Merline Bingham to enter orders. Follow up appointment scheduled. GOLYTELY bowel prep instructions provided and discussed.   Electronically signed by Lopez Man RN on 9/11/2020 at 2:16 PM

## 2020-09-21 ENCOUNTER — HOSPITAL ENCOUNTER (OUTPATIENT)
Dept: INFUSION THERAPY | Age: 75
Discharge: HOME | End: 2020-09-21
Payer: MEDICARE

## 2020-09-21 ENCOUNTER — OFFICE VISIT (OUTPATIENT)
Dept: ONCOLOGY | Age: 75
End: 2020-09-21
Payer: MEDICARE

## 2020-09-21 VITALS
BODY MASS INDEX: 28.23 KG/M2 | DIASTOLIC BLOOD PRESSURE: 82 MMHG | WEIGHT: 153.4 LBS | HEART RATE: 95 BPM | SYSTOLIC BLOOD PRESSURE: 132 MMHG | OXYGEN SATURATION: 96 % | HEIGHT: 62 IN

## 2020-09-21 DIAGNOSIS — C73 HURTHLE CELL CARCINOMA OF THYROID (HCC): ICD-10-CM

## 2020-09-21 DIAGNOSIS — C18.0 CECAL CANCER (HCC): ICD-10-CM

## 2020-09-21 LAB
ALBUMIN SERPL-MCNC: 4.4 G/DL (ref 3.5–5.2)
ALP BLD-CCNC: 102 U/L (ref 35–104)
ALT SERPL-CCNC: 10 U/L (ref 0–32)
ANION GAP SERPL CALCULATED.3IONS-SCNC: 13 MMOL/L (ref 7–16)
AST SERPL-CCNC: 12 U/L (ref 0–31)
BASOPHILS ABSOLUTE: 0.02 E9/L (ref 0–0.2)
BASOPHILS RELATIVE PERCENT: 0.4 % (ref 0–2)
BILIRUB SERPL-MCNC: 0.6 MG/DL (ref 0–1.2)
BUN BLDV-MCNC: 9 MG/DL (ref 8–23)
CALCIUM SERPL-MCNC: 8.9 MG/DL (ref 8.6–10.2)
CEA: 1.6 NG/ML (ref 0–5.2)
CHLORIDE BLD-SCNC: 95 MMOL/L (ref 98–107)
CO2: 30 MMOL/L (ref 22–29)
CREAT SERPL-MCNC: 0.6 MG/DL (ref 0.5–1)
EOSINOPHILS ABSOLUTE: 0.08 E9/L (ref 0.05–0.5)
EOSINOPHILS RELATIVE PERCENT: 1.6 % (ref 0–6)
GFR AFRICAN AMERICAN: >60
GFR NON-AFRICAN AMERICAN: >60 ML/MIN/1.73
GLUCOSE BLD-MCNC: 98 MG/DL (ref 74–99)
HCT VFR BLD CALC: 43.3 % (ref 34–48)
HEMOGLOBIN: 14.3 G/DL (ref 11.5–15.5)
IMMATURE GRANULOCYTES #: 0.02 E9/L
IMMATURE GRANULOCYTES %: 0.4 % (ref 0–5)
LYMPHOCYTES ABSOLUTE: 0.92 E9/L (ref 1.5–4)
LYMPHOCYTES RELATIVE PERCENT: 18.1 % (ref 20–42)
MCH RBC QN AUTO: 29.2 PG (ref 26–35)
MCHC RBC AUTO-ENTMCNC: 33 % (ref 32–34.5)
MCV RBC AUTO: 88.4 FL (ref 80–99.9)
MONOCYTES ABSOLUTE: 0.46 E9/L (ref 0.1–0.95)
MONOCYTES RELATIVE PERCENT: 9.1 % (ref 2–12)
NEUTROPHILS ABSOLUTE: 3.58 E9/L (ref 1.8–7.3)
NEUTROPHILS RELATIVE PERCENT: 70.4 % (ref 43–80)
PDW BLD-RTO: 13.3 FL (ref 11.5–15)
PLATELET # BLD: 215 E9/L (ref 130–450)
PMV BLD AUTO: 9.7 FL (ref 7–12)
POTASSIUM SERPL-SCNC: 3.3 MMOL/L (ref 3.5–5)
RBC # BLD: 4.9 E12/L (ref 3.5–5.5)
SODIUM BLD-SCNC: 138 MMOL/L (ref 132–146)
TOTAL PROTEIN: 7.5 G/DL (ref 6.4–8.3)
WBC # BLD: 5.1 E9/L (ref 4.5–11.5)

## 2020-09-21 PROCEDURE — 4040F PNEUMOC VAC/ADMIN/RCVD: CPT | Performed by: INTERNAL MEDICINE

## 2020-09-21 PROCEDURE — 99214 OFFICE O/P EST MOD 30 MIN: CPT | Performed by: INTERNAL MEDICINE

## 2020-09-21 PROCEDURE — 1036F TOBACCO NON-USER: CPT | Performed by: INTERNAL MEDICINE

## 2020-09-21 PROCEDURE — 80053 COMPREHEN METABOLIC PANEL: CPT

## 2020-09-21 PROCEDURE — 3017F COLORECTAL CA SCREEN DOC REV: CPT | Performed by: INTERNAL MEDICINE

## 2020-09-21 PROCEDURE — G8417 CALC BMI ABV UP PARAM F/U: HCPCS | Performed by: INTERNAL MEDICINE

## 2020-09-21 PROCEDURE — G8399 PT W/DXA RESULTS DOCUMENT: HCPCS | Performed by: INTERNAL MEDICINE

## 2020-09-21 PROCEDURE — 85025 COMPLETE CBC W/AUTO DIFF WBC: CPT

## 2020-09-21 PROCEDURE — G8427 DOCREV CUR MEDS BY ELIG CLIN: HCPCS | Performed by: INTERNAL MEDICINE

## 2020-09-21 PROCEDURE — 1090F PRES/ABSN URINE INCON ASSESS: CPT | Performed by: INTERNAL MEDICINE

## 2020-09-21 PROCEDURE — 1123F ACP DISCUSS/DSCN MKR DOCD: CPT | Performed by: INTERNAL MEDICINE

## 2020-09-21 PROCEDURE — 82378 CARCINOEMBRYONIC ANTIGEN: CPT

## 2020-09-21 NOTE — PROGRESS NOTES
[facility]  Attending Clinic Note    Reason for Visit: Follow-up on a patient with Cecal Cancer    PCP:  Nathaly London,     History of Present Illness:  76 y.o. female, never smoker, hx of HTN, hyperlipidemia, hypothyroidism, who presented to see general surgery team for evaluation of anemia and diagnostic colonoscopy. Colonoscopy on 07/29/2019: The ileocecal valve was obscured by large cecal fungating mass, 5cm in diameter. 1cm polyp was at the proximal transverse just passed the hepatic flexure  A. Mass, cecum, biopsy: Invasive moderately differentiated  adenocarcinoma. B. Polyp, hepatic flexure of colon, biopsy: Tubular adenoma. CT chest 07/31/2019:   Solid pulmonary parenchymal nodule in the left lower lobe measures 12 x 11 mm, and lies just above diaphragm. CT abdomen/pelvis 07/31/2019: There is a cecal mural mass on the medial aspect of the cecum and proximal ascending colon with perpendicular diameter measurements of .2 x 5.7 x 2.8 cm. It appears to be confined to the bowel wall. There is an enlarged right adrenal lesion measuring 2 cm diameter    CEA 3.8 on 08/05/2019. PET/CT scan 08/15/2019  No FDG avid uptake is identified which exceeds the threshold SUV in the left pulmonary nodule. Pulmonary team (Dr. Vic Youssef) consult appreciated; repeat CT chest in 3 months. No uptake in adrenal glands. Abnormal tracer uptake in the right thyroid which is focal. Endocrinology team consult (Dr. Pop Grace) appreciated. Thyroid U/S 08/21/2019 noted thyroid nodules. Abnormal tracer uptake at the level the cecum which exceeds the threshold SUV. Right hemicolectomy was performed on 08/29/2019. Procedure: Right hemicolectomy. Tumor site: Cecum. Tumor size: Greatest dimension is 6.5 cm; additional dimensions 4.5 x 1.8 cm. Macroscopic tumor perforation: Not identified. Histologic type: Adenocarcinoma  Histologic grade: G2 (moderately differentiated).   Tumor extension: Tumor invades through the muscularis propria into pericolorectal tissue. Margin status: All margins are uninvolved by invasive carcinoma, high grade dysplasia, intramucosal adenocarcinoma and adenoma.     - Margins examined: Proximal, distal and radial/soft tissue surgical margins.     - Distance of invasive carcinoma from closest margin:  7 cm (radial/soft tissue surgical margin). Treatment effect: No known presurgical therapy. Lymphovascular invasion:  Not identified. Perineural invasion: Not identified. Tumor deposits:  Not identified. Regional lymph nodes:     - Number of lymph nodes involved: 0     - Number of lymph nodes examined: 18  Pathologic stage classification (pTNM, AJCC 8th edition)     - Primary tumor (pT): pT3 -- tumor invades through the muscularis       propria into pericolorectal tissues.     - Regional lymph nodes (pN):  pN0 -- no regional lymph node metastasis. Mismatch Repair (MMR) IHC panel on specimen S -A6   MLH1: No loss of expression   MSH 2: No loss of expression   MSH 6: No loss of expression   PMS 2: No loss of expression    Stage IIA (pT3 pN0 M0) no high risk features. We reviewed with her that Data from randomized trials and meta-analyses indicated that if there is benefit for 5-FU-based chemotherapy therapy in patients with resected stage II disease, it does not exceed an absolute improvement in five-year survival of 5 percent. We also explained to her the side effects/toxicities of fluoropyrimidine-based adjuvant regimen (which will be for a duration of 6 months). Patient decided to proceed with observation and Not adjuvant chemotherapy; To be followed with History & Physical and blood work including CEA every 3 months for 2 years, then every 6 months for a total of 5 years. CT scan chest/abdomen/pelvis yearly for 5 years; colonoscopy in one year. CEA 0.7 on 12/19/2019. CEA 1.1 on 03/19/2020. CT chest 05/15/2020 noted no evidence of metastatic disease. Follows with pulmonary team for lung nodule  CT abdomen/pelvis 05/15/2020:   No evidence of metastatic disease. Left renal cyst; pt declined seeing urology team.    CEA 1.1 on 06/18/2020. CEA pending today 09/21/2020. Total thyroidectomy with anterior neck dissection on 02/20/2020  A.  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  5 lymph nodes negative for malignancy  CANCER CASE SUMMARY (PAPILLARY CARCINOMA)  Procedure-total thyroidectomy  Tumor focality-unifocal  Tumor site-right lobe  Tumor size-1.2 cm  Histologic type-papillary carcinoma, classic  Margins-uninvolved by carcinoma  Angioinvasion-not identified  Lymphatic invasion-not identified  Extrathyroidal extension-not identified  Regional lymph nodes-negative for malignancy       Number of lymph nodes involved: 0       Number of lymph nodes examined: 5       Lucas levels examined: Level 6  TNM classification (AJCC eighth edition)-pT1b N0 MX       CANCER CASE SUMMARY (HURTHLE CELL CARCINOMA)  Procedure-total thyroidectomy  Tumor focality-multifocal  Tumor site-right and left lobes  Tumor size-cannot be determined (see above comment)  Histologic type-Hurthle cell carcinoma  Margins-right and left inferior margins involved by carcinoma; left  superior margin involved by carcinoma  Angioinvasion-present  Lymphatic invasion-not identified  Extrathyroidal extension-not identified  Regional lymph nodes-negative for malignancy       Number of lymph nodes involved: 0       Number of lymph nodes examined: 5       Lucas levels examined: Level 6  TNM classification (AJCC eighth edition)-at least pT2 N0 MX  Referred back to endocrine team for radioactive iodine. US Head Neck soft tissue 08/24/2020: unremarkable of the thyroid bed. Presented today 09/21/2020 for follow-up. No fever chills.   No nausea vomiting abdominal pain. No diarrhea. No melena or hematochezia. Fair appetite and energy level. No chest pain or SOB. Review of Systems;  CONSTITUTIONAL: No fever, chills. Fair appetite and energy level. ENMT: Eyes: No diplopia; Nose: No epistaxis. Mouth: No sore throat. RESPIRATORY: No hemoptysis, shortness of breath, cough. CARDIOVASCULAR: No chest pain, palpitations. GASTROINTESTINAL: No nausea/vomiting, abdominal pain. No melena or hematochezia. GENITOURINARY: No dysuria, urinary frequency, hematuria. NEURO: No syncope, presyncope, headache. Remainder:  ROS NEGATIVE    Past Medical History:      Diagnosis Date    [facility]    colon    Glaucoma     Hyperlipidemia     Hypertension     Hypothyroidism      Medications:  Reviewed and reconciled. Allergies: Allergies   Allergen Reactions    5-Alpha Reductase Inhibitors     Prevnar 13 [Pneumococcal 13-Kassidy Conj Vacc] Swelling     Redness and swelling in arm at site     Physical Exam:  /82   Pulse 95   Ht 5' 2\" (1.575 m)   Wt 153 lb 6.4 oz (69.6 kg)   LMP  (LMP Unknown)   SpO2 96%   BMI 28.06 kg/m²   GENERAL: Alert, oriented x 3, not in acute distress  HEENT: PERRLA; EOMI. Oropharynx clear. NECK: Supple. Without lymphadenopathy. LUNGS: Good air entry bilaterally. No wheezing, crackles or ronchi. CARDIOVASCULAR: Regular rate. No murmurs, rubs or gallops. ABDOMEN: Soft. Non-tender, non-distended. Positive bowel sounds. EXTREMITIES: Without clubbing, cyanosis, or edema. NEUROLOGIC: No focal deficits. 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 1cm polyp was at the proximal transverse just passed the hepatic flexure  A. Mass, cecum, biopsy: Invasive moderately differentiated adenocarcinoma. B. Polyp, hepatic flexure of colon, biopsy: Tubular adenoma.     CT chest 07/31/2019: Solid pulmonary parenchymal nodule in the left lower lobe measures 12 x 11 mm, and lies just above diaphragm. CT abdomen/pelvis 07/31/2019: There is a cecal mural mass on the medial aspect of the cecum and proximal ascending colon with perpendicular diameter measurements of 6.2 x 5.7 x 2.8 cm. It appears to be confined to the bowel wall. Only small cecal mesenteric LN are identified. There is an enlarged right adrenal lesion measuring 2 cm diameter    CEA 3.8 on 08/05/2019. PET/CT scan 08/15/2019  No FDG avid uptake is identified which exceeds the threshold SUV in the left pulmonary nodule. Pulmonary team (Dr. Jame Bray) consult appreciated; repeat CT chest in 3 months. No uptake in adrenal glands. Abnormal tracer uptake in the right thyroid which is focal. Thyroid U/S 08/21/2019 noted thyroid nodules. Endocrinology team consult (Dr. Curtis Mantilla) appreciated. Abnormal tracer uptake at the level the cecum which exceeds the threshold SUV. Right hemicolectomy on 08/29/2019. Procedure: Right hemicolectomy. Tumor site: Cecum. Tumor size: Greatest dimension is 6.5 cm; additional dimensions 4.5 x 1.8 cm. Macroscopic tumor perforation: Not identified. Histologic type: Adenocarcinoma  Histologic grade: G2 (moderately differentiated). Tumor extension: Tumor invades through the muscularis propria into pericolorectal tissue. Margin status: All margins are uninvolved by invasive carcinoma, high grade dysplasia, intramucosal adenocarcinoma and adenoma.     - Margins examined: Proximal, distal and radial/soft tissue surgical margins.     - Distance of invasive carcinoma from closest margin:  7 cm (radial/soft tissue surgical margin). Treatment effect: No known presurgical therapy. Lymphovascular invasion:  Not identified. Perineural invasion: Not identified. Tumor deposits:  Not identified.   Regional lymph nodes:     - Number of lymph nodes involved: 0     - Number of lymph nodes examined: 18  Pathologic stage classification (pTNM, AJCC 8th edition)     - Primary tumor (pT): pT3 -- tumor invades through the muscularis       propria into pericolorectal tissues.     - Regional lymph nodes (pN):  pN0 -- no regional lymph node metastasis. Mismatch Repair (MMR) IHC panel on specimen HJS -A6   MLH1: No loss of expression   MSH 2: No loss of expression   MSH 6: No loss of expression   PMS 2: No loss of expression    Stage IIA (pT3 pN0 M0) no high risk features. We reviewed with her that Data from randomized trials and meta-analyses indicated that if there is benefit for 5-FU-based chemotherapy therapy in patients with resected stage II disease, it does not exceed an absolute improvement in five-year survival of 5 percent. We also explained to her the side effects/toxicities of fluoropyrimidine-based adjuvant regimen (which will be for a duration of 6 months). Patient decided to proceed with observation and Not adjuvant chemotherapy; To be followed with History & Physical and blood work including CEA every 3 months for 2 years, then every 6 months for a total of 5 years. CT scan chest/abdomen/pelvis yearly for 5 years; colonoscopy in one year    CEA 0.7 on 12/19/2019. CEA 1.1 on 03/19/2020. CT chest 05/15/2020 noted no evidence of metastatic disease. Follows with pulmonary team for lung nodule  CT abdomen/pelvis 05/15/2020: No evidence of metastatic disease. Left renal cyst; pt declined seeing urology team.    CEA 1.1 on 06/18/2020. CEA pending today 09/21/2020. SRINIVAS. RTC 3 months with labs (CBC, CMP CEA).  Colonoscopy scheduled on 09/28/2020    Roberto Rogers MD   [id number]/[id number]  Board Certified Medical Oncologist

## 2020-09-23 ENCOUNTER — HOSPITAL ENCOUNTER (OUTPATIENT)
Age: 75
Discharge: HOME | End: 2020-09-25
Payer: MEDICARE

## 2020-09-23 PROCEDURE — U0003 INFECTIOUS AGENT DETECTION BY NUCLEIC ACID (DNA OR RNA); SEVERE ACUTE RESPIRATORY SYNDROME CORONAVIRUS 2 (SARS-COV-2) (CORONAVIRUS DISEASE [COVID-19]), AMPLIFIED PROBE TECHNIQUE, MAKING USE OF HIGH THROUGHPUT TECHNOLOGIES AS DESCRIBED BY CMS-2020-01-R: HCPCS

## 2020-09-25 LAB
SARS-COV-2: NOT DETECTED
SOURCE: NORMAL

## 2020-09-25 NOTE — PROGRESS NOTES
[address] [facility]                                                                                                                    PRE OP INSTRUCTIONS FOR  Francesco Talley        Date: 9/25/2020    Date of surgery: 9/28/2020   Arrival Time: Hospital will call you between 5pm and 7pm with your final arrival time for surgery    1. Do not eat or drink anything after 12 prior to surgery. This includes no water, chewing gum, mints or ice chips. 2. Take the following medications with a small sip of water on the morning of Surgery: amlodipine     3. Diabetics may take evening dose of insulin but none after midnight. If you feel symptomatic or low blood sugar morning of surgery drink 1-2 ounces of apple juice only. 4. Aspirin, Ibuprofen, Advil, Naproxen, Vitamin E and other Anti-inflammatory products should be stopped  before surgery  as directed by your physician. Take Tylenol only unless instructed otherwise by your surgeon. 5. Check with your Doctor regarding stopping Plavix, Coumadin, Lovenox, Eliquis, Effient, or other blood thinners. 6. Do not smoke,use illicit drugs and do not drink any alcoholic beverages 24 hours prior to surgery. 7. You may brush your teeth the morning of surgery. DO NOT SWALLOW WATER    8. You MUST make arrangements for a responsible adult to take you home after your surgery. You will not be allowed to leave alone or drive yourself home. It is strongly suggested someone stay with you the first 24 hrs. Your surgery will be cancelled if you do not have a ride home. 9. PEDIATRIC PATIENTS ONLY:  A parent/legal guardian must accompany a child scheduled for surgery and plan to stay at the hospital until the child is discharged. Please do not bring other children with you.     10. Please wear simple, loose fitting clothing to the hospital.  Jerrdo Gianfranco not bring valuables (money, credit cards, checkbooks, etc.) Do not wear any makeup (including no eye makeup) or nail polish on your fingers or toes. 11. DO NOT wear any jewelry or piercings on day of surgery. All body piercing jewelry must be removed. 12. Shower the night before surgery with _x__Antibacterial soap /JANIE WIPES________    13. TOTAL JOINT REPLACEMENT/HYSTERECTOMY PATIENTS ONLY---Remember to bring Blood Bank bracelet to the hospital on the day of surgery. 14. If you have a Living Will and Durable Power of  for Healthcare, please bring in a copy. 15. If appropriate bring crutches, inspirex, WALKER, CANE etc... 12. Notify your Surgeon if you develop any illness between now and surgery time, cough, cold, fever, sore throat, nausea, vomiting, etc.  Please notify your surgeon if you experience dizziness, shortness of breath or blurred vision between now & the time of your surgery. 17. If you have ___dentures, they will be removed before going to the OR; we will provide you a container. If you wear ___contact lenses or ___glasses, they will be removed; please bring a case for them. 18. To provide excellent care visitors will be limited to 2 in the room at any given time. 19. Please bring picture ID and insurance card. 20. Sleep apnea patients need to bring CPAP AND SETTINGS to hospital on day of surgery. 21. During flu season no children under the age of 15 are permitted in the hospital for the safety of all patients. 22. Other                  Please call AMBULATORY CARE if you have any further questions.    [address] [facility]     [address]

## 2020-09-28 ENCOUNTER — ANESTHESIA (OUTPATIENT)
Dept: ENDOSCOPY | Age: 75
End: 2020-09-28
Payer: MEDICARE

## 2020-09-28 ENCOUNTER — HOSPITAL ENCOUNTER (OUTPATIENT)
Age: 75
Setting detail: OUTPATIENT SURGERY
Discharge: HOME | End: 2020-09-28
Attending: SURGERY | Admitting: SURGERY
Payer: MEDICARE

## 2020-09-28 ENCOUNTER — ANESTHESIA EVENT (OUTPATIENT)
Dept: ENDOSCOPY | Age: 75
End: 2020-09-28
Payer: MEDICARE

## 2020-09-28 VITALS
DIASTOLIC BLOOD PRESSURE: 64 MMHG | OXYGEN SATURATION: 99 % | SYSTOLIC BLOOD PRESSURE: 125 MMHG | RESPIRATION RATE: 19 BRPM

## 2020-09-28 VITALS
RESPIRATION RATE: 16 BRPM | WEIGHT: 148.56 LBS | HEIGHT: 62 IN | DIASTOLIC BLOOD PRESSURE: 73 MMHG | OXYGEN SATURATION: 96 % | SYSTOLIC BLOOD PRESSURE: 148 MMHG | HEART RATE: 77 BPM | TEMPERATURE: 97.1 F | BODY MASS INDEX: 27.34 KG/M2

## 2020-09-28 PROCEDURE — 3609027000 HC COLONOSCOPY: Performed by: SURGERY

## 2020-09-28 PROCEDURE — 3700000000 HC ANESTHESIA ATTENDED CARE: Performed by: SURGERY

## 2020-09-28 PROCEDURE — 2709999900 HC NON-CHARGEABLE SUPPLY: Performed by: SURGERY

## 2020-09-28 PROCEDURE — 7100000011 HC PHASE II RECOVERY - ADDTL 15 MIN: Performed by: SURGERY

## 2020-09-28 PROCEDURE — 7100000010 HC PHASE II RECOVERY - FIRST 15 MIN: Performed by: SURGERY

## 2020-09-28 PROCEDURE — G0105 COLORECTAL SCRN; HI RISK IND: HCPCS | Performed by: SURGERY

## 2020-09-28 PROCEDURE — 6360000002 HC RX W HCPCS: Performed by: NURSE ANESTHETIST, CERTIFIED REGISTERED

## 2020-09-28 PROCEDURE — 3700000001 HC ADD 15 MINUTES (ANESTHESIA): Performed by: SURGERY

## 2020-09-28 PROCEDURE — 2580000003 HC RX 258: Performed by: SURGERY

## 2020-09-28 RX ORDER — PROPOFOL 10 MG/ML
INJECTION, EMULSION INTRAVENOUS PRN
Status: DISCONTINUED | OUTPATIENT
Start: 2020-09-28 | End: 2020-09-28 | Stop reason: SDUPTHER

## 2020-09-28 RX ORDER — SODIUM CHLORIDE 9 MG/ML
INJECTION, SOLUTION INTRAVENOUS CONTINUOUS
Status: DISCONTINUED | OUTPATIENT
Start: 2020-09-28 | End: 2020-09-28 | Stop reason: HOSPADM

## 2020-09-28 RX ORDER — SODIUM CHLORIDE 0.9 % (FLUSH) 0.9 %
10 SYRINGE (ML) INJECTION PRN
Status: DISCONTINUED | OUTPATIENT
Start: 2020-09-28 | End: 2020-09-28 | Stop reason: HOSPADM

## 2020-09-28 RX ORDER — PROPOFOL 10 MG/ML
INJECTION, EMULSION INTRAVENOUS CONTINUOUS PRN
Status: DISCONTINUED | OUTPATIENT
Start: 2020-09-28 | End: 2020-09-28 | Stop reason: SDUPTHER

## 2020-09-28 RX ORDER — SODIUM CHLORIDE 0.9 % (FLUSH) 0.9 %
10 SYRINGE (ML) INJECTION EVERY 12 HOURS SCHEDULED
Status: DISCONTINUED | OUTPATIENT
Start: 2020-09-28 | End: 2020-09-28 | Stop reason: HOSPADM

## 2020-09-28 RX ADMIN — SODIUM CHLORIDE: 9 INJECTION, SOLUTION INTRAVENOUS at 12:52

## 2020-09-28 RX ADMIN — PROPOFOL 30 MG: 10 INJECTION, EMULSION INTRAVENOUS at 13:59

## 2020-09-28 RX ADMIN — PROPOFOL 40 MG: 10 INJECTION, EMULSION INTRAVENOUS at 13:58

## 2020-09-28 RX ADMIN — PROPOFOL 30 MG: 10 INJECTION, EMULSION INTRAVENOUS at 14:00

## 2020-09-28 RX ADMIN — PROPOFOL 140 MCG/KG/MIN: 10 INJECTION, EMULSION INTRAVENOUS at 13:58

## 2020-09-28 ASSESSMENT — PAIN - FUNCTIONAL ASSESSMENT: PAIN_FUNCTIONAL_ASSESSMENT: 0-10

## 2020-09-28 ASSESSMENT — PAIN SCALES - GENERAL: PAINLEVEL_OUTOF10: 0

## 2020-09-28 NOTE — H&P
General Surgery History and Physical    Patient's Name/Date of Birth: Cat Ogden / [birth year]    Date: 9/28/2020     Surgeon: Issac Bhagat M.D.    PCP: Prince J Luis DO     Chief Complaint: Colon cancer    HPI:   Cat Ogden is a 76 y.o. female who presents for evaluation of screening colonoscopy. Denies history of hemorrhoids, denies history of rectal bleeding, melena, constipation, abdominal pain, abdominal operations. Denies dark or black stools. Denies reflux, heart burn. Stage IIA colon CA last year, 1 yr postop lap right hemicolectomy. No history of recent weight loss. They are a nonsmoker. Allergies   Allergen Reactions    Prevnar 13 [Pneumococcal 13-Kassidy Conj Vacc] Swelling     Redness and swelling in arm at site       Prior to Admission medications    Medication Sig Start Date End Date Taking?  Authorizing Provider   EUTHYROX 112 MCG tablet Take 1 tablet by mouth once daily 8/26/20  Yes Jose M Moreno, DO   potassium chloride (KLOR-CON M) 10 MEQ extended release tablet Take 1 tablet by mouth 2 times daily 5/6/20 11/2/20 Yes Carola P Catterlin, DO   amLODIPine-atorvastatatin (CADUET) 5-10 MG per tablet TAKE 1 TABLET BY MOUTH ONCE DAILY 5/6/20  Yes Carola P Catterlin, DO   enalapril-hydrochlorothiazide (VASERETIC) 10-25 MG per tablet Take 1 tablet by mouth daily 5/6/20 11/2/20 Yes Almariora Hahn P Catterlin, DO   Multiple Vitamins-Minerals (PRESERVISION AREDS PO) Take by mouth 2 times daily    Yes Historical Provider, MD   Omega-3 Fatty Acids (FISH OIL) 500 MG CAPS Take 1 capsule by mouth daily   Yes Historical Provider, MD   Cyanocobalamin (B-12) 2500 MCG TABS Take 1 tablet by mouth three times a week    Yes Historical Provider, MD   vitamin D (CHOLECALCIFEROL) 5000 UNITS CAPS capsule Take 5,000 Units by mouth daily   Yes Historical Provider, MD       Past Medical History:   Diagnosis Date    [facility]    colon    Glaucoma     Hyperlipidemia     Hypertension     Hypothyroidism        Past Surgical History:   Procedure Laterality Date    CATARACT REMOVAL WITH IMPLANT Bilateral     CHOLECYSTECTOMY      COLONOSCOPY N/A 7/29/2019    COLONOSCOPY WITH BIOPSY performed by Sophy Chao MD at [address] Right 8/29/2019    RIGHT HEMICOLECTOMY, LAPAROSCOPIC, ROBOTIC XI ASSISTED performed by Sophy Chao MD at [address] [facility] Bilateral 2/20/2020    TOTAL THYROIDECTOMY WITH ANTERIOR NECK DISSECTION-NEEDS NERVE INTEGRITY MONITOR performed by Rosa Parker DO at [facility] Patient OR       Social History     Socioeconomic History    Marital status: Single     Spouse name: Not on file    Number of children: Not on file    Years of education: Not on file    Highest education level: Not on file   Occupational History    Not on file   Social Needs    Financial resource strain: Not on file    Food insecurity     Worry: Not on file     Inability: Not on file    Transportation needs     Medical: Not on file     Non-medical: Not on file   Tobacco Use    Smoking status: Never Smoker    Smokeless tobacco: Never Used   Substance and Sexual Activity    Alcohol use: No     Alcohol/week: 0.0 standard drinks    Drug use: No    Sexual activity: Not Currently   Lifestyle    Physical activity     Days per week: Not on file     Minutes per session: Not on file    Stress: Not on file   Relationships    Social connections     Talks on phone: Not on file     Gets together: Not on file     Attends Orthodox service: Not on file     Active member of club or organization: Not on file     Attends meetings of clubs or organizations: Not on file     Relationship status: Not on file    Intimate partner violence     Fear of current or ex partner: Not on file     Emotionally abused: Not on file     Physically abused: Not on file     Forced sexual activity: Not on file   Other Topics Concern    Not on file   Social History Narrative    Not on file       No results for input(s): WBC, HGB, HCT, MCV, PLT in the last 72 hours. No results for input(s): NA, K, CO2, PHOS, BUN, CREATININE in the last 72 hours. Invalid input(s): CA    No results for input(s): PROT, INR, LIPASE, AMYLASE in the last 72 hours.     Labs:      Review of Systems  Review of Systems -  General ROS: negative for - chills, fatigue or malaise  ENT ROS: negative for - hearing change, nasal congestion or nasal discharge  Allergy and Immunology ROS: negative for - hives, itchy/watery eyes or nasal congestion  Hematological and Lymphatic ROS: negative for - blood clots, blood transfusions, bruising or fatigue  Endocrine ROS: negative for - malaise/lethargy, mood swings, palpitations or polydipsia/polyuria  Respiratory ROS: negative for - sputum changes, stridor, tachypnea or wheezing  Cardiovascular ROS: negative for - irregular heartbeat, loss of consciousness, murmur or orthopnea  Gastrointestinal ROS: negative for - constipation, diarrhea, gas/bloating, heartburn or hematemesis  Genito-Urinary ROS: negative for -  genital discharge, genital ulcers or hematuria  Musculoskeletal ROS: negative for - gait disturbance, muscle pain or muscular weakness  Psych/Neuro ROS: negative for - visual or auditory hallucinations, suicidal ideation      Physical exam:   BP (!) 158/85   Pulse 98   Temp 97.1 °F (36.2 °C) (Temporal)   Resp 16   Ht 5' 2\" (1.575 m)   Wt 148 lb 9 oz (67.4 kg)   LMP  (LMP Unknown)   SpO2 93%   BMI 27.17 kg/m²   General appearance: AAO, NAD  Eyes: PERRL, EOMI, red conjunctiva  Lungs: Clear bilateral  Chest wall: atraumatic, no tenderness, no echymosis or abrasions  Heart: reg rate, no murmur  Abdomen: soft, nondistended, nontender  Extremities: full ROM all 4 ext  Pulses: 2+ distal  Skin: warm and dry  Neurologic: spontanous eye opening, purposeful, follows complex commands        Assessment:  76 y.o. female presents for colon cancer screening, stage IIA colon CA, s/p lap right nikita    Patient Active Problem List   Diagnosis    Essential hypertension    Mixed hyperlipidemia    Acquired hypothyroidism    Fatigue    Seasonal allergic rhinitis    Allergic drug reaction    Dermatitis    Adrenal incidentaloma (HCC)    Multinodular goiter    Vitamin D deficiency    [facility]    S/P complete thyroidectomy    Post-operative pain    Hurthle cell carcinoma of thyroid ([facility]         Plan:  I recommended high risk screening colonoscopy with possible biopsy or polypectomy and explained the risk, benefits, expected outcome, and alternatives to the procedure. Risks included but are not limited to bleeding, infection, respiratory distress, hypotension, and perforation of the colon. The patient understands and is in agreement.      [place]      Physician Signature: Electronically signed by Dr. Elaine Juarez

## 2020-09-28 NOTE — OP NOTE
[place] [id number] [facility]  Colonoscopy Operative Report    DATE OF PROCEDURE: 9/28/2020     PREOPERATIVE DIAGNOSIS: Colon cancer, status post right hemicolectomy 1 year ago    POSTOPERATIVE DIAGNOSIS/FINDINGS: Normal colon, patent ileocolonic anastomosis    SURGEON: Aliya Price MD    ASSISTANT: None    OPERATION: Total Colonoscopy with intubation of the terminal ileum    ANESTHESIA: Local monitored anesthesia. COMPLICATIONS: None. EBL: None    PRIOR TO THE EXAM: Gen: comfortable, no distress, awake and alert; Lungs: Clear;  Heart:regular rate and rhythm, normal S1S2     BRIEF HISTORY:  This is a 76 y.o. female who presents for follow up colonoscopy. The patient has a personal history of colon cancer. My recommendation is to proceed with colonoscopy. The patient was advised of the risks, benefits, complications and options including the risk of bleeding and perforation. The patient understood and agreed to proceed. PROCEDURE:  In the left side down decubitus position, a digital rectal exam was performed. There were no masses or abnormalities noted. The scope was lubricated and inserted into the anus. The scope was then passed under direct visualization in a retrograde fashion to the ileocolonic anastomosis which was patent I intubated at least 40 cm of the terminal ileum without any difficulty and the scope passed freely. The prep was adequate. Minimal pressure/positioning manuevers were required for complete passage. As the scope is withdrawn, visualization of the transverse colon was unremarkable. The scope is then pulled past the splenic flexure into the descending and sigmoid colon. No abnormalities were seen. The scope is then pulled through the sigmoid colon into the rectum. The scope is retroflexed at the anal verge. No abnormalities are seen at the anal verge. The scope was then straightened and removed. Total withdrawal time was greater than 6 minutes.   There was no evidence of inflammatory bowel disease, polyps or malignancy throughout. The patient tolerated the procedure well        SPECIMENS:  none    PLAN:      Recommendation is for next colonoscopy in 1-3 years.     Aliya Price MD, MS  Minimally Invasive and Bariatric Surgeon  9/28/2020  3:20 PM

## 2020-09-28 NOTE — ANESTHESIA PRE PROCEDURE
Department of Anesthesiology  Preprocedure Note       Name:  Khanh Dahl   Age:  76 y.o.  :  1945                                          MRN:  [id number]         Date:  2020      Surgeon: Tish Lazcano):  Tram Guzman MD    Procedure: COLONOSCOPY POSS BIOPSY OR POLYPECTOMY (N/A )    Medications prior to admission:   Prior to Admission medications    Medication Sig Start Date End Date Taking? Authorizing Provider   EUTHYROX 112 MCG tablet Take 1 tablet by mouth once daily 20   Dorotha Conquest, DO   potassium chloride (KLOR-CON M) 10 MEQ extended release tablet Take 1 tablet by mouth 2 times daily 20  Carola P Catterlin, DO   amLODIPine-atorvastatatin (CADUET) 5-10 MG per tablet TAKE 1 TABLET BY MOUTH ONCE DAILY 20   Liat Lions Catterlin, DO   enalapril-hydrochlorothiazide (VASERETIC) 10-25 MG per tablet Take 1 tablet by mouth daily 20  Carola P Catterlin, DO   Multiple Vitamins-Minerals (PRESERVISION AREDS PO) Take by mouth 2 times daily     Historical Provider, MD   Omega-3 Fatty Acids (FISH OIL) 500 MG CAPS Take 1 capsule by mouth daily    Historical Provider, MD   Cyanocobalamin (B-12) 2500 MCG TABS Take 1 tablet by mouth three times a week     Historical Provider, MD   vitamin D (CHOLECALCIFEROL) 5000 UNITS CAPS capsule Take 5,000 Units by mouth daily    Historical Provider, MD       Current medications:    No current facility-administered medications for this visit. No current outpatient medications on file. Facility-Administered Medications Ordered in Other Visits   Medication Dose Route Frequency Provider Last Rate Last Dose    0.9 % sodium chloride infusion   Intravenous Continuous Tram Guzman MD        sodium chloride flush 0.9 % injection 10 mL  10 mL Intravenous 2 times per day Tram Guzman MD        sodium chloride flush 0.9 % injection 10 mL  10 mL Intravenous PRN Tram Guzman MD           Allergies:     Allergies   Allergen Reactions    Prevnar 13 [Pneumococcal 13-Kassidy Conj Vacc] Swelling     Redness and swelling in arm at site       Problem List:    Patient Active Problem List   Diagnosis Code    Essential hypertension I10    Mixed hyperlipidemia E78.2    Acquired hypothyroidism E03.9    Fatigue R53.83    Seasonal allergic rhinitis J30.2    Allergic drug reaction T78.40XA    Dermatitis L30.9    Adrenal incidentaloma ([facility] E27.8    Multinodular goiter E04.2    Vitamin D deficiency E55.9    Colon cancer ([facility] C18.9    S/P complete thyroidectomy E89.0    Post-operative pain G89.18    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 Bilateral 2/20/2020    TOTAL THYROIDECTOMY WITH ANTERIOR NECK DISSECTION-NEEDS NERVE INTEGRITY MONITOR performed by Rosanna Shukla DO at [address] OR       Social History:    Social History     Tobacco Use    Smoking status: Never Smoker    Smokeless tobacco: Never Used   Substance Use Topics    Alcohol use: No     Alcohol/week: 0.0 standard drinks                                Counseling given: Not Answered      Vital Signs (Current): There were no vitals filed for this visit.                                            BP Readings from Last 3 Encounters:   09/28/20 (!) 158/85   09/21/20 132/82   09/09/20 (!) 153/87       NPO Status:  instructed to be NPO at [facility] before OR BMI:   Wt Readings from Last 3 Encounters:   09/28/20 148 lb 9 oz (67.4 kg)   09/21/20 153 lb 6.4 oz (69.6 kg)   09/09/20 150 lb (68 kg)     There is no height or weight on file to calculate BMI.    CBC:   Lab Results   Component Value Date    WBC 5.1 09/21/2020    RBC 4.90 09/21/2020    HGB 14.3 09/21/2020    HCT 43.3 09/21/2020    MCV 88.4 09/21/2020    RDW 13.3 09/21/2020     09/21/2020       CMP:   Lab Results   Component Value Date     09/21/2020    K 3.3 09/21/2020    K 4.2 08/30/2019    CL 95 09/21/2020    CO2 30 09/21/2020    BUN 9 09/21/2020    CREATININE 0.6 09/21/2020    GFRAA >60 09/21/2020    LABGLOM >60 09/21/2020    GLUCOSE 98 09/21/2020    PROT 7.5 09/21/2020    CALCIUM 8.9 09/21/2020    BILITOT 0.6 09/21/2020    ALKPHOS 102 09/21/2020    AST 12 09/21/2020    ALT 10 09/21/2020       POC Tests: No results for input(s): POCGLU, POCNA, POCK, POCCL, POCBUN, POCHEMO, POCHCT in the last 72 hours.     Coags: No results found for: PROTIME, INR, APTT    HCG (If Applicable): No results found for: PREGTESTUR, PREGSERUM, HCG, HCGQUANT     ABGs: No results found for: PHART, PO2ART, ACG1ZOH, LAD4BAH, BEART, K4KXRQIR     Type & Screen (If Applicable):  No results found for: LABABO, LABRH     EKG 8/28/19  Normal sinus rhythm  Minimal voltage criteria for LVH, may be normal variant  Nonspecific ST and T wave abnormality  Prolonged QT  Abnormal ECG    Anesthesia Evaluation  Patient summary reviewed and Nursing notes reviewed no history of anesthetic complications:   Airway: Mallampati: III  TM distance: >3 FB   Neck ROM: full  Mouth opening: > = 3 FB Dental:          Pulmonary: breath sounds clear to auscultation                            ROS comment: COVID Neg 9/23/2020  Seasonal allergic rhinitis   Cardiovascular:    (+) hypertension:, hyperlipidemia      ECG reviewed  Rhythm: regular  Rate: normal           Beta Blocker:  Not on Beta Blocker        PE comment: tachy   Neuro/Psych: ROS comment: Glaucoma GI/Hepatic/Renal:            ROS comment: HISTORY OF COLON CANCER, SCREENING. Endo/Other:    (+) hypothyroidism::., malignancy/cancer (thyroid). ROS comment: Thyroid CA Abdominal:           Vascular: negative vascular ROS. Anesthesia Plan      MAC     ASA 3       Induction: intravenous. Anesthetic plan and risks discussed with patient. Plan discussed with CRNA.                   Marj Parra MD   9/28/2020

## 2020-09-29 NOTE — ANESTHESIA POSTPROCEDURE EVALUATION
Department of Anesthesiology  Postprocedure Note    Patient: Tru Mota  MRN: [id number]  Birthdate: [birth year]  Date of evaluation: 9/29/2020  Time:  6:32 PM     Procedure Summary     Date:  09/28/20 [facility] / Location:  [facility] [address]    Anesthesia Start:  9942 Anesthesia Stop:  4514    Procedure:  COLONOSCOPY POSS BIOPSY OR POLYPECTOMY (N/A ) Diagnosis:  (HISTORY OF COLON CANCER, SCREENING)    Surgeon:  Sophy Chao MD Responsible Provider:  Jack Paul MD    Anesthesia Type:  MAC ASA Status:  3          Anesthesia Type: MAC    Lesli Phase I: Lesli Score: 10    Lesli Phase II: Lesli Score: 10    Last vitals: Reviewed and per EMR flowsheets.        Anesthesia Post Evaluation    Patient participation: complete - patient participated  Level of consciousness: awake  Airway patency: patent  Nausea & Vomiting: no nausea and no vomiting  Complications: no  Cardiovascular status: hemodynamically stable  Respiratory status: acceptable  Hydration status: stable

## 2020-10-01 ENCOUNTER — TELEPHONE (OUTPATIENT)
Dept: ENT CLINIC | Age: 75
End: 2020-10-01

## 2020-10-04 RX ORDER — LEVOTHYROXINE SODIUM 112 UG/1
TABLET ORAL
Qty: 30 TABLET | Refills: 0 | Status: SHIPPED
Start: 2020-10-04 | End: 2020-11-02

## 2020-10-05 RX ORDER — AMLODIPINE BESYLATE AND ATORVASTATIN CALCIUM 5; 10 MG/1; MG/1
TABLET, FILM COATED ORAL
Qty: 90 TABLET | Refills: 0 | Status: SHIPPED
Start: 2020-10-05 | End: 2020-11-23 | Stop reason: SDUPTHER

## 2020-10-08 ENCOUNTER — NURSE ONLY (OUTPATIENT)
Dept: FAMILY MEDICINE CLINIC | Age: 75
End: 2020-10-08
Payer: MEDICARE

## 2020-10-08 PROCEDURE — G0008 ADMIN INFLUENZA VIRUS VAC: HCPCS | Performed by: FAMILY MEDICINE

## 2020-10-08 PROCEDURE — 90694 VACC AIIV4 NO PRSRV 0.5ML IM: CPT | Performed by: FAMILY MEDICINE

## 2020-10-12 ENCOUNTER — VIRTUAL VISIT (OUTPATIENT)
Dept: SURGERY | Age: 75
End: 2020-10-12
Payer: MEDICARE

## 2020-10-12 PROCEDURE — 99441 PR PHYS/QHP TELEPHONE EVALUATION 5-10 MIN: CPT | Performed by: SURGERY

## 2020-10-12 NOTE — PROGRESS NOTES
General Surgery Office Note  Amanda Wise MD, MS  Telephone visit    Start time: 8190  End time: 0900  Services were provided through a telephone synchronous discussion virtually to substitute for in-person clinic visit. The patient was advised of the risks, benefits and alternatives to telemedicine and agreed to proceed with this type of visit. Pursuant to the emergency declaration under the [address] [place] [address] [facility] authority and the [employer] Act, this Virtual  Visit was conducted, with patient's consent, to reduce the patient's risk of exposure to COVID-19 and provide continuity of care. The patient was also advised the privacy standards of a standard visit continue to apply to telemedicine visits. Time spent time with telephone encounter with patient and family counciling and discussing care exceeded 10min. Additional time spent reviewing images and labs, discussing case with nursing, support staff and other physicians; as well as coordinating care [email or web address]. Patient's Name/Date of Birth: Kody Mejia [birth year]    Date: October 12, 2020     Surgeon: Kaylin Orlando MD    Chief Complaint: s/p colonoscopy    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[address]       Subjective:  Doing well. Did well after colonoscopy. No bleeding. No pain    Objective:  LMP  (LMP Unknown)   Labs:  No results for input(s): WBC, HGB, HCT in the last 72 hours.     Invalid input(s): PLR  Lab Results   Component Value Date    CREATININE 0.6 09/21/2020 BUN 9 09/21/2020     09/21/2020    K 3.3 (L) 09/21/2020    CL 95 (L) 09/21/2020    CO2 30 (H) 09/21/2020     No results for input(s): LIPASE, AMYLASE in the last 72 hours. General appearance: No exam for telephone visit    Review of Systems -  General ROS: negative for - chills, fatigue or malaise  ENT ROS: negative for - hearing change, nasal congestion or nasal discharge  Allergy and Immunology ROS: negative for - hives, itchy/watery eyes or nasal congestion  Hematological and Lymphatic ROS: negative for - blood clots, blood transfusions, bruising or fatigue  Endocrine ROS: negative for - malaise/lethargy, mood swings, palpitations or polydipsia/polyuria  Respiratory ROS: negative for - sputum changes, stridor, tachypnea or wheezing  Cardiovascular ROS: negative for - irregular heartbeat, loss of consciousness, murmur or orthopnea  Gastrointestinal ROS: negative for - constipation, diarrhea, gas/bloating, heartburn or hematemesis  Genito-Urinary ROS: negative for -  genital discharge, genital ulcers or hematuria  Musculoskeletal ROS: negative for - gait disturbance, muscle pain or muscular weakness    Time spent reviewing past medical, surgical, social and family history, vitals, nursing assessment and images.     Colonoscopy: Normal colon        Assessment/Plan:  Cristina George is a 76 y.o. female s/p colonoscopy with findings of normal colon    Resume normal colon screening per NCCN guidelines  Next colonoscopy 1 yrs  High fiber diet- 40g daily  Advise 60oz water intake daily  Follow up as needed    Physician Signature: Electronically signed by Dr. Carrie Flores  [phone] (p)  10/12/2020  8:50 AM

## 2020-11-02 ENCOUNTER — TELEPHONE (OUTPATIENT)
Dept: ENT CLINIC | Age: 75
End: 2020-11-02

## 2020-11-02 RX ORDER — LEVOTHYROXINE SODIUM 112 UG/1
TABLET ORAL
Qty: 30 TABLET | Refills: 0 | Status: SHIPPED
Start: 2020-11-02 | End: 2020-11-23 | Stop reason: SDUPTHER

## 2020-11-16 RX ORDER — ENALAPRIL MALEATE AND HYDROCHLOROTHIAZIDE 10; 25 MG/1; MG/1
TABLET ORAL
Qty: 30 TABLET | Refills: 0 | Status: SHIPPED
Start: 2020-11-16 | End: 2020-11-23 | Stop reason: SDUPTHER

## 2020-11-16 RX ORDER — POTASSIUM CHLORIDE 750 MG/1
TABLET, FILM COATED, EXTENDED RELEASE ORAL
Qty: 60 TABLET | Refills: 0 | Status: SHIPPED
Start: 2020-11-16 | End: 2020-11-23 | Stop reason: SDUPTHER

## 2020-11-23 ENCOUNTER — OFFICE VISIT (OUTPATIENT)
Dept: FAMILY MEDICINE CLINIC | Age: 75
End: 2020-11-23
Payer: MEDICARE

## 2020-11-23 VITALS
RESPIRATION RATE: 16 BRPM | HEIGHT: 62 IN | OXYGEN SATURATION: 98 % | SYSTOLIC BLOOD PRESSURE: 132 MMHG | BODY MASS INDEX: 28.71 KG/M2 | HEART RATE: 94 BPM | DIASTOLIC BLOOD PRESSURE: 84 MMHG | WEIGHT: 156 LBS | TEMPERATURE: 97.6 F

## 2020-11-23 DIAGNOSIS — R73.01 IFG (IMPAIRED FASTING GLUCOSE): ICD-10-CM

## 2020-11-23 DIAGNOSIS — E55.9 VITAMIN D DEFICIENCY: ICD-10-CM

## 2020-11-23 DIAGNOSIS — E78.2 MIXED HYPERLIPIDEMIA: ICD-10-CM

## 2020-11-23 LAB
CHOLESTEROL, TOTAL: 181 MG/DL (ref 0–199)
HBA1C MFR BLD: 5.5 % (ref 4–5.6)
HDLC SERPL-MCNC: 76 MG/DL
LDL CHOLESTEROL CALCULATED: 85 MG/DL (ref 0–99)
TRIGL SERPL-MCNC: 99 MG/DL (ref 0–149)
VITAMIN D 25-HYDROXY: 41 NG/ML (ref 30–100)
VLDLC SERPL CALC-MCNC: 20 MG/DL

## 2020-11-23 PROCEDURE — G8427 DOCREV CUR MEDS BY ELIG CLIN: HCPCS | Performed by: FAMILY MEDICINE

## 2020-11-23 PROCEDURE — 4040F PNEUMOC VAC/ADMIN/RCVD: CPT | Performed by: FAMILY MEDICINE

## 2020-11-23 PROCEDURE — 3017F COLORECTAL CA SCREEN DOC REV: CPT | Performed by: FAMILY MEDICINE

## 2020-11-23 PROCEDURE — 99214 OFFICE O/P EST MOD 30 MIN: CPT | Performed by: FAMILY MEDICINE

## 2020-11-23 PROCEDURE — G8399 PT W/DXA RESULTS DOCUMENT: HCPCS | Performed by: FAMILY MEDICINE

## 2020-11-23 PROCEDURE — 1123F ACP DISCUSS/DSCN MKR DOCD: CPT | Performed by: FAMILY MEDICINE

## 2020-11-23 PROCEDURE — G8417 CALC BMI ABV UP PARAM F/U: HCPCS | Performed by: FAMILY MEDICINE

## 2020-11-23 PROCEDURE — 1036F TOBACCO NON-USER: CPT | Performed by: FAMILY MEDICINE

## 2020-11-23 PROCEDURE — G8484 FLU IMMUNIZE NO ADMIN: HCPCS | Performed by: FAMILY MEDICINE

## 2020-11-23 PROCEDURE — 1090F PRES/ABSN URINE INCON ASSESS: CPT | Performed by: FAMILY MEDICINE

## 2020-11-23 RX ORDER — AMLODIPINE BESYLATE AND ATORVASTATIN CALCIUM 5; 10 MG/1; MG/1
TABLET, FILM COATED ORAL
Qty: 90 TABLET | Refills: 1 | Status: SHIPPED
Start: 2020-11-23 | End: 2021-06-14

## 2020-11-23 RX ORDER — LEVOTHYROXINE SODIUM 112 UG/1
TABLET ORAL
Qty: 90 TABLET | Refills: 1 | Status: SHIPPED
Start: 2020-11-23 | End: 2021-05-12 | Stop reason: SDUPTHER

## 2020-11-23 RX ORDER — ENALAPRIL MALEATE AND HYDROCHLOROTHIAZIDE 10; 25 MG/1; MG/1
TABLET ORAL
Qty: 90 TABLET | Refills: 1 | Status: SHIPPED
Start: 2020-11-23 | End: 2021-06-15

## 2020-11-23 RX ORDER — POTASSIUM CHLORIDE 750 MG/1
TABLET, FILM COATED, EXTENDED RELEASE ORAL
Qty: 180 TABLET | Refills: 1 | Status: SHIPPED
Start: 2020-11-23 | End: 2021-06-15

## 2020-11-27 ASSESSMENT — ENCOUNTER SYMPTOMS
FACIAL SWELLING: 0
DIARRHEA: 0
TROUBLE SWALLOWING: 0
ABDOMINAL PAIN: 0
NAUSEA: 0
ABDOMINAL DISTENTION: 0
WHEEZING: 0
CHEST TIGHTNESS: 0
ANAL BLEEDING: 0
COUGH: 0
RHINORRHEA: 0
EYES NEGATIVE: 1
CHOKING: 0
EYE DISCHARGE: 0
ALLERGIC/IMMUNOLOGIC NEGATIVE: 1
PHOTOPHOBIA: 0
VOMITING: 0
EYE ITCHING: 0
SHORTNESS OF BREATH: 0
RESPIRATORY NEGATIVE: 1
SINUS PRESSURE: 0
VOICE CHANGE: 0
BACK PAIN: 0
CONSTIPATION: 0
STRIDOR: 0
SINUS PAIN: 0
EYE PAIN: 0
BLOOD IN STOOL: 0
EYE REDNESS: 0
SORE THROAT: 0
RECTAL PAIN: 0
COLOR CHANGE: 0

## 2020-11-28 NOTE — PROGRESS NOTES
Sheeba Contreras is a 76 y.o. female. HPI/Chief C/O:  Chief Complaint   Patient presents with    Hypertension     Regular check up    Medication Refill     Medications reviewed, refills pended.  Health Maintenance     Patient is due for Tdap; please discuss with her. Allergies   Allergen Reactions    Prevnar 13 [Pneumococcal 13-Kassidy Conj Vacc] Swelling     Redness and swelling in arm at site     This 76year old female presents with right hemicolectomy, for malignant neoplasm ascending colon ([facility] [address] Pt has hypertension, hyperlipidemia, hypothyroid, and fatigue. Pt denies abdominal pain, nausea,vomiting, denies any BM problems. Pt has pulmonary nodule. Pt has hurthle thyroid cancer ([facility] [address] Pt follows with oncology and general surgery, and pulmonology. ROS:  Review of Systems   Constitutional: Positive for fatigue. Negative for activity change, appetite change, chills, diaphoresis, fever and unexpected weight change. HENT: Negative for congestion, dental problem, drooling, ear discharge, ear pain, facial swelling, hearing loss, mouth sores, nosebleeds, postnasal drip, rhinorrhea, sinus pressure, sinus pain, sneezing, sore throat, tinnitus, trouble swallowing and voice change. Eyes: Negative. Negative for photophobia, pain, discharge, redness, itching and visual disturbance. Respiratory: Negative. Negative for cough, choking, chest tightness, shortness of breath, wheezing and stridor. Cardiovascular: Negative. Negative for chest pain, palpitations and leg swelling. Gastrointestinal: Negative for abdominal distention, abdominal pain, anal bleeding, blood in stool, constipation, diarrhea, nausea, rectal pain and vomiting. Endocrine: Negative. Negative for cold intolerance, heat intolerance, polydipsia, polyphagia and polyuria. Genitourinary: Negative.   Negative for decreased urine volume, difficulty urinating, dysuria, flank pain, frequency, genital sores, hematuria, menstrual problem, pelvic pain and urgency. Musculoskeletal: Positive for arthralgias and myalgias. Negative for back pain, gait problem, joint swelling, neck pain and neck stiffness. Skin: Negative. Negative for color change, pallor, rash and wound. Allergic/Immunologic: Negative. Neurological: Negative. Negative for dizziness, tremors, seizures, syncope, facial asymmetry, speech difficulty, weakness, light-headedness, numbness and headaches. Hematological: Negative. Negative for adenopathy. Does not bruise/bleed easily. Psychiatric/Behavioral: Positive for sleep disturbance. Negative for agitation, behavioral problems, confusion, decreased concentration, dysphoric mood, hallucinations, self-injury and suicidal ideas. The patient is nervous/anxious. The patient is not hyperactive.          Past Medical/Surgical Hx;  Reviewed with patient      Diagnosis Date    Cancer ([facility] [address] 07/29/2019    colon    Glaucoma     Hyperlipidemia     Hypertension     Hypothyroidism      Past Surgical History:   Procedure Laterality Date    CATARACT REMOVAL WITH IMPLANT Bilateral     CHOLECYSTECTOMY      COLONOSCOPY N/A 7/29/2019    COLONOSCOPY WITH BIOPSY performed by aKrla Munguia MD at [address] [facility] COLONOSCOPY N/A 9/28/2020    COLONOSCOPY POSS BIOPSY OR POLYPECTOMY performed by Karla Munguia MD at [address] Right 8/29/2019    RIGHT HEMICOLECTOMY, LAPAROSCOPIC, ROBOTIC XI ASSISTED performed by Karla Munguia MD at [address] Bilateral 2/20/2020    TOTAL THYROIDECTOMY WITH ANTERIOR NECK DISSECTION-NEEDS NERVE INTEGRITY MONITOR performed by Lonnie Haider DO at [facility] OR       Past Family Hx:  Reviewed with patient      Problem Relation Age of Onset    Early Death Father    Finn Spina Cancer Sister     Lung Cancer Sister     Cancer Brother     Lung Cancer Brother        Social Hx:  Reviewed with patient  Social History     Tobacco Use    Smoking status: Never Smoker    Smokeless tobacco: Never Used   Substance Use Topics    Alcohol use: No     Alcohol/week: 0.0 standard drinks       OBJECTIVE  /84 (Site: Left Upper Arm, Position: Sitting, Cuff Size: Large Adult)   Pulse 94   Temp 97.6 °F (36.4 °C) (Temporal)   Resp 16   Ht 5' 2\" (1.575 m)   Wt 156 lb (70.8 kg)   LMP  (LMP Unknown)   SpO2 98%   BMI 28.53 kg/m²     Problem List:  Darrel Albarran does not have any pertinent problems on file. PHYS EX:  Physical Exam  Vitals signs and nursing note reviewed. Constitutional:       General: She is not in acute distress. Appearance: Normal appearance. She is well-developed. She is obese. She is not ill-appearing, toxic-appearing or diaphoretic. Comments: Patient has morbid obesity. Patient instructed on low calorie, healthy diet. HENT:      Head: Normocephalic and atraumatic. Nose: Nose normal. No congestion or rhinorrhea. Eyes:      General: No scleral icterus. Right eye: No discharge. Left eye: No discharge. Conjunctiva/sclera: Conjunctivae normal.      Pupils: Pupils are equal, round, and reactive to light. Neck:      Musculoskeletal: Normal range of motion and neck supple. No neck rigidity or muscular tenderness. Thyroid: Thyromegaly present. Vascular: No carotid bruit or JVD. Trachea: No tracheal deviation. Cardiovascular:      Rate and Rhythm: Normal rate and regular rhythm. Pulses: Normal pulses. Heart sounds: Normal heart sounds. No murmur. No friction rub. No gallop. Pulmonary:      Effort: Pulmonary effort is normal. No respiratory distress. Breath sounds: Normal breath sounds. No stridor. No wheezing, rhonchi or rales. Chest:      Chest wall: No tenderness. Abdominal:      General: Bowel sounds are normal. There is no distension. Palpations: Abdomen is soft. There is no mass. Tenderness: There is no abdominal tenderness. There is no guarding or rebound. Hernia: No hernia is present. Musculoskeletal: Normal range of motion. General: No swelling, tenderness, deformity or signs of injury. Lymphadenopathy:      Cervical: No cervical adenopathy. Skin:     General: Skin is warm. Coloration: Skin is not jaundiced or pale. Findings: No bruising, erythema, lesion or rash. Neurological:      General: No focal deficit present. Mental Status: She is alert and oriented to person, place, and time. Cranial Nerves: No cranial nerve deficit. Sensory: No sensory deficit. Motor: No weakness or abnormal muscle tone. Coordination: Coordination normal.      Gait: Gait normal.      Deep Tendon Reflexes: Reflexes are normal and symmetric. Reflexes normal.   Psychiatric:         Mood and Affect: Mood normal.         Behavior: Behavior normal.         Thought Content: Thought content normal.         Judgment: Judgment normal.         ASSESSMENT/PLAN  Emily Aguilar was seen today for post-op check. Diagnoses and all orders for this visit:    Essential hypertension  Controlled. Caduet, vaseretic, fish oil, aspirin, low salt diet. Lab. Malignant neoplasm of ascending colon (HCC)  Stable. General surgeon. Lab. Mixed hyperlipidemia  Stable. Low chol. Diet, statin. Lab. Hypothyroidism, unspecified type  -     TSH without Reflex; Future  Stable. Synthroid, specialist. Lab. Pulmonary nodule  Stable. Pulmonology. Lab. Hurtle thyroid cancer ([facility] [address]  Stable. Specialist. Lab. Decreased vit. D  Not controlled. Lab. Pt instructed if any worse go ED ASAP. Outpatient Encounter Medications as of 11/23/2020   Medication Sig Dispense Refill    potassium chloride (KLOR-CON) 10 MEQ extended release tablet Take 1 tablet by mouth twice daily 180 tablet 1    enalapril-hydroCHLOROthiazide (VASERETIC) 10-25 MG per tablet Take 1 tablet by mouth daily.  90 tablet 1    levothyroxine (EUTHYROX) 112 MCG tablet Take 1 tablet by mouth once daily 90 tablet 1    amLODIPine-atorvastatatin (CADUET) 5-10 MG per tablet Take 1 tablet by mouth once daily 90 tablet 1    Multiple Vitamins-Minerals (PRESERVISION AREDS PO) Take by mouth 2 times daily       Omega-3 Fatty Acids (FISH OIL) 500 MG CAPS Take 1 capsule by mouth daily      Cyanocobalamin (B-12) 2500 MCG TABS Take 1 tablet by mouth three times a week       vitamin D (CHOLECALCIFEROL) 5000 UNITS CAPS capsule Take 5,000 Units by mouth daily      [DISCONTINUED] potassium chloride (KLOR-CON) 10 MEQ extended release tablet Take 1 tablet by mouth twice daily 60 tablet 0    [DISCONTINUED] enalapril-hydroCHLOROthiazide (VASERETIC) 10-25 MG per tablet Take 1 tablet by mouth daily. Further refill requires appointment. 30 tablet 0    [DISCONTINUED] EUTHYROX 112 MCG tablet Take 1 tablet by mouth once daily 30 tablet 0    [DISCONTINUED] amLODIPine-atorvastatatin (CADUET) 5-10 MG per tablet Take 1 tablet by mouth once daily 90 tablet 0     No facility-administered encounter medications on file as of 11/23/2020. Return in about 6 months (around 5/23/2021).         Reviewed recent labs related to Mahsa's current problems      Discussed importance of regular Health Maintenance follow up  Health Maintenance   Topic    Hepatitis C screen     DTaP/Tdap/Td vaccine (1 - Tdap)    Shingles Vaccine (1 of 2)    Annual Wellness Visit (AWV)     TSH testing     Potassium monitoring     Creatinine monitoring     Lipid screen     Colon cancer screen colonoscopy     DEXA (modify frequency per FRAX score)     Flu vaccine     Hepatitis A vaccine     Hepatitis B vaccine     Hib vaccine     Meningococcal (ACWY) vaccine

## 2020-12-15 ENCOUNTER — OFFICE VISIT (OUTPATIENT)
Dept: ENDOCRINOLOGY | Age: 75
End: 2020-12-15
Payer: MEDICARE

## 2020-12-15 VITALS
OXYGEN SATURATION: 98 % | DIASTOLIC BLOOD PRESSURE: 82 MMHG | BODY MASS INDEX: 29.23 KG/M2 | SYSTOLIC BLOOD PRESSURE: 134 MMHG | WEIGHT: 159.8 LBS | TEMPERATURE: 97 F | RESPIRATION RATE: 16 BRPM | HEART RATE: 84 BPM

## 2020-12-15 PROCEDURE — G8417 CALC BMI ABV UP PARAM F/U: HCPCS | Performed by: INTERNAL MEDICINE

## 2020-12-15 PROCEDURE — 1036F TOBACCO NON-USER: CPT | Performed by: INTERNAL MEDICINE

## 2020-12-15 PROCEDURE — G8399 PT W/DXA RESULTS DOCUMENT: HCPCS | Performed by: INTERNAL MEDICINE

## 2020-12-15 PROCEDURE — G8427 DOCREV CUR MEDS BY ELIG CLIN: HCPCS | Performed by: INTERNAL MEDICINE

## 2020-12-15 PROCEDURE — 1090F PRES/ABSN URINE INCON ASSESS: CPT | Performed by: INTERNAL MEDICINE

## 2020-12-15 PROCEDURE — 99214 OFFICE O/P EST MOD 30 MIN: CPT | Performed by: INTERNAL MEDICINE

## 2020-12-15 PROCEDURE — 3017F COLORECTAL CA SCREEN DOC REV: CPT | Performed by: INTERNAL MEDICINE

## 2020-12-15 PROCEDURE — G8484 FLU IMMUNIZE NO ADMIN: HCPCS | Performed by: INTERNAL MEDICINE

## 2020-12-15 PROCEDURE — 1123F ACP DISCUSS/DSCN MKR DOCD: CPT | Performed by: INTERNAL MEDICINE

## 2020-12-15 PROCEDURE — 4040F PNEUMOC VAC/ADMIN/RCVD: CPT | Performed by: INTERNAL MEDICINE

## 2020-12-15 NOTE — PROGRESS NOTES
maximum dimension.  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  The approximate width of tissue in each block is 4 mm. The overall tumor size cannot be accurately assessed due to the presence of tumor in nonsequential sections but may be considerably larger than what is measured on 1 slide     7/10/2020 - received 105.3 mCi DAN ablation without complications     7/96/5331 - posttherapy scan negative for mets     8/24/220 --> Tg 0.5, Tg Ab negative   Lab Results   Component Value Date/Time    TSH 2.120 08/24/2020 08:51 AM    T4FREE 2.03 (H) 08/24/2020 08:51 AM    THGAB <0.9 08/24/2020 08:51 AM     Thyroid S 8/24/2020 --> negative for recurrence     Pt currently on levothyroxine 112 mcg daily. Patient takes levothyroxine in the morning at empty stomach, wait one hour before eating , avoid multivitamins containing calcium  or iron with it. Jayant Franks denies any new lumps, bumps in her neck, change in her voice, or shortness of breath. No family history of thyroid cancer. No prior history of radiation to head or neck region.       PAST MEDICAL HISTORY   Past Medical History:   Diagnosis Date    Cancer ([facility] [address] 07/29/2019    colon    Glaucoma     Hyperlipidemia     Hypertension     Hypothyroidism       PAST SURGICAL HISTORY   Past Surgical History:   Procedure Laterality Date    CATARACT REMOVAL WITH IMPLANT Bilateral     CHOLECYSTECTOMY      COLONOSCOPY N/A 7/29/2019    COLONOSCOPY WITH BIOPSY performed by Caffie Osgood, MD at [address] [facility] COLONOSCOPY N/A 9/28/2020    COLONOSCOPY POSS BIOPSY OR POLYPECTOMY performed by Caffie Osgood, MD at [address] 8/29/2019    RIGHT HEMICOLECTOMY, LAPAROSCOPIC, ROBOTIC XI ASSISTED performed by Jm Blackwell MD at [address] [facility] Bilateral 2/20/2020    TOTAL THYROIDECTOMY WITH ANTERIOR NECK DISSECTION-NEEDS NERVE INTEGRITY MONITOR performed by Girish Corley DO [place] [facility] [place]      SOCIAL HISTORY   Tobacco:   reports that she has never smoked. She has never used smokeless tobacco.  Alcol:   reports no history of alcohol use. Illicit Drugs:   reports no history of drug use. FAMILY HISTORY   Family History   Problem Relation Age of Onset    Early Death Father     Cancer Sister     Lung Cancer Sister     Cancer Brother     Lung Cancer Brother       ALLERGIES AND DRUG REACTIONS   Allergies   Allergen Reactions    Prevnar 13 [Pneumococcal 13-Kassidy Conj Vacc] Swelling     Redness and swelling in arm at site      CURRENT MEDICATIONS   Current Outpatient Medications   Medication Sig Dispense Refill    potassium chloride (KLOR-CON) 10 MEQ extended release tablet Take 1 tablet by mouth twice daily 180 tablet 1    enalapril-hydroCHLOROthiazide (VASERETIC) 10-25 MG per tablet Take 1 tablet by mouth daily. 90 tablet 1    levothyroxine (EUTHYROX) 112 MCG tablet Take 1 tablet by mouth once daily 90 tablet 1    amLODIPine-atorvastatatin (CADUET) 5-10 MG per tablet Take 1 tablet by mouth once daily 90 tablet 1    Multiple Vitamins-Minerals (PRESERVISION AREDS PO) Take by mouth 2 times daily       Omega-3 Fatty Acids (FISH OIL) 500 MG CAPS Take 1 capsule by mouth daily      Cyanocobalamin (B-12) 2500 MCG TABS Take 1 tablet by mouth three times a week       vitamin D (CHOLECALCIFEROL) 5000 UNITS CAPS capsule Take 5,000 Units by mouth daily       No current facility-administered medications for this visit. Review of Systems   Constitutional: No fever, no chills, no diaphoresis, no generalized weakness.    HEENT: No blurred vision, No sore throat, no ear pain, no hair loss   Neck: denied any neck swelling, difficulty swallowing, Cadrdiopulomary: No CP, SOB or palpitation, No orthopnea or PND. No cough or wheezing. GI: No N/V/D, no constipation, No abdominal pain, no melena or hematochezia   : Denied any dysuria, hematuria, flank pain, discharge, or incontinence. Skin: denied any rash, striae ulcer, Hirsute, or hyperpigmentation. MSK: denied any joint deformity, joint pain/swelling, muscle pain, or back pain. Neuro: no numbess, no tingling, no weakness,     OBJECTIVE   /82 (Site: Left Upper Arm, Position: Sitting, Cuff Size: Medium Adult)   Pulse 84   Temp 97 °F (36.1 °C) (Temporal)   Resp 16   Wt 159 lb 12.8 oz (72.5 kg)   LMP  (LMP Unknown)   SpO2 98%   BMI 29.23 kg/m²    BP Readings from Last 4 Encounters:   12/15/20 134/82   11/23/20 132/84   09/28/20 (!) 148/73   09/28/20 125/64      Wt Readings from Last 6 Encounters:   12/15/20 159 lb 12.8 oz (72.5 kg)   11/23/20 156 lb (70.8 kg)   09/28/20 148 lb 9 oz (67.4 kg)   09/21/20 153 lb 6.4 oz (69.6 kg)   09/09/20 150 lb (68 kg)   08/25/20 150 lb 9.6 oz (68.3 kg)        Physical examination:   General: awake alert, oriented x3, no abnormal position or movements. HEENT: normocephalic non traumatic, no exophthalmos   Neck: supple, no LN enlargement, s/p thyroidectomy, no JVD. Pulm: Clear equal air entry no added sounds, no wheezing or rhonchi   CVS: S1 + S2, no murmur, no heave. Dorsalis pedis pulse palpable   Abd: soft lax, no tenderness, no organomegaly, audible bowel sounds.    Skin: warm, no lesions, no rash  Musculoskeletal: No back tenderness, no kyphosis/scoliosis   Neuro: CN intact, sensation normal, muscle power normal. No tremors   Psych: normal mood, and affect     Review of Laboratory Data:   I personally reviewed the following labs:    Lab Results   Component Value Date/Time    WBC 5.1 09/21/2020 09:13 AM    RBC 4.90 09/21/2020 09:13 AM    HGB 14.3 09/21/2020 09:13 AM    HCT 43.3 09/21/2020 09:13 AM    MCV 88.4 09/21/2020 09:13 AM    MCH 29.2 09/21/2020 09:13 AM    MCHC 33.0 09/21/2020 09:13 AM    RDW 13.3 09/21/2020 09:13 AM     09/21/2020 09:13 AM    MPV 9.7 09/21/2020 09:13 AM      Lab Results   Component Value Date/Time     09/21/2020 09:13 AM    K 3.3 (L) 09/21/2020 09:13 AM    K 4.2 08/30/2019 03:26 AM    CO2 30 (H) 09/21/2020 09:13 AM    BUN 9 09/21/2020 09:13 AM    CREATININE 0.6 09/21/2020 09:13 AM    CALCIUM 8.9 09/21/2020 09:13 AM    LABGLOM >60 09/21/2020 09:13 AM    GFRAA >60 09/21/2020 09:13 AM      Lab Results   Component Value Date/Time    TSH 2.120 08/24/2020 08:51 AM    T4FREE 2.03 (H) 08/24/2020 08:51 AM    THGAB <0.9 08/24/2020 08:51 AM     Lab Results   Component Value Date    LABA1C 5.5 11/23/2020    GLUCOSE 98 09/21/2020     Lab Results   Component Value Date    CHOL 181 11/23/2020    CHOL 170 06/17/2019    TRIG 99 11/23/2020    TRIG 137 06/17/2019    HDL 76 11/23/2020    HDL 50 06/17/2019     No results found for: ALDODIRENCAL   No results found for: ALPRRATIO   Lab Results   Component Value Date/Time    CHOL 181 11/23/2020 12:00 PM    HDL 76 11/23/2020 12:00 PM    TRIG 99 11/23/2020 12:00 PM      Lab Results   Component Value Date/Time    PTH 16 03/03/2020 10:03 AM      Lab Results   Component Value Date/Time    VITD25 41 11/23/2020 12:00 PM        Medical Records/Labs/Images review:   I personally reviewed and summarized previous records   All labs and imaging studies were independently reviewed     [address] Lionel Goff, a 76 y.o.-old female seen in for the following issues     Papillary thyroid cancer/Hurtle cell carcinoma   · S/p total thyroidectomy 2/20/2020, DAN ablation 7/2020 (105.3 mCi)   · Scheduled for thyroid US in few weeks  · Work up 8/2020 Tg level was 0.5 and US was negative for any evidence of recurrence   · Recheck labs again in few weeks     Postsurgical hypothyroidism   · On levothyroxine 112 mcg daily   · Check TFT in few weeks  and adjust the dose   · Goal to keep TSH 0.3-2     Rt adrenal enlargement   · Adrenal mass is found in at least 3-5% of persons older than 50 years. · CT scan 7/2019 --> The right adrenal is enlarged, with a globular appearance and a diameter of 2 cm. PET CT scan 8/15/2019 didn't show any abnormal uptake at adrenals which is in favor of benign etiology  · Previous work up showed normal plasma metanephrines, and Hi/renin   · 1mg DST was in 8/2019 was 3.3 (<5)  · To repeat adrenal hormones now     Return in about 6 months (around 6/15/2021) for Thyroid cancer, adrenal incidentaloma . The above issues were reviewed with the patient who understood and agreed with the plan. Thank you for allowing us to participate in the care of this patient. Please do not hesitate to contact us with any additional questions. Diagnosis Orders   1. Thyroid cancer ([address]  TSH without Reflex    T4, Free    Thyroglobulin   2. Vitamin D deficiency     3. Hypothyroidism, unspecified type  TSH without Reflex    T4, Free   4. Adrenal incidentaloma ([address]  Metanephrines Plasma Free    Renin Activity and Aldosterone    Basic Metabolic Panel   5. Multinodular goiter       Maryam Holm MD   Endocrinologist, [facility]   [address] [place] [id number]   Phone: [phone]   Fax: [phone]   --------------------------  An electronic signature was used to authenticate this note.  Kenroy García MD on 12/15/2020 at 8:51 AM

## 2020-12-21 ENCOUNTER — OFFICE VISIT (OUTPATIENT)
Dept: ONCOLOGY | Age: 75
End: 2020-12-21
Payer: MEDICARE

## 2020-12-21 ENCOUNTER — HOSPITAL ENCOUNTER (OUTPATIENT)
Dept: INFUSION THERAPY | Age: 75
Discharge: HOME | End: 2020-12-21
Payer: MEDICARE

## 2020-12-21 VITALS
HEART RATE: 110 BPM | TEMPERATURE: 97.8 F | WEIGHT: 156 LBS | SYSTOLIC BLOOD PRESSURE: 138 MMHG | DIASTOLIC BLOOD PRESSURE: 84 MMHG | HEIGHT: 62 IN | OXYGEN SATURATION: 96 % | BODY MASS INDEX: 28.71 KG/M2

## 2020-12-21 DIAGNOSIS — C73 HURTHLE CELL CARCINOMA OF THYROID (HCC): ICD-10-CM

## 2020-12-21 DIAGNOSIS — C18.0 CECAL CANCER (HCC): ICD-10-CM

## 2020-12-21 LAB
ALBUMIN SERPL-MCNC: 4.3 G/DL (ref 3.5–5.2)
ALP BLD-CCNC: 105 U/L (ref 35–104)
ALT SERPL-CCNC: 15 U/L (ref 0–32)
ANION GAP SERPL CALCULATED.3IONS-SCNC: 8 MMOL/L (ref 7–16)
AST SERPL-CCNC: 19 U/L (ref 0–31)
BASOPHILS ABSOLUTE: 0.03 E9/L (ref 0–0.2)
BASOPHILS RELATIVE PERCENT: 0.5 % (ref 0–2)
BILIRUB SERPL-MCNC: 0.4 MG/DL (ref 0–1.2)
BUN BLDV-MCNC: 11 MG/DL (ref 8–23)
CALCIUM SERPL-MCNC: 9.1 MG/DL (ref 8.6–10.2)
CEA: 1 NG/ML (ref 0–5.2)
CHLORIDE BLD-SCNC: 100 MMOL/L (ref 98–107)
CO2: 33 MMOL/L (ref 22–29)
CREAT SERPL-MCNC: 0.7 MG/DL (ref 0.5–1)
EOSINOPHILS ABSOLUTE: 0.13 E9/L (ref 0.05–0.5)
EOSINOPHILS RELATIVE PERCENT: 2.1 % (ref 0–6)
GFR AFRICAN AMERICAN: >60
GFR NON-AFRICAN AMERICAN: >60 ML/MIN/1.73
GLUCOSE BLD-MCNC: 101 MG/DL (ref 74–99)
HCT VFR BLD CALC: 44.7 % (ref 34–48)
HEMOGLOBIN: 15 G/DL (ref 11.5–15.5)
IMMATURE GRANULOCYTES #: 0.02 E9/L
IMMATURE GRANULOCYTES %: 0.3 % (ref 0–5)
LYMPHOCYTES ABSOLUTE: 1.23 E9/L (ref 1.5–4)
LYMPHOCYTES RELATIVE PERCENT: 19.4 % (ref 20–42)
MCH RBC QN AUTO: 28.9 PG (ref 26–35)
MCHC RBC AUTO-ENTMCNC: 33.6 % (ref 32–34.5)
MCV RBC AUTO: 86.1 FL (ref 80–99.9)
MONOCYTES ABSOLUTE: 0.64 E9/L (ref 0.1–0.95)
MONOCYTES RELATIVE PERCENT: 10.1 % (ref 2–12)
NEUTROPHILS ABSOLUTE: 4.29 E9/L (ref 1.8–7.3)
NEUTROPHILS RELATIVE PERCENT: 67.6 % (ref 43–80)
PDW BLD-RTO: 13.2 FL (ref 11.5–15)
PLATELET # BLD: 212 E9/L (ref 130–450)
PMV BLD AUTO: 10.2 FL (ref 7–12)
POTASSIUM SERPL-SCNC: 3.1 MMOL/L (ref 3.5–5)
RBC # BLD: 5.19 E12/L (ref 3.5–5.5)
SODIUM BLD-SCNC: 141 MMOL/L (ref 132–146)
TOTAL PROTEIN: 7.6 G/DL (ref 6.4–8.3)
WBC # BLD: 6.3 E9/L (ref 4.5–11.5)

## 2020-12-21 PROCEDURE — 85025 COMPLETE CBC W/AUTO DIFF WBC: CPT

## 2020-12-21 PROCEDURE — G8484 FLU IMMUNIZE NO ADMIN: HCPCS | Performed by: INTERNAL MEDICINE

## 2020-12-21 PROCEDURE — 1123F ACP DISCUSS/DSCN MKR DOCD: CPT | Performed by: INTERNAL MEDICINE

## 2020-12-21 PROCEDURE — 1090F PRES/ABSN URINE INCON ASSESS: CPT | Performed by: INTERNAL MEDICINE

## 2020-12-21 PROCEDURE — 82378 CARCINOEMBRYONIC ANTIGEN: CPT

## 2020-12-21 PROCEDURE — 99212 OFFICE O/P EST SF 10 MIN: CPT

## 2020-12-21 PROCEDURE — 36415 COLL VENOUS BLD VENIPUNCTURE: CPT

## 2020-12-21 PROCEDURE — 80053 COMPREHEN METABOLIC PANEL: CPT

## 2020-12-21 PROCEDURE — 99214 OFFICE O/P EST MOD 30 MIN: CPT | Performed by: INTERNAL MEDICINE

## 2020-12-21 PROCEDURE — G8417 CALC BMI ABV UP PARAM F/U: HCPCS | Performed by: INTERNAL MEDICINE

## 2020-12-21 PROCEDURE — G8399 PT W/DXA RESULTS DOCUMENT: HCPCS | Performed by: INTERNAL MEDICINE

## 2020-12-21 PROCEDURE — 4040F PNEUMOC VAC/ADMIN/RCVD: CPT | Performed by: INTERNAL MEDICINE

## 2020-12-21 PROCEDURE — 1036F TOBACCO NON-USER: CPT | Performed by: INTERNAL MEDICINE

## 2020-12-21 PROCEDURE — G8427 DOCREV CUR MEDS BY ELIG CLIN: HCPCS | Performed by: INTERNAL MEDICINE

## 2020-12-21 PROCEDURE — 3017F COLORECTAL CA SCREEN DOC REV: CPT | Performed by: INTERNAL MEDICINE

## 2020-12-21 NOTE — PROGRESS NOTES
Tumor invades through the muscularis propria into pericolorectal tissue. Margin status: All margins are uninvolved by invasive carcinoma, high grade dysplasia, intramucosal adenocarcinoma and adenoma.     - Margins examined: Proximal, distal and radial/soft tissue surgical margins.     - Distance of invasive carcinoma from closest margin:  7 cm (radial/soft tissue surgical margin). Treatment effect: No known presurgical therapy. Lymphovascular invasion:  Not identified. Perineural invasion: Not identified. Tumor deposits:  Not identified. Regional lymph nodes:     - Number of lymph nodes involved: 0     - Number of lymph nodes examined: 18  Pathologic stage classification (pTNM, AJCC 8th edition)     - Primary tumor (pT): pT3 -- tumor invades through the muscularis       propria into pericolorectal tissues.     - Regional lymph nodes (pN):  pN0 -- no regional lymph node metastasis. Mismatch Repair (MMR) IHC panel on specimen S -A6   MLH1: No loss of expression   MSH 2: No loss of expression   MSH 6: No loss of expression   PMS 2: No loss of expression    Stage IIA (pT3 pN0 M0) no high risk features. We reviewed with her that Data from randomized trials and meta-analyses indicated that if there is benefit for 5-FU-based chemotherapy therapy in patients with resected stage II disease, it does not exceed an absolute improvement in five-year survival of 5 percent. We also explained to her the side effects/toxicities of fluoropyrimidine-based adjuvant regimen (which will be for a duration of 6 months). Patient decided to proceed with observation and Not adjuvant chemotherapy; To be followed with History & Physical and blood work including CEA every 3 months for 2 years, then every 6 months for a total of 5 years. CT scan chest/abdomen/pelvis yearly for 5 years; colonoscopy in one year. CEA 0.7 on 12/19/2019. CEA 1.1 on 03/19/2020. CT chest 05/15/2020 noted no evidence of metastatic disease. bed.    Presented today 12/21/2020 for follow-up. No fever chills. No nausea vomiting abdominal pain. No diarrhea. No melena or hematochezia. Fair appetite and energy level. No chest pain or SOB. Review of Systems;  CONSTITUTIONAL: No fever, chills. Fair appetite and energy level. ENMT: Eyes: No diplopia; Nose: No epistaxis. Mouth: No sore throat. RESPIRATORY: No hemoptysis, shortness of breath, cough. CARDIOVASCULAR: No chest pain, palpitations. GASTROINTESTINAL: No nausea/vomiting, abdominal pain. No melena or hematochezia. GENITOURINARY: No dysuria, urinary frequency, hematuria. NEURO: No syncope, presyncope, headache. Remainder:  ROS NEGATIVE    Past Medical History:      Diagnosis Date    [facility]    colon    Glaucoma     Hyperlipidemia     Hypertension     Hypothyroidism      Medications:  Reviewed and reconciled. Allergies: Allergies   Allergen Reactions    Prevnar 13 [Pneumococcal 13-Kassidy Conj Vacc] Swelling     Redness and swelling in arm at site     Physical Exam:  Pulse 110   Temp 97.8 °F (36.6 °C)   Ht 5' 2\" (1.575 m)   Wt 156 lb (70.8 kg)   LMP  (LMP Unknown)   SpO2 96%   BMI 28.53 kg/m²   GENERAL: Alert, oriented x 3, not in acute distress  HEENT: PERRLA; EOMI. Oropharynx clear. NECK: Supple. Without lymphadenopathy. LUNGS: Good air entry bilaterally. No wheezing, crackles or ronchi. CARDIOVASCULAR: Regular rate. No murmurs, rubs or gallops. ABDOMEN: Soft. Non-tender, non-distended. Positive bowel sounds. EXTREMITIES: Without clubbing, cyanosis, or edema. NEUROLOGIC: No focal deficits. 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 1cm polyp was at the proximal transverse just passed the hepatic flexure  A. Mass, cecum, biopsy: Invasive moderately differentiated adenocarcinoma.   B. Polyp, hepatic flexure of colon, biopsy: Tubular adenoma. CT chest 07/31/2019:   Solid pulmonary parenchymal nodule in the left lower lobe measures 12 x 11 mm, and lies just above diaphragm. CT abdomen/pelvis 07/31/2019: There is a cecal mural mass on the medial aspect of the cecum and proximal ascending colon with perpendicular diameter measurements of 6.2 x 5.7 x 2.8 cm. It appears to be confined to the bowel wall. Only small cecal mesenteric LN are identified. There is an enlarged right adrenal lesion measuring 2 cm diameter    CEA 3.8 on 08/05/2019. PET/CT scan 08/15/2019  No FDG avid uptake is identified which exceeds the threshold SUV in the left pulmonary nodule. Pulmonary team (Dr. Lisandro Hinkle) consult appreciated; repeat CT chest in 3 months. No uptake in adrenal glands. Abnormal tracer uptake in the right thyroid which is focal. Thyroid U/S 08/21/2019 noted thyroid nodules. Endocrinology team consult (Dr. Adali De Jesus) appreciated. Abnormal tracer uptake at the level the cecum which exceeds the threshold SUV. Right hemicolectomy on 08/29/2019. Procedure: Right hemicolectomy. Tumor site: Cecum. Tumor size: Greatest dimension is 6.5 cm; additional dimensions 4.5 x 1.8 cm. Macroscopic tumor perforation: Not identified. Histologic type: Adenocarcinoma  Histologic grade: G2 (moderately differentiated). Tumor extension: Tumor invades through the muscularis propria into pericolorectal tissue. Margin status: All margins are uninvolved by invasive carcinoma, high grade dysplasia, intramucosal adenocarcinoma and adenoma.     - Margins examined: Proximal, distal and radial/soft tissue surgical margins.     - Distance of invasive carcinoma from closest margin:  7 cm (radial/soft tissue surgical margin). Treatment effect: No known presurgical therapy. Lymphovascular invasion:  Not identified. Perineural invasion: Not identified. Tumor deposits:  Not identified.   Regional lymph nodes:     - Number of lymph nodes involved: 0     - Number of lymph nodes examined: 18  Pathologic stage classification (pTNM, AJCC 8th edition)     - Primary tumor (pT): pT3 -- tumor invades through the muscularis       propria into pericolorectal tissues.     - Regional lymph nodes (pN):  pN0 -- no regional lymph node metastasis. Mismatch Repair (MMR) IHC panel on specimen HJS -A6   MLH1: No loss of expression   MSH 2: No loss of expression   MSH 6: No loss of expression   PMS 2: No loss of expression    Stage IIA (pT3 pN0 M0) no high risk features. We reviewed with her that Data from randomized trials and meta-analyses indicated that if there is benefit for 5-FU-based chemotherapy therapy in patients with resected stage II disease, it does not exceed an absolute improvement in five-year survival of 5 percent. We also explained to her the side effects/toxicities of fluoropyrimidine-based adjuvant regimen (which will be for a duration of 6 months). Patient decided to proceed with observation and Not adjuvant chemotherapy; To be followed with History & Physical and blood work including CEA every 3 months for 2 years, then every 6 months for a total of 5 years. CT scan chest/abdomen/pelvis yearly for 5 years; colonoscopy in one year    CEA 0.7 on 12/19/2019. CEA 1.1 on 03/19/2020. CT chest 05/15/2020 noted no evidence of metastatic disease. Follows with pulmonary team for lung nodule  CT abdomen/pelvis 05/15/2020: No evidence of metastatic disease. Left renal cyst; pt declined seeing urology team.    CEA 1.1 on 06/18/2020. CEA 1.6 on 09/21/2020. Colonoscopy 09/28/2020 Normal colon, patent ileocolonic anastomosis. Next colonoscopy in 1-3 years. CEA pending today 12/21/2020. SRINIVAS. RTC 3 months with labs (CBC, CMP CEA).      Kristi Chambers MD   [id number]  Board Certified Medical Oncologist

## 2020-12-22 ENCOUNTER — TELEPHONE (OUTPATIENT)
Dept: ONCOLOGY | Age: 75
End: 2020-12-22

## 2020-12-22 NOTE — TELEPHONE ENCOUNTER
requested call be made to pt to verbalized that potassium blood result was 3.1, falling on the lower end, and recommended to F/U with pts PCP. Informed pt of the above information, and pt verbalized understanding. No further questions or concerns at this time.

## 2021-02-09 ENCOUNTER — HOSPITAL ENCOUNTER (OUTPATIENT)
Age: 76
Discharge: HOME | End: 2021-02-09
Payer: MEDICARE

## 2021-02-09 DIAGNOSIS — C73 HURTHLE CELL CARCINOMA (HCC): ICD-10-CM

## 2021-02-09 LAB
T4 TOTAL: 9.7 MCG/DL (ref 4.5–11.7)
TSH SERPL DL<=0.05 MIU/L-ACNC: 2.36 UIU/ML (ref 0.27–4.2)

## 2021-02-09 PROCEDURE — 84432 ASSAY OF THYROGLOBULIN: CPT

## 2021-02-09 PROCEDURE — 84443 ASSAY THYROID STIM HORMONE: CPT

## 2021-02-09 PROCEDURE — 84436 ASSAY OF TOTAL THYROXINE: CPT

## 2021-02-09 PROCEDURE — 86800 THYROGLOBULIN ANTIBODY: CPT

## 2021-02-09 PROCEDURE — 36415 COLL VENOUS BLD VENIPUNCTURE: CPT

## 2021-02-11 LAB
THYROGLOBULIN AB: <0.9 IU/ML (ref 0–4)
THYROGLOBULIN BY LC-MS/MS, SERUM/PLASMA: ABNORMAL NG/ML (ref 1.3–31.8)
THYROGLOBULIN, SERUM OR PLASMA: 0.4 NG/ML (ref 1.3–31.8)

## 2021-02-18 ENCOUNTER — OFFICE VISIT (OUTPATIENT)
Dept: ENT CLINIC | Age: 76
End: 2021-02-18
Payer: MEDICARE

## 2021-02-18 VITALS
WEIGHT: 160 LBS | HEIGHT: 62 IN | BODY MASS INDEX: 29.44 KG/M2 | DIASTOLIC BLOOD PRESSURE: 84 MMHG | HEART RATE: 108 BPM | SYSTOLIC BLOOD PRESSURE: 170 MMHG

## 2021-02-18 DIAGNOSIS — C73 PAPILLARY THYROID CARCINOMA (HCC): Primary | ICD-10-CM

## 2021-02-18 PROCEDURE — G8399 PT W/DXA RESULTS DOCUMENT: HCPCS | Performed by: OTOLARYNGOLOGY

## 2021-02-18 PROCEDURE — 3017F COLORECTAL CA SCREEN DOC REV: CPT | Performed by: OTOLARYNGOLOGY

## 2021-02-18 PROCEDURE — G8484 FLU IMMUNIZE NO ADMIN: HCPCS | Performed by: OTOLARYNGOLOGY

## 2021-02-18 PROCEDURE — 1123F ACP DISCUSS/DSCN MKR DOCD: CPT | Performed by: OTOLARYNGOLOGY

## 2021-02-18 PROCEDURE — 1090F PRES/ABSN URINE INCON ASSESS: CPT | Performed by: OTOLARYNGOLOGY

## 2021-02-18 PROCEDURE — 4040F PNEUMOC VAC/ADMIN/RCVD: CPT | Performed by: OTOLARYNGOLOGY

## 2021-02-18 PROCEDURE — G8427 DOCREV CUR MEDS BY ELIG CLIN: HCPCS | Performed by: OTOLARYNGOLOGY

## 2021-02-18 PROCEDURE — G8417 CALC BMI ABV UP PARAM F/U: HCPCS | Performed by: OTOLARYNGOLOGY

## 2021-02-18 PROCEDURE — 1036F TOBACCO NON-USER: CPT | Performed by: OTOLARYNGOLOGY

## 2021-02-18 PROCEDURE — 99213 OFFICE O/P EST LOW 20 MIN: CPT | Performed by: OTOLARYNGOLOGY

## 2021-02-18 NOTE — PROGRESS NOTES
[facility] Otolaryngology  Dr. Darlean Osgood. Sparkle Saavedra. Ms.Ed        Patient Name:  Kiran Rock  :       CHIEF C/O:    Chief Complaint   Patient presents with    Follow-up     no new issues        HISTORY OBTAINED FROM:  patient    HISTORY OF PRESENT ILLNESS:       Mike Buitrago is a 76y.o. year old female, here today for follow up of 3 of thyroidectomy, here for follow-up medical therapy got 120 mcg of Synthroid daily. Patient states he is doing well, repeat TSH is reviewed today, no other complaints today of hoarseness shortness of breath stridor difficulty swallowing nausea or chest pain.       Past Medical History:   Diagnosis Date    Cancer ([address] 2019    colon    Glaucoma     Hyperlipidemia     Hypertension     Hypothyroidism      Past Surgical History:   Procedure Laterality Date    CATARACT REMOVAL WITH IMPLANT Bilateral     CHOLECYSTECTOMY      COLONOSCOPY N/A 2019    COLONOSCOPY WITH BIOPSY performed by Nando Hudson MD at [address] COLONOSCOPY N/A 2020    COLONOSCOPY POSS BIOPSY OR POLYPECTOMY performed by Nando Hudson MD at [address] [email or web address] [address] 2019    RIGHT HEMICOLECTOMY, LAPAROSCOPIC, ROBOTIC XI ASSISTED performed by Nando Hudson MD at [address] [facility] Bilateral 2020    TOTAL THYROIDECTOMY WITH ANTERIOR NECK DISSECTION-NEEDS NERVE INTEGRITY MONITOR performed by Anika Franks DO at [facility] OR       Current Outpatient Medications:     potassium chloride (KLOR-CON) 10 MEQ extended release tablet, Take 1 tablet by mouth twice daily, Disp: 180 tablet, Rfl: 1    levothyroxine (EUTHYROX) 112 MCG tablet, Take 1 tablet by mouth once daily, Disp: 90 tablet, Rfl: 1    amLODIPine-atorvastatatin (CADUET) 5-10 MG per tablet, Take 1 tablet by mouth once daily, Disp: 90 tablet, Rfl: 1    Multiple Vitamins-Minerals (PRESERVISION AREDS PO), Take by mouth 2 times daily , Disp: , Rfl:     Omega-3 Fatty Acids (FISH OIL) 500 MG CAPS, Take 1 capsule by mouth daily, Disp: , Rfl:     Cyanocobalamin (B-12) 2500 MCG TABS, Take 1 tablet by mouth three times a week , Disp: , Rfl:     vitamin D (CHOLECALCIFEROL) 5000 UNITS CAPS capsule, Take 5,000 Units by mouth daily, Disp: , Rfl:     enalapril-hydroCHLOROthiazide (VASERETIC) 10-25 MG per tablet, Take 1 tablet by mouth daily. (Patient not taking: Reported on 2/18/2021), Disp: 90 tablet, Rfl: 1  Prevnar 13 [pneumococcal 13-sera conj vacc]  Social History     Tobacco Use    Smoking status: Never Smoker    Smokeless tobacco: Never Used   Substance Use Topics    Alcohol use: No     Alcohol/week: 0.0 standard drinks    Drug use: No     Family History   Problem Relation Age of Onset    Early Death Father     Cancer Sister     Lung Cancer Sister     Cancer Brother     Lung Cancer Brother        Review of Systems   Constitutional: Negative for chills and fever. HENT: Positive for congestion. Negative for ear discharge, hearing loss, nosebleeds, sinus pressure, sneezing, tinnitus and trouble swallowing. Respiratory: Negative for cough and shortness of breath. Cardiovascular: Negative for chest pain and palpitations. Gastrointestinal: Negative for vomiting. Skin: Negative for rash. Allergic/Immunologic: Negative for environmental allergies. Neurological: Negative for dizziness and headaches. Hematological: Does not bruise/bleed easily. All other systems reviewed and are negative. BP (!) 170/84 (Site: Left Upper Arm, Position: Sitting, Cuff Size: Medium Adult)   Pulse 108   Ht 5' 2\" (1.575 m)   Wt 160 lb (72.6 kg)   LMP  (LMP Unknown)   BMI 29.26 kg/m²   Physical Exam  Vitals signs and nursing note reviewed. Constitutional:       Appearance: She is well-developed. HENT:      Head: Normocephalic.         Right Ear: Tympanic membrane and ear canal normal.      Left Ear: Tympanic membrane and ear canal normal.   Eyes:      Pupils: Pupils are equal, round, and reactive to light. Neck:      Musculoskeletal: Normal range of motion. Thyroid: No thyromegaly. Trachea: No tracheal deviation. Cardiovascular:      Rate and Rhythm: Normal rate. Pulmonary:      Effort: Pulmonary effort is normal. No respiratory distress. Musculoskeletal: Normal range of motion. Lymphadenopathy:      Cervical: No cervical adenopathy. Skin:     General: Skin is warm. Findings: No erythema. Neurological:      Mental Status: She is alert. Cranial Nerves: No cranial nerve deficit. IMPRESSION/PLAN:  Patient seen and examined today, has a history of thyroid carcinoma status post total thyroidectomy, TSH levels today under normal limits under current medical therapy, with no sign of recurrent disease. Continue conservative evaluation with follow-up in 6 months, or sooner with any noted lymphadenopathy or a spike in her thyroglobulin levels. Dr. Mustapha Cui.  Otolaryngology Facial Plastic Surgery  :  [facility] Otolaryngology/Facial Plastic Surgery Residency  Associate Clinical Professor:  CARI Carrillo  [facility]

## 2021-03-16 ASSESSMENT — ENCOUNTER SYMPTOMS
SINUS PRESSURE: 0
SHORTNESS OF BREATH: 0
COUGH: 0
VOMITING: 0
TROUBLE SWALLOWING: 0

## 2021-03-18 DIAGNOSIS — C18.9 MALIGNANT NEOPLASM OF COLON, UNSPECIFIED PART OF COLON (HCC): Primary | ICD-10-CM

## 2021-03-22 ENCOUNTER — HOSPITAL ENCOUNTER (OUTPATIENT)
Dept: INFUSION THERAPY | Age: 76
Discharge: HOME | End: 2021-03-22
Payer: MEDICARE

## 2021-03-22 ENCOUNTER — OFFICE VISIT (OUTPATIENT)
Dept: ONCOLOGY | Age: 76
End: 2021-03-22
Payer: MEDICARE

## 2021-03-22 VITALS
OXYGEN SATURATION: 95 % | TEMPERATURE: 97.8 F | DIASTOLIC BLOOD PRESSURE: 86 MMHG | HEIGHT: 62 IN | WEIGHT: 160 LBS | HEART RATE: 102 BPM | BODY MASS INDEX: 29.44 KG/M2 | SYSTOLIC BLOOD PRESSURE: 180 MMHG

## 2021-03-22 DIAGNOSIS — C18.9 MALIGNANT NEOPLASM OF COLON, UNSPECIFIED PART OF COLON (HCC): Primary | ICD-10-CM

## 2021-03-22 DIAGNOSIS — C18.9 MALIGNANT NEOPLASM OF COLON, UNSPECIFIED PART OF COLON (HCC): ICD-10-CM

## 2021-03-22 DIAGNOSIS — C18.0 CECAL CANCER (HCC): Primary | ICD-10-CM

## 2021-03-22 LAB
ALBUMIN SERPL-MCNC: 4.4 G/DL (ref 3.5–5.2)
ALP BLD-CCNC: 117 U/L (ref 35–104)
ALT SERPL-CCNC: 13 U/L (ref 0–32)
ANION GAP SERPL CALCULATED.3IONS-SCNC: 11 MMOL/L (ref 7–16)
AST SERPL-CCNC: 16 U/L (ref 0–31)
BASOPHILS ABSOLUTE: 0.04 E9/L (ref 0–0.2)
BASOPHILS RELATIVE PERCENT: 0.6 % (ref 0–2)
BILIRUB SERPL-MCNC: 0.7 MG/DL (ref 0–1.2)
BUN BLDV-MCNC: 10 MG/DL (ref 8–23)
CALCIUM SERPL-MCNC: 8.8 MG/DL (ref 8.6–10.2)
CEA: 1.5 NG/ML (ref 0–5.2)
CHLORIDE BLD-SCNC: 98 MMOL/L (ref 98–107)
CO2: 30 MMOL/L (ref 22–29)
CREAT SERPL-MCNC: 0.6 MG/DL (ref 0.5–1)
EOSINOPHILS ABSOLUTE: 0.2 E9/L (ref 0.05–0.5)
EOSINOPHILS RELATIVE PERCENT: 2.9 % (ref 0–6)
GFR AFRICAN AMERICAN: >60
GFR NON-AFRICAN AMERICAN: >60 ML/MIN/1.73
GLUCOSE BLD-MCNC: 102 MG/DL (ref 74–99)
HCT VFR BLD CALC: 44.6 % (ref 34–48)
HEMOGLOBIN: 14.6 G/DL (ref 11.5–15.5)
IMMATURE GRANULOCYTES #: 0.04 E9/L
IMMATURE GRANULOCYTES %: 0.6 % (ref 0–5)
LYMPHOCYTES ABSOLUTE: 1.52 E9/L (ref 1.5–4)
LYMPHOCYTES RELATIVE PERCENT: 21.7 % (ref 20–42)
MCH RBC QN AUTO: 29 PG (ref 26–35)
MCHC RBC AUTO-ENTMCNC: 32.7 % (ref 32–34.5)
MCV RBC AUTO: 88.5 FL (ref 80–99.9)
MONOCYTES ABSOLUTE: 0.59 E9/L (ref 0.1–0.95)
MONOCYTES RELATIVE PERCENT: 8.4 % (ref 2–12)
NEUTROPHILS ABSOLUTE: 4.62 E9/L (ref 1.8–7.3)
NEUTROPHILS RELATIVE PERCENT: 65.8 % (ref 43–80)
PDW BLD-RTO: 13.3 FL (ref 11.5–15)
PLATELET # BLD: 243 E9/L (ref 130–450)
PMV BLD AUTO: 10.5 FL (ref 7–12)
POTASSIUM SERPL-SCNC: 3.4 MMOL/L (ref 3.5–5)
RBC # BLD: 5.04 E12/L (ref 3.5–5.5)
SODIUM BLD-SCNC: 139 MMOL/L (ref 132–146)
TOTAL PROTEIN: 7.9 G/DL (ref 6.4–8.3)
WBC # BLD: 7 E9/L (ref 4.5–11.5)

## 2021-03-22 PROCEDURE — 3017F COLORECTAL CA SCREEN DOC REV: CPT | Performed by: INTERNAL MEDICINE

## 2021-03-22 PROCEDURE — G8427 DOCREV CUR MEDS BY ELIG CLIN: HCPCS | Performed by: INTERNAL MEDICINE

## 2021-03-22 PROCEDURE — G8417 CALC BMI ABV UP PARAM F/U: HCPCS | Performed by: INTERNAL MEDICINE

## 2021-03-22 PROCEDURE — 1090F PRES/ABSN URINE INCON ASSESS: CPT | Performed by: INTERNAL MEDICINE

## 2021-03-22 PROCEDURE — 99214 OFFICE O/P EST MOD 30 MIN: CPT | Performed by: INTERNAL MEDICINE

## 2021-03-22 PROCEDURE — 4040F PNEUMOC VAC/ADMIN/RCVD: CPT | Performed by: INTERNAL MEDICINE

## 2021-03-22 PROCEDURE — G8484 FLU IMMUNIZE NO ADMIN: HCPCS | Performed by: INTERNAL MEDICINE

## 2021-03-22 PROCEDURE — 80053 COMPREHEN METABOLIC PANEL: CPT

## 2021-03-22 PROCEDURE — 1036F TOBACCO NON-USER: CPT | Performed by: INTERNAL MEDICINE

## 2021-03-22 PROCEDURE — 1123F ACP DISCUSS/DSCN MKR DOCD: CPT | Performed by: INTERNAL MEDICINE

## 2021-03-22 PROCEDURE — 36415 COLL VENOUS BLD VENIPUNCTURE: CPT

## 2021-03-22 PROCEDURE — 82378 CARCINOEMBRYONIC ANTIGEN: CPT

## 2021-03-22 PROCEDURE — G8399 PT W/DXA RESULTS DOCUMENT: HCPCS | Performed by: INTERNAL MEDICINE

## 2021-03-22 PROCEDURE — 85025 COMPLETE CBC W/AUTO DIFF WBC: CPT

## 2021-03-22 PROCEDURE — 99213 OFFICE O/P EST LOW 20 MIN: CPT

## 2021-03-22 NOTE — PROGRESS NOTES
Department of [facility]  Attending Clinic Note    Reason for Visit: Follow-up on a patient with Cecal Cancer    PCP:  Shila Matos DO    History of Present Illness:  76 y.o. female, never smoker, hx of HTN, hyperlipidemia, hypothyroidism, who presented to see general surgery team for evaluation of anemia and diagnostic colonoscopy. Colonoscopy on 07/29/2019: The ileocecal valve was obscured by large cecal fungating mass, 5cm in diameter. 1cm polyp was at the proximal transverse just passed the hepatic flexure  A. Mass, cecum, biopsy: Invasive moderately differentiated  adenocarcinoma. B. Polyp, hepatic flexure of colon, biopsy: Tubular adenoma. CT chest 07/31/2019:   Solid pulmonary parenchymal nodule in the left lower lobe measures 12 x 11 mm, and lies just above diaphragm. CT abdomen/pelvis 07/31/2019: There is a cecal mural mass on the medial aspect of the cecum and proximal ascending colon with perpendicular diameter measurements of .2 x 5.7 x 2.8 cm. It appears to be confined to the bowel wall. There is an enlarged right adrenal lesion measuring 2 cm diameter    CEA 3.8 on 08/05/2019. PET/CT scan 08/15/2019  No FDG avid uptake is identified which exceeds the threshold SUV in the left pulmonary nodule. Pulmonary team (Dr. Gwendel Ahumada) consult appreciated; repeat CT chest in 3 months. No uptake in adrenal glands. Abnormal tracer uptake in the right thyroid which is focal. Endocrinology team consult (Dr. Ella Harden) appreciated. Thyroid U/S 08/21/2019 noted thyroid nodules. Abnormal tracer uptake at the level the cecum which exceeds the threshold SUV. Right hemicolectomy was performed on 08/29/2019. Procedure: Right hemicolectomy. Tumor site: Cecum. Tumor size: Greatest dimension is 6.5 cm; additional dimensions 4.5 x 1.8 cm. Macroscopic tumor perforation: Not identified. Histologic type: Adenocarcinoma  Histologic grade: G2 (moderately differentiated).   Tumor extension: Tumor invades through the muscularis propria into pericolorectal tissue. Margin status: All margins are uninvolved by invasive carcinoma, high grade dysplasia, intramucosal adenocarcinoma and adenoma.     - Margins examined: Proximal, distal and radial/soft tissue surgical margins.     - Distance of invasive carcinoma from closest margin:  7 cm (radial/soft tissue surgical margin). Treatment effect: No known presurgical therapy. Lymphovascular invasion:  Not identified. Perineural invasion: Not identified. Tumor deposits:  Not identified. Regional lymph nodes:     - Number of lymph nodes involved: 0     - Number of lymph nodes examined: 18  Pathologic stage classification (pTNM, AJCC 8th edition)     - Primary tumor (pT): pT3 -- tumor invades through the muscularis       propria into pericolorectal tissues.     - Regional lymph nodes (pN):  pN0 -- no regional lymph node metastasis. Mismatch Repair (MMR) IHC panel on specimen S -A6   MLH1: No loss of expression   MSH 2: No loss of expression   MSH 6: No loss of expression   PMS 2: No loss of expression    Stage IIA (pT3 pN0 M0) no high risk features. We reviewed with her that Data from randomized trials and meta-analyses indicated that if there is benefit for 5-FU-based chemotherapy therapy in patients with resected stage II disease, it does not exceed an absolute improvement in five-year survival of 5 percent. We also explained to her the side effects/toxicities of fluoropyrimidine-based adjuvant regimen (which will be for a duration of 6 months). Patient decided to proceed with observation and Not adjuvant chemotherapy; To be followed with History & Physical and blood work including CEA every 3 months for 2 years, then every 6 months for a total of 5 years. CT scan chest/abdomen/pelvis yearly for 5 years; colonoscopy in one year. CEA 0.7 on 12/19/2019. CEA 1.1 on 03/19/2020. CT chest 05/15/2020 noted no evidence of metastatic disease. Follows with pulmonary team for lung nodule  CT abdomen/pelvis 05/15/2020:   No evidence of metastatic disease. Left renal cyst; pt declined seeing urology team.    CEA 1.1 on 06/18/2020. CEA 1.6 on 09/21/2020. Colonoscopy 09/28/2020 Normal colon, patent ileocolonic anastomosis. Next colonoscopy in 1-3 years. Total thyroidectomy with anterior neck dissection on 02/20/2020  A.  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  5 lymph nodes negative for malignancy  CANCER CASE SUMMARY (PAPILLARY CARCINOMA)  Procedure-total thyroidectomy  Tumor focality-unifocal  Tumor site-right lobe  Tumor size-1.2 cm  Histologic type-papillary carcinoma, classic  Margins-uninvolved by carcinoma  Angioinvasion-not identified  Lymphatic invasion-not identified  Extrathyroidal extension-not identified  Regional lymph nodes-negative for malignancy       Number of lymph nodes involved: 0       Number of lymph nodes examined: 5       Lucas levels examined: Level 6  TNM classification (AJCC eighth edition)-pT1b N0 MX       CANCER CASE SUMMARY (HURTHLE CELL CARCINOMA)  Procedure-total thyroidectomy  Tumor focality-multifocal  Tumor site-right and left lobes  Tumor size-cannot be determined (see above comment)  Histologic type-Hurthle cell carcinoma  Margins-right and left inferior margins involved by carcinoma; left  superior margin involved by carcinoma  Angioinvasion-present  Lymphatic invasion-not identified  Extrathyroidal extension-not identified  Regional lymph nodes-negative for malignancy       Number of lymph nodes involved: 0       Number of lymph nodes examined: 5       Lucas levels examined: Level 6  TNM classification (AJCC eighth edition)-at least pT2 N0 MX  Referred back to endocrine team for radioactive iodine.    US Head Neck soft tissue 08/24/2020: unremarkable of the thyroid bed.    Presented today 03/22/2021 for follow-up. No fever chills. No nausea vomiting abdominal pain. No diarrhea. No melena or hematochezia. Fair appetite and energy level. No chest pain or SOB. Review of Systems;  CONSTITUTIONAL: No fever, chills. Fair appetite and energy level. ENMT: Eyes: No diplopia; Nose: No epistaxis. Mouth: No sore throat. RESPIRATORY: No hemoptysis, shortness of breath, cough. CARDIOVASCULAR: No chest pain, palpitations. GASTROINTESTINAL: No nausea/vomiting, abdominal pain. No melena or hematochezia. GENITOURINARY: No dysuria, urinary frequency, hematuria. NEURO: No syncope, presyncope, headache. Remainder:  ROS NEGATIVE    Past Medical History:      Diagnosis Date    Cancer ([facility] 07/29/2019    colon    Glaucoma     Hyperlipidemia     Hypertension     Hypothyroidism      Medications:  Reviewed and reconciled. Allergies: Allergies   Allergen Reactions    Prevnar 13 [Pneumococcal 13-Kassidy Conj Vacc] Swelling     Redness and swelling in arm at site     Physical Exam:  BP (!) 180/86 (Site: Right Upper Arm, Position: Sitting, Cuff Size: Medium Adult)   Pulse 102   Temp 97.8 °F (36.6 °C) (Temporal)   Ht 5' 2\" (1.575 m)   Wt 160 lb (72.6 kg)   LMP  (LMP Unknown)   SpO2 95%   BMI 29.26 kg/m²   GENERAL: Alert, oriented x 3, not in acute distress  HEENT: PERRLA; EOMI. Oropharynx clear. NECK: Supple. Without lymphadenopathy. LUNGS: Good air entry bilaterally. No wheezing, crackles or ronchi. CARDIOVASCULAR: Regular rate. No murmurs, rubs or gallops. ABDOMEN: Soft. Non-tender, non-distended. Positive bowel sounds. EXTREMITIES: Without clubbing, cyanosis, or edema. NEUROLOGIC: No focal deficits. 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 1cm polyp was at the proximal transverse just passed the hepatic flexure  A.  Mass, cecum, biopsy: Invasive moderately differentiated adenocarcinoma. B. Polyp, hepatic flexure of colon, biopsy: Tubular adenoma. CT chest 07/31/2019:   Solid pulmonary parenchymal nodule in the left lower lobe measures 12 x 11 mm, and lies just above diaphragm. CT abdomen/pelvis 07/31/2019: There is a cecal mural mass on the medial aspect of the cecum and proximal ascending colon with perpendicular diameter measurements of 6.2 x 5.7 x 2.8 cm. It appears to be confined to the bowel wall. Only small cecal mesenteric LN are identified. There is an enlarged right adrenal lesion measuring 2 cm diameter    CEA 3.8 on 08/05/2019. PET/CT scan 08/15/2019  No FDG avid uptake is identified which exceeds the threshold SUV in the left pulmonary nodule. Pulmonary team (Dr. Efrain Seals) consult appreciated; repeat CT chest in 3 months. No uptake in adrenal glands. Abnormal tracer uptake in the right thyroid which is focal. Thyroid U/S 08/21/2019 noted thyroid nodules. Endocrinology team consult (Dr. Francisco Javier Adames) appreciated. Abnormal tracer uptake at the level the cecum which exceeds the threshold SUV. Right hemicolectomy on 08/29/2019. Procedure: Right hemicolectomy. Tumor site: Cecum. Tumor size: Greatest dimension is 6.5 cm; additional dimensions 4.5 x 1.8 cm. Macroscopic tumor perforation: Not identified. Histologic type: Adenocarcinoma  Histologic grade: G2 (moderately differentiated). Tumor extension: Tumor invades through the muscularis propria into pericolorectal tissue. Margin status: All margins are uninvolved by invasive carcinoma, high grade dysplasia, intramucosal adenocarcinoma and adenoma.     - Margins examined: Proximal, distal and radial/soft tissue surgical margins.     - Distance of invasive carcinoma from closest margin:  7 cm (radial/soft tissue surgical margin). Treatment effect: No known presurgical therapy. Lymphovascular invasion:  Not identified.   Perineural invasion: Not identified. Tumor deposits:  Not identified. Regional lymph nodes:     - Number of lymph nodes involved: 0     - Number of lymph nodes examined: 18  Pathologic stage classification (pTNM, AJCC 8th edition)     - Primary tumor (pT): pT3 -- tumor invades through the muscularis       propria into pericolorectal tissues.     - Regional lymph nodes (pN):  pN0 -- no regional lymph node metastasis. Mismatch Repair (MMR) IHC panel on specimen HJS -A6   MLH1: No loss of expression   MSH 2: No loss of expression   MSH 6: No loss of expression   PMS 2: No loss of expression    Stage IIA (pT3 pN0 M0) no high risk features. We reviewed with her that Data from randomized trials and meta-analyses indicated that if there is benefit for 5-FU-based chemotherapy therapy in patients with resected stage II disease, it does not exceed an absolute improvement in five-year survival of 5 percent. We also explained to her the side effects/toxicities of fluoropyrimidine-based adjuvant regimen (which will be for a duration of 6 months). Patient decided to proceed with observation and Not adjuvant chemotherapy; To be followed with History & Physical and blood work including CEA every 3 months for 2 years, then every 6 months for a total of 5 years. CT scan chest/abdomen/pelvis yearly for 5 years; colonoscopy in one year    CEA 0.7 on 12/19/2019. CEA 1.1 on 03/19/2020. CT chest 05/15/2020 noted no evidence of metastatic disease. Follows with pulmonary team for lung nodule  CT abdomen/pelvis 05/15/2020: No evidence of metastatic disease. Left renal cyst; pt declined seeing urology team.    CEA 1.1 on 06/18/2020. CEA 1.6 on 09/21/2020. Colonoscopy 09/28/2020 Normal colon, patent ileocolonic anastomosis. Next colonoscopy in 1-3 years. CEA 1.0 on 12/21/2020. CEA pending 03/22/2021. SRINIVAS. RTC 3 months with prior scans and labs (CBC, CMP CEA).      Chad Mcallister MD   07/85/1420  Board Certified Medical Oncologist

## 2021-05-05 ENCOUNTER — OFFICE VISIT (OUTPATIENT)
Dept: SURGERY | Age: 76
End: 2021-05-05
Payer: MEDICARE

## 2021-05-05 VITALS
HEIGHT: 62 IN | WEIGHT: 160 LBS | SYSTOLIC BLOOD PRESSURE: 181 MMHG | BODY MASS INDEX: 29.44 KG/M2 | HEART RATE: 101 BPM | TEMPERATURE: 98.1 F | DIASTOLIC BLOOD PRESSURE: 92 MMHG

## 2021-05-05 DIAGNOSIS — C18.9 ADENOCARCINOMA, COLON (HCC): ICD-10-CM

## 2021-05-05 DIAGNOSIS — K62.5 RECTAL BLEEDING: Primary | ICD-10-CM

## 2021-05-05 PROCEDURE — 99213 OFFICE O/P EST LOW 20 MIN: CPT | Performed by: SURGERY

## 2021-05-05 PROCEDURE — G8427 DOCREV CUR MEDS BY ELIG CLIN: HCPCS | Performed by: SURGERY

## 2021-05-05 PROCEDURE — G8399 PT W/DXA RESULTS DOCUMENT: HCPCS | Performed by: SURGERY

## 2021-05-05 PROCEDURE — 4040F PNEUMOC VAC/ADMIN/RCVD: CPT | Performed by: SURGERY

## 2021-05-05 PROCEDURE — G8417 CALC BMI ABV UP PARAM F/U: HCPCS | Performed by: SURGERY

## 2021-05-05 PROCEDURE — 1036F TOBACCO NON-USER: CPT | Performed by: SURGERY

## 2021-05-05 PROCEDURE — 1123F ACP DISCUSS/DSCN MKR DOCD: CPT | Performed by: SURGERY

## 2021-05-05 PROCEDURE — 1090F PRES/ABSN URINE INCON ASSESS: CPT | Performed by: SURGERY

## 2021-05-05 PROCEDURE — 3017F COLORECTAL CA SCREEN DOC REV: CPT | Performed by: SURGERY

## 2021-05-05 NOTE — PROGRESS NOTES
General Surgery History and Physical  [facility]    Patient's Name/Date of Birth: Raina Brunner [birth year]    Date: May 5, 2021     Surgeon: Viki Enciso M.D.    PCP: Jose Lux DO     Chief Complaint: rectal bleeding    HPI:   Raina Brunner is a 76 y.o. female who presents for evaluation of rectal bleeding. Timing is once, radiation to rectum, alleviated by none and started this week and severity is 7/10. Hx of colon CA T3N0M0 2019 s/p right hemicoelctomy with repeat cscope 8 months ago. Presents with one week of constipation and followed by a large bowel movement and bloody stool for one day. This has resolved. States she was straining very hard two days ago and had large BM. States one episode of melena with small amounts of bright red bloood. She has since started on stool softener and her BM now are nonbloody and better with regards to no straining. Denies fever, chills, SOB, chest pain. No abd pain, no nausea, no vomiting.      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[address]       Past Medical History:   Diagnosis Date    Cancer ([address] 07/29/2019    colon    Glaucoma     Hyperlipidemia     Hypertension     Hypothyroidism        Past Surgical History:   Procedure Laterality Date    CATARACT REMOVAL WITH IMPLANT Bilateral     CHOLECYSTECTOMY      COLONOSCOPY N/A 7/29/2019    COLONOSCOPY WITH BIOPSY performed by Viki Enciso MD at [id number] [facility] COLONOSCOPY N/A 9/28/2020    COLONOSCOPY POSS BIOPSY OR POLYPECTOMY performed by Viki Enciso MD at [address] [facility] Right 8/29/2019    RIGHT HEMICOLECTOMY, LAPAROSCOPIC, ROBOTIC XI ASSISTED performed by Juliocesar Cast MD at [address] [facility] Bilateral 2/20/2020    TOTAL THYROIDECTOMY WITH ANTERIOR NECK DISSECTION-NEEDS NERVE INTEGRITY MONITOR performed by Chester Cole DO at [facility] OR       Allergies   Allergen Reactions    Prevnar 13 [Pneumococcal 13-Kassidy Conj Vacc] Swelling     Redness and swelling in arm at site       The patient has a family history that is negative for severe cardiovascular or respiratory issues, negative for reaction to anesthesia. Time spent reviewing past medical, surgical, social and family history, vitals, nursing assessment and images. No changes from above documented history.     Social History     Socioeconomic History    Marital status: Single     Spouse name: Not on file    Number of children: Not on file    Years of education: Not on file    Highest education level: Not on file   Occupational History    Not on file   Social Needs    Financial resource strain: Not on file    Food insecurity     Worry: Not on file     Inability: Not on file    Transportation needs     Medical: Not on file     Non-medical: Not on file   Tobacco Use    Smoking status: Never Smoker    Smokeless tobacco: Never Used   Substance and Sexual Activity    Alcohol use: No     Alcohol/week: 0.0 standard drinks    Drug use: No    Sexual activity: Not Currently   Lifestyle    Physical activity     Days per week: Not on file     Minutes per session: Not on file    Stress: Not on file   Relationships    Social connections     Talks on phone: Not on file     Gets together: Not on file     Attends Jewish service: Not on file     Active member of club or organization: Not on file     Attends meetings of clubs or organizations: Not on file     Relationship status: Not on file    Intimate partner violence     Fear of current or ex partner: Not on file     Emotionally abused: Not on file     Physically abused: Not on file     Forced sexual activity: Not on file   Other Topics Concern    Not on file   Social History Narrative    Not on file       I have reviewed relevant labs from this admission and interpretation is included in my assessment and plan    Review of Systems    A complete 10 system review was performed and are otherwise negative unless mentioned in the above HPI. Specific negatives are listed below but may not include all those reviewed.     General ROS: negative obtundation, AMS  ENT ROS: negative rhinorrhea, epistaxis  Allergy and Immunology ROS: negative itchy/watery eyes or nasal congestion  Hematological and Lymphatic ROS: negative spontaneous bleeding or bruising  Endocrine ROS: negative  lethargy, mood swings, palpitations or polydipsia/polyuria  Respiratory ROS: negative sputum changes, stridor, tachypnea or wheezing  Cardiovascular ROS: negative for - loss of consciousness, murmur or orthopnea  Gastrointestinal ROS: negative for - hematochezia or hematemesis  Genito-Urinary ROS: negative for -  genital discharge or hematuria  Musculoskeletal ROS: negative for - focal weakness, gangrene  Psych/Neuro ROS: negative for - visual or auditory hallucinations, suicidal ideation    Physical exam:   BP (!) 181/92   Pulse 101   Temp 98.1 °F (36.7 °C) (Temporal)   Ht 5' 2\" (1.575 m)   Wt 160 lb (72.6 kg)   LMP  (LMP Unknown)   BMI 29.26 kg/m²   General appearance:  NAD, appears stated age  Head: NCAT, PERRLA, EOMI, red conjunctiva  Neck: supple, no masses, trachea midline  Lungs: Equal chest rise bilateral, no retractions, no wheezing  Heart: Reg rate  Abdomen: soft, nontender, well healed incisiosn, nondistended  Skin; warm and dry, no cyanosis  Gu: no cva tenderness  Extremities: atraumatic, no focal motor deficits, no open wounds  Psych: No tremor, visual hallucinations      Radiology: I reviewed relevant abdominal imaging from this admission and that available in the EMR    7/2019: OPERATION: Total Colonoscopy to the cecum biopsy cecal mass, forecep polypectomy hepatic flexure    8/2019: Laparoscopic robotic-assisted right hemicolectomy  with intracorporeal anastomosis.     9/2020: POSTOPERATIVE DIAGNOSIS/FINDINGS: Normal colon, patent ileocolonic anastomosis    Assessment:  Laura Sofia is a 76 y.o. female with rectal bleeding, constipation  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[address]         Plan:  Stool softener  Repeat cscope in Sept  Sooner if signs of bleeding continue and would do upper endoscopy at that time as well  Follow up in Sept        Anai Marroquin MD  8:40 AM  5/5/2021

## 2021-05-11 ENCOUNTER — TELEPHONE (OUTPATIENT)
Dept: PULMONOLOGY | Age: 76
End: 2021-05-11

## 2021-05-11 NOTE — TELEPHONE ENCOUNTER
Mailed a letter to patient informing her that her CT Chest is scheduled for 5-27-21 at 11:00 am at the [address] [place] [address].  She must arrive by 10:30 am. There is no prep for this test

## 2021-05-12 ENCOUNTER — OFFICE VISIT (OUTPATIENT)
Dept: FAMILY MEDICINE CLINIC | Age: 76
End: 2021-05-12
Payer: MEDICARE

## 2021-05-12 VITALS
BODY MASS INDEX: 30.36 KG/M2 | RESPIRATION RATE: 16 BRPM | DIASTOLIC BLOOD PRESSURE: 74 MMHG | HEIGHT: 62 IN | SYSTOLIC BLOOD PRESSURE: 158 MMHG | TEMPERATURE: 97 F | WEIGHT: 165 LBS | HEART RATE: 91 BPM | OXYGEN SATURATION: 96 %

## 2021-05-12 DIAGNOSIS — C18.2 MALIGNANT NEOPLASM OF ASCENDING COLON (HCC): ICD-10-CM

## 2021-05-12 DIAGNOSIS — I10 ESSENTIAL HYPERTENSION: ICD-10-CM

## 2021-05-12 DIAGNOSIS — Z00.00 MEDICARE ANNUAL WELLNESS VISIT, SUBSEQUENT: Primary | ICD-10-CM

## 2021-05-12 DIAGNOSIS — C73 HURTHLE CELL CARCINOMA OF THYROID (HCC): ICD-10-CM

## 2021-05-12 DIAGNOSIS — E03.9 ACQUIRED HYPOTHYROIDISM: ICD-10-CM

## 2021-05-12 DIAGNOSIS — F41.9 ANXIETY: ICD-10-CM

## 2021-05-12 PROCEDURE — 1123F ACP DISCUSS/DSCN MKR DOCD: CPT | Performed by: FAMILY MEDICINE

## 2021-05-12 PROCEDURE — 4040F PNEUMOC VAC/ADMIN/RCVD: CPT | Performed by: FAMILY MEDICINE

## 2021-05-12 PROCEDURE — 3017F COLORECTAL CA SCREEN DOC REV: CPT | Performed by: FAMILY MEDICINE

## 2021-05-12 PROCEDURE — G0439 PPPS, SUBSEQ VISIT: HCPCS | Performed by: FAMILY MEDICINE

## 2021-05-12 RX ORDER — LEVOTHYROXINE SODIUM 112 UG/1
TABLET ORAL
Qty: 90 TABLET | Refills: 1 | Status: SHIPPED
Start: 2021-05-12 | End: 2021-06-24

## 2021-05-12 RX ORDER — BUSPIRONE HYDROCHLORIDE 5 MG/1
5 TABLET ORAL 2 TIMES DAILY
Qty: 60 TABLET | Refills: 1 | Status: SHIPPED | OUTPATIENT
Start: 2021-05-12 | End: 2021-07-11

## 2021-05-12 RX ORDER — METOPROLOL SUCCINATE 25 MG/1
25 TABLET, EXTENDED RELEASE ORAL DAILY
Qty: 90 TABLET | Refills: 1 | Status: SHIPPED
Start: 2021-05-12 | End: 2021-11-01 | Stop reason: SDUPTHER

## 2021-05-12 ASSESSMENT — PATIENT HEALTH QUESTIONNAIRE - PHQ9
SUM OF ALL RESPONSES TO PHQ QUESTIONS 1-9: 0
SUM OF ALL RESPONSES TO PHQ9 QUESTIONS 1 & 2: 0
SUM OF ALL RESPONSES TO PHQ QUESTIONS 1-9: 0
2. FEELING DOWN, DEPRESSED OR HOPELESS: 0

## 2021-05-12 ASSESSMENT — LIFESTYLE VARIABLES: HOW OFTEN DO YOU HAVE A DRINK CONTAINING ALCOHOL: 0

## 2021-05-18 PROBLEM — Z00.00 MEDICARE ANNUAL WELLNESS VISIT, SUBSEQUENT: Status: ACTIVE | Noted: 2021-05-18

## 2021-05-18 PROBLEM — F41.9 ANXIETY: Status: ACTIVE | Noted: 2021-05-18

## 2021-05-19 NOTE — PROGRESS NOTES
Medicare Annual Wellness Visit  Name: Josephine Lazcano Date: 2021   MRN: [id number] Sex: Female   Age: 76 y.o. Ethnicity: Non-/Non    : 1945 Race: Leoma Goltz is here for Medicare AWV and Hypertension (Regular check up)    Screenings for behavioral, psychosocial and functional/safety risks, and cognitive dysfunction are all negative except as indicated below. These results, as well as other patient data from the [address] form, are documented in Flowsheets linked to this Encounter. Allergies   Allergen Reactions    Prevnar 13 [Pneumococcal 13-Kassidy Conj Vacc] Swelling     Redness and swelling in arm at site       Prior to Visit Medications    Medication Sig Taking? Authorizing Provider   levothyroxine (EUTHYROX) 112 MCG tablet Take 1 tablet by mouth once daily Yes Carola P Catterlin, DO   busPIRone (BUSPAR) 5 MG tablet Take 1 tablet by mouth 2 times daily Yes Carola P Catterlin, DO   metoprolol succinate (TOPROL XL) 25 MG extended release tablet Take 1 tablet by mouth daily Yes Carola P Catterlin, DO   potassium chloride (KLOR-CON) 10 MEQ extended release tablet Take 1 tablet by mouth twice daily Yes Carola P Catterlin, DO   enalapril-hydroCHLOROthiazide (VASERETIC) 10-25 MG per tablet Take 1 tablet by mouth daily.  Yes Carola P Catterlin, DO   amLODIPine-atorvastatatin (CADUET) 5-10 MG per tablet Take 1 tablet by mouth once daily Yes Carola P Catterlin, DO   Multiple Vitamins-Minerals (PRESERVISION AREDS PO) Take by mouth 2 times daily  Yes Historical Provider, MD   Omega-3 Fatty Acids (FISH OIL) 500 MG CAPS Take 1 capsule by mouth daily Yes Historical Provider, MD   Cyanocobalamin (B-12) 2500 MCG TABS Take 1 tablet by mouth three times a week  Yes Historical Provider, MD   vitamin D (CHOLECALCIFEROL) 5000 UNITS CAPS capsule Take 5,000 Units by mouth daily Yes Historical Provider, Patient instructed on low calorie, healthy diet. Skin: warm and dry, no rash or erythema  Head: normocephalic and atraumatic  Eyes: pupils equal, round, and reactive to light, extraocular eye movements intact, conjunctivae normal  ENT: tympanic membrane, external ear and ear canal normal bilaterally, oropharynx clear and moist with normal mucous membranes  Neck: neck supple and non tender without mass, no thyromegaly or thyroid nodules, no cervical lymphadenopathy   Pulmonary/Chest: clear to auscultation bilaterally- no wheezes, rales or rhonchi, normal air movement, no respiratory distress  Cardiovascular: normal rate, regular rhythm and no murmurs  Abdomen: soft, non-tender, non-distended, normal bowel sounds, no masses or organomegaly  Extremities: no cyanosis and no clubbing  Musculoskeletal: Pain and decreased ROM multiple joints. Neurologic: gait and coordination normal and speech normal    Pt has anxiety and is nervous cancer may come back. Pt has neoplasm ascending colon ([address] and hurthle cancer thyroid ([address] Pt denies SI and HI. Pt instructed if any worse go ED ASAP. Patient's complete Health Risk Assessment and screening values have been reviewed and are found in Flowsheets. The following problems were reviewed today and where indicated follow up appointments were made and/or referrals ordered. Positive Risk Factor Screenings with Interventions:      Cognitive: Words recalled: 2 Words Recalled  Clock Drawing Test (CDT) Score: (!) Abnormal  Total Score Interpretation: Positive Mini-Cog  Cognitive Impairment Interventions:  · pt instructed on exercise and healthy diet. General Health and ACP:  General  In general, how would you say your health is?: Good  In the past 7 days, have you experienced any of the following?  New or Increased Pain, New or Increased Fatigue, Loneliness, Social Isolation, Stress or Anger?: (!) Stress  Do you get the social and emotional support that you need?: Yes  Do you have a Living Will?: Yes  Advance Directives     Power of  Living Will ACP-Advance Directive ACP-Power of     Not on File Not on File Not on File Not on File      General Health Risk Interventions:  · pt instructed on yearly eye and dental exams. Health Habits/Nutrition:  Health Habits/Nutrition  Do you exercise for at least 20 minutes 2-3 times per week?: (!) No  Have you lost any weight without trying in the past 3 months?: No  Do you eat only one meal per day?: No  Have you seen the dentist within the past year?: Yes  Body mass index: (!) 30.18  Health Habits/Nutrition Interventions:  · pt instructed on yearly dental exams. Personalized Preventive Plan   Current Health Maintenance Status  Immunization History   Administered Date(s) Administered    Influenza, High Dose (Fluzone 65 yrs and older) 01/23/2018, 10/09/2018    Influenza, Quadv, adjuvanted, 65 yrs +, IM, PF (Fluad) 10/08/2020    Influenza, Triv, inactivated, subunit, adjuvanted, IM (Fluad 65 yrs and older) 10/03/2019    Pneumococcal Conjugate 13-valent (Monica Nipple) 07/11/2017        Health Maintenance   Topic Date Due    Hepatitis C screen  Never done    Shingles Vaccine (1 of 2) Never done   [employer] Visit (AWV)  Never done    Lipid screen  11/23/2021    TSH testing  02/09/2022    Potassium monitoring  03/22/2022    Creatinine monitoring  03/22/2022    Colon cancer screen colonoscopy  09/28/2030    DTaP/Tdap/Td vaccine (2 - Td) 11/27/2030    DEXA (modify frequency per FRAX score)  Completed    Flu vaccine  Completed    COVID-19 Vaccine  Completed    Hepatitis A vaccine  Aged Out    Hepatitis B vaccine  Aged Out    Hib vaccine  Aged Out    Meningococcal (ACWY) vaccine  Aged Out     Recommendations for payleven Due: see orders and patient instructions/AVS.  .   Recommended screening schedule for the next 5-10 years is provided to the patient in written form: see Patient Instructions/AVS.    Elpidio Lesa was seen today for medicare awv and hypertension. Diagnoses and all orders for this visit:    Medicare annual wellness visit, subsequent    Acquired hypothyroidism  -     levothyroxine (EUTHYROX) 112 MCG tablet; Take 1 tablet by mouth once daily    Malignant neoplasm of ascending colon (HCC)    Hurthle cell carcinoma of thyroid (HCC)    Anxiety  -     busPIRone (BUSPAR) 5 MG tablet; Take 1 tablet by mouth 2 times daily    Essential hypertension  -     metoprolol succinate (TOPROL XL) 25 MG extended release tablet;  Take 1 tablet by mouth daily

## 2021-05-21 ENCOUNTER — NURSE ONLY (OUTPATIENT)
Dept: FAMILY MEDICINE CLINIC | Age: 76
End: 2021-05-21

## 2021-05-21 VITALS — DIASTOLIC BLOOD PRESSURE: 62 MMHG | SYSTOLIC BLOOD PRESSURE: 154 MMHG

## 2021-05-28 ENCOUNTER — HOSPITAL ENCOUNTER (OUTPATIENT)
Dept: CT IMAGING | Age: 76
Discharge: HOME | End: 2021-05-30
Payer: MEDICARE

## 2021-05-28 ENCOUNTER — HOSPITAL ENCOUNTER (OUTPATIENT)
Age: 76
Discharge: HOME | End: 2021-05-28
Payer: MEDICARE

## 2021-05-28 DIAGNOSIS — R91.1 LUNG NODULE: ICD-10-CM

## 2021-05-28 DIAGNOSIS — C18.9 MALIGNANT NEOPLASM OF COLON, UNSPECIFIED PART OF COLON (HCC): ICD-10-CM

## 2021-05-28 LAB
BUN BLDV-MCNC: 11 MG/DL (ref 6–23)
CREAT SERPL-MCNC: 0.7 MG/DL (ref 0.5–1)
GFR AFRICAN AMERICAN: >60
GFR NON-AFRICAN AMERICAN: >60 ML/MIN/1.73

## 2021-05-28 PROCEDURE — 82565 ASSAY OF CREATININE: CPT

## 2021-05-28 PROCEDURE — 6360000004 HC RX CONTRAST MEDICATION: Performed by: RADIOLOGY

## 2021-05-28 PROCEDURE — 71260 CT THORAX DX C+: CPT

## 2021-05-28 PROCEDURE — 84520 ASSAY OF UREA NITROGEN: CPT

## 2021-05-28 PROCEDURE — 36415 COLL VENOUS BLD VENIPUNCTURE: CPT

## 2021-05-28 PROCEDURE — 71250 CT THORAX DX C-: CPT

## 2021-05-28 PROCEDURE — 74177 CT ABD & PELVIS W/CONTRAST: CPT

## 2021-05-28 RX ADMIN — IOHEXOL 50 ML: 240 INJECTION, SOLUTION INTRATHECAL; INTRAVASCULAR; INTRAVENOUS; ORAL at 11:20

## 2021-05-28 RX ADMIN — IOPAMIDOL 75 ML: 755 INJECTION, SOLUTION INTRAVENOUS at 11:21

## 2021-06-11 ENCOUNTER — OFFICE VISIT (OUTPATIENT)
Dept: PULMONOLOGY | Age: 76
End: 2021-06-11
Payer: MEDICARE

## 2021-06-11 VITALS
HEIGHT: 62 IN | TEMPERATURE: 97.5 F | BODY MASS INDEX: 30.09 KG/M2 | SYSTOLIC BLOOD PRESSURE: 179 MMHG | RESPIRATION RATE: 16 BRPM | OXYGEN SATURATION: 95 % | DIASTOLIC BLOOD PRESSURE: 84 MMHG | WEIGHT: 163.5 LBS | HEART RATE: 88 BPM

## 2021-06-11 DIAGNOSIS — R91.1 LUNG NODULE: ICD-10-CM

## 2021-06-11 PROCEDURE — G8417 CALC BMI ABV UP PARAM F/U: HCPCS | Performed by: INTERNAL MEDICINE

## 2021-06-11 PROCEDURE — G8427 DOCREV CUR MEDS BY ELIG CLIN: HCPCS | Performed by: INTERNAL MEDICINE

## 2021-06-11 PROCEDURE — 1036F TOBACCO NON-USER: CPT | Performed by: INTERNAL MEDICINE

## 2021-06-11 PROCEDURE — 99213 OFFICE O/P EST LOW 20 MIN: CPT | Performed by: INTERNAL MEDICINE

## 2021-06-11 PROCEDURE — 3017F COLORECTAL CA SCREEN DOC REV: CPT | Performed by: INTERNAL MEDICINE

## 2021-06-11 PROCEDURE — 1090F PRES/ABSN URINE INCON ASSESS: CPT | Performed by: INTERNAL MEDICINE

## 2021-06-11 PROCEDURE — 1123F ACP DISCUSS/DSCN MKR DOCD: CPT | Performed by: INTERNAL MEDICINE

## 2021-06-11 PROCEDURE — G8399 PT W/DXA RESULTS DOCUMENT: HCPCS | Performed by: INTERNAL MEDICINE

## 2021-06-11 PROCEDURE — 4040F PNEUMOC VAC/ADMIN/RCVD: CPT | Performed by: INTERNAL MEDICINE

## 2021-06-11 NOTE — PROGRESS NOTES
Department of Internal Medicine  Division of Pulmonary, Critical Care & Sleep Medicine  Pulmonary [facility]                                             Pulmonary Clinic Consult     I had the pleasure of seeing  Bret Orellana in the [facility] regarding their Lung nodule     Follow up of lung nodule       HISTORY OF PRESENT ILLNESS:    Bret Orellana is a 76y.o. year old  Who never smoke       She has been doing ok until she developed anemia and she underwent colonoscopy which shows cecal mass for which she requested to have surgery    During the work-up for colon cancer she had an CAT scan of the chest, the chest CAT scan shows left lower lobe lung nodule about 1.1 x 1 cm, which in further evaluation did not light up on the PET scan and it was not avid to FDG    The patient also has some thyroid    She denies any shortness of breath, hemoptysis, weight loss, or any other associated symptoms    She never smoke or exposure to smoking, no industrial exposure    Today Visit  She has been doing great  She was diagnosed with thyroid cancer since last visit   No Complain  Had CT chest and shows stable Lung nodule LLL since July 2019     ALLERGIES:    Allergies   Allergen Reactions    Prevnar 13 [Pneumococcal 13-Kassidy Conj Vacc] Swelling     Redness and swelling in arm at site       PAST MEDICAL HISTORY:       Diagnosis Date    Cancer ([facility] 07/29/2019    colon    Glaucoma     Hyperlipidemia     Hypertension     Hypothyroidism        MEDICATIONS:   Current Outpatient Medications   Medication Sig Dispense Refill    levothyroxine (EUTHYROX) 112 MCG tablet Take 1 tablet by mouth once daily 90 tablet 1    busPIRone (BUSPAR) 5 MG tablet Take 1 tablet by mouth 2 times daily 60 tablet 1    metoprolol succinate (TOPROL XL) 25 MG extended release tablet Take 1 tablet by mouth daily 90 tablet 1    potassium chloride (KLOR-CON) 10 MEQ extended release tablet Take 1 tablet by mouth twice daily 180 tablet 1    enalapril-hydroCHLOROthiazide (VASERETIC) 10-25 MG per tablet Take 1 tablet by mouth daily. 90 tablet 1    amLODIPine-atorvastatatin (CADUET) 5-10 MG per tablet Take 1 tablet by mouth once daily 90 tablet 1    Multiple Vitamins-Minerals (PRESERVISION AREDS PO) Take by mouth 2 times daily       Omega-3 Fatty Acids (FISH OIL) 500 MG CAPS Take 1 capsule by mouth daily      Cyanocobalamin (B-12) 2500 MCG TABS Take 1 tablet by mouth three times a week       vitamin D (CHOLECALCIFEROL) 5000 UNITS CAPS capsule Take 5,000 Units by mouth daily       No current facility-administered medications for this visit. SOCIAL AND OCCUPATIONAL HEALTH:  Social History     Tobacco Use   Smoking Status Never Smoker   Smokeless Tobacco Never Used         SURGICAL HISTORY:   Past Surgical History:   Procedure Laterality Date    CATARACT REMOVAL WITH IMPLANT Bilateral     CHOLECYSTECTOMY      COLONOSCOPY N/A 7/29/2019    COLONOSCOPY WITH BIOPSY performed by Marcus Mabry MD at [address] [facility] COLONOSCOPY N/A 9/28/2020    COLONOSCOPY POSS BIOPSY OR POLYPECTOMY performed by Marcus Mabry MD at [address] [facility] Right 8/29/2019    RIGHT HEMICOLECTOMY, LAPAROSCOPIC, ROBOTIC XI ASSISTED performed by Marcus Mabry MD at [address] [facility] Bilateral 2/20/2020    TOTAL THYROIDECTOMY WITH ANTERIOR NECK DISSECTION-NEEDS NERVE INTEGRITY MONITOR performed by King Fall DO at [facility] OR       FAMILY HISTORY:   Lung cancer: No  DVT or PE no    REVIEW OF SYSTEMS:  Constitutional: General health is good . There has been no weight changes. No fevers, fatigue or weakness. Head: Patient denies any history of trauma, convulsive disorder or syncope. Skin:  Patient denies history of changes in pigmentation, eruptions or pruritus. No easy bruising or bleeding.   EENT: no nasal congestion   Cardiovascular ,No chest pain ,No edema ,  Respiration:SOB: No,RUIZ : No  Gastrointestinal:No GI bleed ,no melena  ,no hematemesis    Musculoskeletal: no joint pain ,no swelling  Neurological:no , syncope. Denies twitching, convulsions, loss of consciousness or memory. Endocrine:  . No history of goiter, exophthalmos or dryness of skin. The patient has no history of diabetes. Hematopoietic:  No history of bleeding disorders or easy bruising. Rheumatic:  No connective tissue disease history or polyarthritis/inflammatory joint disease. PHYSICAL EXAMINATION:  Vitals:    06/11/21 1100   BP: (!) 179/84   Pulse: 88   Resp: 16   Temp: 97.5 °F (36.4 °C)   SpO2: 95%     This is phone visit                IMPRESSION:    1- Colon cancer /cecum   2- Thyroid cancer   3-Lung nodule   4-Anemia            PLAN:      -I have long discussion with the patient regarding the lung nodule  Repeat CT shows stable nodule size ,even slightly elss ,it has been 2 years since firsy found ,will need follow up for 5 years ,at this time her nodule lower abdomen nd since she is getting Ct abdomen will be enopugh to follow the nodule  So will see in 1 year and to call with any CT she get until then   I will not order CT at this to avoid necessarily CT           Thank you for allowing me to participate in [facility]. I will keep following with you ,should you have any concerns ,please contact me at [phone]     Sincerely,        Earley Nyhan MD  Pulmonary & Critical Care Medicine     NOTE: This report was transcribed using voice recognition software. Every effort was made to ensure accuracy; however, inadvertent computerized transcription errors may be present.

## 2021-06-11 NOTE — PROGRESS NOTES
1 year follow up in office today. Pt here with . Reviewed most recent Chest CT compared to previous and discussed with pt. Discussed follow up in 1 year and pt to call prior to follow up appt to determine if Chest CT has been done. Will only order if not done prior to appt as scheduled. Pt agrees to this plan.

## 2021-06-13 DIAGNOSIS — I10 ESSENTIAL HYPERTENSION: ICD-10-CM

## 2021-06-13 DIAGNOSIS — E78.2 MIXED HYPERLIPIDEMIA: ICD-10-CM

## 2021-06-14 RX ORDER — AMLODIPINE BESYLATE AND ATORVASTATIN CALCIUM 5; 10 MG/1; MG/1
TABLET, FILM COATED ORAL
Qty: 90 TABLET | Refills: 1 | Status: SHIPPED
Start: 2021-06-14 | End: 2021-11-01 | Stop reason: SDUPTHER

## 2021-06-15 DIAGNOSIS — I10 ESSENTIAL HYPERTENSION: ICD-10-CM

## 2021-06-15 RX ORDER — ENALAPRIL MALEATE AND HYDROCHLOROTHIAZIDE 10; 25 MG/1; MG/1
TABLET ORAL
Qty: 90 TABLET | Refills: 1 | Status: SHIPPED
Start: 2021-06-15 | End: 2021-11-01 | Stop reason: SDUPTHER

## 2021-06-15 RX ORDER — POTASSIUM CHLORIDE 750 MG/1
TABLET, FILM COATED, EXTENDED RELEASE ORAL
Qty: 180 TABLET | Refills: 1 | Status: SHIPPED
Start: 2021-06-15 | End: 2021-11-01 | Stop reason: SDUPTHER

## 2021-06-17 PROBLEM — Z00.00 MEDICARE ANNUAL WELLNESS VISIT, SUBSEQUENT: Status: RESOLVED | Noted: 2021-05-18 | Resolved: 2021-06-17

## 2021-06-23 ENCOUNTER — OFFICE VISIT (OUTPATIENT)
Dept: ENDOCRINOLOGY | Age: 76
End: 2021-06-23
Payer: MEDICARE

## 2021-06-23 VITALS
WEIGHT: 166 LBS | BODY MASS INDEX: 30.55 KG/M2 | SYSTOLIC BLOOD PRESSURE: 160 MMHG | DIASTOLIC BLOOD PRESSURE: 82 MMHG | OXYGEN SATURATION: 97 % | HEIGHT: 62 IN | HEART RATE: 76 BPM | RESPIRATION RATE: 18 BRPM

## 2021-06-23 DIAGNOSIS — E27.8 ADRENAL INCIDENTALOMA (HCC): ICD-10-CM

## 2021-06-23 DIAGNOSIS — E03.9 HYPOTHYROIDISM, UNSPECIFIED TYPE: ICD-10-CM

## 2021-06-23 DIAGNOSIS — C73 THYROID CANCER (HCC): Primary | ICD-10-CM

## 2021-06-23 DIAGNOSIS — E55.9 VITAMIN D DEFICIENCY: ICD-10-CM

## 2021-06-23 PROCEDURE — 1036F TOBACCO NON-USER: CPT | Performed by: INTERNAL MEDICINE

## 2021-06-23 PROCEDURE — G8399 PT W/DXA RESULTS DOCUMENT: HCPCS | Performed by: INTERNAL MEDICINE

## 2021-06-23 PROCEDURE — 1123F ACP DISCUSS/DSCN MKR DOCD: CPT | Performed by: INTERNAL MEDICINE

## 2021-06-23 PROCEDURE — G8427 DOCREV CUR MEDS BY ELIG CLIN: HCPCS | Performed by: INTERNAL MEDICINE

## 2021-06-23 PROCEDURE — 1090F PRES/ABSN URINE INCON ASSESS: CPT | Performed by: INTERNAL MEDICINE

## 2021-06-23 PROCEDURE — 99214 OFFICE O/P EST MOD 30 MIN: CPT | Performed by: INTERNAL MEDICINE

## 2021-06-23 PROCEDURE — G8417 CALC BMI ABV UP PARAM F/U: HCPCS | Performed by: INTERNAL MEDICINE

## 2021-06-23 PROCEDURE — 4040F PNEUMOC VAC/ADMIN/RCVD: CPT | Performed by: INTERNAL MEDICINE

## 2021-06-23 PROCEDURE — 3017F COLORECTAL CA SCREEN DOC REV: CPT | Performed by: INTERNAL MEDICINE

## 2021-06-23 RX ORDER — DEXAMETHASONE 1 MG
1 TABLET ORAL ONCE
Qty: 1 TABLET | Refills: 0 | Status: SHIPPED | OUTPATIENT
Start: 2021-06-23 | End: 2021-06-23

## 2021-06-23 NOTE — PROGRESS NOTES
[address] [place] [address] Department of Endocrinology Diabetes and Metabolism   [address] [facility] [address]   Phone: [phone]  Fax: [phone]  Date of Service: 6/23/2021     Primary Care Physician: Bina Cheema DO  Provider: Tabatha Keys MD     Reason for the visit:   Papillary thyroid carcinoma, hurtle cell carcinoma     History of Present Illness: The history is provided by the patient. No  was used. Accuracy of the patient data is excellent. Leticia Barrow is a very pleasant 76y.o. year-old female who was found to have a 2 cm cm left adrenal mass on computed tomography (CT) imaging obtained as part of the work-up of recently diagnosed Colon cancer     CT chest/Abdomen 7/31/2019  There is a cecal mural mass on the medial aspect of the cecum and proximal ascending colon with perpendicular diameter measurements of 6.2 x 5.7 x 2.8 cm. It appears to be confined to the bowel wall. The right adrenal is enlarged, with a globular appearance and a diameter of 2 cm, and nonspecific high solid attenuation characteristics. Metastatic disease is the diagnosis of exclusion  PET CT scan 8/15/2019:   1.  no abnormal uptake at adrenals  2. Abnormal tracer uptake in the right thyroid which is focal and asymmetrical   3.  Abnormal tracer uptake at the level the cecum which exceeds the threshold SUV, this corresponds to the findings on the CT scan of the abdomen, compatible with neoplasm  4.  There is no other abnormal tracer uptake seen    CT abdomen 5/2021 --> stable Rt side adrenal adenoma    Thyroid US 8/21/2019   Rt lobe measures 3.4 x 1.9 x 0.9 cm --> there is 1.4 cm isoechoic nodule (at location of abnormal PET uptake)   Lt lobe measures 3.4 x 1.1 x 0.9 cm --> 9 mm nodule   Isthmus measures 7 mm ---> hypoechoic 1.9 cm with internal vascularity     Pt underwent total thyroidectomy on 2/20/2020  Path report --> two morphologically distinct carcinomas within the thyroid. 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  The approximate width of tissue in each block is 4 mm. The overall tumor size cannot be accurately assessed due to the presence of tumor in nonsequential sections but may be considerably larger than what is measured on 1 slide     7/10/2020 - received 105.3 mCi DAN ablation without complications     2/33/1590 - posttherapy scan negative for mets   8/24/220 --> Tg 0.5, Tg Ab negative   2/2021 - Tg-Ab negatives, Tg- 0.4    Thyroid S 8/24/2020 --> negative for recurrence     Pt currently on levothyroxine 112 mcg daily. Patient takes levothyroxine in the morning at empty stomach, wait one hour before eating , avoid multivitamins containing calcium  or iron with it. Lab Results   Component Value Date/Time    TSH 2.360 02/09/2021 08:40 AM    T4FREE 2.03 (H) 08/24/2020 08:51 AM    W8WTWKY 9.7 02/09/2021 08:40 AM    THGAB <0.9 02/09/2021 08:40 AM     Anusha Ceja denies any new lumps, bumps in her neck, change in her voice, or shortness of breath. No family history of thyroid cancer. No prior history of radiation to head or neck region.     PAST MEDICAL HISTORY   Past Medical History:   Diagnosis Date    Cancer ([address] 07/29/2019    colon    Glaucoma     Hyperlipidemia     Hypertension     Hypothyroidism       PAST SURGICAL HISTORY   Past Surgical History:   Procedure Laterality Date    CATARACT REMOVAL WITH IMPLANT Bilateral     CHOLECYSTECTOMY      COLONOSCOPY N/A 7/29/2019    COLONOSCOPY WITH BIOPSY performed by Isi Santiago MD at [place] [address] N/A 9/28/2020    COLONOSCOPY POSS BIOPSY OR POLYPECTOMY performed [facility] Amaryllis Boeck, [facility] [address] Right 8/29/2019    RIGHT HEMICOLECTOMY, LAPAROSCOPIC, ROBOTIC XI ASSISTED performed by Amaryllis Boeck, MD at [address] [facility] Bilateral 2/20/2020    TOTAL THYROIDECTOMY WITH ANTERIOR NECK DISSECTION-NEEDS NERVE INTEGRITY MONITOR performed by Marylou Subramanian DO at [facility] OR      SOCIAL HISTORY   Tobacco:   reports that she has never smoked. She has never used smokeless tobacco.  Alcol:   reports no history of alcohol use. Illicit Drugs:   reports no history of drug use.      FAMILY HISTORY   Family History   Problem Relation Age of Onset    Early Death Father     Cancer Sister     Lung Cancer Sister     Cancer Brother     Lung Cancer Brother       ALLERGIES AND DRUG REACTIONS   Allergies   Allergen Reactions    Prevnar 13 [Pneumococcal 13-Kassidy Conj Vacc] Swelling     Redness and swelling in arm at site      CURRENT MEDICATIONS   Current Outpatient Medications   Medication Sig Dispense Refill    enalapril-hydroCHLOROthiazide (VASERETIC) 10-25 MG per tablet Take 1 tablet by mouth once daily 90 tablet 1    potassium chloride (KLOR-CON) 10 MEQ extended release tablet Take 1 tablet by mouth twice daily 180 tablet 1    amLODIPine-atorvastatatin (CADUET) 5-10 MG per tablet Take 1 tablet by mouth once daily 90 tablet 1    levothyroxine (EUTHYROX) 112 MCG tablet Take 1 tablet by mouth once daily 90 tablet 1    busPIRone (BUSPAR) 5 MG tablet Take 1 tablet by mouth 2 times daily 60 tablet 1    metoprolol succinate (TOPROL XL) 25 MG extended release tablet Take 1 tablet by mouth daily 90 tablet 1    Multiple Vitamins-Minerals (PRESERVISION AREDS PO) Take by mouth 2 times daily       Omega-3 Fatty Acids (FISH OIL) 500 MG CAPS Take 1 capsule by mouth daily      Cyanocobalamin (B-12) 2500 MCG TABS Take 1 tablet by mouth three times a week       vitamin D (CHOLECALCIFEROL) 5000 UNITS CAPS capsule Take 5,000 Units by mouth daily       No current facility-administered medications for this visit. Review of Systems   Constitutional: No fever, no chills, no diaphoresis, no generalized weakness. HEENT: No blurred vision, No sore throat, no ear pain, no hair loss   Neck: denied any neck swelling, difficulty swallowing,   Cadrdiopulomary: No CP, SOB or palpitation, No orthopnea or PND. No cough or wheezing. GI: No N/V/D, no constipation, No abdominal pain, no melena or hematochezia   : Denied any dysuria, hematuria, flank pain, discharge, or incontinence. Skin: denied any rash, striae ulcer, Hirsute, or hyperpigmentation. MSK: denied any joint deformity, joint pain/swelling, muscle pain, or back pain. Neuro: no numbess, no tingling, no weakness,     OBJECTIVE   BP (!) 160/82   Pulse 76   Resp 18   Ht 5' 2\" (1.575 m)   Wt 166 lb (75.3 kg)   LMP  (LMP Unknown)   SpO2 97%   BMI 30.36 kg/m²    BP Readings from Last 4 Encounters:   06/23/21 (!) 160/82   06/11/21 (!) 179/84   05/21/21 (!) 154/62   05/12/21 (!) 158/74      Wt Readings from Last 6 Encounters:   06/23/21 166 lb (75.3 kg)   06/11/21 163 lb 8 oz (74.2 kg)   05/12/21 165 lb (74.8 kg)   05/05/21 160 lb (72.6 kg)   03/22/21 160 lb (72.6 kg)   02/18/21 160 lb (72.6 kg)        Physical examination:   General: awake alert, oriented x3, no abnormal position or movements. HEENT: normocephalic non traumatic, no exophthalmos   Neck: supple, no LN enlargement, s/p thyroidectomy, no JVD. Pulm: Clear equal air entry no added sounds, no wheezing or rhonchi   CVS: S1 + S2, no murmur, no heave. Dorsalis pedis pulse palpable   Abd: soft lax, no tenderness, no organomegaly, audible bowel sounds.    Skin: warm, no lesions, no rash  Musculoskeletal: No back tenderness, no kyphosis/scoliosis   Neuro: CN intact, sensation normal, muscle power normal. No tremors   Psych: normal mood, and affect     Review of Laboratory Data:   I personally reviewed the following labs:    Lab Results   Component Value Date/Time    WBC 7.0 03/22/2021 08:48 AM    RBC 5.04 03/22/2021 08:48 AM    HGB 14.6 03/22/2021 08:48 AM    HCT 44.6 03/22/2021 08:48 AM    MCV 88.5 03/22/2021 08:48 AM    MCH 29.0 03/22/2021 08:48 AM    MCHC 32.7 03/22/2021 08:48 AM    RDW 13.3 03/22/2021 08:48 AM     03/22/2021 08:48 AM    MPV 10.5 03/22/2021 08:48 AM      Lab Results   Component Value Date/Time     03/22/2021 08:48 AM    K 3.4 (L) 03/22/2021 08:48 AM    K 4.2 08/30/2019 03:26 AM    CO2 30 (H) 03/22/2021 08:48 AM    BUN 11 05/28/2021 09:22 AM    CREATININE 0.7 05/28/2021 09:22 AM    CALCIUM 8.8 03/22/2021 08:48 AM    LABGLOM >60 05/28/2021 09:22 AM    GFRAA >60 05/28/2021 09:22 AM      Lab Results   Component Value Date/Time    TSH 2.360 02/09/2021 08:40 AM    T4FREE 2.03 (H) 08/24/2020 08:51 AM    S2DAAYU 9.7 02/09/2021 08:40 AM    THGAB <0.9 02/09/2021 08:40 AM     Lab Results   Component Value Date    LABA1C 5.5 11/23/2020    GLUCOSE 102 03/22/2021     Lab Results   Component Value Date    CHOL 181 11/23/2020    CHOL 170 06/17/2019    TRIG 99 11/23/2020    TRIG 137 06/17/2019    HDL 76 11/23/2020    HDL 50 06/17/2019     No results found for: ALDODIRENCAL   No results found for: ALPRRATIO   Lab Results   Component Value Date/Time    CHOL 181 11/23/2020 12:00 PM    HDL 76 11/23/2020 12:00 PM    TRIG 99 11/23/2020 12:00 PM      Lab Results   Component Value Date/Time    PTH 16 03/03/2020 10:03 AM      Lab Results   Component Value Date/Time    VITD25 41 11/23/2020 12:00 PM        ASSESSMENT & RECOMMENDATIONS   Deborah Vazquez, a 76 y.o.-old female seen in for the following issues     Papillary thyroid cancer/Hurtle cell carcinoma   · S/p total thyroidectomy 2/20/2020, DAN ablation 7/2020 (105.3 mCi)   · No clinical, biochemical or radiological evidence of recurrence   · US in 8/2021, TFT and Tg level in few weeks     Postsurgical hypothyroidism   · Currently on levothyroxine 112 mcg daily   · Check TFT and adjust the dose   · Goal to keep TSH 0.3-2     Rt adrenal enlargement   · Adrenal mass is found in at least 3-5% of persons older than 50 years. · CT scan 7/2019 --> The right adrenal is enlarged, with a globular appearance and a diameter of 2 cm. PET CT scan 8/15/2019 didn't show any abnormal uptake at adrenals which is in favor of benign etiology  · Previous work up showed normal plasma metanephrines, and Hi/renin   · 1mg DST was in 8/2019 was 3.3 (<5)  · Ordered 1 mg DST, Renin/Hi     I personally reviewed external notes from PCP and other patient's care team providers, and personally interpreted labs associated with the above diagnosis. I also ordered labs to further assess and manage the above addressed medical conditions    Return in about 1 year (around 6/23/2022) for hypothyroidism, thyroid cancer, VitD deficiency. The above issues were reviewed with the patient who understood and agreed with the plan. Thank you for allowing us to participate in the care of this patient. Please do not hesitate to contact us with any additional questions. Diagnosis Orders   1. Thyroid cancer ([address]  TSH without Reflex    T4, Free    Thyroglobulin    US THYROID   2. Vitamin D deficiency  Vitamin D 25 Hydroxy    Basic Metabolic Panel   3. Adrenal incidentaloma ([address]  Renin Activity and Aldosterone    dexamethasone (DECADRON) 1 MG tablet    Cortisol Total   4. Hypothyroidism, unspecified type  TSH without Reflex    T4, Free     Hodan Mendoza MD   Endocrinologist, [facility] Diabetes Care and Endocrinology   [address] [facility], [address] [place] [address],[facility] [address] [id number]   Phone: [phone]   Fax: [phone]   --------------------------  An electronic signature was used to authenticate this note.  Wyatt Brice MD on 6/23/2021 at 9:46 AM

## 2021-06-24 ENCOUNTER — HOSPITAL ENCOUNTER (OUTPATIENT)
Age: 76
Discharge: HOME | End: 2021-06-24
Payer: MEDICARE

## 2021-06-24 ENCOUNTER — TELEPHONE (OUTPATIENT)
Dept: ENDOCRINOLOGY | Age: 76
End: 2021-06-24

## 2021-06-24 ENCOUNTER — OFFICE VISIT (OUTPATIENT)
Dept: ONCOLOGY | Age: 76
End: 2021-06-24
Payer: MEDICARE

## 2021-06-24 ENCOUNTER — HOSPITAL ENCOUNTER (OUTPATIENT)
Dept: INFUSION THERAPY | Age: 76
Discharge: HOME | End: 2021-06-24
Payer: MEDICARE

## 2021-06-24 VITALS
HEIGHT: 62 IN | SYSTOLIC BLOOD PRESSURE: 183 MMHG | BODY MASS INDEX: 29.81 KG/M2 | DIASTOLIC BLOOD PRESSURE: 113 MMHG | HEART RATE: 98 BPM | TEMPERATURE: 98.2 F | OXYGEN SATURATION: 95 % | WEIGHT: 162 LBS

## 2021-06-24 DIAGNOSIS — E55.9 VITAMIN D DEFICIENCY: ICD-10-CM

## 2021-06-24 DIAGNOSIS — E27.8 ADRENAL INCIDENTALOMA (HCC): ICD-10-CM

## 2021-06-24 DIAGNOSIS — C18.9 MALIGNANT NEOPLASM OF COLON, UNSPECIFIED PART OF COLON (HCC): ICD-10-CM

## 2021-06-24 DIAGNOSIS — E03.9 HYPOTHYROIDISM, UNSPECIFIED TYPE: ICD-10-CM

## 2021-06-24 DIAGNOSIS — C73 THYROID CANCER (HCC): Primary | ICD-10-CM

## 2021-06-24 DIAGNOSIS — C73 THYROID CANCER (HCC): ICD-10-CM

## 2021-06-24 DIAGNOSIS — C18.0 CECAL CANCER (HCC): Primary | ICD-10-CM

## 2021-06-24 LAB
ALBUMIN SERPL-MCNC: 4.5 G/DL (ref 3.5–5.2)
ALP BLD-CCNC: 104 U/L (ref 35–104)
ALT SERPL-CCNC: 14 U/L (ref 0–32)
ANION GAP SERPL CALCULATED.3IONS-SCNC: 14 MMOL/L (ref 7–16)
AST SERPL-CCNC: 17 U/L (ref 0–31)
BASOPHILS ABSOLUTE: 0.09 E9/L (ref 0–0.2)
BASOPHILS RELATIVE PERCENT: 1.2 % (ref 0–2)
BILIRUB SERPL-MCNC: 0.6 MG/DL (ref 0–1.2)
BUN BLDV-MCNC: 12 MG/DL (ref 6–23)
CALCIUM SERPL-MCNC: 8.7 MG/DL (ref 8.6–10.2)
CEA: 1.2 NG/ML (ref 0–5.2)
CHLORIDE BLD-SCNC: 98 MMOL/L (ref 98–107)
CO2: 28 MMOL/L (ref 22–29)
CORTISOL TOTAL: 2.76 MCG/DL (ref 2.68–18.4)
CREAT SERPL-MCNC: 0.6 MG/DL (ref 0.5–1)
EOSINOPHILS ABSOLUTE: 0.02 E9/L (ref 0.05–0.5)
EOSINOPHILS RELATIVE PERCENT: 0.3 % (ref 0–6)
GFR AFRICAN AMERICAN: >60
GFR NON-AFRICAN AMERICAN: >60 ML/MIN/1.73
GLUCOSE BLD-MCNC: 100 MG/DL (ref 74–99)
HCT VFR BLD CALC: 42.2 % (ref 34–48)
HEMOGLOBIN: 14 G/DL (ref 11.5–15.5)
IMMATURE GRANULOCYTES #: 0.04 E9/L
IMMATURE GRANULOCYTES %: 0.5 % (ref 0–5)
LYMPHOCYTES ABSOLUTE: 0.9 E9/L (ref 1.5–4)
LYMPHOCYTES RELATIVE PERCENT: 11.7 % (ref 20–42)
MCH RBC QN AUTO: 28.6 PG (ref 26–35)
MCHC RBC AUTO-ENTMCNC: 33.2 % (ref 32–34.5)
MCV RBC AUTO: 86.1 FL (ref 80–99.9)
MONOCYTES ABSOLUTE: 0.37 E9/L (ref 0.1–0.95)
MONOCYTES RELATIVE PERCENT: 4.8 % (ref 2–12)
NEUTROPHILS ABSOLUTE: 6.24 E9/L (ref 1.8–7.3)
NEUTROPHILS RELATIVE PERCENT: 81.5 % (ref 43–80)
PDW BLD-RTO: 13.3 FL (ref 11.5–15)
PLATELET # BLD: 158 E9/L (ref 130–450)
PLATELET CONFIRMATION: NORMAL
PMV BLD AUTO: 11.5 FL (ref 7–12)
POTASSIUM SERPL-SCNC: 3.5 MMOL/L (ref 3.5–5)
RBC # BLD: 4.9 E12/L (ref 3.5–5.5)
SODIUM BLD-SCNC: 140 MMOL/L (ref 132–146)
T4 FREE: 1.71 NG/DL (ref 0.93–1.7)
TOTAL PROTEIN: 8 G/DL (ref 6.4–8.3)
TSH SERPL DL<=0.05 MIU/L-ACNC: 1.04 UIU/ML (ref 0.27–4.2)
VITAMIN D 25-HYDROXY: 46 NG/ML (ref 30–100)
WBC # BLD: 7.7 E9/L (ref 4.5–11.5)

## 2021-06-24 PROCEDURE — 4040F PNEUMOC VAC/ADMIN/RCVD: CPT | Performed by: INTERNAL MEDICINE

## 2021-06-24 PROCEDURE — 1036F TOBACCO NON-USER: CPT | Performed by: INTERNAL MEDICINE

## 2021-06-24 PROCEDURE — G8417 CALC BMI ABV UP PARAM F/U: HCPCS | Performed by: INTERNAL MEDICINE

## 2021-06-24 PROCEDURE — G8399 PT W/DXA RESULTS DOCUMENT: HCPCS | Performed by: INTERNAL MEDICINE

## 2021-06-24 PROCEDURE — 85025 COMPLETE CBC W/AUTO DIFF WBC: CPT

## 2021-06-24 PROCEDURE — 82306 VITAMIN D 25 HYDROXY: CPT

## 2021-06-24 PROCEDURE — 80053 COMPREHEN METABOLIC PANEL: CPT

## 2021-06-24 PROCEDURE — 84432 ASSAY OF THYROGLOBULIN: CPT

## 2021-06-24 PROCEDURE — 84443 ASSAY THYROID STIM HORMONE: CPT

## 2021-06-24 PROCEDURE — 1123F ACP DISCUSS/DSCN MKR DOCD: CPT | Performed by: INTERNAL MEDICINE

## 2021-06-24 PROCEDURE — 86800 THYROGLOBULIN ANTIBODY: CPT

## 2021-06-24 PROCEDURE — 82378 CARCINOEMBRYONIC ANTIGEN: CPT

## 2021-06-24 PROCEDURE — G8427 DOCREV CUR MEDS BY ELIG CLIN: HCPCS | Performed by: INTERNAL MEDICINE

## 2021-06-24 PROCEDURE — 82088 ASSAY OF ALDOSTERONE: CPT

## 2021-06-24 PROCEDURE — 36415 COLL VENOUS BLD VENIPUNCTURE: CPT

## 2021-06-24 PROCEDURE — 82533 TOTAL CORTISOL: CPT

## 2021-06-24 PROCEDURE — 1090F PRES/ABSN URINE INCON ASSESS: CPT | Performed by: INTERNAL MEDICINE

## 2021-06-24 PROCEDURE — 99214 OFFICE O/P EST MOD 30 MIN: CPT | Performed by: INTERNAL MEDICINE

## 2021-06-24 PROCEDURE — 84439 ASSAY OF FREE THYROXINE: CPT

## 2021-06-24 PROCEDURE — 84244 ASSAY OF RENIN: CPT

## 2021-06-24 PROCEDURE — 3017F COLORECTAL CA SCREEN DOC REV: CPT | Performed by: INTERNAL MEDICINE

## 2021-06-24 PROCEDURE — 99212 OFFICE O/P EST SF 10 MIN: CPT

## 2021-06-24 RX ORDER — LEVOTHYROXINE SODIUM 0.1 MG/1
100 TABLET ORAL DAILY
Qty: 90 TABLET | Refills: 3 | Status: SHIPPED
Start: 2021-06-24 | End: 2021-08-22

## 2021-06-24 NOTE — PROGRESS NOTES
Department of [facility] Oncology  Attending Clinic Note    Reason for Visit: Follow-up on a patient with Cecal Cancer    PCP:  Nancy Thacker DO    History of Present Illness:  76 y.o. female, never smoker, hx of HTN, hyperlipidemia, hypothyroidism, who presented to see general surgery team for evaluation of anemia and diagnostic colonoscopy. Colonoscopy on 07/29/2019: The ileocecal valve was obscured by large cecal fungating mass, 5cm in diameter. 1cm polyp was at the proximal transverse just passed the hepatic flexure  A. Mass, cecum, biopsy: Invasive moderately differentiated  adenocarcinoma. B. Polyp, hepatic flexure of colon, biopsy: Tubular adenoma. CT chest 07/31/2019:   Solid pulmonary parenchymal nodule in the left lower lobe measures 12 x 11 mm, and lies just above diaphragm. CT abdomen/pelvis 07/31/2019: There is a cecal mural mass on the medial aspect of the cecum and proximal ascending colon with perpendicular diameter measurements of .2 x 5.7 x 2.8 cm. It appears to be confined to the bowel wall. There is an enlarged right adrenal lesion measuring 2 cm diameter    CEA 3.8 on 08/05/2019. PET/CT scan 08/15/2019  No FDG avid uptake is identified which exceeds the threshold SUV in the left pulmonary nodule. Pulmonary team (Dr. Ronnie Jolley) consult appreciated; repeat CT chest in 3 months. No uptake in adrenal glands. Abnormal tracer uptake in the right thyroid which is focal. Endocrinology team consult (Dr. Harry Syed) appreciated. Thyroid U/S 08/21/2019 noted thyroid nodules. Abnormal tracer uptake at the level the cecum which exceeds the threshold SUV. Right hemicolectomy was performed on 08/29/2019. Procedure: Right hemicolectomy. Tumor site: Cecum. Tumor size: Greatest dimension is 6.5 cm; additional dimensions 4.5 x 1.8 cm. Macroscopic tumor perforation: Not identified. Histologic type: Adenocarcinoma  Histologic grade: G2 (moderately differentiated).   Tumor extension: Tumor invades through the muscularis propria into pericolorectal tissue. Margin status: All margins are uninvolved by invasive carcinoma, high grade dysplasia, intramucosal adenocarcinoma and adenoma.     - Margins examined: Proximal, distal and radial/soft tissue surgical margins.     - Distance of invasive carcinoma from closest margin:  7 cm (radial/soft tissue surgical margin). Treatment effect: No known presurgical therapy. Lymphovascular invasion:  Not identified. Perineural invasion: Not identified. Tumor deposits:  Not identified. Regional lymph nodes:     - Number of lymph nodes involved: 0     - Number of lymph nodes examined: 18  Pathologic stage classification (pTNM, AJCC 8th edition)     - Primary tumor (pT): pT3 -- tumor invades through the muscularis       propria into pericolorectal tissues.     - Regional lymph nodes (pN):  pN0 -- no regional lymph node metastasis. Mismatch Repair (MMR) IHC panel on specimen S -A6   MLH1: No loss of expression   MSH 2: No loss of expression   MSH 6: No loss of expression   PMS 2: No loss of expression    Stage IIA (pT3 pN0 M0) no high risk features. We reviewed with her that Data from randomized trials and meta-analyses indicated that if there is benefit for 5-FU-based chemotherapy therapy in patients with resected stage II disease, it does not exceed an absolute improvement in five-year survival of 5 percent. We also explained to her the side effects/toxicities of fluoropyrimidine-based adjuvant regimen (which will be for a duration of 6 months). Patient decided to proceed with observation and Not adjuvant chemotherapy; To be followed with History & Physical and blood work including CEA every 3 months for 2 years, then every 6 months for a total of 5 years. CT scan chest/abdomen/pelvis yearly for 5 years; colonoscopy in one year. CEA 0.7 on 12/19/2019. CEA 1.1 on 03/19/2020. CT chest 05/15/2020 noted no evidence of metastatic disease. Follows with pulmonary team for lung nodule  CT abdomen/pelvis 05/15/2020:   No evidence of metastatic disease. Left renal cyst; pt declined seeing urology team.    CEA 1.1 on 06/18/2020. CEA 1.6 on 09/21/2020. Colonoscopy 09/28/2020 Normal colon, patent ileocolonic anastomosis. Next colonoscopy in 1-3 years. CEA 1.5 on 03/22/2021.   CT chest/abdomen/pelvis 05/28/2021 stable examination  CEA pending 06/24/2021    Total thyroidectomy with anterior neck dissection on 02/20/2020  A.  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  5 lymph nodes negative for malignancy  CANCER CASE SUMMARY (PAPILLARY CARCINOMA)  Procedure-total thyroidectomy  Tumor focality-unifocal  Tumor site-right lobe  Tumor size-1.2 cm  Histologic type-papillary carcinoma, classic  Margins-uninvolved by carcinoma  Angioinvasion-not identified  Lymphatic invasion-not identified  Extrathyroidal extension-not identified  Regional lymph nodes-negative for malignancy       Number of lymph nodes involved: 0       Number of lymph nodes examined: 5       Lucas levels examined: Level 6  TNM classification (AJCC eighth edition)-pT1b N0 MX       CANCER CASE SUMMARY (HURTHLE CELL CARCINOMA)  Procedure-total thyroidectomy  Tumor focality-multifocal  Tumor site-right and left lobes  Tumor size-cannot be determined (see above comment)  Histologic type-Hurthle cell carcinoma  Margins-right and left inferior margins involved by carcinoma; left  superior margin involved by carcinoma  Angioinvasion-present  Lymphatic invasion-not identified  Extrathyroidal extension-not identified  Regional lymph nodes-negative for malignancy       Number of lymph nodes involved: 0       Number of lymph nodes examined: 5       Lucas levels examined: Level 6  TNM classification (AJCC eighth edition)-at least pT2 N0 MX  Referred back to lower lobe measures 12 x 11 mm, and lies just above diaphragm. CT abdomen/pelvis 07/31/2019: There is a cecal mural mass on the medial aspect of the cecum and proximal ascending colon with perpendicular diameter measurements of 6.2 x 5.7 x 2.8 cm. It appears to be confined to the bowel wall. Only small cecal mesenteric LN are identified. There is an enlarged right adrenal lesion measuring 2 cm diameter    CEA 3.8 on 08/05/2019. PET/CT scan 08/15/2019  No FDG avid uptake is identified which exceeds the threshold SUV in the left pulmonary nodule. Pulmonary team (Dr. Lauren Marina) consult appreciated; repeat CT chest in 3 months. No uptake in adrenal glands. Abnormal tracer uptake in the right thyroid which is focal. Thyroid U/S 08/21/2019 noted thyroid nodules. Endocrinology team consult (Dr. Lu Anne) appreciated. Abnormal tracer uptake at the level the cecum which exceeds the threshold SUV. Right hemicolectomy on 08/29/2019. Procedure: Right hemicolectomy. Tumor site: Cecum. Tumor size: Greatest dimension is 6.5 cm; additional dimensions 4.5 x 1.8 cm. Macroscopic tumor perforation: Not identified. Histologic type: Adenocarcinoma  Histologic grade: G2 (moderately differentiated). Tumor extension: Tumor invades through the muscularis propria into pericolorectal tissue. Margin status: All margins are uninvolved by invasive carcinoma, high grade dysplasia, intramucosal adenocarcinoma and adenoma.     - Margins examined: Proximal, distal and radial/soft tissue surgical margins.     - Distance of invasive carcinoma from closest margin:  7 cm (radial/soft tissue surgical margin). Treatment effect: No known presurgical therapy. Lymphovascular invasion:  Not identified. Perineural invasion: Not identified. Tumor deposits:  Not identified.   Regional lymph nodes:     - Number of lymph nodes involved: 0     - Number of lymph nodes examined: 18  Pathologic stage classification (pTNM, AJCC 8th edition)

## 2021-06-25 NOTE — TELEPHONE ENCOUNTER
Notify pt,  I have reviewed your recent results    Thyroid hormones are slightly above normal range. I recommend slightly change synthroid dose form 112 to 100 mcg daily and repeat labs in 6-8 weeks     Also, adrenal hormones are good.  Please confirm that she took dexamethasone the night before blood work

## 2021-06-26 LAB
THYROGLOBULIN AB: <0.9 IU/ML (ref 0–4)
THYROGLOBULIN BY LC-MS/MS, SERUM/PLASMA: ABNORMAL NG/ML (ref 1.3–31.8)
THYROGLOBULIN, SERUM OR PLASMA: 0.6 NG/ML (ref 1.3–31.8)

## 2021-06-27 LAB — ALDOSTERONE: 6.2 NG/DL

## 2021-06-30 LAB — RENIN ACTIVITY: 0.8 NG/ML/HR

## 2021-06-30 NOTE — TELEPHONE ENCOUNTER
Patient advised and verbalized understanding   She states that she did take the dexamethasone before getting the lab work

## 2021-08-16 ENCOUNTER — HOSPITAL ENCOUNTER (OUTPATIENT)
Age: 76
Discharge: HOME | End: 2021-08-16
Payer: MEDICARE

## 2021-08-16 DIAGNOSIS — C73 PAPILLARY THYROID CARCINOMA (HCC): ICD-10-CM

## 2021-08-16 DIAGNOSIS — E55.9 VITAMIN D DEFICIENCY: ICD-10-CM

## 2021-08-16 LAB
ANION GAP SERPL CALCULATED.3IONS-SCNC: 12 MMOL/L (ref 7–16)
BUN BLDV-MCNC: 10 MG/DL (ref 6–23)
CALCIUM SERPL-MCNC: 9.1 MG/DL (ref 8.6–10.2)
CHLORIDE BLD-SCNC: 99 MMOL/L (ref 98–107)
CO2: 30 MMOL/L (ref 22–29)
CREAT SERPL-MCNC: 0.7 MG/DL (ref 0.5–1)
GFR AFRICAN AMERICAN: >60
GFR NON-AFRICAN AMERICAN: >60 ML/MIN/1.73
GLUCOSE BLD-MCNC: 147 MG/DL (ref 74–99)
POTASSIUM SERPL-SCNC: 3.5 MMOL/L (ref 3.5–5)
SODIUM BLD-SCNC: 141 MMOL/L (ref 132–146)
T4 FREE: 1.53 NG/DL (ref 0.93–1.7)
TSH SERPL DL<=0.05 MIU/L-ACNC: 5.02 UIU/ML (ref 0.27–4.2)

## 2021-08-16 PROCEDURE — 82088 ASSAY OF ALDOSTERONE: CPT

## 2021-08-16 PROCEDURE — 36415 COLL VENOUS BLD VENIPUNCTURE: CPT

## 2021-08-16 PROCEDURE — 83835 ASSAY OF METANEPHRINES: CPT

## 2021-08-16 PROCEDURE — 80048 BASIC METABOLIC PNL TOTAL CA: CPT

## 2021-08-16 PROCEDURE — 86800 THYROGLOBULIN ANTIBODY: CPT

## 2021-08-16 PROCEDURE — 84443 ASSAY THYROID STIM HORMONE: CPT

## 2021-08-16 PROCEDURE — 84432 ASSAY OF THYROGLOBULIN: CPT

## 2021-08-16 PROCEDURE — 84439 ASSAY OF FREE THYROXINE: CPT

## 2021-08-16 PROCEDURE — 84244 ASSAY OF RENIN: CPT

## 2021-08-18 LAB
ALDOSTERONE: 5.2 NG/DL
THYROGLOBULIN AB: <0.9 IU/ML (ref 0–4)
THYROGLOBULIN BY LC-MS/MS, SERUM/PLASMA: ABNORMAL NG/ML (ref 1.3–31.8)
THYROGLOBULIN, SERUM OR PLASMA: 1.1 NG/ML (ref 1.3–31.8)

## 2021-08-20 ENCOUNTER — OFFICE VISIT (OUTPATIENT)
Dept: ENT CLINIC | Age: 76
End: 2021-08-20
Payer: MEDICARE

## 2021-08-20 VITALS
HEART RATE: 89 BPM | SYSTOLIC BLOOD PRESSURE: 168 MMHG | HEIGHT: 62 IN | WEIGHT: 163 LBS | BODY MASS INDEX: 30 KG/M2 | DIASTOLIC BLOOD PRESSURE: 90 MMHG

## 2021-08-20 DIAGNOSIS — E04.1 THYROID NODULE: Primary | ICD-10-CM

## 2021-08-20 LAB
METANEPH/PLASMA INTERP: ABNORMAL
METANEPHRINE FREE PLASMA: 0.17 NMOL/L (ref 0–0.49)
NORMETANEPHRINE FREE PLASMA: 0.93 NMOL/L (ref 0–0.89)

## 2021-08-20 PROCEDURE — G8427 DOCREV CUR MEDS BY ELIG CLIN: HCPCS | Performed by: OTOLARYNGOLOGY

## 2021-08-20 PROCEDURE — 1036F TOBACCO NON-USER: CPT | Performed by: OTOLARYNGOLOGY

## 2021-08-20 PROCEDURE — 1090F PRES/ABSN URINE INCON ASSESS: CPT | Performed by: OTOLARYNGOLOGY

## 2021-08-20 PROCEDURE — 99213 OFFICE O/P EST LOW 20 MIN: CPT | Performed by: OTOLARYNGOLOGY

## 2021-08-20 PROCEDURE — 1123F ACP DISCUSS/DSCN MKR DOCD: CPT | Performed by: OTOLARYNGOLOGY

## 2021-08-20 PROCEDURE — G8399 PT W/DXA RESULTS DOCUMENT: HCPCS | Performed by: OTOLARYNGOLOGY

## 2021-08-20 PROCEDURE — 4040F PNEUMOC VAC/ADMIN/RCVD: CPT | Performed by: OTOLARYNGOLOGY

## 2021-08-20 PROCEDURE — G8417 CALC BMI ABV UP PARAM F/U: HCPCS | Performed by: OTOLARYNGOLOGY

## 2021-08-20 ASSESSMENT — ENCOUNTER SYMPTOMS
VOICE CHANGE: 0
TROUBLE SWALLOWING: 0

## 2021-08-20 NOTE — PROGRESS NOTES
[address] [facility] Otolaryngology  Dr. Melissa Weber. DESMOND Moreno Ms.Ed. New Consult       Patient Name:  Dwayne Castañeda  :       CHIEF C/O:    Chief Complaint   Patient presents with    Follow-up     6 month thyoid        HISTORY OBTAINED FROM:  patient    HISTORY OF PRESENT ILLNESS:       Salud Cary is a 68y.o. year old female, here today for a 6 month thyroid follow. She has no complaints.        Past Medical History:   Diagnosis Date    Cancer ([address] 2019    colon    Glaucoma     Hyperlipidemia     Hypertension     Hypothyroidism      Past Surgical History:   Procedure Laterality Date    CATARACT REMOVAL WITH IMPLANT Bilateral     CHOLECYSTECTOMY      COLONOSCOPY N/A 2019    COLONOSCOPY WITH BIOPSY performed by Kayden Nance MD at [address] [facility] COLONOSCOPY N/A 2020    COLONOSCOPY POSS BIOPSY OR POLYPECTOMY performed by Kayden Nance MD [address] 2019    RIGHT HEMICOLECTOMY, LAPAROSCOPIC, ROBOTIC XI ASSISTED performed by Kayden Nance MD at [address] Bilateral 2020    TOTAL THYROIDECTOMY WITH ANTERIOR NECK DISSECTION-NEEDS NERVE INTEGRITY MONITOR performed by Ashia Jones DO at [facility] OR       Current Outpatient Medications:     levothyroxine (SYNTHROID) 100 MCG tablet, Take 1 tablet by mouth daily, Disp: 90 tablet, Rfl: 3    enalapril-hydroCHLOROthiazide (VASERETIC) 10-25 MG per tablet, Take 1 tablet by mouth once daily, Disp: 90 tablet, Rfl: 1    potassium chloride (KLOR-CON) 10 MEQ extended release tablet, Take 1 tablet by mouth twice daily, Disp: 180 tablet, Rfl: 1    amLODIPine-atorvastatatin (CADUET) 5-10 MG per tablet, Take 1 tablet by mouth once daily, Disp: 90 tablet, Rfl: 1    metoprolol succinate (TOPROL XL) 25 MG extended release tablet, Take 1 tablet by mouth daily, Disp: 90 tablet, Rfl: 1    Multiple Vitamins-Minerals (PRESERVISION AREDS PO), Take by mouth 2 times daily , Disp: , Rfl:     Omega-3 Fatty Acids (FISH OIL) 500 MG CAPS, Take 1 capsule by mouth daily, Disp: , Rfl:     Cyanocobalamin (B-12) 2500 MCG TABS, Take 1 tablet by mouth three times a week , Disp: , Rfl:     vitamin D (CHOLECALCIFEROL) 5000 UNITS CAPS capsule, Take 5,000 Units by mouth daily, Disp: , Rfl:   Prevnar 13 [pneumococcal 13-sera conj vacc]  Social History     Tobacco Use    Smoking status: Never Smoker    Smokeless tobacco: Never Used   Vaping Use    Vaping Use: Never used   Substance Use Topics    Alcohol use: No     Alcohol/week: 0.0 standard drinks    Drug use: No     Family History   Problem Relation Age of Onset    Early Death Father     Cancer Sister     Lung Cancer Sister     Cancer Brother     Lung Cancer Brother        Review of Systems   Constitutional: Negative for activity change, appetite change, chills, fatigue, fever and unexpected weight change. HENT: Negative for ear discharge, hearing loss, trouble swallowing and voice change. Respiratory: Negative for cough and shortness of breath. Cardiovascular: Negative for chest pain and palpitations. Gastrointestinal: Negative for vomiting. Skin: Negative for rash. Allergic/Immunologic: Negative for environmental allergies. Neurological: Negative for dizziness and headaches. Hematological: Does not bruise/bleed easily. All other systems reviewed and are negative. BP (!) 168/90   Pulse 89   Ht 5' 2\" (1.575 m)   Wt 163 lb (73.9 kg)   LMP  (LMP Unknown)   BMI 29.81 kg/m²   Physical Exam  Vitals and nursing note reviewed. Constitutional:       Appearance: She is well-developed. HENT:      Head: Normocephalic. Right Ear: There is impacted cerumen. Left Ear: There is impacted cerumen. Nose:      Right Sinus: No maxillary sinus tenderness or frontal sinus tenderness. Left Sinus: No maxillary sinus tenderness or frontal sinus tenderness.       Mouth/Throat:      Mouth: Mucous membranes are moist. No oral lesions. Tongue: No lesions. Tongue does not deviate from midline. Palate: No mass. Pharynx: Oropharynx is clear. No pharyngeal swelling, oropharyngeal exudate, posterior oropharyngeal erythema or uvula swelling. Tonsils: No tonsillar exudate. Eyes:      Pupils: Pupils are equal, round, and reactive to light. Neck:      Thyroid: No thyromegaly. Trachea: No tracheal deviation. Cardiovascular:      Rate and Rhythm: Normal rate. Pulmonary:      Effort: Pulmonary effort is normal. No respiratory distress. Musculoskeletal:         General: Normal range of motion. Cervical back: Normal range of motion. Lymphadenopathy:      Cervical: No cervical adenopathy. Skin:     General: Skin is warm. Findings: No erythema. Neurological:      Mental Status: She is alert. Cranial Nerves: No cranial nerve deficit. IMPRESSION/PLAN:    Ultrasound   Follow-up in 1 year    Dr. Mary Cummins [facility] Otolaryngology/Facial Plastic Surgery Residency  Associate Clinical Professor:  Pat [facility]

## 2021-08-22 ENCOUNTER — TELEPHONE (OUTPATIENT)
Dept: ENDOCRINOLOGY | Age: 76
End: 2021-08-22

## 2021-08-22 DIAGNOSIS — E03.9 HYPOTHYROIDISM, UNSPECIFIED TYPE: Primary | ICD-10-CM

## 2021-08-22 RX ORDER — LEVOTHYROXINE SODIUM 112 UG/1
112 TABLET ORAL DAILY
Qty: 90 TABLET | Refills: 3 | Status: SHIPPED
Start: 2021-08-22 | End: 2021-11-01 | Stop reason: SDUPTHER

## 2021-08-22 NOTE — TELEPHONE ENCOUNTER
Notify pt,  I have reviewed your recent results    Adrenal hormones are very good but thyroid hormones are low     Will need to go back to syntyroid 112 mcg daily

## 2021-08-24 LAB — RENIN ACTIVITY: 1.5 NG/ML/HR

## 2021-08-26 ASSESSMENT — ENCOUNTER SYMPTOMS
SHORTNESS OF BREATH: 0
COUGH: 0
VOMITING: 0

## 2021-09-27 ENCOUNTER — TELEPHONE (OUTPATIENT)
Dept: ONCOLOGY | Age: 76
End: 2021-09-27

## 2021-09-27 ENCOUNTER — OFFICE VISIT (OUTPATIENT)
Dept: ONCOLOGY | Age: 76
End: 2021-09-27
Payer: MEDICARE

## 2021-09-27 ENCOUNTER — HOSPITAL ENCOUNTER (OUTPATIENT)
Dept: INFUSION THERAPY | Age: 76
Discharge: HOME | End: 2021-09-27
Payer: MEDICARE

## 2021-09-27 VITALS
OXYGEN SATURATION: 96 % | HEIGHT: 62 IN | SYSTOLIC BLOOD PRESSURE: 175 MMHG | DIASTOLIC BLOOD PRESSURE: 82 MMHG | HEART RATE: 81 BPM | BODY MASS INDEX: 29.81 KG/M2

## 2021-09-27 DIAGNOSIS — C18.0 CECAL CANCER (HCC): Primary | ICD-10-CM

## 2021-09-27 DIAGNOSIS — C18.9 MALIGNANT NEOPLASM OF COLON, UNSPECIFIED PART OF COLON (HCC): ICD-10-CM

## 2021-09-27 LAB
ALBUMIN SERPL-MCNC: 4.5 G/DL (ref 3.5–5.2)
ALP BLD-CCNC: 109 U/L (ref 35–104)
ALT SERPL-CCNC: 12 U/L (ref 0–32)
ANION GAP SERPL CALCULATED.3IONS-SCNC: 13 MMOL/L (ref 7–16)
AST SERPL-CCNC: 17 U/L (ref 0–31)
BASOPHILS ABSOLUTE: 0.05 E9/L (ref 0–0.2)
BASOPHILS RELATIVE PERCENT: 0.6 % (ref 0–2)
BILIRUB SERPL-MCNC: 0.4 MG/DL (ref 0–1.2)
BUN BLDV-MCNC: 10 MG/DL (ref 6–23)
CALCIUM SERPL-MCNC: 8.9 MG/DL (ref 8.6–10.2)
CEA: 1.6 NG/ML (ref 0–5.2)
CHLORIDE BLD-SCNC: 97 MMOL/L (ref 98–107)
CO2: 30 MMOL/L (ref 22–29)
CREAT SERPL-MCNC: 0.7 MG/DL (ref 0.5–1)
EOSINOPHILS ABSOLUTE: 0.16 E9/L (ref 0.05–0.5)
EOSINOPHILS RELATIVE PERCENT: 2 % (ref 0–6)
GFR AFRICAN AMERICAN: >60
GFR NON-AFRICAN AMERICAN: >60 ML/MIN/1.73
GLUCOSE BLD-MCNC: 97 MG/DL (ref 74–99)
HCT VFR BLD CALC: 43.1 % (ref 34–48)
HEMOGLOBIN: 14.4 G/DL (ref 11.5–15.5)
IMMATURE GRANULOCYTES #: 0.03 E9/L
IMMATURE GRANULOCYTES %: 0.4 % (ref 0–5)
LYMPHOCYTES ABSOLUTE: 1.41 E9/L (ref 1.5–4)
LYMPHOCYTES RELATIVE PERCENT: 17.6 % (ref 20–42)
MCH RBC QN AUTO: 28.7 PG (ref 26–35)
MCHC RBC AUTO-ENTMCNC: 33.4 % (ref 32–34.5)
MCV RBC AUTO: 86 FL (ref 80–99.9)
MONOCYTES ABSOLUTE: 0.7 E9/L (ref 0.1–0.95)
MONOCYTES RELATIVE PERCENT: 8.8 % (ref 2–12)
NEUTROPHILS ABSOLUTE: 5.65 E9/L (ref 1.8–7.3)
NEUTROPHILS RELATIVE PERCENT: 70.6 % (ref 43–80)
PDW BLD-RTO: 13.5 FL (ref 11.5–15)
PLATELET # BLD: 257 E9/L (ref 130–450)
PMV BLD AUTO: 10.1 FL (ref 7–12)
POTASSIUM SERPL-SCNC: 3.1 MMOL/L (ref 3.5–5)
RBC # BLD: 5.01 E12/L (ref 3.5–5.5)
SODIUM BLD-SCNC: 140 MMOL/L (ref 132–146)
TOTAL PROTEIN: 7.9 G/DL (ref 6.4–8.3)
WBC # BLD: 8 E9/L (ref 4.5–11.5)

## 2021-09-27 PROCEDURE — 99214 OFFICE O/P EST MOD 30 MIN: CPT | Performed by: INTERNAL MEDICINE

## 2021-09-27 PROCEDURE — G8427 DOCREV CUR MEDS BY ELIG CLIN: HCPCS | Performed by: INTERNAL MEDICINE

## 2021-09-27 PROCEDURE — G8417 CALC BMI ABV UP PARAM F/U: HCPCS | Performed by: INTERNAL MEDICINE

## 2021-09-27 PROCEDURE — 85025 COMPLETE CBC W/AUTO DIFF WBC: CPT

## 2021-09-27 PROCEDURE — G8399 PT W/DXA RESULTS DOCUMENT: HCPCS | Performed by: INTERNAL MEDICINE

## 2021-09-27 PROCEDURE — 99213 OFFICE O/P EST LOW 20 MIN: CPT

## 2021-09-27 PROCEDURE — 1036F TOBACCO NON-USER: CPT | Performed by: INTERNAL MEDICINE

## 2021-09-27 PROCEDURE — 36415 COLL VENOUS BLD VENIPUNCTURE: CPT

## 2021-09-27 PROCEDURE — 80053 COMPREHEN METABOLIC PANEL: CPT

## 2021-09-27 PROCEDURE — 82378 CARCINOEMBRYONIC ANTIGEN: CPT

## 2021-09-27 PROCEDURE — 1123F ACP DISCUSS/DSCN MKR DOCD: CPT | Performed by: INTERNAL MEDICINE

## 2021-09-27 PROCEDURE — 1090F PRES/ABSN URINE INCON ASSESS: CPT | Performed by: INTERNAL MEDICINE

## 2021-09-27 PROCEDURE — 4040F PNEUMOC VAC/ADMIN/RCVD: CPT | Performed by: INTERNAL MEDICINE

## 2021-09-27 NOTE — TELEPHONE ENCOUNTER
Patient called in stating that she got blood work done at her OBGYN and her Potassium was high. Patient is asking if she should up her dosage on her potassium pill or keep it the same.

## 2021-09-27 NOTE — TELEPHONE ENCOUNTER
Called patient regarding potassium level of 3.1 on blood work drawn today.  Per Dr. Veronica Grigsby request  Patient instructed to call PCP ( Dr. Roger Turner.) patient verebalizes understanding

## 2021-09-27 NOTE — TELEPHONE ENCOUNTER
This MA spoke with pt - pt takes 10meq BID.     Electronically signed by Helene Costa MA on 9/27/21 at 2:55 PM EDT

## 2021-09-27 NOTE — TELEPHONE ENCOUNTER
Call pt and tell her to take potassium 10 janki    2 tablets in am and 1 tablet in pm    and put in medication list

## 2021-09-27 NOTE — PROGRESS NOTES
Tumor invades through the muscularis propria into pericolorectal tissue. Margin status: All margins are uninvolved by invasive carcinoma, high grade dysplasia, intramucosal adenocarcinoma and adenoma.     - Margins examined: Proximal, distal and radial/soft tissue surgical margins.     - Distance of invasive carcinoma from closest margin:  7 cm (radial/soft tissue surgical margin). Treatment effect: No known presurgical therapy. Lymphovascular invasion:  Not identified. Perineural invasion: Not identified. Tumor deposits:  Not identified. Regional lymph nodes:     - Number of lymph nodes involved: 0     - Number of lymph nodes examined: 18  Pathologic stage classification (pTNM, AJCC 8th edition)     - Primary tumor (pT): pT3 -- tumor invades through the muscularis       propria into pericolorectal tissues.     - Regional lymph nodes (pN):  pN0 -- no regional lymph node metastasis. Mismatch Repair (MMR) IHC panel on specimen S -A6   MLH1: No loss of expression   MSH 2: No loss of expression   MSH 6: No loss of expression   PMS 2: No loss of expression    Stage IIA (pT3 pN0 M0) no high risk features. We reviewed with her that Data from randomized trials and meta-analyses indicated that if there is benefit for 5-FU-based chemotherapy therapy in patients with resected stage II disease, it does not exceed an absolute improvement in five-year survival of 5 percent. We also explained to her the side effects/toxicities of fluoropyrimidine-based adjuvant regimen (which will be for a duration of 6 months). Patient decided to proceed with observation and Not adjuvant chemotherapy; To be followed with History & Physical and blood work including CEA every 3 months for 2 years, then every 6 months for a total of 5 years. CT scan chest/abdomen/pelvis yearly for 5 years; colonoscopy in one year. CEA 0.7 on 12/19/2019. CEA 1.1 on 03/19/2020. CT chest 05/15/2020 noted no evidence of metastatic disease. Follows with pulmonary team for lung nodule  CT abdomen/pelvis 05/15/2020:   No evidence of metastatic disease. Left renal cyst; pt declined seeing urology team.    CEA 1.1 on 06/18/2020. CEA 1.6 on 09/21/2020. Colonoscopy 09/28/2020 Normal colon, patent ileocolonic anastomosis. Next colonoscopy in 1-3 years. CEA 1.5 on 03/22/2021.   CT chest/abdomen/pelvis 05/28/2021 stable examination  CEA 1.2 on 06/24/2021    Total thyroidectomy with anterior neck dissection on 02/20/2020  A.  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  5 lymph nodes negative for malignancy  CANCER CASE SUMMARY (PAPILLARY CARCINOMA)  Procedure-total thyroidectomy  Tumor focality-unifocal  Tumor site-right lobe  Tumor size-1.2 cm  Histologic type-papillary carcinoma, classic  Margins-uninvolved by carcinoma  Angioinvasion-not identified  Lymphatic invasion-not identified  Extrathyroidal extension-not identified  Regional lymph nodes-negative for malignancy       Number of lymph nodes involved: 0       Number of lymph nodes examined: 5       Lucas levels examined: Level 6  TNM classification (AJCC eighth edition)-pT1b N0 MX     CANCER CASE SUMMARY (HURTHLE CELL CARCINOMA)  Procedure-total thyroidectomy  Tumor focality-multifocal  Tumor site-right and left lobes  Tumor size-cannot be determined (see above comment)  Histologic type-Hurthle cell carcinoma  Margins-right and left inferior margins involved by carcinoma; left  superior margin involved by carcinoma  Angioinvasion-present  Lymphatic invasion-not identified  Extrathyroidal extension-not identified  Regional lymph nodes-negative for malignancy       Number of lymph nodes involved: 0       Number of lymph nodes examined: 5       Lucas levels examined: Level 6  TNM classification (AJCC eighth edition)-at least pT2 N0 MX  Referred back to endocrine team for radioactive iodine. US Head Neck soft tissue 08/24/2020: unremarkable of the thyroid bed. Presented today 09/27/2021 for follow-up. No fever chills. No nausea vomiting abdominal pain. No diarrhea. No melena or hematochezia. Fair appetite and energy level. No chest pain or SOB. Review of Systems;  CONSTITUTIONAL: No fever, chills. Fair appetite and energy level. ENMT: Eyes: No diplopia; Nose: No epistaxis. Mouth: No sore throat. RESPIRATORY: No hemoptysis, shortness of breath, cough. CARDIOVASCULAR: No chest pain, palpitations. GASTROINTESTINAL: No nausea/vomiting, abdominal pain. No melena or hematochezia. GENITOURINARY: No dysuria, urinary frequency, hematuria. NEURO: No syncope, presyncope, headache. Remainder:  ROS NEGATIVE    Past Medical History:      Diagnosis Date    [facility]    colon    Glaucoma     Hyperlipidemia     Hypertension     Hypothyroidism      Medications:  Reviewed and reconciled. Allergies: Allergies   Allergen Reactions    Prevnar 13 [Pneumococcal 13-Kassidy Conj Vacc] Swelling     Redness and swelling in arm at site     Physical Exam:  BP (!) 175/82   Pulse 81   Ht 5' 2\" (1.575 m)   LMP  (LMP Unknown)   SpO2 96%   BMI 29.81 kg/m²   GENERAL: Alert, oriented x 3, not in acute distress   EXTREMITIES: Without clubbing, cyanosis, or edema. ECOG PS 1    Impression/Plan:  68 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 1cm polyp was at the proximal transverse just passed the hepatic flexure  A. Mass, cecum, biopsy: Invasive moderately differentiated adenocarcinoma. B. Polyp, hepatic flexure of colon, biopsy: Tubular adenoma. CT chest 07/31/2019:   Solid pulmonary parenchymal nodule in the left lower lobe measures 12 x 11 mm, and lies just above diaphragm. CT abdomen/pelvis 07/31/2019:   There is a cecal mural mass on the medial (pN):  pN0 -- no regional lymph node metastasis. Mismatch Repair (MMR) IHC panel on specimen HJS -A6   MLH1: No loss of expression   MSH 2: No loss of expression   MSH 6: No loss of expression   PMS 2: No loss of expression    Stage IIA (pT3 pN0 M0) no high risk features. We reviewed with her that Data from randomized trials and meta-analyses indicated that if there is benefit for 5-FU-based chemotherapy therapy in patients with resected stage II disease, it does not exceed an absolute improvement in five-year survival of 5 percent. We also explained to her the side effects/toxicities of fluoropyrimidine-based adjuvant regimen (which will be for a duration of 6 months). Patient decided to proceed with observation and Not adjuvant chemotherapy; To be followed with History & Physical and blood work including CEA every 3 months for 2 years, then every 6 months for a total of 5 years. CT scan chest/abdomen/pelvis yearly for 5 years; colonoscopy in one year    CEA 0.7 on 12/19/2019. CEA 1.1 on 03/19/2020. CT chest 05/15/2020 noted no evidence of metastatic disease. Follows with pulmonary team for lung nodule  CT abdomen/pelvis 05/15/2020: No evidence of metastatic disease. Left renal cyst; pt declined seeing urology team.    CEA 1.1 on 06/18/2020. CEA 1.6 on 09/21/2020. Colonoscopy 09/28/2020 Normal colon, patent ileocolonic anastomosis. Next colonoscopy in 1-3 years. CEA 1.0 on 12/21/2020. CEA 1.5 on 03/22/2021. CT chest/abdomen/pelvis 05/28/2021 stable examination  CEA 1.2 on 06/24/2021  CEA pending 09/27/2021  Imaging reviewed. Labs reviewed. SRINIVAS. RTC 6 months with prior labs (CBC, CMP CEA).  Colonoscopy in Sept 2021    Erlin Amaya MD   [id number]  Board Certified Medical Oncologist

## 2021-10-04 ENCOUNTER — TELEPHONE (OUTPATIENT)
Dept: SURGERY | Age: 76
End: 2021-10-04

## 2021-10-04 ENCOUNTER — OFFICE VISIT (OUTPATIENT)
Dept: SURGERY | Age: 76
End: 2021-10-04
Payer: MEDICARE

## 2021-10-04 VITALS
TEMPERATURE: 97.8 F | HEART RATE: 109 BPM | HEIGHT: 62 IN | BODY MASS INDEX: 30 KG/M2 | DIASTOLIC BLOOD PRESSURE: 83 MMHG | SYSTOLIC BLOOD PRESSURE: 166 MMHG | WEIGHT: 163 LBS

## 2021-10-04 DIAGNOSIS — C18.9 ADENOCARCINOMA, COLON (HCC): Primary | ICD-10-CM

## 2021-10-04 PROCEDURE — 4040F PNEUMOC VAC/ADMIN/RCVD: CPT | Performed by: SURGERY

## 2021-10-04 PROCEDURE — 1036F TOBACCO NON-USER: CPT | Performed by: SURGERY

## 2021-10-04 PROCEDURE — 1123F ACP DISCUSS/DSCN MKR DOCD: CPT | Performed by: SURGERY

## 2021-10-04 PROCEDURE — 1090F PRES/ABSN URINE INCON ASSESS: CPT | Performed by: SURGERY

## 2021-10-04 PROCEDURE — G8427 DOCREV CUR MEDS BY ELIG CLIN: HCPCS | Performed by: SURGERY

## 2021-10-04 PROCEDURE — 99213 OFFICE O/P EST LOW 20 MIN: CPT | Performed by: SURGERY

## 2021-10-04 PROCEDURE — G8417 CALC BMI ABV UP PARAM F/U: HCPCS | Performed by: SURGERY

## 2021-10-04 PROCEDURE — G8484 FLU IMMUNIZE NO ADMIN: HCPCS | Performed by: SURGERY

## 2021-10-04 PROCEDURE — G8399 PT W/DXA RESULTS DOCUMENT: HCPCS | Performed by: SURGERY

## 2021-10-04 NOTE — TELEPHONE ENCOUNTER
Per Dr. Roula Orozco, patient is scheduled for Colonoscopy possible biopsy or polypectomy at [address] [place] [address] on 1/10/22. Surgery scheduling form faxed with confirmation, surgeon's calendar updated. Dr. Roula Orozco to enter orders. Follow up appointment scheduled. Patient has received COVID 19 vaccine. Miralax and magnesium citrate bowel prep instructions provided and discussed. Electronically signed by Ines Parmar RN on 10/4/2021 at 10:25 AM    Per patient request, bowel prep has been changed to Golytely.    Electronically signed by Ines Parmar RN on 10/12/2021 at 9:01 AM

## 2021-10-04 NOTE — PROGRESS NOTES
General Surgery History and Physical  [facility]    Patient's Name/Date of Birth: Nancy Pretty / [birth year]    Date: October 4, 2021     Surgeon: Edin Castrejon M.D.    PCP: Eunice Monsalve DO     Chief Complaint: rectal bleeding    HPI:   Nancy Pretty is a 68 y.o. female who presents for evaluation of rectal bleeding. Timing is once, radiation to rectum, alleviated by none and started this week and severity is 7/10. Hx of colon CA [id number] 2019 s/p right hemicoelctomy with repeat cscope 8 months ago. Presents with one week of constipation and followed by a large bowel movement and bloody stool for one day. This has resolved. States she was straining very hard two days ago and had large BM. States one episode of melena with small amounts of bright red bloood. She has since started on stool softener and her BM now are nonbloody and better with regards to no straining. Denies fever, chills, SOB, chest pain. No abd pain, no nausea, no vomiting. Patient now returns 3 months later with no problems. Denies any problems with bloody stools. She states that she had that one single episode several months ago and has not had any issues since. She is followed up with her oncologist is recommending a repeat colonoscopy. I discussed with her it should be done sometime within the next 6 months. Denies any shortness of breath or chest pain. Denies any bloody stools she has had normal bowel movements. She would like to try different type of prep rather than the GoLYTELY.     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 COLONOSCOPY N/A 9/28/2020    COLONOSCOPY POSS BIOPSY OR POLYPECTOMY performed by Antonio Ramirez MD at [address] Right 8/29/2019    RIGHT HEMICOLECTOMY, LAPAROSCOPIC, ROBOTIC XI ASSISTED performed by Antonio Ramirez MD at [address] [facility] Bilateral 2/20/2020    TOTAL THYROIDECTOMY WITH ANTERIOR NECK DISSECTION-NEEDS NERVE INTEGRITY MONITOR performed by Kaden Jules DO at [facility] OR       Allergies   Allergen Reactions    Prevnar 13 [Pneumococcal 13-Kassidy Conj Vacc] Swelling     Redness and swelling in arm at site       The patient has a family history that is negative for severe cardiovascular or respiratory issues, negative for reaction to anesthesia. Time spent reviewing past medical, surgical, social and family history, vitals, nursing assessment and images. No changes from above documented history.     Social History     Socioeconomic History    Marital status: Single     Spouse name: Not on file    Number of children: Not on file    Years of education: Not on file    Highest education level: Not on file   Occupational History    Not on file   Tobacco Use    Smoking status: Never Smoker    Smokeless tobacco: Never Used   Vaping Use    Vaping Use: Never used   Substance and Sexual Activity    Alcohol use: No     Alcohol/week: 0.0 standard drinks    Drug use: No    Sexual activity: Not Currently   Other Topics Concern    Not on file   Social History Narrative    Not on file     Social Determinants of Health     Financial Resource Strain:     Difficulty of Paying Living Expenses:    Food Insecurity:     Worried About Running Out of Food in the Last Year:     [address] in the Last Year:    Transportation Needs:     Lack of Transportation (Medical):  Lack of Transportation (Non-Medical):    Physical Activity:     Days of Exercise per Week:     Minutes of Exercise per Session:    Stress:     Feeling of Stress :    Social Connections:     Frequency of Communication with Friends and Family:     Frequency of Social Gatherings with Friends and Family:     Attends [employer] Services:     Active Member of Clubs or Organizations:     Attends Club or Organization Meetings:     Marital Status:    Intimate Partner Violence:     Fear of Current or Ex-Partner:     Emotionally Abused:     Physically Abused:     Sexually Abused:        I have reviewed relevant labs from this admission and interpretation is included in my assessment and plan    Review of Systems    A complete 10 system review was performed and are otherwise negative unless mentioned in the above HPI. Specific negatives are listed below but may not include all those reviewed.     General ROS: negative obtundation, AMS  ENT ROS: negative rhinorrhea, epistaxis  Allergy and Immunology ROS: negative itchy/watery eyes or nasal congestion  Hematological and Lymphatic ROS: negative spontaneous bleeding or bruising  Endocrine ROS: negative  lethargy, mood swings, palpitations or polydipsia/polyuria  Respiratory ROS: negative sputum changes, stridor, tachypnea or wheezing  Cardiovascular ROS: negative for - loss of consciousness, murmur or orthopnea  Gastrointestinal ROS: negative for - hematochezia or hematemesis  Genito-Urinary ROS: negative for -  genital discharge or hematuria  Musculoskeletal ROS: negative for - focal weakness, gangrene  Psych/Neuro ROS: negative for - visual or auditory hallucinations, suicidal ideation    Physical exam:   BP (!) 166/83   Pulse 109   Temp 97.8 °F (36.6 °C) (Temporal)   Ht 5' 2\" (1.575 m)   Wt 163 lb (73.9 kg)   LMP  (LMP Unknown)   BMI 29.81 kg/m²   General appearance:  NAD, appears stated age  Head: NCAT, PERRLA, EOMI, red conjunctiva  Neck: supple, no masses, trachea midline  Lungs: Equal chest rise bilateral, no retractions, no wheezing  Heart: Reg rate  Abdomen: soft, nontender, well healed incisiosn, nondistended  Skin; warm and dry, no cyanosis  Gu: no cva tenderness  Extremities: atraumatic, no focal motor deficits, no open wounds  Psych: No tremor, visual hallucinations      Radiology: I reviewed relevant abdominal imaging from this admission and that available in the EMR    7/2019: OPERATION: Total Colonoscopy to the cecum biopsy cecal mass, forecep polypectomy hepatic flexure    8/2019: Laparoscopic robotic-assisted right hemicolectomy  with intracorporeal anastomosis. 9/2020: POSTOPERATIVE DIAGNOSIS/FINDINGS: Normal colon, patent ileocolonic anastomosis    Assessment:  Miles Pena is a 68 y.o. 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 Risks included but are not limited to bleeding, infection, respiratory distress, hypotension, and perforation of the colon. The patient understands and is in agreement.

## 2021-10-04 NOTE — TELEPHONE ENCOUNTER
Prior Authorization Form:      DEMOGRAPHICS:                     Patient Name:  Caty Theodore  Patient :  1945            Insurance:  Payor: Misty Michelle / Plan: [facility] / Product Type: *No Product type* /   Insurance ID Number:    Payor/Plan Subscr  Sex Relation Sub. Ins. ID Effective Group Num   1. [address] [place] [address], [address] A [birth year] Female Self [id number] 1/1/15 [id number]                                   [address]   2.  [place] -* ADALID JONES A  Female Self [id number] 3/3/20                                    [address]         DIAGNOSIS & PROCEDURE:                       Procedure/Operation: Colonoscopy possible biopsy or polypectomy           CPT Code: 58043    Diagnosis:  Screening, adenocarcinoma colon     ICD10 Code: Z12.11 + C18.9    Location:  [address] [place] [address]    Surgeon:  Marla Fitch INFORMATION:                          Date: 1/10/22    Time: TBD              Anesthesia:  MAC/TIVA                                                       Status:  Outpatient        Special Comments:         Electronically signed by Rubi Calabrese RN on 10/4/2021 at 10:25 AM

## 2021-10-12 RX ORDER — SODIUM, POTASSIUM,MAG SULFATES 17.5-3.13G
1 SOLUTION, RECONSTITUTED, ORAL ORAL ONCE
Qty: 1 EACH | Refills: 0 | Status: SHIPPED | OUTPATIENT
Start: 2021-10-12 | End: 2021-10-12

## 2021-11-01 ENCOUNTER — OFFICE VISIT (OUTPATIENT)
Dept: FAMILY MEDICINE CLINIC | Age: 76
End: 2021-11-01
Payer: MEDICARE

## 2021-11-01 VITALS
HEIGHT: 62 IN | DIASTOLIC BLOOD PRESSURE: 84 MMHG | BODY MASS INDEX: 29.81 KG/M2 | TEMPERATURE: 97.9 F | OXYGEN SATURATION: 97 % | HEART RATE: 86 BPM | WEIGHT: 162 LBS | SYSTOLIC BLOOD PRESSURE: 158 MMHG | RESPIRATION RATE: 18 BRPM

## 2021-11-01 DIAGNOSIS — C18.2 MALIGNANT NEOPLASM OF ASCENDING COLON (HCC): Primary | ICD-10-CM

## 2021-11-01 DIAGNOSIS — E89.0 S/P COMPLETE THYROIDECTOMY: ICD-10-CM

## 2021-11-01 DIAGNOSIS — E03.9 ACQUIRED HYPOTHYROIDISM: ICD-10-CM

## 2021-11-01 DIAGNOSIS — E03.9 HYPOTHYROIDISM, UNSPECIFIED TYPE: ICD-10-CM

## 2021-11-01 DIAGNOSIS — E78.2 MIXED HYPERLIPIDEMIA: ICD-10-CM

## 2021-11-01 DIAGNOSIS — Z23 IMMUNIZATION DUE: ICD-10-CM

## 2021-11-01 DIAGNOSIS — C73 HURTHLE CELL CARCINOMA OF THYROID (HCC): ICD-10-CM

## 2021-11-01 DIAGNOSIS — I10 ESSENTIAL HYPERTENSION: ICD-10-CM

## 2021-11-01 PROCEDURE — G0008 ADMIN INFLUENZA VIRUS VAC: HCPCS | Performed by: FAMILY MEDICINE

## 2021-11-01 PROCEDURE — 1090F PRES/ABSN URINE INCON ASSESS: CPT | Performed by: FAMILY MEDICINE

## 2021-11-01 PROCEDURE — G8417 CALC BMI ABV UP PARAM F/U: HCPCS | Performed by: FAMILY MEDICINE

## 2021-11-01 PROCEDURE — 90694 VACC AIIV4 NO PRSRV 0.5ML IM: CPT | Performed by: FAMILY MEDICINE

## 2021-11-01 PROCEDURE — 1123F ACP DISCUSS/DSCN MKR DOCD: CPT | Performed by: FAMILY MEDICINE

## 2021-11-01 PROCEDURE — 1036F TOBACCO NON-USER: CPT | Performed by: FAMILY MEDICINE

## 2021-11-01 PROCEDURE — G8427 DOCREV CUR MEDS BY ELIG CLIN: HCPCS | Performed by: FAMILY MEDICINE

## 2021-11-01 PROCEDURE — G8484 FLU IMMUNIZE NO ADMIN: HCPCS | Performed by: FAMILY MEDICINE

## 2021-11-01 PROCEDURE — G8399 PT W/DXA RESULTS DOCUMENT: HCPCS | Performed by: FAMILY MEDICINE

## 2021-11-01 PROCEDURE — 4040F PNEUMOC VAC/ADMIN/RCVD: CPT | Performed by: FAMILY MEDICINE

## 2021-11-01 PROCEDURE — 99214 OFFICE O/P EST MOD 30 MIN: CPT | Performed by: FAMILY MEDICINE

## 2021-11-01 RX ORDER — LEVOTHYROXINE SODIUM 112 UG/1
112 TABLET ORAL DAILY
Qty: 90 TABLET | Refills: 1 | Status: SHIPPED
Start: 2021-11-01 | End: 2022-05-09

## 2021-11-01 RX ORDER — POTASSIUM CHLORIDE 750 MG/1
TABLET, FILM COATED, EXTENDED RELEASE ORAL
Qty: 180 TABLET | Refills: 1 | Status: SHIPPED
Start: 2021-11-01 | End: 2022-03-21

## 2021-11-01 RX ORDER — AMLODIPINE BESYLATE AND ATORVASTATIN CALCIUM 5; 10 MG/1; MG/1
TABLET, FILM COATED ORAL
Qty: 90 TABLET | Refills: 1 | Status: SHIPPED
Start: 2021-11-01 | End: 2022-07-06

## 2021-11-01 RX ORDER — ENALAPRIL MALEATE AND HYDROCHLOROTHIAZIDE 10; 25 MG/1; MG/1
TABLET ORAL
Qty: 90 TABLET | Refills: 1 | Status: SHIPPED
Start: 2021-11-01 | End: 2022-06-13

## 2021-11-01 RX ORDER — METOPROLOL SUCCINATE 25 MG/1
25 TABLET, EXTENDED RELEASE ORAL 2 TIMES DAILY
Qty: 180 TABLET | Refills: 1 | Status: SHIPPED
Start: 2021-11-01 | End: 2022-05-04

## 2021-11-01 SDOH — ECONOMIC STABILITY: FOOD INSECURITY: WITHIN THE PAST 12 MONTHS, THE FOOD YOU BOUGHT JUST DIDN'T LAST AND YOU DIDN'T HAVE MONEY TO GET MORE.: NEVER TRUE

## 2021-11-01 SDOH — ECONOMIC STABILITY: FOOD INSECURITY: WITHIN THE PAST 12 MONTHS, YOU WORRIED THAT YOUR FOOD WOULD RUN OUT BEFORE YOU GOT MONEY TO BUY MORE.: NEVER TRUE

## 2021-11-01 ASSESSMENT — SOCIAL DETERMINANTS OF HEALTH (SDOH): HOW HARD IS IT FOR YOU TO PAY FOR THE VERY BASICS LIKE FOOD, HOUSING, MEDICAL CARE, AND HEATING?: NOT HARD AT ALL

## 2021-11-05 PROBLEM — E03.9 HYPOTHYROIDISM: Status: ACTIVE | Noted: 2021-11-05

## 2021-11-05 ASSESSMENT — ENCOUNTER SYMPTOMS
CHEST TIGHTNESS: 0
NAUSEA: 0
WHEEZING: 0
RESPIRATORY NEGATIVE: 1
DIARRHEA: 0
BACK PAIN: 0
EYE DISCHARGE: 0
VOICE CHANGE: 0
FACIAL SWELLING: 0
CONSTIPATION: 0
RHINORRHEA: 0
COLOR CHANGE: 0
EYE REDNESS: 0
EYE ITCHING: 0
BLOOD IN STOOL: 0
VOMITING: 0
ALLERGIC/IMMUNOLOGIC NEGATIVE: 1
SINUS PRESSURE: 0
EYES NEGATIVE: 1
TROUBLE SWALLOWING: 0
COUGH: 0
SHORTNESS OF BREATH: 0
ABDOMINAL DISTENTION: 0
RECTAL PAIN: 0
PHOTOPHOBIA: 0
SINUS PAIN: 0
EYE PAIN: 0
ABDOMINAL PAIN: 0
CHOKING: 0
SORE THROAT: 0
STRIDOR: 0
ANAL BLEEDING: 0

## 2021-11-05 NOTE — PROGRESS NOTES
Joseline Chaudhry is a 68 y.o. female. HPI/Chief C/O:  Chief Complaint   Patient presents with    Medication Refill     Pt here for check up and refills    Flu Vaccine     Pended     Allergies   Allergen Reactions    Prevnar 13 [Pneumococcal 13-Kassidy Conj Vacc] Swelling     Redness and swelling in arm at site     This 68year old female presents for physical exam. Pt has malignant neoplasm of ascending colon ([address] hurthle cell carcinoma of thyoid ([address] hypothyroid, hypertension, hyperlipidemia, and fatigue. Pt follows with general surgeon, oncology, and ENT.  ROS:  Review of Systems   Constitutional: Positive for fatigue. Negative for activity change, appetite change, chills, diaphoresis, fever and unexpected weight change. HENT: Negative for congestion, dental problem, drooling, ear discharge, ear pain, facial swelling, hearing loss, mouth sores, nosebleeds, postnasal drip, rhinorrhea, sinus pressure, sinus pain, sneezing, sore throat, tinnitus, trouble swallowing and voice change. Eyes: Negative. Negative for photophobia, pain, discharge, redness, itching and visual disturbance. Respiratory: Negative. Negative for cough, choking, chest tightness, shortness of breath, wheezing and stridor. Cardiovascular: Negative. Negative for chest pain, palpitations and leg swelling. Gastrointestinal: Negative for abdominal distention, abdominal pain, anal bleeding, blood in stool, constipation, diarrhea, nausea, rectal pain and vomiting. Endocrine: Negative. Negative for cold intolerance, heat intolerance, polydipsia, polyphagia and polyuria. Genitourinary: Negative. Negative for decreased urine volume, difficulty urinating, dysuria, flank pain, frequency, genital sores, hematuria, menstrual problem, pelvic pain and urgency. Musculoskeletal: Positive for arthralgias and myalgias. Negative for back pain, gait problem, joint swelling, neck pain and neck stiffness. Skin: Negative.   Negative for color change, pallor, rash and wound. Allergic/Immunologic: Negative. Neurological: Negative. Negative for dizziness, tremors, seizures, syncope, facial asymmetry, speech difficulty, weakness, light-headedness, numbness and headaches. Hematological: Negative. Negative for adenopathy. Does not bruise/bleed easily. Psychiatric/Behavioral: Positive for sleep disturbance. Negative for agitation, behavioral problems, confusion, decreased concentration, dysphoric mood, hallucinations, self-injury and suicidal ideas. The patient is nervous/anxious. The patient is not hyperactive.          Past Medical/Surgical Hx;  Reviewed with patient      Diagnosis Date    Cancer ([facility] 07/29/2019    colon    Glaucoma     Hyperlipidemia     Hypertension     Hypothyroidism      Past Surgical History:   Procedure Laterality Date    CATARACT REMOVAL WITH IMPLANT Bilateral     CHOLECYSTECTOMY      COLONOSCOPY N/A 7/29/2019    COLONOSCOPY WITH BIOPSY performed by Simona Pop MD at [address] [facility] COLONOSCOPY N/A 9/28/2020    COLONOSCOPY POSS BIOPSY OR POLYPECTOMY performed by Simona Pop MD at [address] Right 8/29/2019    RIGHT HEMICOLECTOMY, LAPAROSCOPIC, ROBOTIC XI ASSISTED performed by Simona Pop MD at [address] [facility] [address] Bilateral 2/20/2020    TOTAL THYROIDECTOMY WITH ANTERIOR NECK DISSECTION-NEEDS NERVE INTEGRITY MONITOR performed by Leopoldo Bernheim, DO at [facility] OR       Past Family Hx:  Reviewed with patient      Problem Relation Age of Onset    Early Death Father    Santiago Cancer Sister     Lung Cancer Sister     Cancer Brother     Lung Cancer Brother        Social Hx:  Reviewed with patient  Social History     Tobacco Use    Smoking status: Never Smoker    Smokeless tobacco: Never Used   Substance Use Topics    Alcohol use: No     Alcohol/week: 0.0 standard drinks       OBJECTIVE  BP (!) 158/84   Pulse 86   Temp 97.9 °F (36.6 °C) (Temporal)   Resp 18   Ht 5' 2\" (1.575 m)   Wt 162 lb (73.5 kg)   LMP  (LMP Unknown)   SpO2 97%   Breastfeeding No   BMI 29.63 kg/m²     Problem List:  Nallely Moreno does not have any pertinent problems on file. PHYS EX:  Physical Exam  Vitals and nursing note reviewed. Constitutional:       General: She is not in acute distress. Appearance: Normal appearance. She is well-developed. She is obese. She is not ill-appearing, toxic-appearing or diaphoretic. Comments: Patient has morbid obesity. Patient instructed on low calorie, healthy diet. HENT:      Head: Normocephalic and atraumatic. Nose: Nose normal. No congestion or rhinorrhea. Eyes:      General: No scleral icterus. Right eye: No discharge. Left eye: No discharge. Conjunctiva/sclera: Conjunctivae normal.      Pupils: Pupils are equal, round, and reactive to light. Neck:      Thyroid: Thyromegaly present. Vascular: No carotid bruit or JVD. Trachea: No tracheal deviation. Cardiovascular:      Rate and Rhythm: Normal rate and regular rhythm. Pulses: Normal pulses. Heart sounds: Normal heart sounds. No murmur heard. No friction rub. No gallop. Pulmonary:      Effort: Pulmonary effort is normal. No respiratory distress. Breath sounds: Normal breath sounds. No stridor. No wheezing, rhonchi or rales. Chest:      Chest wall: No tenderness. Abdominal:      General: Bowel sounds are normal. There is no distension. Palpations: Abdomen is soft. There is no mass. Tenderness: There is no abdominal tenderness. There is no guarding or rebound. Hernia: No hernia is present. Musculoskeletal:         General: Tenderness present. No swelling, deformity or signs of injury. Cervical back: Normal range of motion and neck supple. No rigidity. No muscular tenderness. Right lower leg: No edema. Left lower leg: No edema.       Comments: Pain and decreased ROM multiple instructed on flu vaccine. Other orders  -     INFLUENZA, QUADV, ADJUVANTED, 65 YRS =, IM, PF, PREFILL SYR, 0.5ML (FLUAD)    Pt instructed if any worse go ED ASAP. Outpatient Encounter Medications as of 11/1/2021   Medication Sig Dispense Refill    levothyroxine (SYNTHROID) 112 MCG tablet Take 1 tablet by mouth daily 90 tablet 1    enalapril-hydroCHLOROthiazide (VASERETIC) 10-25 MG per tablet Take 1 tablet by mouth once daily 90 tablet 1    potassium chloride (KLOR-CON) 10 MEQ extended release tablet Take 1 tablet by mouth twice daily 180 tablet 1    amLODIPine-atorvastatatin (CADUET) 5-10 MG per tablet Take 1 tablet by mouth once daily 90 tablet 1    metoprolol succinate (TOPROL XL) 25 MG extended release tablet Take 1 tablet by mouth 2 times daily 180 tablet 1    [DISCONTINUED] levothyroxine (SYNTHROID) 112 MCG tablet Take 1 tablet by mouth daily 90 tablet 3    [DISCONTINUED] enalapril-hydroCHLOROthiazide (VASERETIC) 10-25 MG per tablet Take 1 tablet by mouth once daily 90 tablet 1    [DISCONTINUED] potassium chloride (KLOR-CON) 10 MEQ extended release tablet Take 1 tablet by mouth twice daily 180 tablet 1    [DISCONTINUED] amLODIPine-atorvastatatin (CADUET) 5-10 MG per tablet Take 1 tablet by mouth once daily 90 tablet 1    [DISCONTINUED] metoprolol succinate (TOPROL XL) 25 MG extended release tablet Take 1 tablet by mouth daily 90 tablet 1    Multiple Vitamins-Minerals (PRESERVISION AREDS PO) Take by mouth 2 times daily       Omega-3 Fatty Acids (FISH OIL) 500 MG CAPS Take 1 capsule by mouth daily      Cyanocobalamin (B-12) 2500 MCG TABS Take 1 tablet by mouth three times a week       vitamin D (CHOLECALCIFEROL) 5000 UNITS CAPS capsule Take 5,000 Units by mouth daily       No facility-administered encounter medications on file as of 11/1/2021. Return in about 2 weeks (around 11/15/2021) for recheck BP.         Reviewed recent labs related to Mahsa's current problems      Discussed importance of regular Health Maintenance follow up  Health Maintenance   Topic    Hepatitis C screen     Shingles Vaccine (1 of 2)    Lipid screen     Annual Wellness Visit (AWV)     TSH testing     Potassium monitoring     Creatinine monitoring     DTaP/Tdap/Td vaccine (2 - Td or Tdap)    DEXA (modify frequency per FRAX score)     Flu vaccine     COVID-19 Vaccine     Hepatitis A vaccine     Hepatitis B vaccine     Hib vaccine     Meningococcal (ACWY) vaccine

## 2021-11-14 DIAGNOSIS — I10 ESSENTIAL HYPERTENSION: ICD-10-CM

## 2021-11-15 ENCOUNTER — OFFICE VISIT (OUTPATIENT)
Dept: FAMILY MEDICINE CLINIC | Age: 76
End: 2021-11-15
Payer: MEDICARE

## 2021-11-15 VITALS
WEIGHT: 163 LBS | DIASTOLIC BLOOD PRESSURE: 80 MMHG | SYSTOLIC BLOOD PRESSURE: 146 MMHG | BODY MASS INDEX: 30 KG/M2 | HEIGHT: 62 IN | HEART RATE: 97 BPM | RESPIRATION RATE: 16 BRPM | OXYGEN SATURATION: 97 % | TEMPERATURE: 98.2 F

## 2021-11-15 DIAGNOSIS — I10 ESSENTIAL HYPERTENSION: ICD-10-CM

## 2021-11-15 DIAGNOSIS — Z12.31 SCREENING MAMMOGRAM FOR HIGH-RISK PATIENT: Primary | ICD-10-CM

## 2021-11-15 DIAGNOSIS — E03.9 ACQUIRED HYPOTHYROIDISM: ICD-10-CM

## 2021-11-15 DIAGNOSIS — C73 HURTHLE CELL CARCINOMA OF THYROID (HCC): ICD-10-CM

## 2021-11-15 DIAGNOSIS — C18.2 MALIGNANT NEOPLASM OF ASCENDING COLON (HCC): ICD-10-CM

## 2021-11-15 PROCEDURE — 1036F TOBACCO NON-USER: CPT | Performed by: FAMILY MEDICINE

## 2021-11-15 PROCEDURE — 4040F PNEUMOC VAC/ADMIN/RCVD: CPT | Performed by: FAMILY MEDICINE

## 2021-11-15 PROCEDURE — G8399 PT W/DXA RESULTS DOCUMENT: HCPCS | Performed by: FAMILY MEDICINE

## 2021-11-15 PROCEDURE — G8484 FLU IMMUNIZE NO ADMIN: HCPCS | Performed by: FAMILY MEDICINE

## 2021-11-15 PROCEDURE — 1123F ACP DISCUSS/DSCN MKR DOCD: CPT | Performed by: FAMILY MEDICINE

## 2021-11-15 PROCEDURE — 1090F PRES/ABSN URINE INCON ASSESS: CPT | Performed by: FAMILY MEDICINE

## 2021-11-15 PROCEDURE — 99213 OFFICE O/P EST LOW 20 MIN: CPT | Performed by: FAMILY MEDICINE

## 2021-11-15 PROCEDURE — G8427 DOCREV CUR MEDS BY ELIG CLIN: HCPCS | Performed by: FAMILY MEDICINE

## 2021-11-15 PROCEDURE — G8417 CALC BMI ABV UP PARAM F/U: HCPCS | Performed by: FAMILY MEDICINE

## 2021-11-15 RX ORDER — POTASSIUM CHLORIDE 750 MG/1
TABLET, FILM COATED, EXTENDED RELEASE ORAL
Qty: 180 TABLET | Refills: 0 | OUTPATIENT
Start: 2021-11-15

## 2021-11-15 ASSESSMENT — ENCOUNTER SYMPTOMS
COUGH: 0
RECTAL PAIN: 0
TROUBLE SWALLOWING: 0
FACIAL SWELLING: 0
PHOTOPHOBIA: 0
BLOOD IN STOOL: 0
EYE REDNESS: 0
EYES NEGATIVE: 1
VOMITING: 0
EYE ITCHING: 0
RESPIRATORY NEGATIVE: 1
CHEST TIGHTNESS: 0
BACK PAIN: 0
ANAL BLEEDING: 0
EYE DISCHARGE: 0
COLOR CHANGE: 0
WHEEZING: 0
ALLERGIC/IMMUNOLOGIC NEGATIVE: 1
EYE PAIN: 0
SHORTNESS OF BREATH: 0
DIARRHEA: 0
SORE THROAT: 0
NAUSEA: 0
SINUS PRESSURE: 0
STRIDOR: 0
SINUS PAIN: 0
ABDOMINAL PAIN: 0
CHOKING: 0
VOICE CHANGE: 0
ABDOMINAL DISTENTION: 0
RHINORRHEA: 0
CONSTIPATION: 0

## 2021-11-15 NOTE — PROGRESS NOTES
Gayle Nath is a 68 y.o. female. HPI/Chief C/O:  Chief Complaint   Patient presents with    Hypertension     Allergies   Allergen Reactions    Prevnar 13 [Pneumococcal 13-Kassidy Conj Vacc] Swelling     Redness and swelling in arm at site     This 68year old female presents for physical exam. Pt has malignant neoplasm of ascending colon ([address] hurthle cell carcinoma of thyoid ([address] hypothyroid, hypertension, hyperlipidemia, and fatigue. Pt follows with general surgeon, oncology, and ENT.  ROS:  Review of Systems   Constitutional: Positive for fatigue. Negative for activity change, appetite change, chills, diaphoresis, fever and unexpected weight change. HENT: Negative for congestion, dental problem, drooling, ear discharge, ear pain, facial swelling, hearing loss, mouth sores, nosebleeds, postnasal drip, rhinorrhea, sinus pressure, sinus pain, sneezing, sore throat, tinnitus, trouble swallowing and voice change. Eyes: Negative. Negative for photophobia, pain, discharge, redness, itching and visual disturbance. Respiratory: Negative. Negative for cough, choking, chest tightness, shortness of breath, wheezing and stridor. Cardiovascular: Negative. Negative for chest pain, palpitations and leg swelling. Gastrointestinal: Negative for abdominal distention, abdominal pain, anal bleeding, blood in stool, constipation, diarrhea, nausea, rectal pain and vomiting. Endocrine: Negative. Negative for cold intolerance, heat intolerance, polydipsia, polyphagia and polyuria. Genitourinary: Negative. Negative for decreased urine volume, difficulty urinating, dysuria, flank pain, frequency, genital sores, hematuria, menstrual problem, pelvic pain and urgency. Musculoskeletal: Positive for arthralgias and myalgias. Negative for back pain, gait problem, joint swelling, neck pain and neck stiffness. Skin: Negative. Negative for color change, pallor, rash and wound.    Allergic/Immunologic: Negative. Neurological: Negative. Negative for dizziness, tremors, seizures, syncope, facial asymmetry, speech difficulty, weakness, light-headedness, numbness and headaches. Hematological: Negative. Negative for adenopathy. Does not bruise/bleed easily. Psychiatric/Behavioral: Positive for sleep disturbance. Negative for agitation, behavioral problems, confusion, decreased concentration, dysphoric mood, hallucinations, self-injury and suicidal ideas. The patient is nervous/anxious. The patient is not hyperactive.          Past Medical/Surgical Hx;  Reviewed with patient      Diagnosis Date    Cancer ([facility] 07/29/2019    colon    Glaucoma     Hyperlipidemia     Hypertension     Hypothyroidism      Past Surgical History:   Procedure Laterality Date    CATARACT REMOVAL WITH IMPLANT Bilateral     CHOLECYSTECTOMY      COLONOSCOPY N/A 7/29/2019    COLONOSCOPY WITH BIOPSY performed by Augie Eric MD at [address] COLONOSCOPY N/A 9/28/2020    COLONOSCOPY POSS BIOPSY OR POLYPECTOMY performed by Augie Eric MD at [address] [place] Right 8/29/2019    RIGHT HEMICOLECTOMY, LAPAROSCOPIC, ROBOTIC XI ASSISTED performed by Augie Eric MD at [address] [facility] Bilateral 2/20/2020    TOTAL THYROIDECTOMY WITH ANTERIOR NECK DISSECTION-NEEDS NERVE INTEGRITY MONITOR [place]az[place] [facility] [place]       Past Family Hx:  Reviewed with patient      Problem Relation Age of Onset    Early Death Father    [employer] Cancer Sister     Lung Cancer Sister     Cancer Brother     Lung Cancer Brother        Social Hx:  Reviewed with patient  Social History     Tobacco Use    Smoking status: Never Smoker    Smokeless tobacco: Never Used   Substance Use Topics    Alcohol use: No     Alcohol/week: 0.0 standard drinks       OBJECTIVE  BP (!) 146/80   Pulse 97   Temp 98.2 °F (36.8 °C)   Resp 16   Ht 5' 2\" (1.575 m)   Wt 163 lb (73.9 kg)   LMP (LMP Unknown)   SpO2 97%   Breastfeeding No   BMI 29.81 kg/m²     Problem List:  Belinda Tinoco does not have any pertinent problems on file. PHYS EX:  Physical Exam  Vitals and nursing note reviewed. Constitutional:       General: She is not in acute distress. Appearance: Normal appearance. She is well-developed. She is obese. She is not ill-appearing, toxic-appearing or diaphoretic. Comments: Patient has morbid obesity. Patient instructed on low calorie, healthy diet. HENT:      Head: Normocephalic and atraumatic. Nose: Nose normal. No congestion or rhinorrhea. Eyes:      General: No scleral icterus. Right eye: No discharge. Left eye: No discharge. Conjunctiva/sclera: Conjunctivae normal.      Pupils: Pupils are equal, round, and reactive to light. Neck:      Thyroid: Thyromegaly present. Vascular: No carotid bruit or JVD. Trachea: No tracheal deviation. Cardiovascular:      Rate and Rhythm: Normal rate and regular rhythm. Pulses: Normal pulses. Heart sounds: Normal heart sounds. No murmur heard. No friction rub. No gallop. Pulmonary:      Effort: Pulmonary effort is normal. No respiratory distress. Breath sounds: Normal breath sounds. No stridor. No wheezing, rhonchi or rales. Chest:      Chest wall: No tenderness. Abdominal:      General: Bowel sounds are normal. There is no distension. Palpations: Abdomen is soft. There is no mass. Tenderness: There is no abdominal tenderness. There is no guarding or rebound. Hernia: No hernia is present. Musculoskeletal:         General: Tenderness present. No swelling, deformity or signs of injury. Cervical back: Normal range of motion and neck supple. No rigidity. No muscular tenderness. Right lower leg: No edema. Left lower leg: No edema. Comments: Pain and decreased ROM multiple joints. Lymphadenopathy:      Cervical: No cervical adenopathy.    Skin: General: Skin is warm. Coloration: Skin is not jaundiced or pale. Findings: No bruising, erythema, lesion or rash. Neurological:      General: No focal deficit present. Mental Status: She is alert and oriented to person, place, and time. Cranial Nerves: No cranial nerve deficit. Sensory: No sensory deficit. Motor: No weakness or abnormal muscle tone. Coordination: Coordination normal.      Gait: Gait normal.      Deep Tendon Reflexes: Reflexes are normal and symmetric. Reflexes normal.   Psychiatric:         Mood and Affect: Mood normal.         Behavior: Behavior normal.         Thought Content: Thought content normal.         Judgment: Judgment normal.         ASSESSMENT/PLAN  Kulwant Galicia was seen today for medication refill and flu vaccine. Diagnoses and all orders for this visit:    Malignant neoplasm of ascending colon ([address]  Controlled. General surgeon. Hypothyroidism, unspecified type  -     levothyroxine (SYNTHROID) 112 MCG tablet; Take 1 tablet by mouth daily  Controlled. Synthroid, ENT. Essential hypertension  -     enalapril-hydroCHLOROthiazide (VASERETIC) 10-25 MG per tablet; Take 1 tablet by mouth once daily  -     potassium chloride (KLOR-CON) 10 MEQ extended release tablet; Take 1 tablet by mouth twice daily  -     amLODIPine-atorvastatatin (CADUET) 5-10 MG per tablet; Take 1 tablet by mouth once daily  -     metoprolol succinate (TOPROL XL) 25 MG extended release tablet; Take 1 tablet by mouth 2 times daily  Not controlled. Pt instructed on low salt diet. Mixed hyperlipidemia  -     amLODIPine-atorvastatatin (CADUET) 5-10 MG per tablet; Take 1 tablet by mouth once daily  Controlled. Low chol. Diet, statin. Hurthle cell carcinoma of thyroid (HCC)  Controlled. ENT. Acquired hypothyroidism  Controlled. Synthroid. S/P complete thyroidectomy  Controlled. ENT. Pt instructed if any worse go ED ASAP.     Outpatient Encounter Medications as of 11/15/2021 Medication Sig Dispense Refill    levothyroxine (SYNTHROID) 112 MCG tablet Take 1 tablet by mouth daily 90 tablet 1    enalapril-hydroCHLOROthiazide (VASERETIC) 10-25 MG per tablet Take 1 tablet by mouth once daily 90 tablet 1    potassium chloride (KLOR-CON) 10 MEQ extended release tablet Take 1 tablet by mouth twice daily 180 tablet 1    amLODIPine-atorvastatatin (CADUET) 5-10 MG per tablet Take 1 tablet by mouth once daily 90 tablet 1    metoprolol succinate (TOPROL XL) 25 MG extended release tablet Take 1 tablet by mouth 2 times daily 180 tablet 1    Multiple Vitamins-Minerals (PRESERVISION AREDS PO) Take by mouth 2 times daily       Omega-3 Fatty Acids (FISH OIL) 500 MG CAPS Take 1 capsule by mouth daily      Cyanocobalamin (B-12) 2500 MCG TABS Take 1 tablet by mouth three times a week       vitamin D (CHOLECALCIFEROL) 5000 UNITS CAPS capsule Take 5,000 Units by mouth daily       No facility-administered encounter medications on file as of 11/15/2021. Return in about 2 months (around 1/15/2022).         Reviewed recent labs related to Mahsa's current problems      Discussed importance of regular Health Maintenance follow up  Health Maintenance   Topic    Hepatitis C screen     Shingles Vaccine (1 of 2)    COVID-19 Vaccine (3 - Booster for Moderna series)    Lipid screen    [employer] Visit (AWV)     TSH testing     Potassium monitoring     Creatinine monitoring     DTaP/Tdap/Td vaccine (2 - Td or Tdap)    DEXA (modify frequency per FRAX score)     Flu vaccine     Hepatitis A vaccine     Hepatitis B vaccine     Hib vaccine     Meningococcal (ACWY) vaccine

## 2021-11-16 RX ORDER — POTASSIUM CHLORIDE 750 MG/1
TABLET, FILM COATED, EXTENDED RELEASE ORAL
Qty: 180 TABLET | Refills: 0 | OUTPATIENT
Start: 2021-11-16

## 2021-12-02 ENCOUNTER — HOSPITAL ENCOUNTER (OUTPATIENT)
Dept: MAMMOGRAPHY | Age: 76
Discharge: HOME | End: 2021-12-04
Payer: MEDICARE

## 2021-12-02 DIAGNOSIS — Z12.31 SCREENING MAMMOGRAM FOR HIGH-RISK PATIENT: ICD-10-CM

## 2021-12-02 PROCEDURE — 77063 BREAST TOMOSYNTHESIS BI: CPT

## 2022-01-07 NOTE — PROGRESS NOTES
Dr. Jeremy Beverly notified that patient's colonoscopy procedure is at 1600. He gave an order that the patient can drink clear liquids up to 8 hours prior to procedure.

## 2022-01-07 NOTE — PROGRESS NOTES
Anca Wagoner                                                                                                                     PRE OP INSTRUCTIONS FOR  Adrián Pedroza        Date: 1/7/2022    Date and time of surgery: 1/10/22   Arrival Time: 1430    1. Do not eat or drink anything after 12 midnight prior to surgery. This includes no water, chewing gum, mints or ice chips. 2. Take the following pills with a small sip of water on the morning of Surgery: synthroid, amlodipine, toprol xl     3. Diabetics may take evening dose of insulin but none after midnight. If you feel symptomatic or low blood sugar take 1-2 ounces of apple juice only. 4. Aspirin, Ibuprofen, Advil, Naproxen, Vitamin E and other Anti-inflammatory products should be stopped  before surgery  as directed by your physician. 5. Check with your Doctor regarding stopping Plavix, Coumadin, Lovenox, Fragmin or other blood thinners. 6. Do not smoke,use illicit drugs and do not drink any alcoholic beverages 24 hours prior to surgery. 7. You may brush your teeth and gargle the morning of surgery. DO NOT SWALLOW WATER    8. You MUST make arrangements for a responsible adult to take you home after your surgery. You will not be allowed to leave alone or drive yourself home. It is strongly suggested someone stay with you the first 24 hrs. Your surgery will be cancelled if you do not have a ride home. 9. A parent/legal guardian must accompany a child scheduled for surgery and plan to stay at the hospital until the child is discharged. Please do not bring other children with you. 10. Please wear simple, loose fitting clothing to the hospital.  Benny Slight not bring valuables (money, credit cards, checkbooks, etc.) Do not wear any makeup (including no eye makeup) or nail polish on your fingers or toes. 11. DO NOT wear any jewelry or piercings on day of surgery. All body piercing jewelry must be removed. 12.  Shower the night before surgery with ___Antibacterial soap ___Hibiclens. 15. Remember to bring Blood Bank bracelet to the hospital on the day of surgery. 14. If you have a Living Will and Durable Power of  for Healthcare, please bring in a copy. 15. If appropriate bring crutches, inspirex, etc... 12. Notify your Surgeon if you develop any illness between now and surgery time, cough, cold, fever, sore throat, nausea, vomiting, etc.  Please notify your surgeon if you experience dizziness, shortness of breath or blurred vision between now & the time of your surgery. 17. If you have ___dentures, they will be removed before going to the OR; we will provide you a container. If you wear ___contact lenses or ___glasses, they will be removed; please bring a case for them. 18. To provide excellent care visitors will be limited to one in the room at any given time. 19. Please bring picture ID and insurance card. 20. Sleep apnea patients need to bring CPAP to hospital on day of surgery. 21. Visit our web site for additional information: [email or web address]    22. During flu season no children under the age of 15 are permitted in the hospital for the safety of all patients. 23. Other                 Please call pre admission testing if you have any further questions.    [address] [facility]     [address]

## 2022-01-09 ENCOUNTER — ANESTHESIA EVENT (OUTPATIENT)
Dept: ENDOSCOPY | Age: 77
End: 2022-01-09
Payer: MEDICARE

## 2022-01-09 ASSESSMENT — LIFESTYLE VARIABLES: SMOKING_STATUS: 0

## 2022-01-09 NOTE — ANESTHESIA PRE PROCEDURE
Department of Anesthesiology  Preprocedure Note       Name:  Destiney Maddox   Age:  68 y.o.  :  1945                                          MRN:  [id number]         Date:  1/10/2022      Surgeon: Marcy Cosme):  Hiwot Daniel MD    Procedure: COLONOSCOPY POSSIBLE BIOPSY OR POLYPECTOMY (N/A )    Medications prior to admission:   Prior to Admission medications    Medication Sig Start Date End Date Taking?  Authorizing Provider   levothyroxine (SYNTHROID) 112 MCG tablet Take 1 tablet by mouth daily 21 Yes Carola P Catterlin, DO   amLODIPine-atorvastatatin (CADUET) 5-10 MG per tablet Take 1 tablet by mouth once daily 21  Yes Carola P Catterlin, DO   metoprolol succinate (TOPROL XL) 25 MG extended release tablet Take 1 tablet by mouth 2 times daily 21 Yes Carola P Catterlin, DO   enalapril-hydroCHLOROthiazide (VASERETIC) 10-25 MG per tablet Take 1 tablet by mouth once daily 21   Arna Frame P Catterlin, DO   potassium chloride (KLOR-CON) 10 MEQ extended release tablet Take 1 tablet by mouth twice daily 21   Carola P Catterlin, DO   Multiple Vitamins-Minerals (PRESERVISION AREDS PO) Take by mouth 2 times daily     Historical Provider, MD   Omega-3 Fatty Acids (FISH OIL) 500 MG CAPS Take 1 capsule by mouth daily    Historical Provider, MD   Cyanocobalamin (B-12) 2500 MCG TABS Take 1 tablet by mouth three times a week     Historical Provider, MD   vitamin D (CHOLECALCIFEROL) 5000 UNITS CAPS capsule Take 5,000 Units by mouth daily    Historical Provider, MD       Current medications:    Current Facility-Administered Medications   Medication Dose Route Frequency Provider Last Rate Last Admin    0.9 % sodium chloride infusion   IntraVENous Continuous Hiwot Daniel  mL/hr at 01/10/22 1426 New Bag at 01/10/22 1426    sodium chloride flush 0.9 % injection 10 mL  10 mL IntraVENous 2 times per day Hiwot Daniel MD        sodium chloride flush 0.9 % injection 10 mL  10 mL IntraVENous PRN Nadia Hodgson MD        0.9 % sodium chloride infusion  25 mL IntraVENous PRN Nadia Hodgson MD           Allergies:     Allergies   Allergen Reactions    Prevnar 13 [Pneumococcal 13-Kassidy Conj Vacc] Swelling     Redness and swelling in arm at site       Problem List:    Patient Active Problem List   Diagnosis Code    Essential hypertension I10    Mixed hyperlipidemia E78.2    Acquired hypothyroidism E03.9    Fatigue R53.83    Seasonal allergic rhinitis J30.2    Allergic drug reaction T78.40XA    Dermatitis L30.9    Adrenal incidentaloma ([facility] E27.8    Multinodular goiter E04.2    Vitamin D deficiency E55.9    Colon cancer ([facility] C18.9    S/P complete thyroidectomy E89.0    Post-operative pain G89.18    Hurthle cell carcinoma of thyroid ([facility] C73    Anxiety F41.9    Hypothyroidism E03.9    Screening mammogram for high-risk patient Z12.31       Past Medical History:        Diagnosis Date    Cancer ([facility] 07/29/2019    colon    Glaucoma     Hyperlipidemia     Hypertension     Hypothyroidism        Past Surgical History:        Procedure Laterality Date    CATARACT REMOVAL WITH IMPLANT Bilateral     CHOLECYSTECTOMY      COLONOSCOPY N/A 7/29/2019    COLONOSCOPY WITH BIOPSY performed by Nadia Hodgson MD at [address] [facility] COLONOSCOPY N/A 9/28/2020    COLONOSCOPY POSS BIOPSY OR POLYPECTOMY performed by Nadia Hodgson MD at [address] [facility] Right 8/29/2019    RIGHT HEMICOLECTOMY, LAPAROSCOPIC, ROBOTIC XI ASSISTED performed by Nadia Hodgson MD at [address] [facility] Bilateral 2/20/2020    TOTAL THYROIDECTOMY WITH ANTERIOR NECK DISSECTION-NEEDS NERVE INTEGRITY MONITOR performed by Lady David DO at [facility] OR       Social History:    Social History     Tobacco Use    Smoking status: Never Smoker    Smokeless tobacco: Never Used   Substance Use Topics    Alcohol use: No     Alcohol/week: 0.0 standard drinks Counseling given: Not Answered      Vital Signs (Current):   Vitals:    01/10/22 1354   BP: (!) 166/89   Pulse: 98   Resp: 18   Temp: 96.6 °F (35.9 °C)   TempSrc: Infrared   SpO2: 96%   Weight: 157 lb (71.2 kg)   Height: 5' 2\" (1.575 m)                                              BP Readings from Last 3 Encounters:   01/10/22 (!) 166/89   11/15/21 (!) 146/80   11/01/21 (!) 158/84       NPO Status: Time of last liquid consumption: 0800 (small sip with meds)instructed to be NPO at [facility] before OR                        Time of last solid consumption: 1300                        Date of last liquid consumption: 01/10/22                        Date of last solid food consumption: 01/08/22    BMI:   Wt Readings from Last 3 Encounters:   01/10/22 157 lb (71.2 kg)   11/15/21 163 lb (73.9 kg)   11/01/21 162 lb (73.5 kg)     Body mass index is 28.72 kg/m². CBC:   Lab Results   Component Value Date    WBC 8.0 09/27/2021    RBC 5.01 09/27/2021    HGB 14.4 09/27/2021    HCT 43.1 09/27/2021    MCV 86.0 09/27/2021    RDW 13.5 09/27/2021     09/27/2021       CMP:   Lab Results   Component Value Date     09/27/2021    K 3.1 09/27/2021    K 4.2 08/30/2019    CL 97 09/27/2021    CO2 30 09/27/2021    BUN 10 09/27/2021    CREATININE 0.7 09/27/2021    GFRAA >60 09/27/2021    LABGLOM >60 09/27/2021    GLUCOSE 97 09/27/2021    PROT 7.9 09/27/2021    CALCIUM 8.9 09/27/2021    BILITOT 0.4 09/27/2021    ALKPHOS 109 09/27/2021    AST 17 09/27/2021    ALT 12 09/27/2021       POC Tests: No results for input(s): POCGLU, POCNA, POCK, POCCL, POCBUN, POCHEMO, POCHCT in the last 72 hours.     Coags: No results found for: PROTIME, INR, APTT    HCG (If Applicable): No results found for: PREGTESTUR, PREGSERUM, HCG, HCGQUANT     ABGs: No results found for: PHART, PO2ART, GCW5XJJ, LAK2FIN, BEART, B4DLACWW     Type & Screen (If Applicable):  No results found for: LABABO, LABRH     EKG 8/28/19  Normal sinus rhythm  Minimal voltage criteria for LVH, may be normal variant  Nonspecific ST and T wave abnormality  Prolonged QT  Abnormal ECG    Anesthesia Evaluation  Patient summary reviewed and Nursing notes reviewed no history of anesthetic complications:   Airway: Mallampati: III  TM distance: >3 FB   Neck ROM: full  Mouth opening: > = 3 FB Dental:          Pulmonary: breath sounds clear to auscultation      (-) not a current smoker                          ROS comment:   Seasonal allergic rhinitis   Cardiovascular:    (+) hypertension:, hyperlipidemia      ECG reviewed  Rhythm: regular  Rate: normal           Beta Blocker:  Dose within 24 Hrs        PE comment: tachy   Neuro/Psych:                ROS comment: Glaucoma GI/Hepatic/Renal:   (+) bowel prep,          ROS comment: HISTORY OF COLON CANCER, SCREENING. Endo/Other:    (+) hypothyroidism::., malignancy/cancer (thyroid, hx. colon cancer). ROS comment: Thyroid CA Abdominal:         (-) obese       Vascular: negative vascular ROS. Other Findings:               Anesthesia Plan      MAC     ASA 3       Induction: intravenous. Plan discussed with CRNA. PAT Chart Review:  Chart reviewed per routine by Hebert Olivas MD.  Above represents information available via shared medical record including previous anesthesia history, drug and allergy history. Confirmation of above and final plan per Day of Surgery (DOS) anesthesiologist.    DOS STAFF ADDENDUM:    Pt seen and examined, chart reviewed (including anesthesia, drug and allergy history). Anesthetic plan, risks, benefits, alternatives, and personnel involved discussed with patient. Patient verbalized an understanding and agrees to proceed. Plan discussed with care team members and agreed upon.     Hebert Olivas MD  Staff Anesthesiologist  2:58 PM  Hebert Olivas MD   1/10/2022

## 2022-01-10 ENCOUNTER — ANESTHESIA (OUTPATIENT)
Dept: ENDOSCOPY | Age: 77
End: 2022-01-10
Payer: MEDICARE

## 2022-01-10 ENCOUNTER — HOSPITAL ENCOUNTER (OUTPATIENT)
Age: 77
Setting detail: OUTPATIENT SURGERY
Discharge: HOME | End: 2022-01-10
Attending: SURGERY | Admitting: SURGERY
Payer: MEDICARE

## 2022-01-10 VITALS
DIASTOLIC BLOOD PRESSURE: 74 MMHG | HEART RATE: 80 BPM | WEIGHT: 157 LBS | HEIGHT: 62 IN | SYSTOLIC BLOOD PRESSURE: 165 MMHG | RESPIRATION RATE: 16 BRPM | BODY MASS INDEX: 28.89 KG/M2 | OXYGEN SATURATION: 97 % | TEMPERATURE: 97.3 F

## 2022-01-10 VITALS
DIASTOLIC BLOOD PRESSURE: 78 MMHG | SYSTOLIC BLOOD PRESSURE: 131 MMHG | OXYGEN SATURATION: 98 % | RESPIRATION RATE: 14 BRPM

## 2022-01-10 PROCEDURE — 3700000000 HC ANESTHESIA ATTENDED CARE: Performed by: SURGERY

## 2022-01-10 PROCEDURE — 7100000010 HC PHASE II RECOVERY - FIRST 15 MIN: Performed by: SURGERY

## 2022-01-10 PROCEDURE — 3609027000 HC COLONOSCOPY: Performed by: SURGERY

## 2022-01-10 PROCEDURE — G0105 COLORECTAL SCRN; HI RISK IND: HCPCS | Performed by: SURGERY

## 2022-01-10 PROCEDURE — 6360000002 HC RX W HCPCS: Performed by: NURSE ANESTHETIST, CERTIFIED REGISTERED

## 2022-01-10 PROCEDURE — 2709999900 HC NON-CHARGEABLE SUPPLY: Performed by: SURGERY

## 2022-01-10 PROCEDURE — 2580000003 HC RX 258: Performed by: SURGERY

## 2022-01-10 PROCEDURE — 3700000001 HC ADD 15 MINUTES (ANESTHESIA): Performed by: SURGERY

## 2022-01-10 PROCEDURE — 7100000011 HC PHASE II RECOVERY - ADDTL 15 MIN: Performed by: SURGERY

## 2022-01-10 RX ORDER — SODIUM CHLORIDE 0.9 % (FLUSH) 0.9 %
10 SYRINGE (ML) INJECTION PRN
Status: DISCONTINUED | OUTPATIENT
Start: 2022-01-10 | End: 2022-01-10 | Stop reason: HOSPADM

## 2022-01-10 RX ORDER — SODIUM CHLORIDE 0.9 % (FLUSH) 0.9 %
10 SYRINGE (ML) INJECTION EVERY 12 HOURS SCHEDULED
Status: DISCONTINUED | OUTPATIENT
Start: 2022-01-10 | End: 2022-01-10 | Stop reason: HOSPADM

## 2022-01-10 RX ORDER — SODIUM CHLORIDE 9 MG/ML
25 INJECTION, SOLUTION INTRAVENOUS PRN
Status: DISCONTINUED | OUTPATIENT
Start: 2022-01-10 | End: 2022-01-10 | Stop reason: HOSPADM

## 2022-01-10 RX ORDER — SODIUM CHLORIDE 9 MG/ML
INJECTION, SOLUTION INTRAVENOUS CONTINUOUS
Status: DISCONTINUED | OUTPATIENT
Start: 2022-01-10 | End: 2022-01-10 | Stop reason: HOSPADM

## 2022-01-10 RX ORDER — PROPOFOL 10 MG/ML
INJECTION, EMULSION INTRAVENOUS PRN
Status: DISCONTINUED | OUTPATIENT
Start: 2022-01-10 | End: 2022-01-10 | Stop reason: SDUPTHER

## 2022-01-10 RX ADMIN — PROPOFOL 150 MCG/KG/MIN: 10 INJECTION, EMULSION INTRAVENOUS at 15:25

## 2022-01-10 RX ADMIN — PROPOFOL 60 MG: 10 INJECTION, EMULSION INTRAVENOUS at 15:24

## 2022-01-10 RX ADMIN — SODIUM CHLORIDE: 9 INJECTION, SOLUTION INTRAVENOUS at 14:26

## 2022-01-10 ASSESSMENT — PAIN SCALES - GENERAL: PAINLEVEL_OUTOF10: 0

## 2022-01-10 ASSESSMENT — PAIN - FUNCTIONAL ASSESSMENT: PAIN_FUNCTIONAL_ASSESSMENT: 0-10

## 2022-01-10 NOTE — OP NOTE
[place] [address] [facility]  Colonoscopy Operative Report    DATE OF PROCEDURE: 1/10/2022     PREOPERATIVE DIAGNOSIS: Colon cancer    POSTOPERATIVE DIAGNOSIS/FINDINGS: Normal colon, patent anastomosis    SURGEON: Juliocesar Cast MD    ASSISTANT: None    OPERATION: Total Colonoscopy to the to ileocolic anastomosis    ANESTHESIA: Local monitored anesthesia. COMPLICATIONS: None. EBL: None    PRIOR TO THE EXAM: Gen: comfortable, no distress, awake and alert; Lungs: Clear;  Heart:regular rate and rhythm, normal S1S2     BRIEF HISTORY:  This is a 68 y.o. female who presents for screening colonoscopy for surveillance of colon cancer. The patient has a personal of colon cancer. The patient had right hemicolectomy for cancer in the past. My recommendation is to proceed with colonoscopy. The patient was advised of the risks, benefits, complications and options including the risk of bleeding and perforation. The patient understood and agreed to proceed. PROCEDURE:  In the left side down decubitus position, a digital rectal exam was performed. There were no masses or abnormalities noted. The scope was lubricated and inserted into the anus. The scope was then passed under direct visualization in a retrograde fashion to the ileocolic anastomosis. The light was seen in the patient's right side. The ileocecal valve was photographed. The prep was adequate. No pressure/positioning manuevers were required for complete passage. As the scope is withdrawn, visualization of the ascending and transverse colon was unremarkable. The scope is then pulled past the splenic flexure into the descending and sigmoid colon. No abnormalities were seen. The scope is then pulled through the sigmoid colon into the rectum. The scope is retroflexed at the anal verge. No abnormalities are seen at the anal verge. The scope was then straightened and removed. Total withdrawal time was 8 minutes.   There was no evidence of inflammatory bowel disease, polyps or malignancy throughout. The patient tolerated the procedure well        SPECIMENS:  none    PLAN:    Follow up with me as needed    Recommendation is for next colonoscopy in 5 years but likely as needed unless dictated different by Oncology given advanced age.     Gonsalo Vizcarra MD, MS  Minimally Invasive and Bariatric Surgeon  1/10/2022  3:40 PM

## 2022-01-10 NOTE — H&P
General Surgery History and Physical  [facility]    Patient's Name/Date of Birth: Dale Gamble / [birth year]    Date: January 10, 2022     Surgeon: Marissa Hidalgo M.D.    PCP: Nayeli العراقي DO     Chief Complaint: rectal bleeding    HPI:   Dale Gamble is a 68 y.o. female who presents for evaluation of rectal bleeding. Timing is once, radiation to rectum, alleviated by none and started this week and severity is 7/10. Hx of colon CA [id number] 2019 s/p right hemicoelctomy with repeat cscope 8 months ago. Presents with one week of constipation and followed by a large bowel movement and bloody stool for one day. This has resolved. States she was straining very hard two days ago and had large BM. States one episode of melena with small amounts of bright red bloood. She has since started on stool softener and her BM now are nonbloody and better with regards to no straining. Denies fever, chills, SOB, chest pain. No abd pain, no nausea, no vomiting. Patient now returns 3 months later with no problems. Denies any problems with bloody stools. She states that she had that one single episode several months ago and has not had any issues since. She is followed up with her oncologist is recommending a repeat colonoscopy. I discussed with her it should be done sometime within the next 6 months. Denies any shortness of breath or chest pain. Denies any bloody stools she has had normal bowel movements. She would like to try different type of prep rather than the GoLYTELY.     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[address]    Anxiety    Hypothyroidism    Screening mammogram for high-risk patient       Past Medical History:   Diagnosis Date    Cancer ([address] 07/29/2019    colon    Glaucoma     Hyperlipidemia     Hypertension     Hypothyroidism        Past Surgical History:   Procedure Laterality Date    CATARACT REMOVAL WITH IMPLANT Bilateral     CHOLECYSTECTOMY      COLONOSCOPY N/A 7/29/2019    COLONOSCOPY WITH BIOPSY performed by Crissy Resendiz MD at [address] COLONOSCOPY N/A 9/28/2020    COLONOSCOPY POSS BIOPSY OR POLYPECTOMY performed by Crissy Resendiz MD at [address] Right 8/29/2019    RIGHT HEMICOLECTOMY, LAPAROSCOPIC, ROBOTIC XI ASSISTED performed by Crissy Resendiz MD at [address] [facility] Bilateral 2/20/2020    TOTAL THYROIDECTOMY WITH ANTERIOR NECK DISSECTION-NEEDS NERVE INTEGRITY MONITOR performed by Kash Huerta DO at [facility] OR       Allergies   Allergen Reactions    Prevnar 13 [Pneumococcal 13-Kassidy Conj Vacc] Swelling     Redness and swelling in arm at site       The patient has a family history that is negative for severe cardiovascular or respiratory issues, negative for reaction to anesthesia. Time spent reviewing past medical, surgical, social and family history, vitals, nursing assessment and images. No changes from above documented history.     Social History     Socioeconomic History    Marital status: Single     Spouse name: Not on file    Number of children: Not on file    Years of education: Not on file    Highest education level: Not on file   Occupational History    Not on file   Tobacco Use    Smoking status: Never Smoker    Smokeless tobacco: Never Used   Vaping Use    Vaping Use: Never used   Substance and Sexual Activity    Alcohol use: No     Alcohol/week: 0.0 standard drinks    Drug use: No    Sexual activity: Not Currently   Other Topics Concern    Not on file   Social History Narrative    Not on file     Social Determinants of Health     Financial Resource Strain: Low Risk     Difficulty of Paying Living Expenses: Not hard at all   Food Insecurity: No Food Insecurity    Worried About Running Out of Food in the Last Year: Never true    Jaison of Food in the Last Year: Never true   Transportation Needs:     Lack of Transportation (Medical): Not on file    Lack of Transportation (Non-Medical): Not on file   Physical Activity:     Days of Exercise per Week: Not on file    Minutes of Exercise per Session: Not on file   Stress:     Feeling of Stress : Not on file   Social Connections:     Frequency of Communication with Friends and Family: Not on file    Frequency of Social Gatherings with Friends and Family: Not on file    Attends Islam Services: Not on file    Active Member of [address] [place] [address] or Organizations: Not on file    Attends Club or Organization Meetings: Not on file    Marital Status: Not on file   Intimate Partner Violence:     Fear of Current or Ex-Partner: Not on file    Emotionally Abused: Not on file    Physically Abused: Not on file    Sexually Abused: Not on file   Housing Stability:     Unable to Pay for Housing in the Last Year: Not on file    Number of [place] in the Last Year: Not on file    Unstable Housing in the Last Year: Not on file       I have reviewed relevant labs from this admission and interpretation is included in my assessment and plan    Review of Systems    A complete 10 system review was performed and are otherwise negative unless mentioned in the above HPI. Specific negatives are listed below but may not include all those reviewed.     General ROS: negative obtundation, AMS  ENT ROS: negative rhinorrhea, epistaxis  Allergy and Immunology ROS: negative itchy/watery eyes or nasal congestion  Hematological and Lymphatic ROS: negative spontaneous bleeding or bruising  Endocrine ROS: negative  lethargy, mood swings, palpitations or polydipsia/polyuria  Respiratory ROS: negative sputum changes, stridor, tachypnea or wheezing  Cardiovascular ROS: negative for - loss of consciousness, murmur or orthopnea  Gastrointestinal ROS: negative for - hematochezia or hematemesis  Genito-Urinary ROS: negative for -  genital discharge or hematuria  Musculoskeletal ROS: negative for - focal weakness, gangrene  Psych/Neuro ROS: negative for - visual or auditory hallucinations, suicidal ideation    Physical exam:   BP (!) 166/89   Pulse 98   Temp 96.6 °F (35.9 °C) (Infrared)   Resp 18   Ht 5' 2\" (1.575 m)   Wt 157 lb (71.2 kg)   LMP  (LMP Unknown)   SpO2 96%   BMI 28.72 kg/m²   General appearance:  NAD, appears stated age  Head: NCAT, PERRLA, EOMI, red conjunctiva  Neck: supple, no masses, trachea midline  Lungs: Equal chest rise bilateral, no retractions, no wheezing  Heart: Reg rate  Abdomen: soft, nontender, well healed incisiosn, nondistended  Skin; warm and dry, no cyanosis  Gu: no cva tenderness  Extremities: atraumatic, no focal motor deficits, no open wounds  Psych: No tremor, visual hallucinations      Radiology: I reviewed relevant abdominal imaging from this admission and that available in the EMR    7/2019: OPERATION: Total Colonoscopy to the cecum biopsy cecal mass, forecep polypectomy hepatic flexure    8/2019: Laparoscopic robotic-assisted right hemicolectomy  with intracorporeal anastomosis.     9/2020: POSTOPERATIVE DIAGNOSIS/FINDINGS: Normal colon, patent ileocolonic anastomosis    Assessment:  Doris Chu is a 68 y.o. female with rectal bleeding, constipation, hx of right colon CA  Patient Active Problem List   Diagnosis    Essential hypertension    Mixed hyperlipidemia    Acquired hypothyroidism    Fatigue    Seasonal allergic rhinitis    Allergic drug reaction    Dermatitis    Adrenal incidentaloma ([address]    Multinodular goiter    Vitamin D deficiency    Colon [facility] ([facility]    S/P complete thyroidectomy    Post-operative pain    [facility]thle cell carcinoma of thyroid ([facility]    Anxiety    Hypothyroidism    Screening mammogram for high-risk patient         Plan:     Colonoscopy for cancer surveillance with hx of cecal cancer plan to be done after holidays per her request  MiraLAX prep  I recommended high risk screening colonoscopy with possible biopsy or polypectomy and explained the risk, benefits, expected outcome, and alternatives to the procedure. Risks included but are not limited to bleeding, infection, respiratory distress, hypotension, and perforation of the colon. The patient understands and is in agreement.            Nallely Franz MD  3:12 PM  1/10/2022

## 2022-01-12 ENCOUNTER — OFFICE VISIT (OUTPATIENT)
Dept: FAMILY MEDICINE CLINIC | Age: 77
End: 2022-01-12
Payer: MEDICARE

## 2022-01-12 VITALS
HEIGHT: 62 IN | RESPIRATION RATE: 16 BRPM | TEMPERATURE: 97.7 F | BODY MASS INDEX: 29.63 KG/M2 | OXYGEN SATURATION: 97 % | DIASTOLIC BLOOD PRESSURE: 86 MMHG | WEIGHT: 161 LBS | HEART RATE: 76 BPM | SYSTOLIC BLOOD PRESSURE: 150 MMHG

## 2022-01-12 DIAGNOSIS — C18.2 MALIGNANT NEOPLASM OF ASCENDING COLON (HCC): ICD-10-CM

## 2022-01-12 DIAGNOSIS — I10 ESSENTIAL HYPERTENSION: Primary | ICD-10-CM

## 2022-01-12 DIAGNOSIS — E03.9 ACQUIRED HYPOTHYROIDISM: ICD-10-CM

## 2022-01-12 DIAGNOSIS — C73 HURTHLE CELL CARCINOMA OF THYROID (HCC): ICD-10-CM

## 2022-01-12 DIAGNOSIS — E89.0 S/P COMPLETE THYROIDECTOMY: ICD-10-CM

## 2022-01-12 DIAGNOSIS — E27.8 ADRENAL INCIDENTALOMA (HCC): ICD-10-CM

## 2022-01-12 PROCEDURE — 1036F TOBACCO NON-USER: CPT | Performed by: FAMILY MEDICINE

## 2022-01-12 PROCEDURE — G8399 PT W/DXA RESULTS DOCUMENT: HCPCS | Performed by: FAMILY MEDICINE

## 2022-01-12 PROCEDURE — G8484 FLU IMMUNIZE NO ADMIN: HCPCS | Performed by: FAMILY MEDICINE

## 2022-01-12 PROCEDURE — 99213 OFFICE O/P EST LOW 20 MIN: CPT | Performed by: FAMILY MEDICINE

## 2022-01-12 PROCEDURE — G8427 DOCREV CUR MEDS BY ELIG CLIN: HCPCS | Performed by: FAMILY MEDICINE

## 2022-01-12 PROCEDURE — G8417 CALC BMI ABV UP PARAM F/U: HCPCS | Performed by: FAMILY MEDICINE

## 2022-01-12 PROCEDURE — 1090F PRES/ABSN URINE INCON ASSESS: CPT | Performed by: FAMILY MEDICINE

## 2022-01-12 PROCEDURE — 1123F ACP DISCUSS/DSCN MKR DOCD: CPT | Performed by: FAMILY MEDICINE

## 2022-01-12 PROCEDURE — 4040F PNEUMOC VAC/ADMIN/RCVD: CPT | Performed by: FAMILY MEDICINE

## 2022-01-12 RX ORDER — ENALAPRIL MALEATE 10 MG/1
10 TABLET ORAL DAILY
Qty: 30 TABLET | Refills: 3 | Status: SHIPPED
Start: 2022-01-12 | End: 2022-05-02

## 2022-01-14 ASSESSMENT — ENCOUNTER SYMPTOMS
ANAL BLEEDING: 0
CHOKING: 0
ABDOMINAL PAIN: 0
VOICE CHANGE: 0
SINUS PRESSURE: 0
ABDOMINAL DISTENTION: 0
COLOR CHANGE: 0
RECTAL PAIN: 0
SORE THROAT: 0
CHEST TIGHTNESS: 0
EYE ITCHING: 0
EYE PAIN: 0
VOMITING: 0
TROUBLE SWALLOWING: 0
EYES NEGATIVE: 1
STRIDOR: 0
DIARRHEA: 0
RHINORRHEA: 0
FACIAL SWELLING: 0
NAUSEA: 0
ALLERGIC/IMMUNOLOGIC NEGATIVE: 1
WHEEZING: 0
PHOTOPHOBIA: 0
BACK PAIN: 0
COUGH: 0
EYE REDNESS: 0
RESPIRATORY NEGATIVE: 1
BLOOD IN STOOL: 0
CONSTIPATION: 0
SHORTNESS OF BREATH: 0
EYE DISCHARGE: 0
SINUS PAIN: 0

## 2022-01-14 NOTE — PROGRESS NOTES
Rupesh Maciel is a 68 y.o. female. HPI/Chief C/O:  Chief Complaint   Patient presents with    Hypertension     Regular check up     Allergies   Allergen Reactions    Prevnar 13 [Pneumococcal 13-Kassidy Conj Vacc] Swelling     Redness and swelling in arm at site     This 68year old female presents for recheck on hypertension. Pt states BP is normal at home. BP is elevated at our office and other offices. Pt has hypertension, malignant neoplasm of colon ([address] hurthle cell carcinoma of thyroid ([address] hypothyroid, and  complete thyroidectomy. ROS:  Review of Systems   Constitutional: Positive for fatigue. Negative for activity change, appetite change, chills, diaphoresis, fever and unexpected weight change. HENT: Negative for congestion, dental problem, drooling, ear discharge, ear pain, facial swelling, hearing loss, mouth sores, nosebleeds, postnasal drip, rhinorrhea, sinus pressure, sinus pain, sneezing, sore throat, tinnitus, trouble swallowing and voice change. Eyes: Negative. Negative for photophobia, pain, discharge, redness, itching and visual disturbance. Respiratory: Negative. Negative for cough, choking, chest tightness, shortness of breath, wheezing and stridor. Cardiovascular: Negative. Negative for chest pain, palpitations and leg swelling. Gastrointestinal: Negative for abdominal distention, abdominal pain, anal bleeding, blood in stool, constipation, diarrhea, nausea, rectal pain and vomiting. Endocrine: Negative. Negative for cold intolerance, heat intolerance, polydipsia, polyphagia and polyuria. Genitourinary: Negative. Negative for decreased urine volume, difficulty urinating, dysuria, flank pain, frequency, genital sores, hematuria, menstrual problem, pelvic pain and urgency. Musculoskeletal: Positive for arthralgias and myalgias. Negative for back pain, gait problem, joint swelling, neck pain and neck stiffness. Skin: Negative.   Negative for color change, pallor, rash and wound. Allergic/Immunologic: Negative. Neurological: Negative. Negative for dizziness, tremors, seizures, syncope, facial asymmetry, speech difficulty, weakness, light-headedness, numbness and headaches. Hematological: Negative. Negative for adenopathy. Does not bruise/bleed easily. Psychiatric/Behavioral: Positive for sleep disturbance. Negative for agitation, behavioral problems, confusion, decreased concentration, dysphoric mood, hallucinations, self-injury and suicidal ideas. The patient is nervous/anxious. The patient is not hyperactive.          Past Medical/Surgical Hx;  Reviewed with patient      Diagnosis Date    Cancer ([facility] 07/29/2019    colon    Glaucoma     Hyperlipidemia     Hypertension     Hypothyroidism      Past Surgical History:   Procedure Laterality Date    CATARACT REMOVAL WITH IMPLANT Bilateral     CHOLECYSTECTOMY      COLONOSCOPY N/A 7/29/2019    COLONOSCOPY WITH BIOPSY performed by Avery Pathak MD at [address] [place] [address] COLONOSCOPY N/A 9/28/2020    COLONOSCOPY POSS BIOPSY OR POLYPECTOMY performed by Avery Pathak MD at [address] [facility] COLONOSCOPY N/A 1/10/2022    COLONOSCOPY performed by Avery Pathak MD at [address] Right 8/29/2019    RIGHT HEMICOLECTOMY, LAPAROSCOPIC, ROBOTIC XI ASSISTED performed by Avery Pathak MD at [address] [facility] [address] Bilateral 2/20/2020    TOTAL THYROIDECTOMY WITH ANTERIOR NECK DISSECTION-NEEDS NERVE INTEGRITY MONITOR performed by Logan Finley DO at [facility] OR       Past Family Hx:  Reviewed with patient      Problem Relation Age of Onset    Early Death Father    Gi Murdock Cancer Sister     Lung Cancer Sister     Cancer Brother     Lung Cancer Brother        Social Hx:  Reviewed with patient  Social History     Tobacco Use    Smoking status: Never Smoker    Smokeless tobacco: Never Used   Substance Use Topics    Alcohol use: No     Alcohol/week: 0.0 standard drinks       OBJECTIVE  BP (!) 150/86   Pulse 76   Temp 97.7 °F (36.5 °C)   Resp 16   Ht 5' 2\" (1.575 m)   Wt 161 lb (73 kg)   LMP  (LMP Unknown)   SpO2 97%   Breastfeeding No   BMI 29.45 kg/m²     Problem List:  Andrey Kaufman does not have any pertinent problems on file. PHYS EX:  Physical Exam  Vitals and nursing note reviewed. Constitutional:       General: She is not in acute distress. Appearance: Normal appearance. She is well-developed. She is obese. She is not ill-appearing, toxic-appearing or diaphoretic. Comments: Patient has morbid obesity. Patient instructed on low calorie, healthy diet. HENT:      Head: Normocephalic and atraumatic. Nose: Nose normal. No congestion or rhinorrhea. Eyes:      General: No scleral icterus. Right eye: No discharge. Left eye: No discharge. Conjunctiva/sclera: Conjunctivae normal.      Pupils: Pupils are equal, round, and reactive to light. Neck:      Thyroid: Thyromegaly present. Vascular: No carotid bruit or JVD. Trachea: No tracheal deviation. Cardiovascular:      Rate and Rhythm: Normal rate and regular rhythm. Pulses: Normal pulses. Heart sounds: Normal heart sounds. No murmur heard. No friction rub. No gallop. Pulmonary:      Effort: Pulmonary effort is normal. No respiratory distress. Breath sounds: Normal breath sounds. No stridor. No wheezing, rhonchi or rales. Chest:      Chest wall: No tenderness. Abdominal:      General: Bowel sounds are normal. There is no distension. Palpations: Abdomen is soft. There is no mass. Tenderness: There is no abdominal tenderness. There is no guarding or rebound. Hernia: No hernia is present. Musculoskeletal:         General: Tenderness present. No swelling, deformity or signs of injury. Cervical back: Normal range of motion and neck supple. No rigidity or tenderness. No muscular tenderness. Right lower leg: No edema. Left lower leg: No edema. Comments: Pain and decreased ROM multiple joints. Lymphadenopathy:      Cervical: No cervical adenopathy. Skin:     General: Skin is warm. Coloration: Skin is not jaundiced or pale. Findings: No bruising, erythema, lesion or rash. Neurological:      General: No focal deficit present. Mental Status: She is alert and oriented to person, place, and time. Cranial Nerves: No cranial nerve deficit. Sensory: No sensory deficit. Motor: No weakness or abnormal muscle tone. Coordination: Coordination normal.      Gait: Gait normal.      Deep Tendon Reflexes: Reflexes are normal and symmetric. Reflexes normal.   Psychiatric:         Mood and Affect: Mood normal.         Behavior: Behavior normal.         Thought Content: Thought content normal.         Judgment: Judgment normal.         ASSESSMENT/PLAN  Deisi Wilkes was seen today for hypertension. Diagnoses and all orders for this visit:    Essential hypertension  -     enalapril (VASOTEC) 10 MG tablet; Take 1 tablet by mouth daily  Not controlled. Caduet, vaseretic, low salt diet. vasotec 10 mg was added. Malignant neoplasm of ascending colon (HCC)  Controlled. General surgeon. Adrenal incidentaloma ([address]  Controlled. Hurthle cell carcinoma of thyroid (HCC)  Controlled. ENT. Acquired hypothyroidism  Controlled. Synthroid. S/P complete thyroidectomy  Controlled. ENT. Pt instructed if any worse go ED ASAP.     Outpatient Encounter Medications as of 1/12/2022   Medication Sig Dispense Refill    enalapril (VASOTEC) 10 MG tablet Take 1 tablet by mouth daily 30 tablet 3    levothyroxine (SYNTHROID) 112 MCG tablet Take 1 tablet by mouth daily 90 tablet 1    enalapril-hydroCHLOROthiazide (VASERETIC) 10-25 MG per tablet Take 1 tablet by mouth once daily 90 tablet 1    potassium chloride (KLOR-CON) 10 MEQ extended release tablet Take 1 tablet by mouth twice daily 180 tablet 1    amLODIPine-atorvastatatin (CADUET) 5-10 MG per tablet Take 1 tablet by mouth once daily 90 tablet 1    metoprolol succinate (TOPROL XL) 25 MG extended release tablet Take 1 tablet by mouth 2 times daily 180 tablet 1    Multiple Vitamins-Minerals (PRESERVISION AREDS PO) Take by mouth 2 times daily       Omega-3 Fatty Acids (FISH OIL) 500 MG CAPS Take 1 capsule by mouth daily      Cyanocobalamin (B-12) 2500 MCG TABS Take 1 tablet by mouth three times a week       vitamin D (CHOLECALCIFEROL) 5000 UNITS CAPS capsule Take 5,000 Units by mouth daily       No facility-administered encounter medications on file as of 1/12/2022. Return in about 2 months (around 3/12/2022).         Reviewed recent labs related to Mahsa's current problems      Discussed importance of regular Health Maintenance follow up  Health Maintenance   Topic    Hepatitis C screen     Shingles Vaccine (1 of 2)    COVID-19 Vaccine (3 - Booster for Moderna series)    Lipid screen     Depression Screen     Annual Wellness Visit (AWV)     TSH testing     Potassium monitoring     Creatinine monitoring     DTaP/Tdap/Td vaccine (2 - Td or Tdap)    DEXA (modify frequency per FRAX score)     Flu vaccine     Hepatitis A vaccine     Hepatitis B vaccine     Hib vaccine     Meningococcal (ACWY) vaccine

## 2022-01-31 ENCOUNTER — OFFICE VISIT (OUTPATIENT)
Dept: SURGERY | Age: 77
End: 2022-01-31
Payer: MEDICARE

## 2022-01-31 VITALS
HEIGHT: 62 IN | WEIGHT: 161 LBS | SYSTOLIC BLOOD PRESSURE: 184 MMHG | DIASTOLIC BLOOD PRESSURE: 88 MMHG | HEART RATE: 93 BPM | BODY MASS INDEX: 29.63 KG/M2 | TEMPERATURE: 98.2 F

## 2022-01-31 DIAGNOSIS — C18.9 ADENOCARCINOMA, COLON (HCC): Primary | ICD-10-CM

## 2022-01-31 PROCEDURE — G8417 CALC BMI ABV UP PARAM F/U: HCPCS | Performed by: SURGERY

## 2022-01-31 PROCEDURE — 99211 OFF/OP EST MAY X REQ PHY/QHP: CPT | Performed by: SURGERY

## 2022-01-31 PROCEDURE — G8427 DOCREV CUR MEDS BY ELIG CLIN: HCPCS | Performed by: SURGERY

## 2022-01-31 NOTE — PROGRESS NOTES
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 Did well after colonoscopy. No bleeding. No pain    Objective:  BP (!) 184/88 (Site: Left Upper Arm, Position: Sitting, Cuff Size: Medium Adult)   Pulse 93   Temp 98.2 °F (36.8 °C)   Ht 5' 2\" (1.575 m)   Wt 161 lb (73 kg)   LMP  (LMP Unknown)   BMI 29.45 kg/m²   Labs:  No results for input(s): WBC, HGB, HCT in the last 72 hours. Invalid input(s): PLR  Lab Results   Component Value Date    CREATININE 0.7 09/27/2021    BUN 10 09/27/2021     09/27/2021    K 3.1 (L) 09/27/2021    CL 97 (L) 09/27/2021    CO2 30 (H) 09/27/2021     No results for input(s): LIPASE, AMYLASE in the last 72 hours.       General appearance: AA, NAD  HEENT: NCAT, PERRLA, EOMI  Lungs: Clear, equal rise bilateral  Heart: Reg  Abdomen: soft, nondistended, nontender  Skin: No lesions  Psych: No distress, conversive, no hallucinations  : No ulcers or lesions  Rectal: No bleeding    Review of Systems -  General ROS: negative for - chills, fatigue or malaise  ENT ROS: negative for - hearing change, nasal congestion or nasal discharge  Allergy and Immunology ROS: negative for - hives, itchy/watery eyes or nasal congestion  Hematological and Lymphatic ROS: negative for - blood clots, blood transfusions, bruising or fatigue  Endocrine ROS: negative for - malaise/lethargy, mood swings, palpitations or polydipsia/polyuria  Respiratory ROS: negative for - sputum changes, stridor, tachypnea or wheezing  Cardiovascular ROS: negative for - irregular heartbeat, loss of consciousness, murmur or orthopnea  Gastrointestinal ROS: negative for - constipation, diarrhea, gas/bloating, heartburn or hematemesis  Genito-Urinary ROS: negative for -  genital discharge, genital ulcers or hematuria  Musculoskeletal ROS: negative for - gait disturbance, muscle pain or muscular weakness    Time spent reviewing past medical, surgical, social and family history, vitals, nursing assessment and images.     Colonoscopy: normal colon      Assessment/Plan:  Rigoberto Westfall is a 68 y.o. female s/p colonoscopy with findings of normal colon and anastomosis    Resume normal colon screening per NCCN guidelines  Next colonoscopy 3-5 yrs  High fiber diet- 40g daily  Advise 60oz water intake daily  Follow up as needed    Physician Signature: Electronically signed by Dr. Marcia Recio  [phone] (p)  1/31/2022  10:09 AM

## 2022-03-18 DIAGNOSIS — I10 ESSENTIAL HYPERTENSION: ICD-10-CM

## 2022-03-21 RX ORDER — POTASSIUM CHLORIDE 750 MG/1
TABLET, FILM COATED, EXTENDED RELEASE ORAL
Qty: 180 TABLET | Refills: 1 | Status: SHIPPED
Start: 2022-03-21 | End: 2022-03-30 | Stop reason: SDUPTHER

## 2022-03-24 DIAGNOSIS — C18.9 MALIGNANT NEOPLASM OF COLON, UNSPECIFIED PART OF COLON (HCC): Primary | ICD-10-CM

## 2022-03-28 ENCOUNTER — HOSPITAL ENCOUNTER (OUTPATIENT)
Dept: INFUSION THERAPY | Age: 77
Discharge: HOME | End: 2022-03-28
Payer: MEDICARE

## 2022-03-28 ENCOUNTER — OFFICE VISIT (OUTPATIENT)
Dept: ONCOLOGY | Age: 77
End: 2022-03-28
Payer: MEDICARE

## 2022-03-28 VITALS
OXYGEN SATURATION: 97 % | HEART RATE: 95 BPM | WEIGHT: 163 LBS | DIASTOLIC BLOOD PRESSURE: 78 MMHG | SYSTOLIC BLOOD PRESSURE: 171 MMHG | BODY MASS INDEX: 30 KG/M2 | TEMPERATURE: 98.2 F | HEIGHT: 62 IN

## 2022-03-28 DIAGNOSIS — C18.9 MALIGNANT NEOPLASM OF COLON, UNSPECIFIED PART OF COLON (HCC): ICD-10-CM

## 2022-03-28 DIAGNOSIS — C18.0 CECAL CANCER (HCC): Primary | ICD-10-CM

## 2022-03-28 LAB
ALBUMIN SERPL-MCNC: 4.3 G/DL (ref 3.5–5.2)
ALP BLD-CCNC: 105 U/L (ref 35–104)
ALT SERPL-CCNC: 13 U/L (ref 0–32)
ANION GAP SERPL CALCULATED.3IONS-SCNC: 12 MMOL/L (ref 7–16)
AST SERPL-CCNC: 14 U/L (ref 0–31)
BASOPHILS ABSOLUTE: 0.04 E9/L (ref 0–0.2)
BASOPHILS RELATIVE PERCENT: 0.6 % (ref 0–2)
BILIRUB SERPL-MCNC: 0.4 MG/DL (ref 0–1.2)
BUN BLDV-MCNC: 14 MG/DL (ref 6–23)
CALCIUM SERPL-MCNC: 8.9 MG/DL (ref 8.6–10.2)
CEA: 0.9 NG/ML (ref 0–5.2)
CHLORIDE BLD-SCNC: 101 MMOL/L (ref 98–107)
CO2: 30 MMOL/L (ref 22–29)
CREAT SERPL-MCNC: 0.7 MG/DL (ref 0.5–1)
EOSINOPHILS ABSOLUTE: 0.14 E9/L (ref 0.05–0.5)
EOSINOPHILS RELATIVE PERCENT: 2 % (ref 0–6)
GFR AFRICAN AMERICAN: >60
GFR NON-AFRICAN AMERICAN: >60 ML/MIN/1.73
GLUCOSE BLD-MCNC: 136 MG/DL (ref 74–99)
HCT VFR BLD CALC: 42.6 % (ref 34–48)
HEMOGLOBIN: 13.8 G/DL (ref 11.5–15.5)
IMMATURE GRANULOCYTES #: 0.02 E9/L
IMMATURE GRANULOCYTES %: 0.3 % (ref 0–5)
LYMPHOCYTES ABSOLUTE: 1.3 E9/L (ref 1.5–4)
LYMPHOCYTES RELATIVE PERCENT: 18.2 % (ref 20–42)
MCH RBC QN AUTO: 28.5 PG (ref 26–35)
MCHC RBC AUTO-ENTMCNC: 32.4 % (ref 32–34.5)
MCV RBC AUTO: 88 FL (ref 80–99.9)
MONOCYTES ABSOLUTE: 0.69 E9/L (ref 0.1–0.95)
MONOCYTES RELATIVE PERCENT: 9.7 % (ref 2–12)
NEUTROPHILS ABSOLUTE: 4.95 E9/L (ref 1.8–7.3)
NEUTROPHILS RELATIVE PERCENT: 69.2 % (ref 43–80)
PDW BLD-RTO: 13.6 FL (ref 11.5–15)
PLATELET # BLD: 244 E9/L (ref 130–450)
PMV BLD AUTO: 10.4 FL (ref 7–12)
POTASSIUM SERPL-SCNC: 3.5 MMOL/L (ref 3.5–5)
RBC # BLD: 4.84 E12/L (ref 3.5–5.5)
SODIUM BLD-SCNC: 143 MMOL/L (ref 132–146)
TOTAL PROTEIN: 7.4 G/DL (ref 6.4–8.3)
WBC # BLD: 7.1 E9/L (ref 4.5–11.5)

## 2022-03-28 PROCEDURE — 85025 COMPLETE CBC W/AUTO DIFF WBC: CPT

## 2022-03-28 PROCEDURE — G8484 FLU IMMUNIZE NO ADMIN: HCPCS | Performed by: INTERNAL MEDICINE

## 2022-03-28 PROCEDURE — 80053 COMPREHEN METABOLIC PANEL: CPT

## 2022-03-28 PROCEDURE — 1123F ACP DISCUSS/DSCN MKR DOCD: CPT | Performed by: INTERNAL MEDICINE

## 2022-03-28 PROCEDURE — 1036F TOBACCO NON-USER: CPT | Performed by: INTERNAL MEDICINE

## 2022-03-28 PROCEDURE — G8427 DOCREV CUR MEDS BY ELIG CLIN: HCPCS | Performed by: INTERNAL MEDICINE

## 2022-03-28 PROCEDURE — 4040F PNEUMOC VAC/ADMIN/RCVD: CPT | Performed by: INTERNAL MEDICINE

## 2022-03-28 PROCEDURE — 1090F PRES/ABSN URINE INCON ASSESS: CPT | Performed by: INTERNAL MEDICINE

## 2022-03-28 PROCEDURE — 99212 OFFICE O/P EST SF 10 MIN: CPT

## 2022-03-28 PROCEDURE — 36415 COLL VENOUS BLD VENIPUNCTURE: CPT

## 2022-03-28 PROCEDURE — G8417 CALC BMI ABV UP PARAM F/U: HCPCS | Performed by: INTERNAL MEDICINE

## 2022-03-28 PROCEDURE — G8399 PT W/DXA RESULTS DOCUMENT: HCPCS | Performed by: INTERNAL MEDICINE

## 2022-03-28 PROCEDURE — 82378 CARCINOEMBRYONIC ANTIGEN: CPT

## 2022-03-28 PROCEDURE — 99214 OFFICE O/P EST MOD 30 MIN: CPT | Performed by: INTERNAL MEDICINE

## 2022-03-28 NOTE — PROGRESS NOTES
Department of [facility] Oncology  Attending Clinic Note    Reason for Visit: Follow-up on a patient with Cecal Cancer    PCP:  Cem Reich DO    History of Present Illness:  68 y.o. female, never smoker, hx of HTN, hyperlipidemia, hypothyroidism, who presented to see general surgery team for evaluation of anemia and diagnostic colonoscopy. Colonoscopy on 07/29/2019: The ileocecal valve was obscured by large cecal fungating mass, 5cm in diameter. 1cm polyp was at the proximal transverse just passed the hepatic flexure  A. Mass, cecum, biopsy: Invasive moderately differentiated  adenocarcinoma. B. Polyp, hepatic flexure of colon, biopsy: Tubular adenoma. CT chest 07/31/2019:   Solid pulmonary parenchymal nodule in the left lower lobe measures 12 x 11 mm, and lies just above diaphragm. CT abdomen/pelvis 07/31/2019: There is a cecal mural mass on the medial aspect of the cecum and proximal ascending colon with perpendicular diameter measurements of .2 x 5.7 x 2.8 cm. It appears to be confined to the bowel wall. There is an enlarged right adrenal lesion measuring 2 cm diameter    CEA 3.8 on 08/05/2019. PET/CT scan 08/15/2019  No FDG avid uptake is identified which exceeds the threshold SUV in the left pulmonary nodule. Pulmonary team (Dr. Brigid Luna) consult appreciated; repeat CT chest in 3 months. No uptake in adrenal glands. Abnormal tracer uptake in the right thyroid which is focal. Endocrinology team consult (Dr. Sharie Dancer) appreciated. Thyroid U/S 08/21/2019 noted thyroid nodules. Abnormal tracer uptake at the level the cecum which exceeds the threshold SUV. Right hemicolectomy was performed on 08/29/2019. Procedure: Right hemicolectomy. Tumor site: Cecum. Tumor size: Greatest dimension is 6.5 cm; additional dimensions 4.5 x 1.8 cm. Macroscopic tumor perforation: Not identified. Histologic type: Adenocarcinoma  Histologic grade: G2 (moderately differentiated).   Tumor extension: Follows with pulmonary team for lung nodule  CT abdomen/pelvis 05/15/2020:   No evidence of metastatic disease. Left renal cyst; pt declined seeing urology team.    CEA 1.1 on 06/18/2020. CEA 1.6 on 09/21/2020. Colonoscopy 09/28/2020 Normal colon, patent ileocolonic anastomosis. Next colonoscopy in 1-3 years. CEA 1.5 on 03/22/2021. CT chest/abdomen/pelvis 05/28/2021 stable examination  CEA 1.2 on 06/24/2021  CEA 1.6 on 09/27/2021  Colonoscopy 01/10/2022 Normal colon, patent anastomosis; repeat colonoscopy in 3-5 years.     Total thyroidectomy with anterior neck dissection on 02/20/2020  A.  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  5 lymph nodes negative for malignancy  CANCER CASE SUMMARY (PAPILLARY CARCINOMA)  Procedure-total thyroidectomy  Tumor focality-unifocal  Tumor site-right lobe  Tumor size-1.2 cm  Histologic type-papillary carcinoma, classic  Margins-uninvolved by carcinoma  Angioinvasion-not identified  Lymphatic invasion-not identified  Extrathyroidal extension-not identified  Regional lymph nodes-negative for malignancy       Number of lymph nodes involved: 0       Number of lymph nodes examined: 5       Lucas levels examined: Level 6  TNM classification (AJCC eighth edition)-pT1b N0 MX     CANCER CASE SUMMARY (HURTHLE CELL CARCINOMA)  Procedure-total thyroidectomy  Tumor focality-multifocal  Tumor site-right and left lobes  Tumor size-cannot be determined (see above comment)  Histologic type-Hurthle cell carcinoma  Margins-right and left inferior margins involved by carcinoma; left  superior margin involved by carcinoma  Angioinvasion-present  Lymphatic invasion-not identified  Extrathyroidal extension-not identified  Regional lymph nodes-negative for malignancy       Number of lymph nodes involved: 0       Number of lymph nodes examined: 5       Lucas levels examined: Level 6  TNM classification (AJCC eighth edition)-at least pT2 N0 MX  Referred back to endocrine team for radioactive iodine. US Head Neck soft tissue 08/24/2020: unremarkable of the thyroid bed. Presented today 03/28/2022 for follow-up. No fever chills. No nausea vomiting abdominal pain. No melena or hematochezia. Fair appetite and energy level. Review of Systems;  CONSTITUTIONAL: No fever, chills. Fair appetite and energy level. ENMT: Eyes: No diplopia; Nose: No epistaxis. Mouth: No sore throat. RESPIRATORY: No hemoptysis, shortness of breath, cough. CARDIOVASCULAR: No chest pain, palpitations. GASTROINTESTINAL: No nausea/vomiting, abdominal pain. No melena or hematochezia. GENITOURINARY: No dysuria, urinary frequency, hematuria. NEURO: No syncope, presyncope, headache. Remainder:  ROS NEGATIVE    Past Medical History:      Diagnosis Date    [facility]    colon    Glaucoma     Hyperlipidemia     Hypertension     Hypothyroidism      Medications:  Reviewed and reconciled. Allergies: Allergies   Allergen Reactions    Prevnar 13 [Pneumococcal 13-Kassidy Conj Vacc] Swelling     Redness and swelling in arm at site     Physical Exam:  BP (!) 171/78   Pulse 95   Temp 98.2 °F (36.8 °C)   Ht 5' 2\" (1.575 m)   Wt 163 lb (73.9 kg)   LMP  (LMP Unknown)   SpO2 97%   BMI 29.81 kg/m²   GENERAL: Alert, oriented x 3, not in acute distress   LUNGS: CTA Cristian  CVS: RRR. No murmurs ribs or gallops. EXTREMITIES: Without clubbing, cyanosis, or edema.    ECOG PS 1    Lab Results   Component Value Date    WBC 7.1 03/28/2022    HGB 13.8 03/28/2022    HCT 42.6 03/28/2022    MCV 88.0 03/28/2022     03/28/2022     Lab Results   Component Value Date     03/28/2022    K 3.5 03/28/2022     03/28/2022    CO2 30 (H) 03/28/2022    BUN 14 03/28/2022    CREATININE 0.7 03/28/2022    GLUCOSE 136 (H) 03/28/2022    CALCIUM 8.9 03/28/2022    PROT 7.4 03/28/2022    LABALBU 4.3 03/28/2022    BILITOT 0.4 03/28/2022    ALKPHOS 105 (H) 03/28/2022    AST 14 03/28/2022    ALT 13 03/28/2022    LABGLOM >60 03/28/2022    GFRAA >60 03/28/2022     Lab Results   Component Value Date    CEA 0.9 03/28/2022     Impression/Plan:  68 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 1cm polyp was at the proximal transverse just passed the hepatic flexure  A. Mass, cecum, biopsy: Invasive moderately differentiated adenocarcinoma. B. Polyp, hepatic flexure of colon, biopsy: Tubular adenoma. CT chest 07/31/2019:   Solid pulmonary parenchymal nodule in the left lower lobe measures 12 x 11 mm, and lies just above diaphragm. CT abdomen/pelvis 07/31/2019: There is a cecal mural mass on the medial aspect of the cecum and proximal ascending colon with perpendicular diameter measurements of 6.2 x 5.7 x 2.8 cm. It appears to be confined to the bowel wall. Only small cecal mesenteric LN are identified. There is an enlarged right adrenal lesion measuring 2 cm diameter    CEA 3.8 on 08/05/2019. PET/CT scan 08/15/2019  No FDG avid uptake is identified which exceeds the threshold SUV in the left pulmonary nodule. Pulmonary team (Dr. Siobhan Weber) consult appreciated; repeat CT chest in 3 months. No uptake in adrenal glands. Abnormal tracer uptake in the right thyroid which is focal. Thyroid U/S 08/21/2019 noted thyroid nodules. Endocrinology team consult (Dr. Tom Mari) appreciated. Abnormal tracer uptake at the level the cecum which exceeds the threshold SUV. Right hemicolectomy on 08/29/2019. Procedure: Right hemicolectomy. Tumor site: Cecum. Tumor size: Greatest dimension is 6.5 cm; additional dimensions 4.5 x 1.8 cm. Macroscopic tumor perforation: Not identified. Histologic type: Adenocarcinoma  Histologic grade: G2 (moderately differentiated).   Tumor extension: Tumor invades through the muscularis propria into pericolorectal tissue. Margin status: All margins are uninvolved by invasive carcinoma, high grade dysplasia, intramucosal adenocarcinoma and adenoma.     - Margins examined: Proximal, distal and radial/soft tissue surgical margins.     - Distance of invasive carcinoma from closest margin:  7 cm (radial/soft tissue surgical margin). Treatment effect: No known presurgical therapy. Lymphovascular invasion:  Not identified. Perineural invasion: Not identified. Tumor deposits:  Not identified. Regional lymph nodes:     - Number of lymph nodes involved: 0     - Number of lymph nodes examined: 18  Pathologic stage classification (pTNM, AJCC 8th edition)     - Primary tumor (pT): pT3 -- tumor invades through the muscularis       propria into pericolorectal tissues.     - Regional lymph nodes (pN):  pN0 -- no regional lymph node metastasis. Mismatch Repair (MMR) IHC panel on specimen S -A6   MLH1: No loss of expression   MSH 2: No loss of expression   MSH 6: No loss of expression   PMS 2: No loss of expression    Stage IIA (pT3 pN0 M0) no high risk features. We reviewed with her that Data from randomized trials and meta-analyses indicated that if there is benefit for 5-FU-based chemotherapy therapy in patients with resected stage II disease, it does not exceed an absolute improvement in five-year survival of 5 percent. We also explained to her the side effects/toxicities of fluoropyrimidine-based adjuvant regimen (which will be for a duration of 6 months). Patient decided to proceed with observation and Not adjuvant chemotherapy; To be followed with History & Physical and blood work including CEA every 3 months for 2 years, then every 6 months for a total of 5 years. CT scan chest/abdomen/pelvis yearly for 5 years; colonoscopy in one year    CEA 0.7 on 12/19/2019. CEA 1.1 on 03/19/2020. CT chest 05/15/2020 noted no evidence of metastatic disease.  Follows with pulmonary team for lung nodule  CT abdomen/pelvis 05/15/2020: No evidence of metastatic disease. Left renal cyst; pt declined seeing urology team.    CEA 1.1 on 06/18/2020. CEA 1.6 on 09/21/2020. Colonoscopy 09/28/2020 Normal colon, patent ileocolonic anastomosis. Next colonoscopy in 1-3 years. CEA 1.0 on 12/21/2020. CEA 1.5 on 03/22/2021. CT chest/abdomen/pelvis 05/28/2021 stable examination  CEA 1.2 on 06/24/2021  CEA 1.6 on 09/27/2021  Colonoscopy 01/10/2022 Normal colon, patent anastomosis; repeat colonoscopy in 3-5 years. CEA 0.9 on 03/28/2021  Imaging reviewed. Labs reviewed. Reviewed colonoscopy report.   SRINIVAS    RTC 6 months with prior scans and labs (CBC, CMP CEA)    Latanya Leon MD   [id number]  Board Certified Medical Oncologist

## 2022-03-29 DIAGNOSIS — I10 ESSENTIAL HYPERTENSION: ICD-10-CM

## 2022-03-29 NOTE — TELEPHONE ENCOUNTER
Pt came into the office today because she is unable to get her potassium chloride (KLOR-CON) 10 MEQ extended release tablet filled. Per the patient she said last time she was seen by doctor it was increased from 2 tablets to 3 tablets, the pharmacy only has the patient taking 2 tablets daily so they are unable to fill the script.

## 2022-03-30 RX ORDER — POTASSIUM CHLORIDE 750 MG/1
10 TABLET, FILM COATED, EXTENDED RELEASE ORAL 3 TIMES DAILY
Qty: 270 TABLET | Refills: 1 | Status: SHIPPED
Start: 2022-03-30 | End: 2022-09-12

## 2022-05-02 DIAGNOSIS — I10 ESSENTIAL HYPERTENSION: ICD-10-CM

## 2022-05-02 RX ORDER — ENALAPRIL MALEATE 10 MG/1
TABLET ORAL
Qty: 30 TABLET | Refills: 0 | Status: SHIPPED
Start: 2022-05-02 | End: 2022-06-06

## 2022-05-04 DIAGNOSIS — I10 ESSENTIAL HYPERTENSION: ICD-10-CM

## 2022-05-04 RX ORDER — METOPROLOL SUCCINATE 25 MG/1
TABLET, EXTENDED RELEASE ORAL
Qty: 180 TABLET | Refills: 0 | Status: SHIPPED
Start: 2022-05-04 | End: 2022-08-01

## 2022-05-09 DIAGNOSIS — E03.9 HYPOTHYROIDISM, UNSPECIFIED TYPE: ICD-10-CM

## 2022-05-09 RX ORDER — LEVOTHYROXINE SODIUM 112 UG/1
TABLET ORAL
Qty: 90 TABLET | Refills: 0 | Status: SHIPPED
Start: 2022-05-09 | End: 2022-06-02 | Stop reason: SDUPTHER

## 2022-05-20 ENCOUNTER — TELEPHONE (OUTPATIENT)
Dept: PULMONOLOGY | Age: 77
End: 2022-05-20

## 2022-05-20 NOTE — TELEPHONE ENCOUNTER
Call returned to pt to discuss the follow up needed for Lung Nodule. Pt would like to wait and follow up with Dr. Nomi Sheppard in Sept and if the CT scan shows any changes she will call our office. Advised pt to call prn.

## 2022-06-02 ENCOUNTER — OFFICE VISIT (OUTPATIENT)
Dept: ENDOCRINOLOGY | Age: 77
End: 2022-06-02
Payer: MEDICARE

## 2022-06-02 VITALS
OXYGEN SATURATION: 95 % | RESPIRATION RATE: 18 BRPM | BODY MASS INDEX: 30.36 KG/M2 | SYSTOLIC BLOOD PRESSURE: 151 MMHG | WEIGHT: 165 LBS | DIASTOLIC BLOOD PRESSURE: 85 MMHG | HEIGHT: 62 IN

## 2022-06-02 DIAGNOSIS — E03.9 HYPOTHYROIDISM, UNSPECIFIED TYPE: ICD-10-CM

## 2022-06-02 DIAGNOSIS — E27.8 ADRENAL INCIDENTALOMA (HCC): ICD-10-CM

## 2022-06-02 DIAGNOSIS — C73 THYROID CANCER (HCC): ICD-10-CM

## 2022-06-02 DIAGNOSIS — E55.9 VITAMIN D DEFICIENCY: ICD-10-CM

## 2022-06-02 DIAGNOSIS — E03.9 HYPOTHYROIDISM, UNSPECIFIED TYPE: Primary | ICD-10-CM

## 2022-06-02 LAB
ANION GAP SERPL CALCULATED.3IONS-SCNC: 11 MMOL/L (ref 7–16)
BUN BLDV-MCNC: 11 MG/DL (ref 6–23)
CALCIUM SERPL-MCNC: 9 MG/DL (ref 8.6–10.2)
CHLORIDE BLD-SCNC: 97 MMOL/L (ref 98–107)
CO2: 30 MMOL/L (ref 22–29)
CREAT SERPL-MCNC: 0.7 MG/DL (ref 0.5–1)
GFR AFRICAN AMERICAN: >60
GFR NON-AFRICAN AMERICAN: >60 ML/MIN/1.73
GLUCOSE BLD-MCNC: 103 MG/DL (ref 74–99)
POTASSIUM SERPL-SCNC: 3.7 MMOL/L (ref 3.5–5)
SODIUM BLD-SCNC: 138 MMOL/L (ref 132–146)
T4 FREE: 1.84 NG/DL (ref 0.93–1.7)
TSH SERPL DL<=0.05 MIU/L-ACNC: 1.79 UIU/ML (ref 0.27–4.2)
VITAMIN D 25-HYDROXY: 44 NG/ML (ref 30–100)

## 2022-06-02 PROCEDURE — 1036F TOBACCO NON-USER: CPT | Performed by: INTERNAL MEDICINE

## 2022-06-02 PROCEDURE — 1123F ACP DISCUSS/DSCN MKR DOCD: CPT | Performed by: INTERNAL MEDICINE

## 2022-06-02 PROCEDURE — 99214 OFFICE O/P EST MOD 30 MIN: CPT | Performed by: INTERNAL MEDICINE

## 2022-06-02 PROCEDURE — G8399 PT W/DXA RESULTS DOCUMENT: HCPCS | Performed by: INTERNAL MEDICINE

## 2022-06-02 PROCEDURE — 1090F PRES/ABSN URINE INCON ASSESS: CPT | Performed by: INTERNAL MEDICINE

## 2022-06-02 PROCEDURE — G8427 DOCREV CUR MEDS BY ELIG CLIN: HCPCS | Performed by: INTERNAL MEDICINE

## 2022-06-02 PROCEDURE — G8417 CALC BMI ABV UP PARAM F/U: HCPCS | Performed by: INTERNAL MEDICINE

## 2022-06-02 RX ORDER — LEVOTHYROXINE SODIUM 112 UG/1
TABLET ORAL
Qty: 90 TABLET | Refills: 3 | Status: SHIPPED | OUTPATIENT
Start: 2022-06-02

## 2022-06-02 NOTE — PROGRESS NOTES
[address] [place] [address] Department of Endocrinology Diabetes and Metabolism   [address] [facility] [address]   Phone: [phone]  Fax: [phone]  Date of Service: 6/2/2022     Primary Care Physician: Analilia Mejia DO  Provider: Betty Aly MD     Reason for the visit:   Papillary thyroid carcinoma, hurtle cell carcinoma     History of Present Illness: The history is provided by the patient. No  was used. Accuracy of the patient data is excellent. Noemí Baumann is a very pleasant 68y.o. year-old female who was found to have a 2 cm cm left adrenal mass on computed tomography (CT) imaging obtained as part of the work-up of recently diagnosed Colon cancer     CT chest/Abdomen 7/31/2019  There is a cecal mural mass on the medial aspect of the cecum and proximal ascending colon with perpendicular diameter measurements of 6.2 x 5.7 x 2.8 cm. It appears to be confined to the bowel wall. The right adrenal is enlarged, with a globular appearance and a diameter of 2 cm, and nonspecific high solid attenuation characteristics. Metastatic disease is the diagnosis of exclusion  PET CT scan 8/15/2019:   1.  no abnormal uptake at adrenals  2. Abnormal tracer uptake in the right thyroid which is focal and asymmetrical   3.  Abnormal tracer uptake at the level the cecum which exceeds the threshold SUV, this corresponds to the findings on the CT scan of the abdomen, compatible with neoplasm  4.  There is no other abnormal tracer uptake seen    CT abdomen 5/2021 --> stable Rt side adrenal adenoma    Thyroid US 8/21/2019   Rt lobe measures 3.4 x 1.9 x 0.9 cm --> there is 1.4 cm isoechoic nodule (at location of abnormal PET uptake)   Lt lobe measures 3.4 x 1.1 x 0.9 cm --> 9 mm nodule   Isthmus measures 7 mm ---> hypoechoic 1.9 cm with internal vascularity     Pt underwent total thyroidectomy on 2/20/2020  Path report --> two morphologically distinct carcinomas within the thyroid. 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  The approximate width of tissue in each block is 4 mm. The overall tumor size cannot be accurately assessed due to the presence of tumor in nonsequential sections but may be considerably larger than what is measured on 1 slide     7/10/2020 - received 105.3 mCi DAN ablation without complications     6/76/6459 - posttherapy scan negative for mets   8/24/220 --> Tg 0.5, Tg Ab negative   2/2021 - Tg-Ab negatives, Tg- 0.4  Thyroid S 8/24/2020 --> negative for recurrence     Labs 8/2021 - TSH 5.02, Tg level 1.1, Tg-Ab negative   Pt currently on levothyroxine 112 mcg daily. Patient takes levothyroxine in the morning at empty stomach, wait one hour before eating , avoid multivitamins containing calcium  or iron with it. Lab Results   Component Value Date/Time    TSH 5.020 (H) 08/16/2021 09:20 AM    T4FREE 1.53 08/16/2021 09:20 AM    [id number] 9.7 02/09/2021 08:40 AM    THGAB <0.9 08/16/2021 09:20 AM     Nat Ceja denies any new lumps, bumps in her neck, change in her voice, or shortness of breath. No family history of thyroid cancer. No prior history of radiation to head or neck region.     PAST MEDICAL HISTORY   Past Medical History:   Diagnosis Date    Cancer ([address] 07/29/2019    colon    Glaucoma     Hyperlipidemia     Hypertension     Hypothyroidism       PAST SURGICAL HISTORY   Past Surgical History:   Procedure Laterality Date    CATARACT REMOVAL WITH IMPLANT Bilateral     CHOLECYSTECTOMY      COLONOSCOPY N/A 7/29/2019    COLONOSCOPY WITH BIOPSY performed by Anne Marie Menchaca MD at [facility] ENDOSCOPY    COLONOSCOPY N/A 9/28/2020    COLONOSCOPY POSS BIOPSY OR POLYPECTOMY [facility]    COLONOSCOPY performed by Anne Marie Menchaca MD at [address] Right 8/29/2019    RIGHT HEMICOLECTOMY, LAPAROSCOPIC, ROBOTIC XI ASSISTED performed by Anne Marie Menchaca MD at [address] [facility] Bilateral 2/20/2020    TOTAL THYROIDECTOMY WITH ANTERIOR NECK DISSECTION-NEEDS NERVE INTEGRITY MONITOR performed by Aliza Bedolla DO [place] [facility] [place]      SOCIAL HISTORY   Tobacco:   reports that she has never smoked. She has never used smokeless tobacco.  Alcol:   reports no history of alcohol use. Illicit Drugs:   reports no history of drug use.      FAMILY HISTORY   Family History   Problem Relation Age of Onset    Early Death Father     Cancer Sister     Lung Cancer Sister     Cancer Brother     Lung Cancer Brother       ALLERGIES AND DRUG REACTIONS   Allergies   Allergen Reactions    Prevnar 13 [Pneumococcal 13-Kassidy Conj Vacc] Swelling     Redness and swelling in arm at site      CURRENT MEDICATIONS   Current Outpatient Medications   Medication Sig Dispense Refill    EUTHYROX 112 MCG tablet Take 1 tablet by mouth once daily 90 tablet 0    metoprolol succinate (TOPROL XL) 25 MG extended release tablet Take 1 tablet by mouth twice daily 180 tablet 0    enalapril (VASOTEC) 10 MG tablet Take 1 tablet by mouth once daily 30 tablet 0    potassium chloride (KLOR-CON) 10 MEQ extended release tablet Take 1 tablet by mouth 3 times daily 270 tablet 1    enalapril-hydroCHLOROthiazide (VASERETIC) 10-25 MG per tablet Take 1 tablet by mouth once daily 90 tablet 1    amLODIPine-atorvastatatin (CADUET) 5-10 MG per tablet Take 1 tablet by mouth once daily 90 tablet 1    Multiple Vitamins-Minerals (PRESERVISION AREDS PO) Take by mouth 2 times daily       Omega-3 Fatty Acids (FISH OIL) 500 MG CAPS Take 1 capsule by mouth daily      Cyanocobalamin (B-12) 2500 MCG TABS Take 1 tablet by mouth three times a week       vitamin D (CHOLECALCIFEROL) 5000 UNITS CAPS capsule Take 5,000 Units by mouth daily       No current facility-administered medications for this visit. Review of Systems   Constitutional: No fever, no chills, no diaphoresis, no generalized weakness. HEENT: No blurred vision, No sore throat, no ear pain, no hair loss   Neck: denied any neck swelling, difficulty swallowing,   Cadrdiopulomary: No CP, SOB or palpitation, No orthopnea or PND. No cough or wheezing. GI: No N/V/D, no constipation, No abdominal pain, no melena or hematochezia   : Denied any dysuria, hematuria, flank pain, discharge, or incontinence. Skin: denied any rash, striae ulcer, Hirsute, or hyperpigmentation. MSK: denied any joint deformity, joint pain/swelling, muscle pain, or back pain. Neuro: no numbess, no tingling, no weakness,     OBJECTIVE   BP (!) 151/85   Resp 18   Ht 5' 2\" (1.575 m)   Wt 165 lb (74.8 kg)   LMP  (LMP Unknown)   SpO2 95%   BMI 30.18 kg/m²    BP Readings from Last 4 Encounters:   06/02/22 (!) 151/85   03/28/22 (!) 171/78   01/31/22 (!) 184/88   01/12/22 (!) 150/86      Wt Readings from Last 6 Encounters:   06/02/22 165 lb (74.8 kg)   03/28/22 163 lb (73.9 kg)   01/31/22 161 lb (73 kg)   01/12/22 161 lb (73 kg)   01/10/22 157 lb (71.2 kg)   11/15/21 163 lb (73.9 kg)        Physical examination:   General: awake alert, oriented x3, no abnormal position or movements. HEENT: normocephalic non traumatic, no exophthalmos   Neck: supple, no LN enlargement, s/p thyroidectomy, no JVD. Pulm: Clear equal air entry no added sounds, no wheezing or rhonchi   CVS: S1 + S2, no murmur, no heave. Dorsalis pedis pulse palpable   Abd: soft lax, no tenderness, no organomegaly, audible bowel sounds.    Skin: warm, no lesions, no rash  Musculoskeletal: No back tenderness, no kyphosis/scoliosis   Neuro: CN intact, sensation normal, muscle power normal. No tremors   Psych: normal mood, and affect     Review of Laboratory Data:   I personally reviewed the following labs:    Lab Results   Component Value Date/Time    WBC 7.1 03/28/2022 08:54 AM    RBC 4.84 03/28/2022 08:54 AM    HGB 13.8 03/28/2022 08:54 AM    HCT 42.6 03/28/2022 08:54 AM    MCV 88.0 03/28/2022 08:54 AM    MCH 28.5 03/28/2022 08:54 AM    MCHC 32.4 03/28/2022 08:54 AM    RDW 13.6 03/28/2022 08:54 AM     03/28/2022 08:54 AM    MPV 10.4 03/28/2022 08:54 AM      Lab Results   Component Value Date/Time     03/28/2022 08:54 AM    K 3.5 03/28/2022 08:54 AM    K 4.2 08/30/2019 03:26 AM    CO2 30 (H) 03/28/2022 08:54 AM    BUN 14 03/28/2022 08:54 AM    CREATININE 0.7 03/28/2022 08:54 AM    CALCIUM 8.9 03/28/2022 08:54 AM    LABGLOM >60 03/28/2022 08:54 AM    GFRAA >60 03/28/2022 08:54 AM      Lab Results   Component Value Date/Time    TSH 5.020 (H) 08/16/2021 09:20 AM    T4FREE 1.53 08/16/2021 09:20 AM    [id number] 9.7 02/09/2021 08:40 AM    THGAB <0.9 08/16/2021 09:20 AM     Lab Results   Component Value Date    LABA1C 5.5 11/23/2020    GLUCOSE 136 03/28/2022     Lab Results   Component Value Date    CHOL 181 11/23/2020    CHOL 170 06/17/2019    TRIG 99 11/23/2020    TRIG 137 06/17/2019    HDL 76 11/23/2020    HDL 50 06/17/2019     No results found for: ALDODIRENCAL   No results found for: ALPRRATIO   Lab Results   Component Value Date/Time    CHOL 181 11/23/2020 12:00 PM    HDL 76 11/23/2020 12:00 PM    TRIG 99 11/23/2020 12:00 PM      Lab Results   Component Value Date/Time    PTH 16 03/03/2020 10:03 AM      Lab Results   Component Value Date/Time    VITD25 46 06/24/2021 11:32 AM        ASSESSMENT & RECOMMENDATIONS   Maria A Patino, a 68 y.o.-old female seen in for the following issues     Papillary thyroid cancer/Hurtle cell carcinoma   · S/p total thyroidectomy 2/20/2020, DAN ablation 7/2020 (105.3 mCi)   · No clinical, biochemical or radiological evidence of recurrence   · Check labs now Tg level, TFT   · Will also obtain thyroid US     Postsurgical hypothyroidism   · Currently on levothyroxine 112 mcg daily   · Check TFT and adjust the dose   · Goal to keep TSH 0.3-2     Rt adrenal enlargement   · Adrenal mass is found in at least 3-5% of persons older than 50 years. · CT scan 7/2019 --> The right adrenal is enlarged, with a globular appearance and a diameter of 2 cm. PET CT scan 8/15/2019 didn't show any abnormal uptake at adrenals which is in favor of benign etiology  · Previous work up showed normal plasma metanephrines, and Hi/renin   · 1mg DST was in 8/2019 was 3.3 and 6/2021 2.7 (<5). Pt is no signs on cushing's   · Will also get 1 mg DST   · Ordered plasma metanephrine, Renin/Hi     I personally reviewed external notes from PCP and other patient's care team providers, and personally interpreted labs associated with the above diagnosis. I also ordered labs to further assess and manage the above addressed medical conditions    Return in about 1 year (around 6/2/2023) for hypothyroidism, thyroid cancer , ADRENAL INCIDENTALOMA . The above issues were reviewed with the patient who understood and agreed with the plan. Thank you for allowing us to participate in the care of this patient. Please do not hesitate to contact us with any additional questions. Diagnosis Orders   1. Hypothyroidism, unspecified type  TSH    T4, Free    levothyroxine (EUTHYROX) 112 MCG tablet   2. Thyroid cancer (HCC)  TSH    T4, Free    Thyroglobulin   3. Adrenal incidentaloma ([address]  Renin Activity and Aldosterone    Metanephrines Plasma Free    Basic Metabolic Panel    dexamethasone (DECADRON) 1 MG tablet    Cortisol Total   4.  Vitamin D deficiency  Vitamin D 25 Hydroxy    Basic Metabolic Panel     Sree Sweeney MD   Endocrinologist, [facility] and Endocrinology   [address] [facility] [address]   Phone: [phone]   Fax: [phone] --------------------------  An electronic signature was used to authenticate this note.  Kenroy García MD on 6/2/2022 at 9:24 AM

## 2022-06-04 ENCOUNTER — TELEPHONE (OUTPATIENT)
Dept: ENDOCRINOLOGY | Age: 77
End: 2022-06-04

## 2022-06-04 RX ORDER — DEXAMETHASONE 1 MG
1 TABLET ORAL ONCE
Qty: 1 TABLET | Refills: 0 | Status: SHIPPED | OUTPATIENT
Start: 2022-06-04 | End: 2022-06-04

## 2022-06-05 LAB
THYROGLOBULIN AB: <0.9 IU/ML (ref 0–4)
THYROGLOBULIN BY LC-MS/MS, SERUM/PLASMA: NORMAL NG/ML (ref 1.3–31.8)
THYROGLOBULIN, SERUM OR PLASMA: 1.9 NG/ML (ref 1.3–31.8)

## 2022-06-05 NOTE — TELEPHONE ENCOUNTER
Notify pt,  I have reviewed your recent results      Great!, thyroid hormones results are within an acceptable range of normal. There is no need for a change in your therapy at this time.

## 2022-06-06 DIAGNOSIS — I10 ESSENTIAL HYPERTENSION: ICD-10-CM

## 2022-06-06 LAB
Lab: NORMAL
REPORT: NORMAL
THIS TEST SENT TO: NORMAL

## 2022-06-06 RX ORDER — ENALAPRIL MALEATE 10 MG/1
TABLET ORAL
Qty: 30 TABLET | Refills: 0 | Status: SHIPPED
Start: 2022-06-06 | End: 2022-07-06

## 2022-06-07 LAB
METANEPH/PLASMA INTERP: ABNORMAL
METANEPHRINE FREE PLASMA: 0.18 NMOL/L (ref 0–0.49)
NORMETANEPHRINE FREE PLASMA: 0.95 NMOL/L (ref 0–0.89)

## 2022-06-08 ENCOUNTER — OFFICE VISIT (OUTPATIENT)
Dept: FAMILY MEDICINE CLINIC | Age: 77
End: 2022-06-08
Payer: MEDICARE

## 2022-06-08 VITALS
BODY MASS INDEX: 30.73 KG/M2 | WEIGHT: 167 LBS | TEMPERATURE: 97.7 F | SYSTOLIC BLOOD PRESSURE: 150 MMHG | OXYGEN SATURATION: 98 % | DIASTOLIC BLOOD PRESSURE: 80 MMHG | HEIGHT: 62 IN | HEART RATE: 68 BPM | RESPIRATION RATE: 18 BRPM

## 2022-06-08 DIAGNOSIS — I10 ESSENTIAL HYPERTENSION: Primary | ICD-10-CM

## 2022-06-08 DIAGNOSIS — J30.2 SEASONAL ALLERGIC RHINITIS, UNSPECIFIED TRIGGER: ICD-10-CM

## 2022-06-08 PROCEDURE — G8399 PT W/DXA RESULTS DOCUMENT: HCPCS | Performed by: FAMILY MEDICINE

## 2022-06-08 PROCEDURE — G8427 DOCREV CUR MEDS BY ELIG CLIN: HCPCS | Performed by: FAMILY MEDICINE

## 2022-06-08 PROCEDURE — 1036F TOBACCO NON-USER: CPT | Performed by: FAMILY MEDICINE

## 2022-06-08 PROCEDURE — 99213 OFFICE O/P EST LOW 20 MIN: CPT | Performed by: FAMILY MEDICINE

## 2022-06-08 PROCEDURE — 1123F ACP DISCUSS/DSCN MKR DOCD: CPT | Performed by: FAMILY MEDICINE

## 2022-06-08 PROCEDURE — 1090F PRES/ABSN URINE INCON ASSESS: CPT | Performed by: FAMILY MEDICINE

## 2022-06-08 PROCEDURE — G8417 CALC BMI ABV UP PARAM F/U: HCPCS | Performed by: FAMILY MEDICINE

## 2022-06-08 RX ORDER — AMLODIPINE BESYLATE 5 MG/1
5 TABLET ORAL DAILY
Qty: 30 TABLET | Refills: 3 | Status: SHIPPED
Start: 2022-06-08 | End: 2022-09-26

## 2022-06-08 ASSESSMENT — PATIENT HEALTH QUESTIONNAIRE - PHQ9
SUM OF ALL RESPONSES TO PHQ QUESTIONS 1-9: 0
SUM OF ALL RESPONSES TO PHQ QUESTIONS 1-9: 0
SUM OF ALL RESPONSES TO PHQ9 QUESTIONS 1 & 2: 0
1. LITTLE INTEREST OR PLEASURE IN DOING THINGS: 0
2. FEELING DOWN, DEPRESSED OR HOPELESS: 0
SUM OF ALL RESPONSES TO PHQ QUESTIONS 1-9: 0
SUM OF ALL RESPONSES TO PHQ QUESTIONS 1-9: 0

## 2022-06-10 ASSESSMENT — ENCOUNTER SYMPTOMS
EYE REDNESS: 0
EYE PAIN: 0
VOICE CHANGE: 0
FACIAL SWELLING: 0
SHORTNESS OF BREATH: 0
CONSTIPATION: 0
CHOKING: 0
BACK PAIN: 0
DIARRHEA: 0
PHOTOPHOBIA: 0
COUGH: 0
BLOOD IN STOOL: 0
SINUS PAIN: 0
VOMITING: 0
NAUSEA: 0
ABDOMINAL PAIN: 0
EYES NEGATIVE: 1
RESPIRATORY NEGATIVE: 1
RHINORRHEA: 0
TROUBLE SWALLOWING: 0
WHEEZING: 0
ALLERGIC/IMMUNOLOGIC NEGATIVE: 1
SORE THROAT: 0
EYE DISCHARGE: 0
ABDOMINAL DISTENTION: 0
COLOR CHANGE: 0
RECTAL PAIN: 0
SINUS PRESSURE: 0
ANAL BLEEDING: 0
STRIDOR: 0
EYE ITCHING: 0
CHEST TIGHTNESS: 0

## 2022-06-10 NOTE — PROGRESS NOTES
Jesus France is a 68 y.o. female. HPI/Chief C/O:  Chief Complaint   Patient presents with    Hypertension     Pt is here for a follow up on her BP. Allergies   Allergen Reactions    Prevnar 13 [Pneumococcal 13-Kassidy Conj Vacc] Swelling     Redness and swelling in arm at site     This 68year old female presents with hypertension. ROS:  Review of Systems   Constitutional: Positive for fatigue. Negative for activity change, appetite change, chills, diaphoresis, fever and unexpected weight change. HENT: Negative for congestion, dental problem, drooling, ear discharge, ear pain, facial swelling, hearing loss, mouth sores, nosebleeds, postnasal drip, rhinorrhea, sinus pressure, sinus pain, sneezing, sore throat, tinnitus, trouble swallowing and voice change. Eyes: Negative. Negative for photophobia, pain, discharge, redness, itching and visual disturbance. Respiratory: Negative. Negative for cough, choking, chest tightness, shortness of breath, wheezing and stridor. Cardiovascular: Negative. Negative for chest pain, palpitations and leg swelling. Gastrointestinal: Negative for abdominal distention, abdominal pain, anal bleeding, blood in stool, constipation, diarrhea, nausea, rectal pain and vomiting. Endocrine: Negative. Negative for cold intolerance, heat intolerance, polydipsia, polyphagia and polyuria. Genitourinary: Negative. Negative for decreased urine volume, difficulty urinating, dysuria, flank pain, frequency, genital sores, hematuria, menstrual problem, pelvic pain and urgency. Musculoskeletal: Positive for arthralgias and myalgias. Negative for back pain, gait problem, joint swelling, neck pain and neck stiffness. Skin: Negative. Negative for color change, pallor, rash and wound. Allergic/Immunologic: Negative. Neurological: Negative.   Negative for dizziness, tremors, seizures, syncope, facial asymmetry, speech difficulty, weakness, light-headedness, numbness and headaches. Hematological: Negative. Negative for adenopathy. Does not bruise/bleed easily. Psychiatric/Behavioral: Positive for sleep disturbance. Negative for agitation, behavioral problems, confusion, decreased concentration, dysphoric mood, hallucinations, self-injury and suicidal ideas. The patient is nervous/anxious. The patient is not hyperactive.          Past Medical/Surgical Hx;  Reviewed with patient      Diagnosis Date    Cancer ([facility] [address] 07/29/2019    colon    Glaucoma     Hyperlipidemia     Hypertension     Hypothyroidism      Past Surgical History:   Procedure Laterality Date    CATARACT REMOVAL WITH IMPLANT Bilateral     CHOLECYSTECTOMY      COLONOSCOPY N/A 7/29/2019    COLONOSCOPY WITH BIOPSY performed by Ira Nieves MD at [address] COLONOSCOPY N/A 9/28/2020    COLONOSCOPY POSS BIOPSY OR POLYPECTOMY performed by Ira Nieves MD at [address] COLONOSCOPY N/A 1/10/2022    COLONOSCOPY performed by Ira Nieves MD at [address] ([facility])      SMALL INTESTINE SURGERY Right 8/29/2019    RIGHT HEMICOLECTOMY, LAPAROSCOPIC, ROBOTIC XI ASSISTED performed by Ira Nieves MD at [address] [facility] Bilateral 2/20/2020    TOTAL THYROIDECTOMY WITH ANTERIOR NECK DISSECTION-NEEDS NERVE INTEGRITY MONITOR performed by Meghan Rapp DO at [facility] OR       Past Family Hx:  Reviewed with patient      Problem Relation Age of Onset    Early Death Father    [de-identified] Cancer Sister     Lung Cancer Sister     Cancer Brother     Lung Cancer Brother        Social Hx:  Reviewed with patient  Social History     Tobacco Use    Smoking status: Never Smoker    Smokeless tobacco: Never Used   Substance Use Topics    Alcohol use: No     Alcohol/week: 0.0 standard drinks       OBJECTIVE  BP (!) 150/80 (Site: Right Upper Arm, Position: Sitting, Cuff Size: Medium Adult)   Pulse 68   Temp 97.7 °F (36.5 °C) (Temporal)   Resp 18   Ht 5' 2\" (1.575 m)   Wt 167 lb (75.8 kg)   LMP  (LMP Unknown)   SpO2 98%   Breastfeeding No   BMI 30.54 kg/m²     Problem List:  Ines Mayfield does not have any pertinent problems on file. PHYS EX:  Physical Exam  Vitals and nursing note reviewed. Constitutional:       General: She is not in acute distress. Appearance: Normal appearance. She is well-developed. She is obese. She is not ill-appearing, toxic-appearing or diaphoretic. Comments: Patient has morbid obesity. Patient instructed on low calorie, healthy diet. HENT:      Head: Normocephalic and atraumatic. Nose: Nose normal. No congestion or rhinorrhea. Eyes:      General: No scleral icterus. Right eye: No discharge. Left eye: No discharge. Conjunctiva/sclera: Conjunctivae normal.      Pupils: Pupils are equal, round, and reactive to light. Neck:      Thyroid: Thyromegaly present. Vascular: No carotid bruit or JVD. Trachea: No tracheal deviation. Cardiovascular:      Rate and Rhythm: Normal rate and regular rhythm. Pulses: Normal pulses. Heart sounds: Normal heart sounds. No murmur heard. No friction rub. No gallop. Pulmonary:      Effort: Pulmonary effort is normal. No respiratory distress. Breath sounds: Normal breath sounds. No stridor. No wheezing, rhonchi or rales. Chest:      Chest wall: No tenderness. Abdominal:      General: Bowel sounds are normal. There is no distension. Palpations: Abdomen is soft. There is no mass. Tenderness: There is no abdominal tenderness. There is no guarding or rebound. Hernia: No hernia is present. Musculoskeletal:         General: Tenderness present. No swelling, deformity or signs of injury. Cervical back: Normal range of motion and neck supple. No rigidity or tenderness. No muscular tenderness. Right lower leg: No edema. Left lower leg: No edema. Comments: Pain and decreased ROM multiple joints. Lymphadenopathy:      Cervical: No cervical adenopathy. Skin:     General: Skin is warm. Coloration: Skin is not jaundiced or pale. Findings: No bruising, erythema, lesion or rash. Neurological:      General: No focal deficit present. Mental Status: She is alert and oriented to person, place, and time. Cranial Nerves: No cranial nerve deficit. Sensory: No sensory deficit. Motor: No weakness or abnormal muscle tone. Coordination: Coordination normal.      Gait: Gait normal.      Deep Tendon Reflexes: Reflexes are normal and symmetric. Reflexes normal.   Psychiatric:         Mood and Affect: Mood normal.         Behavior: Behavior normal.         Thought Content: Thought content normal.         Judgment: Judgment normal.         ASSESSMENT/PLAN  Luis Chávez was seen today for hypertension. Diagnoses and all orders for this visit:    Essential hypertension  -     amLODIPine (NORVASC) 5 MG tablet; Take 1 tablet by mouth daily    Seasonal allergic rhinitis, unspecified trigger    Pt instructed if any worse go ED ASAP.     Outpatient Encounter Medications as of 6/8/2022   Medication Sig Dispense Refill    amLODIPine (NORVASC) 5 MG tablet Take 1 tablet by mouth daily 30 tablet 3    enalapril (VASOTEC) 10 MG tablet Take 1 tablet by mouth once daily 30 tablet 0    levothyroxine (EUTHYROX) 112 MCG tablet Take 1 tablet by mouth once daily 90 tablet 3    metoprolol succinate (TOPROL XL) 25 MG extended release tablet Take 1 tablet by mouth twice daily 180 tablet 0    potassium chloride (KLOR-CON) 10 MEQ extended release tablet Take 1 tablet by mouth 3 times daily 270 tablet 1    enalapril-hydroCHLOROthiazide (VASERETIC) 10-25 MG per tablet Take 1 tablet by mouth once daily 90 tablet 1    amLODIPine-atorvastatatin (CADUET) 5-10 MG per tablet Take 1 tablet by mouth once daily 90 tablet 1    Multiple Vitamins-Minerals (PRESERVISION AREDS PO) Take by mouth 2 times daily       Omega-3 Fatty Acids (FISH OIL) 500 MG CAPS Take 1 capsule by mouth daily      Cyanocobalamin (B-12) 2500 MCG TABS Take 1 tablet by mouth three times a week       vitamin D (CHOLECALCIFEROL) 5000 UNITS CAPS capsule Take 5,000 Units by mouth daily       No facility-administered encounter medications on file as of 6/8/2022. Return in about 4 weeks (around 7/6/2022).         Reviewed recent labs related to Mahsa's current problems      Discussed importance of regular Health Maintenance follow up  Health Maintenance   Topic    Hepatitis C screen     Shingles vaccine (1 of 2)    COVID-19 Vaccine (3 - Booster for Moderna series)    Lipids     Annual Wellness Visit (AWV)     Depression Screen     DTaP/Tdap/Td vaccine (2 - Td or Tdap)    DEXA (modify frequency per FRAX score)     Flu vaccine     Hepatitis A vaccine     Hepatitis B vaccine     Hib vaccine     Meningococcal (ACWY) vaccine

## 2022-06-13 DIAGNOSIS — I10 ESSENTIAL HYPERTENSION: ICD-10-CM

## 2022-06-13 RX ORDER — ENALAPRIL MALEATE AND HYDROCHLOROTHIAZIDE 10; 25 MG/1; MG/1
TABLET ORAL
Qty: 90 TABLET | Refills: 0 | Status: SHIPPED
Start: 2022-06-13 | End: 2022-09-12

## 2022-06-30 DIAGNOSIS — E78.2 MIXED HYPERLIPIDEMIA: ICD-10-CM

## 2022-06-30 DIAGNOSIS — I10 ESSENTIAL HYPERTENSION: ICD-10-CM

## 2022-07-01 RX ORDER — AMLODIPINE BESYLATE AND ATORVASTATIN CALCIUM 5; 10 MG/1; MG/1
TABLET, FILM COATED ORAL
Qty: 90 TABLET | Refills: 0 | OUTPATIENT
Start: 2022-07-01

## 2022-07-05 DIAGNOSIS — E78.2 MIXED HYPERLIPIDEMIA: ICD-10-CM

## 2022-07-05 DIAGNOSIS — I10 ESSENTIAL HYPERTENSION: ICD-10-CM

## 2022-07-06 RX ORDER — ENALAPRIL MALEATE 10 MG/1
TABLET ORAL
Qty: 30 TABLET | Refills: 0 | Status: SHIPPED
Start: 2022-07-06 | End: 2022-08-01

## 2022-07-06 RX ORDER — AMLODIPINE BESYLATE AND ATORVASTATIN CALCIUM 5; 10 MG/1; MG/1
TABLET, FILM COATED ORAL
Qty: 90 TABLET | Refills: 0 | Status: SHIPPED
Start: 2022-07-06 | End: 2022-10-10

## 2022-07-11 ENCOUNTER — OFFICE VISIT (OUTPATIENT)
Dept: FAMILY MEDICINE CLINIC | Age: 77
End: 2022-07-11
Payer: MEDICARE

## 2022-07-11 VITALS
HEIGHT: 62 IN | TEMPERATURE: 97.8 F | WEIGHT: 162.4 LBS | HEART RATE: 78 BPM | BODY MASS INDEX: 29.88 KG/M2 | SYSTOLIC BLOOD PRESSURE: 138 MMHG | OXYGEN SATURATION: 96 % | DIASTOLIC BLOOD PRESSURE: 72 MMHG

## 2022-07-11 DIAGNOSIS — J30.2 SEASONAL ALLERGIES: ICD-10-CM

## 2022-07-11 DIAGNOSIS — C73 HURTHLE CELL CARCINOMA OF THYROID (HCC): ICD-10-CM

## 2022-07-11 DIAGNOSIS — C18.2 MALIGNANT NEOPLASM OF ASCENDING COLON (HCC): ICD-10-CM

## 2022-07-11 DIAGNOSIS — Z00.00 MEDICARE ANNUAL WELLNESS VISIT, SUBSEQUENT: Primary | ICD-10-CM

## 2022-07-11 DIAGNOSIS — I10 ESSENTIAL HYPERTENSION: ICD-10-CM

## 2022-07-11 DIAGNOSIS — E03.9 ACQUIRED HYPOTHYROIDISM: ICD-10-CM

## 2022-07-11 PROCEDURE — G0439 PPPS, SUBSEQ VISIT: HCPCS | Performed by: FAMILY MEDICINE

## 2022-07-11 PROCEDURE — 1123F ACP DISCUSS/DSCN MKR DOCD: CPT | Performed by: FAMILY MEDICINE

## 2022-07-11 RX ORDER — MONTELUKAST SODIUM 10 MG/1
10 TABLET ORAL DAILY
Qty: 30 TABLET | Refills: 3 | Status: SHIPPED | OUTPATIENT
Start: 2022-07-11

## 2022-07-11 ASSESSMENT — PATIENT HEALTH QUESTIONNAIRE - PHQ9
SUM OF ALL RESPONSES TO PHQ QUESTIONS 1-9: 0
SUM OF ALL RESPONSES TO PHQ QUESTIONS 1-9: 0
1. LITTLE INTEREST OR PLEASURE IN DOING THINGS: 0
SUM OF ALL RESPONSES TO PHQ QUESTIONS 1-9: 0
SUM OF ALL RESPONSES TO PHQ QUESTIONS 1-9: 0
SUM OF ALL RESPONSES TO PHQ9 QUESTIONS 1 & 2: 0
2. FEELING DOWN, DEPRESSED OR HOPELESS: 0

## 2022-07-11 ASSESSMENT — LIFESTYLE VARIABLES: HOW OFTEN DO YOU HAVE A DRINK CONTAINING ALCOHOL: NEVER

## 2022-07-14 PROBLEM — J30.2 SEASONAL ALLERGIES: Status: ACTIVE | Noted: 2022-07-14

## 2022-07-14 PROBLEM — Z00.00 MEDICARE ANNUAL WELLNESS VISIT, SUBSEQUENT: Status: ACTIVE | Noted: 2022-07-14

## 2022-07-15 NOTE — PATIENT INSTRUCTIONS
Personalized Preventive Plan for Travon Garay - 7/11/2022  Medicare offers a range of preventive health benefits. Some of the tests and screenings are paid in full while other may be subject to a deductible, co-insurance, and/or copay. Some of these benefits include a comprehensive review of your medical history including lifestyle, illnesses that may run in your family, and various assessments and screenings as appropriate. After reviewing your medical record and screening and assessments performed today your provider may have ordered immunizations, labs, imaging, and/or referrals for you. A list of these orders (if applicable) as well as your Preventive Care list are included within your After Visit Summary for your review. Other Preventive Recommendations:    · A preventive eye exam performed by an eye specialist is recommended every 1-2 years to screen for glaucoma; cataracts, macular degeneration, and other eye disorders. · A preventive dental visit is recommended every 6 months. · Try to get at least 150 minutes of exercise per week or 10,000 steps per day on a pedometer . · Order or download the FREE \"Exercise & Physical Activity: Your Everyday Guide\" from [employer]. Call 4-728.130.2668 or search [employer] online. · You need 4201-2043 mg of calcium and 8393-6561 IU of vitamin D per day. It is possible to meet your calcium requirement with diet alone, but a vitamin D supplement is usually necessary to meet this goal.  · When exposed to the sun, use a sunscreen that protects against both UVA and UVB radiation with an SPF of 30 or greater. Reapply every 2 to 3 hours or after sweating, drying off with a towel, or swimming. · Always wear a seat belt when traveling in a car. Always wear a helmet when riding a bicycle or motorcycle.

## 2022-07-15 NOTE — PROGRESS NOTES
Medicare Annual Wellness Visit    Estee Soler is here for Medicare AWV    Assessment & Plan   Medicare annual wellness visit, subsequent  Essential hypertension  Malignant neoplasm of ascending colon ([address]  Acquired hypothyroidism   Hurthle cell carcinoma of thyroid (HCC)  Seasonal allergies  -     montelukast (SINGULAIR) 10 MG tablet; Take 1 tablet by mouth daily, Disp-30 tablet, R-3Normal     Pt instructed if any worse go ED ASAP. Recommendations for Preventive Services Due: see orders and patient instructions/AVS.  Recommended screening schedule for the next 5-10 years is provided to the patient in written form: see Patient Instructions/AVS.     Return in about 6 months (around 1/11/2023). Subjective   The following acute and/or chronic problems were also addressed today: This 68year old female presents for physical exam, hypertension, and seasonal allergies. Patient's complete Health Risk Assessment and screening values have been reviewed and are found in Flowsheets. The following problems were reviewed today and where indicated follow up appointments were made and/or referrals ordered. Positive Risk Factor Screenings with Interventions:             General Health and ACP:  General  In general, how would you say your health is?: Very Good  In the past 7 days, have you experienced any of the following: New or Increased Pain, New or Increased Fatigue, Loneliness, Social Isolation, Stress or Anger?: (!) Yes  Select all that apply: (!) Stress  Do you get the social and emotional support that you need?: Yes  Do you have a Living Will?: Yes    Advance Directives     Power of  Living Will ACP-Advance Directive ACP-Power of     Not on File Not on File Not on File Not on File      General Health Risk Interventions:  · pt instructed on exercise and healthy diet.                Objective   Vitals:    07/11/22 1010 07/11/22 1014   BP: (!) 142/78 138/72   Pulse: 78    Temp: 97.8 °F (36.6 °C)    TempSrc: Oral    SpO2: 96%    Weight: 162 lb 6.4 oz (73.7 kg)    Height: 5' 2\" (1.575 m)       Body mass index is 29.7 kg/m². General Appearance: alert and oriented to person, place and time, well-developed and well-nourished, in no acute distress  Skin: warm and dry, no rash or erythema  Head: normocephalic and atraumatic  Eyes: pupils equal, round, and reactive to light, extraocular eye movements intact, conjunctivae normal  ENT: tympanic membrane, external ear and ear canal normal bilaterally, oropharynx clear and moist with normal mucous membranes pt has seasonal allergies. Neck: neck supple and non tender without mass, no thyromegaly or thyroid nodules, no cervical lymphadenopathy   Pulmonary/Chest: clear to auscultation bilaterally- no wheezes, rales or rhonchi, normal air movement, no respiratory distress  Cardiovascular: normal rate, regular rhythm and no murmurs  Abdomen: soft, non-tender, non-distended, normal bowel sounds, no masses or organomegaly  Extremities: no cyanosis and no clubbing  Musculoskeletal: Pain and decreased ROM multiple joints. Neurologic: gait and coordination normal and speech normal       Allergies   Allergen Reactions    Prevnar 13 [Pneumococcal 13-Kassidy Conj Vacc] Swelling     Redness and swelling in arm at site     Prior to Visit Medications    Medication Sig Taking?  Authorizing Provider   montelukast (SINGULAIR) 10 MG tablet Take 1 tablet by mouth daily Yes Carola P Catterlin, DO   amLODIPine-atorvastatatin (CADUET) 5-10 MG per tablet Take 1 tablet by mouth once daily Yes Carola P Catterlin, DO   enalapril (VASOTEC) 10 MG tablet Take 1 tablet by mouth once daily Yes Carola P Catterlin, DO   enalapril-hydroCHLOROthiazide (VASERETIC) 10-25 MG per tablet Take 1 tablet by mouth once daily Yes Carola P Catterlin, DO   amLODIPine (NORVASC) 5 MG tablet Take 1 tablet by mouth daily Yes Carola P Catterlin, DO   levothyroxine (EUTHYROX) 112 MCG tablet Take 1 tablet by mouth once daily Yes Taty Cooper MD   metoprolol succinate (TOPROL XL) 25 MG extended release tablet Take 1 tablet by mouth twice daily Yes Carola P Catterlin, DO   potassium chloride (KLOR-CON) 10 MEQ extended release tablet Take 1 tablet by mouth 3 times daily Yes Carola P Catterlin, DO   Multiple Vitamins-Minerals (PRESERVISION AREDS PO) Take by mouth 2 times daily  Yes Historical Provider, MD   Omega-3 Fatty Acids (FISH OIL) 500 MG CAPS Take 1 capsule by mouth daily Yes Historical Provider, MD   Cyanocobalamin (B-12) 2500 MCG TABS Take 1 tablet by mouth three times a week  Yes Historical Provider, MD   vitamin D (CHOLECALCIFEROL) 5000 UNITS CAPS capsule Take 5,000 Units by mouth daily Yes Historical Provider, MD       [facility] (Including outside providers/suppliers regularly involved in providing care):   Patient Care Team:  Jose Antonio Joel DO as PCP - General (Family Medicine)  Jose Antonio Joel DO as PCP - [facility] Empaneled Provider  Constantino Thompson MD as Consulting Physician (Pulmonology)     Reviewed and updated this visit:  Tobacco  Allergies  Meds  Problems  Med Hx  Surg Hx  Soc Hx  Fam Hx

## 2022-08-01 DIAGNOSIS — I10 ESSENTIAL HYPERTENSION: ICD-10-CM

## 2022-08-01 RX ORDER — ENALAPRIL MALEATE 10 MG/1
TABLET ORAL
Qty: 30 TABLET | Refills: 0 | Status: SHIPPED
Start: 2022-08-01 | End: 2022-08-03

## 2022-08-01 RX ORDER — METOPROLOL SUCCINATE 25 MG/1
TABLET, EXTENDED RELEASE ORAL
Qty: 180 TABLET | Refills: 0 | Status: SHIPPED
Start: 2022-08-01 | End: 2022-08-03

## 2022-08-02 DIAGNOSIS — I10 ESSENTIAL HYPERTENSION: ICD-10-CM

## 2022-08-03 RX ORDER — METOPROLOL SUCCINATE 25 MG/1
TABLET, EXTENDED RELEASE ORAL
Qty: 180 TABLET | Refills: 0 | Status: SHIPPED | OUTPATIENT
Start: 2022-08-03

## 2022-08-03 RX ORDER — ENALAPRIL MALEATE 10 MG/1
TABLET ORAL
Qty: 30 TABLET | Refills: 0 | Status: SHIPPED
Start: 2022-08-03 | End: 2022-09-26

## 2022-08-13 PROBLEM — Z00.00 MEDICARE ANNUAL WELLNESS VISIT, SUBSEQUENT: Status: RESOLVED | Noted: 2022-07-14 | Resolved: 2022-08-13

## 2022-08-15 ENCOUNTER — OFFICE VISIT (OUTPATIENT)
Dept: ENT CLINIC | Age: 77
End: 2022-08-15
Payer: MEDICARE

## 2022-08-15 VITALS — WEIGHT: 160 LBS | HEIGHT: 62 IN | BODY MASS INDEX: 29.44 KG/M2

## 2022-08-15 DIAGNOSIS — E04.1 THYROID NODULE: Primary | ICD-10-CM

## 2022-08-15 DIAGNOSIS — C18.0 CECAL CANCER (HCC): Primary | ICD-10-CM

## 2022-08-15 DIAGNOSIS — C73 PAPILLARY THYROID CARCINOMA (HCC): ICD-10-CM

## 2022-08-15 DIAGNOSIS — C18.9 MALIGNANT NEOPLASM OF COLON, UNSPECIFIED PART OF COLON (HCC): ICD-10-CM

## 2022-08-15 PROCEDURE — 1123F ACP DISCUSS/DSCN MKR DOCD: CPT | Performed by: OTOLARYNGOLOGY

## 2022-08-15 PROCEDURE — 1090F PRES/ABSN URINE INCON ASSESS: CPT | Performed by: OTOLARYNGOLOGY

## 2022-08-15 PROCEDURE — 99213 OFFICE O/P EST LOW 20 MIN: CPT | Performed by: OTOLARYNGOLOGY

## 2022-08-15 PROCEDURE — G8427 DOCREV CUR MEDS BY ELIG CLIN: HCPCS | Performed by: OTOLARYNGOLOGY

## 2022-08-15 PROCEDURE — G8417 CALC BMI ABV UP PARAM F/U: HCPCS | Performed by: OTOLARYNGOLOGY

## 2022-08-15 PROCEDURE — 1036F TOBACCO NON-USER: CPT | Performed by: OTOLARYNGOLOGY

## 2022-08-15 PROCEDURE — G8399 PT W/DXA RESULTS DOCUMENT: HCPCS | Performed by: OTOLARYNGOLOGY

## 2022-08-15 RX ORDER — BUSPIRONE HYDROCHLORIDE 5 MG/1
5 TABLET ORAL 3 TIMES DAILY
COMMUNITY

## 2022-08-15 ASSESSMENT — ENCOUNTER SYMPTOMS
SHORTNESS OF BREATH: 0
VOICE CHANGE: 0
TROUBLE SWALLOWING: 0
VOMITING: 0
COUGH: 0

## 2022-08-15 NOTE — PROGRESS NOTES
[facility] Otolaryngology  Dr. Maurizio Narvaez. BARBARA Moreno. Ms.Ed. New Consult       Patient Name:  Rod Becerra  :  [id number]     CHIEF C/O:    Chief Complaint   Patient presents with    Follow-up     1 yr thyroid       HISTORY OBTAINED FROM:  patient    HISTORY OF PRESENT ILLNESS:       Valentin Brenner is a 68y.o. year old female, here today for a 1 year thyroid follow up. No complaints in regards to thyroid. Recent TSH and thyroglobulin was normal. 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 Has noted right nasal congestion and drainage, as well as fullness in right ear, overall improving though. Has noticed a painful lesion on the right upper pinna for the past 3 weeks, worse when she sleeps on it and she prefers to sleep on her right side. Past Medical History:   Diagnosis Date    Cancer ([address] 2019    colon    Glaucoma     Hyperlipidemia     Hypertension     Hypothyroidism      Past Surgical History:   Procedure Laterality Date    CATARACT REMOVAL WITH IMPLANT Bilateral     CHOLECYSTECTOMY      COLONOSCOPY N/A 2019    COLONOSCOPY WITH BIOPSY performed by Charly Luong MD at [address] [place] [address] 2020    COLONOSCOPY POSS BIOPSY OR POLYPECTOMY performed by Charly Luong MD at [address] [place] [address] 1/10/2022    COLONOSCOPY performed by Charly Luong MD at [address])      SMALL INTESTINE SURGERY Right 2019    RIGHT HEMICOLECTOMY, LAPAROSCOPIC, ROBOTIC XI ASSISTED performed by Charly Luong MD at [address] Bilateral 2020    TOTAL THYROIDECTOMY WITH ANTERIOR NECK DISSECTION-NEEDS NERVE INTEGRITY MONITOR performed by Dexter Burrell DO at [facility] OR       Current Outpatient Medications:     busPIRone (BUSPAR) 5 MG tablet, Take 5 mg by mouth in the morning and 5 mg at noon and 5 mg before bedtime. , Disp: , Rfl:     metoprolol succinate (TOPROL XL) 25 MG extended release tablet, Take 1 tablet by mouth twice daily, Disp: 180 tablet, Rfl: 0    amLODIPine-atorvastatatin (CADUET) 5-10 MG per tablet, Take 1 tablet by mouth once daily, Disp: 90 tablet, Rfl: 0    enalapril-hydroCHLOROthiazide (VASERETIC) 10-25 MG per tablet, Take 1 tablet by mouth once daily, Disp: 90 tablet, Rfl: 0    amLODIPine (NORVASC) 5 MG tablet, Take 1 tablet by mouth daily, Disp: 30 tablet, Rfl: 3    levothyroxine (EUTHYROX) 112 MCG tablet, Take 1 tablet by mouth once daily, Disp: 90 tablet, Rfl: 3    potassium chloride (KLOR-CON) 10 MEQ extended release tablet, Take 1 tablet by mouth 3 times daily, Disp: 270 tablet, Rfl: 1    Omega-3 Fatty Acids (FISH OIL) 500 MG CAPS, Take 1 capsule by mouth daily, Disp: , Rfl:     enalapril (VASOTEC) 10 MG tablet, Take 1 tablet by mouth once daily (Patient not taking: Reported on 8/15/2022), Disp: 30 tablet, Rfl: 0    montelukast (SINGULAIR) 10 MG tablet, Take 1 tablet by mouth daily (Patient not taking: Reported on 8/15/2022), Disp: 30 tablet, Rfl: 3    Multiple Vitamins-Minerals (PRESERVISION AREDS PO), Take by mouth 2 times daily  (Patient not taking: Reported on 8/15/2022), Disp: , Rfl:     Cyanocobalamin (B-12) 2500 MCG TABS, Take 1 tablet by mouth three times a week  (Patient not taking: Reported on 8/15/2022), Disp: , Rfl:     vitamin D (CHOLECALCIFEROL) 5000 UNITS CAPS capsule, Take 5,000 Units by mouth daily (Patient not taking: Reported on 8/15/2022), Disp: , Rfl:   Prevnar 13 [pneumococcal 13-sera conj vacc]  Social History     Tobacco Use    Smoking status: Never    Smokeless tobacco: Never   Vaping Use    Vaping Use: Never used   Substance Use Topics    Alcohol use: No     Alcohol/week: 0.0 standard drinks    Drug use: No     Family History   Problem Relation Age of Onset    Early Death Father     Cancer Sister     Lung Cancer Sister     Cancer Brother     Lung Cancer Brother        Review of Systems   Constitutional:  Negative for activity change, appetite change, chills, fatigue, fever and unexpected weight change. HENT:  Negative for ear discharge, hearing loss, trouble swallowing and voice change. Respiratory:  Negative for cough and shortness of breath. Cardiovascular:  Negative for chest pain and palpitations. Gastrointestinal:  Negative for vomiting. Skin:  Negative for rash. Allergic/Immunologic: Negative for environmental allergies. Neurological:  Negative for dizziness and headaches. Hematological:  Does not bruise/bleed easily. All other systems reviewed and are negative. Ht 5' 2\" (1.575 m)   Wt 160 lb (72.6 kg)   LMP  (LMP Unknown)   BMI 29.26 kg/m²   Physical Exam  Vitals and nursing note reviewed. Constitutional:       General: She is not in acute distress. Appearance: Normal appearance. She is well-developed. HENT:      Head: Normocephalic. Right Ear: Tympanic membrane and ear canal normal. There is impacted cerumen. Left Ear: Tympanic membrane, ear canal and external ear normal.      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 Left Sinus: No maxillary sinus tenderness or frontal sinus tenderness. Mouth/Throat:      Mouth: Mucous membranes are moist. No oral lesions. Tongue: No lesions. Tongue does not deviate from midline. Palate: No mass. Pharynx: Oropharynx is clear. No pharyngeal swelling, oropharyngeal exudate, posterior oropharyngeal erythema or uvula swelling. Tonsils: No tonsillar exudate. Eyes:      Pupils: Pupils are equal, round, and reactive to light. Neck:      Thyroid: No thyromegaly (s/p total thyroidectomy). Trachea: No tracheal deviation. Cardiovascular:      Rate and Rhythm: Normal rate. Pulmonary:      Effort: Pulmonary effort is normal. No respiratory distress.    Musculoskeletal:         General: Normal range of

## 2022-09-02 ENCOUNTER — HOSPITAL ENCOUNTER (OUTPATIENT)
Dept: CT IMAGING | Age: 77
Discharge: HOME | End: 2022-09-04
Payer: MEDICARE

## 2022-09-02 ENCOUNTER — HOSPITAL ENCOUNTER (OUTPATIENT)
Age: 77
Discharge: HOME | End: 2022-09-02
Payer: MEDICARE

## 2022-09-02 DIAGNOSIS — C18.9 MALIGNANT NEOPLASM OF COLON, UNSPECIFIED PART OF COLON (HCC): ICD-10-CM

## 2022-09-02 DIAGNOSIS — C18.0 CECAL CANCER (HCC): ICD-10-CM

## 2022-09-02 LAB
ALBUMIN SERPL-MCNC: 4.5 G/DL (ref 3.5–5.2)
ALP BLD-CCNC: 109 U/L (ref 35–104)
ALT SERPL-CCNC: 11 U/L (ref 0–32)
ANION GAP SERPL CALCULATED.3IONS-SCNC: 13 MMOL/L (ref 7–16)
AST SERPL-CCNC: 15 U/L (ref 0–31)
BILIRUB SERPL-MCNC: 0.5 MG/DL (ref 0–1.2)
BUN BLDV-MCNC: 13 MG/DL (ref 6–23)
CALCIUM SERPL-MCNC: 9.4 MG/DL (ref 8.6–10.2)
CHLORIDE BLD-SCNC: 102 MMOL/L (ref 98–107)
CO2: 28 MMOL/L (ref 22–29)
CREAT SERPL-MCNC: 0.6 MG/DL (ref 0.5–1)
GFR AFRICAN AMERICAN: >60
GFR NON-AFRICAN AMERICAN: >60 ML/MIN/1.73
GLUCOSE BLD-MCNC: 113 MG/DL (ref 74–99)
POTASSIUM SERPL-SCNC: 3.7 MMOL/L (ref 3.5–5)
SODIUM BLD-SCNC: 143 MMOL/L (ref 132–146)
TOTAL PROTEIN: 8 G/DL (ref 6.4–8.3)

## 2022-09-02 PROCEDURE — 71270 CT THORAX DX C-/C+: CPT

## 2022-09-02 PROCEDURE — 6360000004 HC RX CONTRAST MEDICATION: Performed by: RADIOLOGY

## 2022-09-02 PROCEDURE — 36415 COLL VENOUS BLD VENIPUNCTURE: CPT

## 2022-09-02 PROCEDURE — 2580000003 HC RX 258: Performed by: RADIOLOGY

## 2022-09-02 PROCEDURE — 74178 CT ABD&PLV WO CNTR FLWD CNTR: CPT

## 2022-09-02 PROCEDURE — 80053 COMPREHEN METABOLIC PANEL: CPT

## 2022-09-02 RX ORDER — SODIUM CHLORIDE 0.9 % (FLUSH) 0.9 %
10 SYRINGE (ML) INJECTION PRN
Status: DISCONTINUED | OUTPATIENT
Start: 2022-09-02 | End: 2022-09-05 | Stop reason: HOSPADM

## 2022-09-02 RX ADMIN — IOHEXOL 50 ML: 240 INJECTION, SOLUTION INTRATHECAL; INTRAVASCULAR; INTRAVENOUS; ORAL at 12:04

## 2022-09-02 RX ADMIN — IOPAMIDOL 75 ML: 755 INJECTION, SOLUTION INTRAVENOUS at 11:59

## 2022-09-02 RX ADMIN — SODIUM CHLORIDE, PRESERVATIVE FREE 10 ML: 5 INJECTION INTRAVENOUS at 12:04

## 2022-09-10 DIAGNOSIS — I10 ESSENTIAL HYPERTENSION: ICD-10-CM

## 2022-09-12 RX ORDER — ENALAPRIL MALEATE AND HYDROCHLOROTHIAZIDE 10; 25 MG/1; MG/1
TABLET ORAL
Qty: 90 TABLET | Refills: 0 | Status: SHIPPED | OUTPATIENT
Start: 2022-09-12

## 2022-09-12 RX ORDER — POTASSIUM CHLORIDE 750 MG/1
TABLET, FILM COATED, EXTENDED RELEASE ORAL
Qty: 270 TABLET | Refills: 0 | Status: SHIPPED | OUTPATIENT
Start: 2022-09-12

## 2022-09-21 ENCOUNTER — TELEPHONE (OUTPATIENT)
Dept: ENT CLINIC | Age: 77
End: 2022-09-21

## 2022-09-21 NOTE — TELEPHONE ENCOUNTER
Patient called and states that she did complete the 4 weeks of topical medication for the infection on the ear. States that the area is still sore and is unsure if she should continue use or if she would need seen again.  Please advise

## 2022-09-26 ENCOUNTER — HOSPITAL ENCOUNTER (OUTPATIENT)
Dept: INFUSION THERAPY | Age: 77
Discharge: HOME | End: 2022-09-26
Payer: MEDICARE

## 2022-09-26 ENCOUNTER — OFFICE VISIT (OUTPATIENT)
Dept: ONCOLOGY | Age: 77
End: 2022-09-26
Payer: MEDICARE

## 2022-09-26 VITALS
WEIGHT: 164.3 LBS | DIASTOLIC BLOOD PRESSURE: 69 MMHG | TEMPERATURE: 97.3 F | SYSTOLIC BLOOD PRESSURE: 149 MMHG | HEIGHT: 62 IN | BODY MASS INDEX: 30.24 KG/M2 | HEART RATE: 76 BPM | OXYGEN SATURATION: 97 %

## 2022-09-26 DIAGNOSIS — I10 ESSENTIAL HYPERTENSION: ICD-10-CM

## 2022-09-26 DIAGNOSIS — C18.0 CECAL CANCER (HCC): Primary | ICD-10-CM

## 2022-09-26 DIAGNOSIS — C18.9 MALIGNANT NEOPLASM OF COLON, UNSPECIFIED PART OF COLON (HCC): ICD-10-CM

## 2022-09-26 DIAGNOSIS — N28.1 KIDNEY CYSTS: ICD-10-CM

## 2022-09-26 LAB
ALBUMIN SERPL-MCNC: 4.2 G/DL (ref 3.5–5.2)
ALP BLD-CCNC: 106 U/L (ref 35–104)
ALT SERPL-CCNC: 15 U/L (ref 0–32)
ANION GAP SERPL CALCULATED.3IONS-SCNC: 11 MMOL/L (ref 7–16)
AST SERPL-CCNC: 19 U/L (ref 0–31)
BASOPHILS ABSOLUTE: 0.04 E9/L (ref 0–0.2)
BASOPHILS RELATIVE PERCENT: 0.5 % (ref 0–2)
BILIRUB SERPL-MCNC: 0.4 MG/DL (ref 0–1.2)
BUN BLDV-MCNC: 10 MG/DL (ref 6–23)
CALCIUM SERPL-MCNC: 9 MG/DL (ref 8.6–10.2)
CEA: 1.4 NG/ML (ref 0–5.2)
CHLORIDE BLD-SCNC: 99 MMOL/L (ref 98–107)
CO2: 29 MMOL/L (ref 22–29)
CREAT SERPL-MCNC: 0.6 MG/DL (ref 0.5–1)
EOSINOPHILS ABSOLUTE: 0.22 E9/L (ref 0.05–0.5)
EOSINOPHILS RELATIVE PERCENT: 2.9 % (ref 0–6)
GFR AFRICAN AMERICAN: >60
GFR NON-AFRICAN AMERICAN: >60 ML/MIN/1.73
GLUCOSE BLD-MCNC: 109 MG/DL (ref 74–99)
HCT VFR BLD CALC: 42.9 % (ref 34–48)
HEMOGLOBIN: 14.3 G/DL (ref 11.5–15.5)
IMMATURE GRANULOCYTES #: 0.03 E9/L
IMMATURE GRANULOCYTES %: 0.4 % (ref 0–5)
LYMPHOCYTES ABSOLUTE: 1.28 E9/L (ref 1.5–4)
LYMPHOCYTES RELATIVE PERCENT: 16.8 % (ref 20–42)
MCH RBC QN AUTO: 29.2 PG (ref 26–35)
MCHC RBC AUTO-ENTMCNC: 33.3 % (ref 32–34.5)
MCV RBC AUTO: 87.7 FL (ref 80–99.9)
MONOCYTES ABSOLUTE: 0.73 E9/L (ref 0.1–0.95)
MONOCYTES RELATIVE PERCENT: 9.6 % (ref 2–12)
NEUTROPHILS ABSOLUTE: 5.31 E9/L (ref 1.8–7.3)
NEUTROPHILS RELATIVE PERCENT: 69.8 % (ref 43–80)
PDW BLD-RTO: 13.4 FL (ref 11.5–15)
PLATELET # BLD: 227 E9/L (ref 130–450)
PMV BLD AUTO: 10.2 FL (ref 7–12)
POTASSIUM SERPL-SCNC: 3.4 MMOL/L (ref 3.5–5)
RBC # BLD: 4.89 E12/L (ref 3.5–5.5)
SODIUM BLD-SCNC: 139 MMOL/L (ref 132–146)
TOTAL PROTEIN: 7.5 G/DL (ref 6.4–8.3)
WBC # BLD: 7.6 E9/L (ref 4.5–11.5)

## 2022-09-26 PROCEDURE — 1123F ACP DISCUSS/DSCN MKR DOCD: CPT | Performed by: INTERNAL MEDICINE

## 2022-09-26 PROCEDURE — 36415 COLL VENOUS BLD VENIPUNCTURE: CPT

## 2022-09-26 PROCEDURE — G8417 CALC BMI ABV UP PARAM F/U: HCPCS | Performed by: INTERNAL MEDICINE

## 2022-09-26 PROCEDURE — 80053 COMPREHEN METABOLIC PANEL: CPT

## 2022-09-26 PROCEDURE — G8427 DOCREV CUR MEDS BY ELIG CLIN: HCPCS | Performed by: INTERNAL MEDICINE

## 2022-09-26 PROCEDURE — 1090F PRES/ABSN URINE INCON ASSESS: CPT | Performed by: INTERNAL MEDICINE

## 2022-09-26 PROCEDURE — 1036F TOBACCO NON-USER: CPT | Performed by: INTERNAL MEDICINE

## 2022-09-26 PROCEDURE — 99213 OFFICE O/P EST LOW 20 MIN: CPT

## 2022-09-26 PROCEDURE — 85025 COMPLETE CBC W/AUTO DIFF WBC: CPT

## 2022-09-26 PROCEDURE — G8399 PT W/DXA RESULTS DOCUMENT: HCPCS | Performed by: INTERNAL MEDICINE

## 2022-09-26 PROCEDURE — 99214 OFFICE O/P EST MOD 30 MIN: CPT | Performed by: INTERNAL MEDICINE

## 2022-09-26 PROCEDURE — 82378 CARCINOEMBRYONIC ANTIGEN: CPT

## 2022-09-26 RX ORDER — ENALAPRIL MALEATE 10 MG/1
TABLET ORAL
Qty: 30 TABLET | Refills: 0 | Status: SHIPPED
Start: 2022-09-26 | End: 2022-10-31

## 2022-09-26 RX ORDER — AMLODIPINE BESYLATE 5 MG/1
TABLET ORAL
Qty: 30 TABLET | Refills: 0 | Status: SHIPPED
Start: 2022-09-26 | End: 2022-10-31

## 2022-09-26 NOTE — PROGRESS NOTES
Department of [facility] Oncology  Attending Clinic Note    Reason for Visit: Follow-up on a patient with Cecal Cancer    PCP:  Wolf Shaikh DO    History of Present Illness:  68 y.o. female, never smoker, hx of HTN, hyperlipidemia, hypothyroidism, who presented to see general surgery team for evaluation of anemia and diagnostic colonoscopy. Colonoscopy on 07/29/2019: The ileocecal valve was obscured by large cecal fungating mass, 5cm in diameter. 1cm polyp was at the proximal transverse just passed the hepatic flexure  A. Mass, cecum, biopsy: Invasive moderately differentiated  adenocarcinoma. B. Polyp, hepatic flexure of colon, biopsy: Tubular adenoma. CT chest 07/31/2019:   Solid pulmonary parenchymal nodule in the left lower lobe measures 12 x 11 mm, and lies just above diaphragm. CT abdomen/pelvis 07/31/2019: There is a cecal mural mass on the medial aspect of the cecum and proximal ascending colon with perpendicular diameter measurements of .2 x 5.7 x 2.8 cm. It appears to be confined to the bowel wall. There is an enlarged right adrenal lesion measuring 2 cm diameter    CEA 3.8 on 08/05/2019. PET/CT scan 08/15/2019  No FDG avid uptake is identified which exceeds the threshold SUV in the left pulmonary nodule. Pulmonary team (Dr. Veena Callejas) consult appreciated; repeat CT chest in 3 months. No uptake in adrenal glands. Abnormal tracer uptake in the right thyroid which is focal. Endocrinology team consult (Dr. Shana Gan) appreciated. Thyroid U/S 08/21/2019 noted thyroid nodules. Abnormal tracer uptake at the level the cecum which exceeds the threshold SUV. Right hemicolectomy was performed on 08/29/2019. Procedure: Right hemicolectomy. Tumor site: Cecum. Tumor size: Greatest dimension is 6.5 cm; additional dimensions 4.5 x 1.8 cm. Macroscopic tumor perforation: Not identified. Histologic type: Adenocarcinoma  Histologic grade: G2 (moderately differentiated).   Tumor extension: Tumor invades through the muscularis propria into pericolorectal tissue. Margin status: All margins are uninvolved by invasive carcinoma, high grade dysplasia, intramucosal adenocarcinoma and adenoma. - Margins examined: Proximal, distal and radial/soft tissue surgical margins.     - Distance of invasive carcinoma from closest margin:  7 cm (radial/soft tissue surgical margin). Treatment effect: No known presurgical therapy. Lymphovascular invasion:  Not identified. Perineural invasion: Not identified. Tumor deposits:  Not identified. Regional lymph nodes:     - Number of lymph nodes involved: 0     - Number of lymph nodes examined: 18  Pathologic stage classification (pTNM, AJCC 8th edition)     - Primary tumor (pT): pT3 -- tumor invades through the muscularis       propria into pericolorectal tissues. - Regional lymph nodes (pN):  pN0 -- no regional lymph node metastasis. Mismatch Repair (MMR) IHC panel on specimen S [id number]   MLH1: No loss of expression   [address] 2: No loss of expression   [address] 6: No loss of expression   PMS 2: No loss of expression    Stage IIA (pT3 pN0 M0) no high risk features. We reviewed with her that Data from randomized trials and meta-analyses indicated that if there is benefit for 5-FU-based chemotherapy therapy in patients with resected stage II disease, it does not exceed an absolute improvement in five-year survival of 5 percent. We also explained to her the side effects/toxicities of fluoropyrimidine-based adjuvant regimen (which will be for a duration of 6 months). Patient decided to proceed with observation and Not adjuvant chemotherapy; To be followed with History & Physical and blood work including CEA every 3 months for 2 years, then every 6 months for a total of 5 years. CT scan chest/abdomen/pelvis yearly for 5 years; colonoscopy in one year. CEA 0.7 on 12/19/2019. CEA 1.1 on 03/19/2020. CT chest 05/15/2020 noted no evidence of metastatic disease. Follows with pulmonary team for lung nodule  CT abdomen/pelvis 05/15/2020:   No evidence of metastatic disease. Left renal cyst; pt declined seeing urology team.    CEA 1.1 on 06/18/2020. CEA 1.6 on 09/21/2020. Colonoscopy 09/28/2020 Normal colon, patent ileocolonic anastomosis. Next colonoscopy in 1-3 years. CEA 1.5 on 03/22/2021. CT chest/abdomen/pelvis 05/28/2021 stable examination  CEA 1.2 on 06/24/2021  CEA 1.6 on 09/27/2021  Colonoscopy 01/10/2022 Normal colon, patent anastomosis; repeat colonoscopy in 3-5 years. Total thyroidectomy with anterior neck dissection on 02/20/2020  A. 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  5 lymph nodes negative for malignancy  CANCER CASE SUMMARY (PAPILLARY CARCINOMA)  Procedure-total thyroidectomy  Tumor focality-unifocal  Tumor site-right lobe  Tumor size-1.2 cm  Histologic type-papillary carcinoma, classic  Margins-uninvolved by carcinoma  Angioinvasion-not identified  Lymphatic invasion-not identified  Extrathyroidal extension-not identified  Regional lymph nodes-negative for malignancy       Number of lymph nodes involved: 0       Number of lymph nodes examined: 5       Lucas levels examined: Level 6  TNM classification (AJCC eighth edition)-pT1b N0 MX     CANCER CASE SUMMARY (HURTHLE CELL CARCINOMA)  Procedure-total thyroidectomy  Tumor focality-multifocal  Tumor site-right and left lobes  Tumor size-cannot be determined (see above comment)  Histologic type-Hurthle cell carcinoma  Margins-right and left inferior margins involved by carcinoma; left  superior margin involved by carcinoma  Angioinvasion-present  Lymphatic invasion-not identified  Extrathyroidal extension-not identified  Regional lymph nodes-negative for malignancy       Number of lymph nodes involved: 0       Number of lymph nodes examined: 5       Lucas levels examined: Level 6  TNM classification (AJCC eighth edition)-at least pT2 N0 MX  Referred back to endocrine team for radioactive iodine. US Head Neck soft tissue 08/24/2020: unremarkable of the thyroid bed. US Head Neck soft tissue 08/20/2021: No residual or recurrent tissue. No discrete nodules. Today 09/26/2022; No fever, chills. Fair appetite and energy level. No N.V abdominal pain. No melena or hematochezia. Review of Systems;  CONSTITUTIONAL: No fever, chills. Fair appetite and energy level. ENMT: Eyes: No diplopia; Nose: No epistaxis. Mouth: No sore throat. RESPIRATORY: No hemoptysis, shortness of breath, cough. CARDIOVASCULAR: No chest pain, palpitations. GASTROINTESTINAL: No nausea/vomiting, abdominal pain. No melena or hematochezia. GENITOURINARY: No dysuria, urinary frequency, hematuria. NEURO: No syncope, presyncope, headache. Remainder:  ROS NEGATIVE    Past Medical History:      Diagnosis Date    [facility] 07/29/2019    colon    Glaucoma     Hyperlipidemia     Hypertension     Hypothyroidism      Medications:  Reviewed and reconciled. Allergies: Allergies   Allergen Reactions    Prevnar 13 [Pneumococcal 13-Kassidy Conj Vacc] Swelling     Redness and swelling in arm at site     Physical Exam:  BP (!) 149/69   Pulse 76   Temp 97.3 °F (36.3 °C)   Ht 5' 2\" (1.575 m)   Wt 164 lb 4.8 oz (74.5 kg)   LMP  (LMP Unknown)   SpO2 97%   BMI 30.05 kg/m²   GENERAL: Alert, oriented x 3, not in acute distress   LUNGS: CTA Cristian  CVS: RRR. No murmurs ribs or gallops. EXTREMITIES: Without clubbing, cyanosis, or edema. 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 1cm polyp was at the proximal transverse just passed the hepatic flexure  A. Mass, cecum, biopsy: Invasive moderately differentiated adenocarcinoma. B. Polyp, hepatic flexure of colon, biopsy: Tubular adenoma. CT chest 07/31/2019:   Solid pulmonary parenchymal nodule in the left lower lobe measures 12 x 11 mm, and lies just above diaphragm. CT abdomen/pelvis 07/31/2019: There is a cecal mural mass on the medial aspect of the cecum and proximal ascending colon with perpendicular diameter measurements of 6.2 x 5.7 x 2.8 cm. It appears to be confined to the bowel wall. Only small cecal mesenteric LN are identified. There is an enlarged right adrenal lesion measuring 2 cm diameter    CEA 3.8 on 08/05/2019. PET/CT scan 08/15/2019  No FDG avid uptake is identified which exceeds the threshold SUV in the left pulmonary nodule. Pulmonary team (Dr. Harini Dailey) consult appreciated; repeat CT chest in 3 months. No uptake in adrenal glands. Abnormal tracer uptake in the right thyroid which is focal. Thyroid U/S 08/21/2019 noted thyroid nodules. Endocrinology team consult (Dr. Trish Mirza) appreciated. Abnormal tracer uptake at the level the cecum which exceeds the threshold SUV. Right hemicolectomy on 08/29/2019. Procedure: Right hemicolectomy. Tumor site: Cecum. Tumor size: Greatest dimension is 6.5 cm; additional dimensions 4.5 x 1.8 cm. Macroscopic tumor perforation: Not identified. Histologic type: Adenocarcinoma  Histologic grade: G2 (moderately differentiated). Tumor extension: Tumor invades through the muscularis propria into pericolorectal tissue. Margin status: All margins are uninvolved by invasive carcinoma, high grade dysplasia, intramucosal adenocarcinoma and adenoma. - Margins examined: Proximal, distal and radial/soft tissue surgical margins.     - Distance of invasive carcinoma from closest margin:  7 cm (radial/soft tissue surgical margin).   Treatment effect: No known presurgical therapy. Lymphovascular invasion:  Not identified. Perineural invasion: Not identified. Tumor deposits:  Not identified. Regional lymph nodes:     - Number of lymph nodes involved: 0     - Number of lymph nodes examined: 18  Pathologic stage classification (pTNM, AJCC 8th edition)     - Primary tumor (pT): pT3 -- tumor invades through the muscularis       propria into pericolorectal tissues. - Regional lymph nodes (pN):  pN0 -- no regional lymph node metastasis. Mismatch Repair (MMR) IHC panel on specimen S [id number]   MLH1: No loss of expression   [address] 2: No loss of expression   [address] 6: No loss of expression   PMS 2: No loss of expression    Stage IIA (pT3 pN0 M0) no high risk features. We reviewed with her that Data from randomized trials and meta-analyses indicated that if there is benefit for 5-FU-based chemotherapy therapy in patients with resected stage II disease, it does not exceed an absolute improvement in five-year survival of 5 percent. We also explained to her the side effects/toxicities of fluoropyrimidine-based adjuvant regimen (which will be for a duration of 6 months). Patient decided to proceed with observation and Not adjuvant chemotherapy; To be followed with History & Physical and blood work including CEA every 3 months for 2 years, then every 6 months for a total of 5 years. CT scan chest/abdomen/pelvis yearly for 5 years; colonoscopy in one year    CEA 0.7 on 12/19/2019. CEA 1.1 on 03/19/2020. CT chest 05/15/2020 noted no evidence of metastatic disease. Follows with pulmonary team for lung nodule  CT abdomen/pelvis 05/15/2020: No evidence of metastatic disease. Left renal cyst; pt declined seeing urology team.    CEA 1.1 on 06/18/2020. CEA 1.6 on 09/21/2020. Colonoscopy 09/28/2020 Normal colon, patent ileocolonic anastomosis. Next colonoscopy in 1-3 years. CEA 1.0 on 12/21/2020. CEA 1.5 on 03/22/2021.   CT chest/abdomen/pelvis 05/28/2021 stable examination  CEA 1.2 on 06/24/2021  CEA 1.6 on 09/27/2021  Colonoscopy 01/10/2022 Normal colon, patent anastomosis; repeat colonoscopy in 3-5 years. CEA 0.9 on 03/28/2022  CT chest 09/02/2022 no significant change, with stable lung nodules. CT abdomen pelvis 09/02/2022 noted 2.0 x 2.1 cm intermediate density mass with enhancing internal and nodular components compatible with Bosniak classification 4 lesion. Urology team consulted. Stable right adrenal adenoma  Prior cholecystectomy and hysterectomy  Fat-containing left inguinal hernia  No evidence of metastatic disease  CEA pending 09/26/2022  Imaging reviewed. Labs reviewed.    SRINIVAS  RTC 6 months with labs (CBC, CMP and CEA)    Genny Navarro MD   37/29/7710  Board Certified Medical Oncologist

## 2022-10-03 ENCOUNTER — OFFICE VISIT (OUTPATIENT)
Dept: ENT CLINIC | Age: 77
End: 2022-10-03
Payer: MEDICARE

## 2022-10-03 VITALS
HEIGHT: 62 IN | WEIGHT: 163 LBS | SYSTOLIC BLOOD PRESSURE: 157 MMHG | DIASTOLIC BLOOD PRESSURE: 83 MMHG | BODY MASS INDEX: 30 KG/M2 | HEART RATE: 88 BPM

## 2022-10-03 DIAGNOSIS — H61.001 CHONDRODERMATITIS NODULARIS HELICIS OF RIGHT EAR: Primary | ICD-10-CM

## 2022-10-03 PROCEDURE — G8399 PT W/DXA RESULTS DOCUMENT: HCPCS | Performed by: OTOLARYNGOLOGY

## 2022-10-03 PROCEDURE — 99213 OFFICE O/P EST LOW 20 MIN: CPT | Performed by: OTOLARYNGOLOGY

## 2022-10-03 PROCEDURE — G8427 DOCREV CUR MEDS BY ELIG CLIN: HCPCS | Performed by: OTOLARYNGOLOGY

## 2022-10-03 PROCEDURE — 1036F TOBACCO NON-USER: CPT | Performed by: OTOLARYNGOLOGY

## 2022-10-03 PROCEDURE — G8417 CALC BMI ABV UP PARAM F/U: HCPCS | Performed by: OTOLARYNGOLOGY

## 2022-10-03 PROCEDURE — 1090F PRES/ABSN URINE INCON ASSESS: CPT | Performed by: OTOLARYNGOLOGY

## 2022-10-03 PROCEDURE — G8484 FLU IMMUNIZE NO ADMIN: HCPCS | Performed by: OTOLARYNGOLOGY

## 2022-10-03 PROCEDURE — 1123F ACP DISCUSS/DSCN MKR DOCD: CPT | Performed by: OTOLARYNGOLOGY

## 2022-10-03 ASSESSMENT — ENCOUNTER SYMPTOMS
VOICE CHANGE: 0
VOMITING: 0
COUGH: 0
SHORTNESS OF BREATH: 0
TROUBLE SWALLOWING: 0

## 2022-10-03 NOTE — PROGRESS NOTES
[facility] Otolaryngology  Dr. Roseanne Valdez. DESMOND Moreno Ms.Ed. New Consult       Patient Name:  Mariah Spurling  :       CHIEF C/O:    Chief Complaint   Patient presents with    Follow-up     Pt states sore on right ear is clearing up. HISTORY OBTAINED FROM:  patient    HISTORY OF PRESENT ILLNESS:       German Cannon is a 68y.o. year old female, here today for a 1 year thyroid follow up. No complaints in regards to thyroid. Recent TSH and thyroglobulin was normal. 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 Has noted right nasal congestion and drainage, as well as fullness in right ear, overall improving though. Has noticed a painful lesion on the right upper pinna for the past 3 weeks, worse when she sleeps on it and she prefers to sleep on her right side. 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 Pt doing well. Lesion much less tender, able to sleep on that side. Used mupirocin ointment as prescribed but has since stopped. Otherwise no complaints.        Past Medical History:   Diagnosis Date    Cancer ([address] 2019    colon    Glaucoma     Hyperlipidemia     Hypertension     Hypothyroidism      Past Surgical History:   Procedure Laterality Date    CATARACT REMOVAL WITH IMPLANT Bilateral     CHOLECYSTECTOMY      COLONOSCOPY N/A 2019    COLONOSCOPY WITH BIOPSY performed by Snehal Lilly MD at [address] [place] [address] 2020    COLONOSCOPY POSS BIOPSY OR POLYPECTOMY performed by Snehal Lilly MD at [address] [place] [address] 1/10/2022    COLONOSCOPY performed by Snehal Lilly MD at [facility] ([address] [place] [address])      SMALL INTESTINE SURGERY Right 2019    RIGHT HEMICOLECTOMY, LAPAROSCOPIC, ROBOTIC XI ASSISTED performed by Snehal Lilly MD at [address] Bilateral 2020    [address] DISSECTION-NEEDS NERVE INTEGRITY MONITOR performed by Chavo Herrera DO at [facility] OR       Current Outpatient Medications:     enalapril (VASOTEC) 10 MG tablet, Take 1 tablet by mouth once daily, Disp: 30 tablet, Rfl: 0    amLODIPine (NORVASC) 5 MG tablet, Take 1 tablet by mouth once daily, Disp: 30 tablet, Rfl: 0    enalapril-hydroCHLOROthiazide (VASERETIC) 10-25 MG per tablet, Take 1 tablet by mouth once daily, Disp: 90 tablet, Rfl: 0    potassium chloride (KLOR-CON) 10 MEQ extended release tablet, TAKE 1 TABLET BY MOUTH THREE TIMES DAILY, Disp: 270 tablet, Rfl: 0    busPIRone (BUSPAR) 5 MG tablet, Take 5 mg by mouth in the morning and 5 mg at noon and 5 mg before bedtime. , Disp: , Rfl:     mupirocin (BACTROBAN) 2 % ointment, Apply topically 2 times daily to right ear for 4 weeks, Disp: 1 g, Rfl: 1    metoprolol succinate (TOPROL XL) 25 MG extended release tablet, Take 1 tablet by mouth twice daily, Disp: 180 tablet, Rfl: 0    montelukast (SINGULAIR) 10 MG tablet, Take 1 tablet by mouth daily, Disp: 30 tablet, Rfl: 3    amLODIPine-atorvastatatin (CADUET) 5-10 MG per tablet, Take 1 tablet by mouth once daily, Disp: 90 tablet, Rfl: 0    levothyroxine (EUTHYROX) 112 MCG tablet, Take 1 tablet by mouth once daily, Disp: 90 tablet, Rfl: 3    Multiple Vitamins-Minerals (PRESERVISION AREDS PO), Take by mouth 2 times daily, Disp: , Rfl:     Omega-3 Fatty Acids (FISH OIL) 500 MG CAPS, Take 1 capsule by mouth daily, Disp: , Rfl:     Cyanocobalamin (B-12) 2500 MCG TABS, Take 1 tablet by mouth three times a week, Disp: , Rfl:     vitamin D (CHOLECALCIFEROL) 5000 UNITS CAPS capsule, Take 5,000 Units by mouth daily, Disp: , Rfl:   Prevnar 13 [pneumococcal 13-sera conj vacc]  Social History     Tobacco Use    Smoking status: Never    Smokeless tobacco: Never   Vaping Use    Vaping Use: Never used   Substance Use Topics    Alcohol use: No     Alcohol/week: 0.0 standard drinks    Drug use: No     Family History   Problem Relation Age of Onset    Early Death Father Cancer Sister     Lung Cancer Sister     Cancer Brother     Lung Cancer Brother        Review of Systems   Constitutional:  Negative for activity change, appetite change, chills, fatigue, fever and unexpected weight change. HENT:  Negative for ear discharge, hearing loss, trouble swallowing and voice change. Respiratory:  Negative for cough and shortness of breath. Cardiovascular:  Negative for chest pain and palpitations. Gastrointestinal:  Negative for vomiting. Skin:  Negative for rash. Allergic/Immunologic: Negative for environmental allergies. Neurological:  Negative for dizziness and headaches. Hematological:  Does not bruise/bleed easily. All other systems reviewed and are negative. BP (!) 157/83 (Site: Left Upper Arm, Position: Sitting, Cuff Size: Large Adult)   Pulse 88   Ht 5' 2\" (1.575 m)   Wt 163 lb (73.9 kg)   LMP  (LMP Unknown)   BMI 29.81 kg/m²   Physical Exam  Vitals and nursing note reviewed. Constitutional:       General: She is not in acute distress. Appearance: Normal appearance. She is well-developed. HENT:      Head: Normocephalic. Right Ear: Tympanic membrane and ear canal normal. There is impacted cerumen. Left Ear: Tympanic membrane, ear canal and external ear normal.      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 Left Sinus: No maxillary sinus tenderness or frontal sinus tenderness. Mouth/Throat:      Mouth: Mucous membranes are moist. No oral lesions. Tongue: No lesions. Tongue does not deviate from midline. Palate: No mass. Pharynx: Oropharynx is clear. No pharyngeal swelling, oropharyngeal exudate, posterior oropharyngeal erythema or uvula swelling. Tonsils: No tonsillar exudate. Eyes:      Pupils: Pupils are equal, round, and reactive to light.    Neck:      Thyroid: No thyromegaly (s/p total thyroidectomy). Trachea: No tracheal deviation. Cardiovascular:      Rate and Rhythm: Normal rate. Pulmonary:      Effort: Pulmonary effort is normal. No respiratory distress. Musculoskeletal:         General: Normal range of motion. Cervical back: Normal range of motion. Lymphadenopathy:      Cervical: No cervical adenopathy. Skin:     General: Skin is warm. Findings: No erythema. Neurological:      Mental Status: She is alert. Cranial Nerves: No cranial nerve deficit. IMPRESSION/PLAN:    S/p total thyroidectomy - papillary thyroid CA  Chondritis helicis  Repeat TSH, thyroglobulin in 1 year  OTC [place]  Follow-up as scheduled for thyroid surveillance.

## 2022-10-10 DIAGNOSIS — E78.2 MIXED HYPERLIPIDEMIA: ICD-10-CM

## 2022-10-10 DIAGNOSIS — I10 ESSENTIAL HYPERTENSION: ICD-10-CM

## 2022-10-10 RX ORDER — AMLODIPINE BESYLATE AND ATORVASTATIN CALCIUM 5; 10 MG/1; MG/1
TABLET, FILM COATED ORAL
Qty: 90 TABLET | Refills: 0 | Status: SHIPPED | OUTPATIENT
Start: 2022-10-10

## 2022-10-11 ENCOUNTER — IMMUNIZATION (OUTPATIENT)
Dept: FAMILY MEDICINE CLINIC | Age: 77
End: 2022-10-11
Payer: MEDICARE

## 2022-10-11 DIAGNOSIS — Z23 FLU VACCINE NEED: Primary | ICD-10-CM

## 2022-10-11 PROCEDURE — G0008 ADMIN INFLUENZA VIRUS VAC: HCPCS | Performed by: FAMILY MEDICINE

## 2022-10-11 PROCEDURE — 90694 VACC AIIV4 NO PRSRV 0.5ML IM: CPT | Performed by: FAMILY MEDICINE

## 2022-10-11 NOTE — PROGRESS NOTES
Pt here for flu shot per Dr. Dylan Velasquez orders.     Electronically signed by Nikia Goff MA on 10/11/22 at 8:27 AM EDT

## 2022-10-30 DIAGNOSIS — I10 ESSENTIAL HYPERTENSION: ICD-10-CM

## 2022-10-31 RX ORDER — AMLODIPINE BESYLATE 5 MG/1
TABLET ORAL
Qty: 30 TABLET | Refills: 0 | Status: SHIPPED
Start: 2022-10-31 | End: 2022-11-28

## 2022-10-31 RX ORDER — ENALAPRIL MALEATE 10 MG/1
TABLET ORAL
Qty: 30 TABLET | Refills: 0 | Status: SHIPPED
Start: 2022-10-31 | End: 2022-11-28

## 2022-11-28 DIAGNOSIS — I10 ESSENTIAL HYPERTENSION: ICD-10-CM

## 2022-11-28 RX ORDER — ENALAPRIL MALEATE 10 MG/1
TABLET ORAL
Qty: 30 TABLET | Refills: 0 | Status: SHIPPED | OUTPATIENT
Start: 2022-11-28

## 2022-11-28 RX ORDER — AMLODIPINE BESYLATE 5 MG/1
TABLET ORAL
Qty: 30 TABLET | Refills: 0 | Status: SHIPPED | OUTPATIENT
Start: 2022-11-28

## 2022-11-28 NOTE — TELEPHONE ENCOUNTER
Pt was told she didn't have to come back for 6 months.    Last seen 7/11/2022  Next appt 1/9/2023      Electronically signed by Jimmie Alonso on 11/28/22 at 8:48 AM EST

## 2022-12-12 DIAGNOSIS — I10 ESSENTIAL HYPERTENSION: ICD-10-CM

## 2022-12-12 RX ORDER — ENALAPRIL MALEATE AND HYDROCHLOROTHIAZIDE 10; 25 MG/1; MG/1
TABLET ORAL
Qty: 90 TABLET | Refills: 0 | Status: SHIPPED | OUTPATIENT
Start: 2022-12-12

## 2022-12-19 DIAGNOSIS — I10 ESSENTIAL HYPERTENSION: ICD-10-CM

## 2022-12-19 DIAGNOSIS — E87.5 HIGH POTASSIUM: Primary | ICD-10-CM

## 2022-12-19 RX ORDER — POTASSIUM CHLORIDE 750 MG/1
TABLET, FILM COATED, EXTENDED RELEASE ORAL
Qty: 270 TABLET | Refills: 0 | Status: SHIPPED | OUTPATIENT
Start: 2022-12-19

## 2022-12-19 NOTE — TELEPHONE ENCOUNTER
Pt states she will go get the lab done.      Electronically signed by Marlin Gonzales on 12/19/22 at 12:05 PM EST

## 2022-12-20 ENCOUNTER — HOSPITAL ENCOUNTER (OUTPATIENT)
Age: 77
Discharge: HOME | End: 2022-12-20
Payer: MEDICARE

## 2022-12-20 DIAGNOSIS — E87.5 HIGH POTASSIUM: ICD-10-CM

## 2022-12-20 LAB — POTASSIUM SERPL-SCNC: 4.1 MMOL/L (ref 3.5–5)

## 2022-12-20 PROCEDURE — 84132 ASSAY OF SERUM POTASSIUM: CPT

## 2022-12-20 PROCEDURE — 36415 COLL VENOUS BLD VENIPUNCTURE: CPT

## 2022-12-26 DIAGNOSIS — I10 ESSENTIAL HYPERTENSION: ICD-10-CM

## 2022-12-27 RX ORDER — ENALAPRIL MALEATE 10 MG/1
TABLET ORAL
Qty: 30 TABLET | Refills: 0 | Status: SHIPPED | OUTPATIENT
Start: 2022-12-27

## 2022-12-27 RX ORDER — AMLODIPINE BESYLATE 5 MG/1
TABLET ORAL
Qty: 30 TABLET | Refills: 0 | Status: SHIPPED | OUTPATIENT
Start: 2022-12-27

## 2023-01-03 DIAGNOSIS — I10 ESSENTIAL HYPERTENSION: ICD-10-CM

## 2023-01-03 DIAGNOSIS — E78.2 MIXED HYPERLIPIDEMIA: ICD-10-CM

## 2023-01-03 RX ORDER — AMLODIPINE BESYLATE AND ATORVASTATIN CALCIUM 5; 10 MG/1; MG/1
TABLET, FILM COATED ORAL
Qty: 90 TABLET | Refills: 0 | Status: SHIPPED
Start: 2023-01-03 | End: 2023-01-06

## 2023-01-03 NOTE — TELEPHONE ENCOUNTER
Last seen 7/11/2022  Next appt 1/9/2023      Electronically signed by Jonnie Richmond on 1/3/23 at 2:00 PM EST

## 2023-01-04 DIAGNOSIS — I10 ESSENTIAL HYPERTENSION: ICD-10-CM

## 2023-01-04 DIAGNOSIS — E78.2 MIXED HYPERLIPIDEMIA: ICD-10-CM

## 2023-01-05 ENCOUNTER — HOSPITAL ENCOUNTER (OUTPATIENT)
Dept: MAMMOGRAPHY | Age: 78
Discharge: HOME | End: 2023-01-07
Payer: MEDICARE

## 2023-01-05 DIAGNOSIS — Z12.31 VISIT FOR SCREENING MAMMOGRAM: ICD-10-CM

## 2023-01-05 PROCEDURE — 77063 BREAST TOMOSYNTHESIS BI: CPT

## 2023-01-06 RX ORDER — AMLODIPINE BESYLATE AND ATORVASTATIN CALCIUM 5; 10 MG/1; MG/1
TABLET, FILM COATED ORAL
Qty: 90 TABLET | Refills: 0 | Status: SHIPPED | OUTPATIENT
Start: 2023-01-06

## 2023-01-16 ENCOUNTER — OFFICE VISIT (OUTPATIENT)
Dept: FAMILY MEDICINE CLINIC | Age: 78
End: 2023-01-16
Payer: MEDICARE

## 2023-01-16 VITALS
WEIGHT: 165 LBS | OXYGEN SATURATION: 96 % | HEIGHT: 62 IN | SYSTOLIC BLOOD PRESSURE: 128 MMHG | DIASTOLIC BLOOD PRESSURE: 78 MMHG | RESPIRATION RATE: 16 BRPM | HEART RATE: 71 BPM | BODY MASS INDEX: 30.36 KG/M2 | TEMPERATURE: 97.9 F

## 2023-01-16 DIAGNOSIS — E03.9 ACQUIRED HYPOTHYROIDISM: ICD-10-CM

## 2023-01-16 DIAGNOSIS — C73 HURTHLE CELL CARCINOMA OF THYROID (HCC): ICD-10-CM

## 2023-01-16 DIAGNOSIS — I10 ESSENTIAL HYPERTENSION: Primary | ICD-10-CM

## 2023-01-16 DIAGNOSIS — C18.0 CECAL CANCER (HCC): ICD-10-CM

## 2023-01-16 DIAGNOSIS — E27.8 ADRENAL INCIDENTALOMA (HCC): ICD-10-CM

## 2023-01-16 PROCEDURE — 3074F SYST BP LT 130 MM HG: CPT | Performed by: FAMILY MEDICINE

## 2023-01-16 PROCEDURE — 3078F DIAST BP <80 MM HG: CPT | Performed by: FAMILY MEDICINE

## 2023-01-16 PROCEDURE — 1090F PRES/ABSN URINE INCON ASSESS: CPT | Performed by: FAMILY MEDICINE

## 2023-01-16 PROCEDURE — G8427 DOCREV CUR MEDS BY ELIG CLIN: HCPCS | Performed by: FAMILY MEDICINE

## 2023-01-16 PROCEDURE — G8399 PT W/DXA RESULTS DOCUMENT: HCPCS | Performed by: FAMILY MEDICINE

## 2023-01-16 PROCEDURE — G8417 CALC BMI ABV UP PARAM F/U: HCPCS | Performed by: FAMILY MEDICINE

## 2023-01-16 PROCEDURE — G8484 FLU IMMUNIZE NO ADMIN: HCPCS | Performed by: FAMILY MEDICINE

## 2023-01-16 PROCEDURE — 99214 OFFICE O/P EST MOD 30 MIN: CPT | Performed by: FAMILY MEDICINE

## 2023-01-16 PROCEDURE — 1123F ACP DISCUSS/DSCN MKR DOCD: CPT | Performed by: FAMILY MEDICINE

## 2023-01-16 PROCEDURE — 1036F TOBACCO NON-USER: CPT | Performed by: FAMILY MEDICINE

## 2023-01-16 SDOH — ECONOMIC STABILITY: FOOD INSECURITY: WITHIN THE PAST 12 MONTHS, YOU WORRIED THAT YOUR FOOD WOULD RUN OUT BEFORE YOU GOT MONEY TO BUY MORE.: NEVER TRUE

## 2023-01-16 SDOH — ECONOMIC STABILITY: FOOD INSECURITY: WITHIN THE PAST 12 MONTHS, THE FOOD YOU BOUGHT JUST DIDN'T LAST AND YOU DIDN'T HAVE MONEY TO GET MORE.: NEVER TRUE

## 2023-01-16 ASSESSMENT — PATIENT HEALTH QUESTIONNAIRE - PHQ9
SUM OF ALL RESPONSES TO PHQ QUESTIONS 1-9: 0
2. FEELING DOWN, DEPRESSED OR HOPELESS: 0
SUM OF ALL RESPONSES TO PHQ QUESTIONS 1-9: 0
1. LITTLE INTEREST OR PLEASURE IN DOING THINGS: 0
SUM OF ALL RESPONSES TO PHQ9 QUESTIONS 1 & 2: 0

## 2023-01-16 ASSESSMENT — SOCIAL DETERMINANTS OF HEALTH (SDOH): HOW HARD IS IT FOR YOU TO PAY FOR THE VERY BASICS LIKE FOOD, HOUSING, MEDICAL CARE, AND HEATING?: NOT HARD AT ALL

## 2023-01-17 DIAGNOSIS — I10 ESSENTIAL HYPERTENSION: ICD-10-CM

## 2023-01-17 DIAGNOSIS — J30.2 SEASONAL ALLERGIES: ICD-10-CM

## 2023-01-18 PROBLEM — C18.0 CECAL CANCER (HCC): Status: ACTIVE | Noted: 2023-01-18

## 2023-01-18 RX ORDER — ENALAPRIL MALEATE 10 MG/1
TABLET ORAL
Qty: 30 TABLET | Refills: 0 | Status: SHIPPED | OUTPATIENT
Start: 2023-01-18

## 2023-01-18 RX ORDER — MONTELUKAST SODIUM 10 MG/1
TABLET ORAL
Qty: 30 TABLET | Refills: 0 | Status: SHIPPED | OUTPATIENT
Start: 2023-01-18

## 2023-01-18 ASSESSMENT — ENCOUNTER SYMPTOMS
EYE DISCHARGE: 0
DIARRHEA: 0
RHINORRHEA: 0
FACIAL SWELLING: 0
CONSTIPATION: 0
SORE THROAT: 0
BLOOD IN STOOL: 0
CHEST TIGHTNESS: 0
RESPIRATORY NEGATIVE: 1
SINUS PRESSURE: 0
COUGH: 0
EYE PAIN: 0
ABDOMINAL DISTENTION: 0
BACK PAIN: 0
EYE REDNESS: 0
SHORTNESS OF BREATH: 0
ALLERGIC/IMMUNOLOGIC NEGATIVE: 1
EYES NEGATIVE: 1
RECTAL PAIN: 0
WHEEZING: 0
VOICE CHANGE: 0
SINUS PAIN: 0
NAUSEA: 0
CHOKING: 0
ANAL BLEEDING: 0
ABDOMINAL PAIN: 0
EYE ITCHING: 0
STRIDOR: 0
TROUBLE SWALLOWING: 0
PHOTOPHOBIA: 0
VOMITING: 0
COLOR CHANGE: 0

## 2023-01-18 NOTE — PROGRESS NOTES
Boni Flores is a 68 y.o. female. HPI/Chief C/O:  Chief Complaint   Patient presents with    Hypertension     Follow up. Allergies   Allergen Reactions    Prevnar 13 [Pneumococcal 13-Kassidy Conj Vacc] Swelling     Redness and swelling in arm at site     This 68year old female presents for physical exam. Pt has hypertension, hypothyroid, cecal cancer ([address] hypothyroid, and hurthle cell carcinoma of thyroid ([address] Pt states she feels good. ROS:  Review of Systems   Constitutional:  Positive for fatigue. Negative for activity change, appetite change, chills, diaphoresis, fever and unexpected weight change. HENT:  Negative for congestion, dental problem, drooling, ear discharge, ear pain, facial swelling, hearing loss, mouth sores, nosebleeds, postnasal drip, rhinorrhea, sinus pressure, sinus pain, sneezing, sore throat, tinnitus, trouble swallowing and voice change. Eyes: Negative. Negative for photophobia, pain, discharge, redness, itching and visual disturbance. Respiratory: Negative. Negative for cough, choking, chest tightness, shortness of breath, wheezing and stridor. Cardiovascular: Negative. Negative for chest pain, palpitations and leg swelling. Gastrointestinal:  Negative for abdominal distention, abdominal pain, anal bleeding, blood in stool, constipation, diarrhea, nausea, rectal pain and vomiting. Endocrine: Negative. Negative for cold intolerance, heat intolerance, polydipsia, polyphagia and polyuria. Genitourinary: Negative. Negative for decreased urine volume, difficulty urinating, dysuria, flank pain, frequency, genital sores, hematuria, menstrual problem, pelvic pain and urgency. Musculoskeletal:  Positive for arthralgias and myalgias. Negative for back pain, gait problem, joint swelling, neck pain and neck stiffness. Skin: Negative. Negative for color change, pallor, rash and wound. Allergic/Immunologic: Negative. Neurological: Negative.   Negative for dizziness, tremors, seizures, syncope, facial asymmetry, speech difficulty, weakness, light-headedness, numbness and headaches. Hematological: Negative. Negative for adenopathy. Does not bruise/bleed easily. Psychiatric/Behavioral:  Positive for sleep disturbance. Negative for agitation, behavioral problems, confusion, decreased concentration, dysphoric mood, hallucinations, self-injury and suicidal ideas. The patient is nervous/anxious. The patient is not hyperactive.        Past Medical/Surgical Hx;  Reviewed with patient      Diagnosis Date    Cancer ([facility] [address] 07/29/2019    colon    Glaucoma     Hyperlipidemia     Hypertension     Hypothyroidism      Past Surgical History:   Procedure Laterality Date    CATARACT REMOVAL WITH IMPLANT Bilateral     CHOLECYSTECTOMY      COLONOSCOPY N/A 7/29/2019    COLONOSCOPY WITH BIOPSY performed by Erendira Soto MD at [place] [address] N/A 9/28/2020    COLONOSCOPY POSS BIOPSY OR POLYPECTOMY performed by Erendira Soto MD at [address] [place] [address] 1/10/2022    COLONOSCOPY performed by Erendira Soto MD at [address])      SMALL INTESTINE SURGERY Right 8/29/2019    RIGHT HEMICOLECTOMY, LAPAROSCOPIC, ROBOTIC XI ASSISTED performed by Erendira Soto MD at [address] Bilateral 2/20/2020    TOTAL THYROIDECTOMY WITH ANTERIOR NECK DISSECTION-NEEDS NERVE INTEGRITY MONITOR performed by María Mantilla DO [place] [facility] [place]       Past Family Hx:  Reviewed with patient      Problem Relation Age of Onset    Early Death Father     Cancer Sister     Lung Cancer Sister     Cancer Brother     Lung Cancer Brother        Social Hx:  Reviewed with patient  Social History     Tobacco Use    Smoking status: Never    Smokeless tobacco: Never   Substance Use Topics    Alcohol use: No     Alcohol/week: 0.0 standard drinks       OBJECTIVE  /78   Pulse 71   Temp 97.9 °F (36.6 °C)   Resp 16   Ht 5' 2\" (1.575 m)   Wt 165 lb (74.8 kg)   LMP  (LMP Unknown)   SpO2 96%   BMI 30.18 kg/m²     Problem List:  Leena Llanos does not have any pertinent problems on file. PHYS EX:  Physical Exam  Vitals and nursing note reviewed. Constitutional:       General: She is not in acute distress. Appearance: Normal appearance. She is well-developed. She is obese. She is not ill-appearing, toxic-appearing or diaphoretic. Comments: Patient has morbid obesity. Patient instructed on low calorie, healthy diet. HENT:      Head: Normocephalic and atraumatic. Nose: Nose normal. No congestion or rhinorrhea. Eyes:      General: No scleral icterus. Right eye: No discharge. Left eye: No discharge. Conjunctiva/sclera: Conjunctivae normal.      Pupils: Pupils are equal, round, and reactive to light. Neck:      Thyroid: Thyromegaly present. Vascular: No carotid bruit or JVD. Trachea: No tracheal deviation. Cardiovascular:      Rate and Rhythm: Normal rate and regular rhythm. Pulses: Normal pulses. Heart sounds: Normal heart sounds. No murmur heard. No friction rub. No gallop. Pulmonary:      Effort: Pulmonary effort is normal. No respiratory distress. Breath sounds: Normal breath sounds. No stridor. No wheezing, rhonchi or rales. Chest:      Chest wall: No tenderness. Abdominal:      General: Bowel sounds are normal. There is no distension. Palpations: Abdomen is soft. There is no mass. Tenderness: There is no abdominal tenderness. There is no guarding or rebound. Hernia: No hernia is present. Musculoskeletal:         General: Tenderness present. No swelling, deformity or signs of injury. Cervical back: Normal range of motion and neck supple. No rigidity or tenderness. No muscular tenderness. Right lower leg: No edema. Left lower leg: No edema. Comments: Pain and decreased ROM multiple joints.      Lymphadenopathy:      Cervical: No cervical adenopathy. Skin:     General: Skin is warm. Coloration: Skin is not jaundiced or pale. Findings: No bruising, erythema, lesion or rash. Neurological:      General: No focal deficit present. Mental Status: She is alert and oriented to person, place, and time. Cranial Nerves: No cranial nerve deficit. Sensory: No sensory deficit. Motor: No weakness or abnormal muscle tone. Coordination: Coordination normal.      Gait: Gait normal.      Deep Tendon Reflexes: Reflexes are normal and symmetric. Reflexes normal.   Psychiatric:         Mood and Affect: Mood normal.         Behavior: Behavior normal.         Thought Content: Thought content normal.         Judgment: Judgment normal.       ASSESSMENT/PLAN  Melburn Felty was seen today for hypertension. Diagnoses and all orders for this visit:    Essential hypertension  Controlled. Norvasc, caduet, vasotec, vasoretic, BB, low salt diet. Cecal cancer [facility])  Oncology, GI specialist.   Adrenal incidentaloma [facility])  Plan recheck CT abdomen. Acquired hypothyroidism  Controlled. Euthyrox. Hurthle cell carcinoma of thyroid (HCC)  Stable. Oncology. Pt instructed if any worse go ED ASAP. Outpatient Encounter Medications as of 1/16/2023   Medication Sig Dispense Refill    amLODIPine-atorvastatatin (CADUET) 5-10 MG per tablet Take 1 tablet by mouth once daily 90 tablet 0    [DISCONTINUED] enalapril (VASOTEC) 10 MG tablet Take 1 tablet by mouth once daily 30 tablet 0    potassium chloride (KLOR-CON) 10 MEQ extended release tablet TAKE 1 TABLET BY MOUTH THREE TIMES DAILY 270 tablet 0    enalapril-hydroCHLOROthiazide (VASERETIC) 10-25 MG per tablet Take 1 tablet by mouth once daily 90 tablet 0    busPIRone (BUSPAR) 5 MG tablet Take 5 mg by mouth in the morning and 5 mg at noon and 5 mg before bedtime.       mupirocin (BACTROBAN) 2 % ointment Apply topically 2 times daily to right ear for 4 weeks 1 g 1    metoprolol succinate (TOPROL XL) 25 MG extended release tablet Take 1 tablet by mouth twice daily 180 tablet 0    [DISCONTINUED] montelukast (SINGULAIR) 10 MG tablet Take 1 tablet by mouth daily 30 tablet 3    levothyroxine (EUTHYROX) 112 MCG tablet Take 1 tablet by mouth once daily 90 tablet 3    Multiple Vitamins-Minerals (PRESERVISION AREDS PO) Take by mouth 2 times daily      Omega-3 Fatty Acids (FISH OIL) 500 MG CAPS Take 1 capsule by mouth daily      Cyanocobalamin (B-12) 2500 MCG TABS Take 1 tablet by mouth three times a week      vitamin D (CHOLECALCIFEROL) 5000 UNITS CAPS capsule Take 5,000 Units by mouth daily      amLODIPine (NORVASC) 5 MG tablet Take 1 tablet by mouth once daily 30 tablet 0     No facility-administered encounter medications on file as of 1/16/2023. Return in about 3 months (around 4/16/2023).         Reviewed recent labs related to Mahsa's current problems      Discussed importance of regular Health Maintenance follow up  Health Maintenance   Topic    Hepatitis C screen     Shingles vaccine (1 of 2)    Lipids     Annual Wellness Visit (AWV)     Depression Screen     DTaP/Tdap/Td vaccine (2 - Td or Tdap)    DEXA (modify frequency per FRAX score)     Flu vaccine     COVID-19 Vaccine     Hepatitis A vaccine     Hib vaccine     Meningococcal (ACWY) vaccine

## 2023-01-23 ENCOUNTER — CLINICAL DOCUMENTATION (OUTPATIENT)
Dept: GENERAL RADIOLOGY | Age: 78
End: 2023-01-23

## 2023-01-30 DIAGNOSIS — I10 ESSENTIAL HYPERTENSION: ICD-10-CM

## 2023-01-30 RX ORDER — METOPROLOL SUCCINATE 25 MG/1
TABLET, EXTENDED RELEASE ORAL
Qty: 180 TABLET | Refills: 0 | Status: SHIPPED | OUTPATIENT
Start: 2023-01-30

## 2023-01-30 RX ORDER — AMLODIPINE BESYLATE 5 MG/1
TABLET ORAL
Qty: 30 TABLET | Refills: 0 | Status: SHIPPED | OUTPATIENT
Start: 2023-01-30

## 2023-01-30 NOTE — TELEPHONE ENCOUNTER
Last seen 1/16/2023  Next appt 7/17/2023    Electronically signed by Viviane Orlando on 1/30/23 at 2:55 PM EST

## 2023-03-06 DIAGNOSIS — I10 ESSENTIAL HYPERTENSION: ICD-10-CM

## 2023-03-06 RX ORDER — ENALAPRIL MALEATE AND HYDROCHLOROTHIAZIDE 10; 25 MG/1; MG/1
TABLET ORAL
Qty: 90 TABLET | Refills: 0 | Status: SHIPPED | OUTPATIENT
Start: 2023-03-06

## 2023-03-06 RX ORDER — POTASSIUM CHLORIDE 750 MG/1
TABLET, FILM COATED, EXTENDED RELEASE ORAL
Qty: 270 TABLET | Refills: 0 | Status: SHIPPED | OUTPATIENT
Start: 2023-03-06

## 2023-03-06 NOTE — TELEPHONE ENCOUNTER
Last Appointment:  1/16/2023  Future Appointments   Date Time Provider Hany Ramos   3/27/2023 10:00 AM [facility] MED ONC FAST TRACK 2 [facility]Z Med Onc [facility]   3/27/2023 10:15 AM Eliecer Ho MD MED ONC [facility]   5/31/2023  8:45 AM [facility]   7/17/2023 10:00 AM Mirza Staley DO [facility]   8/14/2023 10:00 AM DO Phillip Valenzuela ENT [facility]

## 2023-03-27 ENCOUNTER — OFFICE VISIT (OUTPATIENT)
Dept: ONCOLOGY | Age: 78
End: 2023-03-27
Payer: MEDICARE

## 2023-03-27 ENCOUNTER — HOSPITAL ENCOUNTER (OUTPATIENT)
Dept: INFUSION THERAPY | Age: 78
Discharge: HOME | End: 2023-03-27
Payer: MEDICARE

## 2023-03-27 VITALS
HEIGHT: 62 IN | SYSTOLIC BLOOD PRESSURE: 154 MMHG | HEART RATE: 74 BPM | OXYGEN SATURATION: 96 % | DIASTOLIC BLOOD PRESSURE: 70 MMHG | WEIGHT: 162 LBS | BODY MASS INDEX: 29.81 KG/M2 | TEMPERATURE: 97.5 F

## 2023-03-27 DIAGNOSIS — C18.0 CECAL CANCER (HCC): Primary | ICD-10-CM

## 2023-03-27 DIAGNOSIS — C18.0 CECAL CANCER (HCC): ICD-10-CM

## 2023-03-27 LAB
ALBUMIN SERPL-MCNC: 4.1 G/DL (ref 3.5–5.2)
ALP SERPL-CCNC: 111 U/L (ref 35–104)
ALT SERPL-CCNC: 13 U/L (ref 0–32)
ANION GAP SERPL CALCULATED.3IONS-SCNC: 11 MMOL/L (ref 7–16)
AST SERPL-CCNC: 17 U/L (ref 0–31)
BASOPHILS # BLD: 0.04 E9/L (ref 0–0.2)
BASOPHILS NFR BLD: 0.5 % (ref 0–2)
BILIRUB SERPL-MCNC: 0.6 MG/DL (ref 0–1.2)
BUN SERPL-MCNC: 15 MG/DL (ref 6–23)
CALCIUM SERPL-MCNC: 8.7 MG/DL (ref 8.6–10.2)
CEA SERPL-MCNC: 1.5 NG/ML (ref 0–5.2)
CHLORIDE SERPL-SCNC: 100 MMOL/L (ref 98–107)
CO2 SERPL-SCNC: 28 MMOL/L (ref 22–29)
CREAT SERPL-MCNC: 0.6 MG/DL (ref 0.5–1)
EOSINOPHIL # BLD: 0.08 E9/L (ref 0.05–0.5)
EOSINOPHIL NFR BLD: 1.1 % (ref 0–6)
ERYTHROCYTE [DISTWIDTH] IN BLOOD BY AUTOMATED COUNT: 13.3 FL (ref 11.5–15)
GLUCOSE SERPL-MCNC: 106 MG/DL (ref 74–99)
HCT VFR BLD AUTO: 44.7 % (ref 34–48)
HGB BLD-MCNC: 14.4 G/DL (ref 11.5–15.5)
IMM GRANULOCYTES # BLD: 0.03 E9/L
IMM GRANULOCYTES NFR BLD: 0.4 % (ref 0–5)
LYMPHOCYTES # BLD: 1.47 E9/L (ref 1.5–4)
LYMPHOCYTES NFR BLD: 20.1 % (ref 20–42)
MCH RBC QN AUTO: 28.5 PG (ref 26–35)
MCHC RBC AUTO-ENTMCNC: 32.2 % (ref 32–34.5)
MCV RBC AUTO: 88.5 FL (ref 80–99.9)
MONOCYTES # BLD: 0.61 E9/L (ref 0.1–0.95)
MONOCYTES NFR BLD: 8.3 % (ref 2–12)
NEUTROPHILS # BLD: 5.1 E9/L (ref 1.8–7.3)
NEUTS SEG NFR BLD: 69.6 % (ref 43–80)
PLATELET # BLD AUTO: 241 E9/L (ref 130–450)
PMV BLD AUTO: 11.2 FL (ref 7–12)
POTASSIUM SERPL-SCNC: 3.4 MMOL/L (ref 3.5–5)
PROT SERPL-MCNC: 7.5 G/DL (ref 6.4–8.3)
RBC # BLD AUTO: 5.05 E12/L (ref 3.5–5.5)
SODIUM SERPL-SCNC: 139 MMOL/L (ref 132–146)
WBC # BLD: 7.3 E9/L (ref 4.5–11.5)

## 2023-03-27 PROCEDURE — G8427 DOCREV CUR MEDS BY ELIG CLIN: HCPCS | Performed by: INTERNAL MEDICINE

## 2023-03-27 PROCEDURE — 99213 OFFICE O/P EST LOW 20 MIN: CPT

## 2023-03-27 PROCEDURE — 3078F DIAST BP <80 MM HG: CPT | Performed by: INTERNAL MEDICINE

## 2023-03-27 PROCEDURE — 99213 OFFICE O/P EST LOW 20 MIN: CPT | Performed by: INTERNAL MEDICINE

## 2023-03-27 PROCEDURE — 1090F PRES/ABSN URINE INCON ASSESS: CPT | Performed by: INTERNAL MEDICINE

## 2023-03-27 PROCEDURE — 3077F SYST BP >= 140 MM HG: CPT | Performed by: INTERNAL MEDICINE

## 2023-03-27 PROCEDURE — 1123F ACP DISCUSS/DSCN MKR DOCD: CPT | Performed by: INTERNAL MEDICINE

## 2023-03-27 PROCEDURE — 1036F TOBACCO NON-USER: CPT | Performed by: INTERNAL MEDICINE

## 2023-03-27 PROCEDURE — G8417 CALC BMI ABV UP PARAM F/U: HCPCS | Performed by: INTERNAL MEDICINE

## 2023-03-27 PROCEDURE — 36415 COLL VENOUS BLD VENIPUNCTURE: CPT

## 2023-03-27 PROCEDURE — G8399 PT W/DXA RESULTS DOCUMENT: HCPCS | Performed by: INTERNAL MEDICINE

## 2023-03-27 PROCEDURE — G8484 FLU IMMUNIZE NO ADMIN: HCPCS | Performed by: INTERNAL MEDICINE

## 2023-03-27 RX ORDER — POTASSIUM CHLORIDE 20 MEQ/1
20 TABLET, EXTENDED RELEASE ORAL DAILY
Qty: 3 TABLET | Refills: 0 | Status: SHIPPED | OUTPATIENT
Start: 2023-03-27 | End: 2023-03-30

## 2023-03-27 NOTE — PROGRESS NOTES
Department of [facility] Oncology  Attending Clinic Note    Reason for Visit: Follow-up on a patient with Cecal Cancer    PCP:  Jw Silver DO    History of Present Illness:  68 y.o. female, never smoker, hx of HTN, hyperlipidemia, hypothyroidism, who presented to see general surgery team for evaluation of anemia and diagnostic colonoscopy. Colonoscopy on 07/29/2019: The ileocecal valve was obscured by large cecal fungating mass, 5cm in diameter. 1cm polyp was at the proximal transverse just passed the hepatic flexure  A. Mass, cecum, biopsy: Invasive moderately differentiated  adenocarcinoma. B. Polyp, hepatic flexure of colon, biopsy: Tubular adenoma. CT chest 07/31/2019:   Solid pulmonary parenchymal nodule in the left lower lobe measures 12 x 11 mm, and lies just above diaphragm. CT abdomen/pelvis 07/31/2019: There is a cecal mural mass on the medial aspect of the cecum and proximal ascending colon with perpendicular diameter measurements of .2 x 5.7 x 2.8 cm. It appears to be confined to the bowel wall. There is an enlarged right adrenal lesion measuring 2 cm diameter    CEA 3.8 on 08/05/2019. PET/CT scan 08/15/2019  No FDG avid uptake is identified which exceeds the threshold SUV in the left pulmonary nodule. Pulmonary team (Dr. Michelle Keith) consult appreciated; repeat CT chest in 3 months. No uptake in adrenal glands. Abnormal tracer uptake in the right thyroid which is focal. Endocrinology team consult (Dr. Aye Casas) appreciated. Thyroid U/S 08/21/2019 noted thyroid nodules. Abnormal tracer uptake at the level the cecum which exceeds the threshold SUV. Right hemicolectomy was performed on 08/29/2019. Procedure: Right hemicolectomy. Tumor site: Cecum. Tumor size: Greatest dimension is 6.5 cm; additional dimensions 4.5 x 1.8 cm. Macroscopic tumor perforation: Not identified. Histologic type: Adenocarcinoma  Histologic grade: G2 (moderately differentiated).   Tumor extension:

## 2023-03-31 ENCOUNTER — TELEPHONE (OUTPATIENT)
Dept: INFUSION THERAPY | Age: 78
End: 2023-03-31

## 2023-03-31 NOTE — TELEPHONE ENCOUNTER
Patient has called and stated that she is on potassium 10meq tid and Dr Oli Ghosh has ordered daily potassium. After reviewed with Dr Oli Ghosh, patient is to take 10meq 4 times daily for 1 week.  Patient states understanding

## 2023-04-25 ENCOUNTER — HOSPITAL ENCOUNTER (OUTPATIENT)
Dept: CT IMAGING | Age: 78
Discharge: HOME | End: 2023-04-27
Payer: MEDICARE

## 2023-04-25 DIAGNOSIS — D41.02 NEOPLASM OF UNCERTAIN BEHAVIOR OF LEFT KIDNEY: ICD-10-CM

## 2023-04-25 PROCEDURE — 74178 CT ABD&PLV WO CNTR FLWD CNTR: CPT

## 2023-04-25 PROCEDURE — 6360000004 HC RX CONTRAST MEDICATION: Performed by: RADIOLOGY

## 2023-04-25 RX ADMIN — IOPAMIDOL 120 ML: 755 INJECTION, SOLUTION INTRAVENOUS at 09:26

## 2023-05-01 DIAGNOSIS — I10 ESSENTIAL HYPERTENSION: ICD-10-CM

## 2023-05-01 NOTE — TELEPHONE ENCOUNTER
Last Appointment:  1/16/2023  Future Appointments   Date Time Provider Hany Ramos   5/31/2023  8:45 AM Juventino Mcnamara MD [facility] ENDO [facility]   7/17/2023 10:00 AM Laverna Kayser Catterlin, DO [facility]   8/14/2023 10:00 AM Jose M Grace DO [place] ENT [facility]   9/27/2023  9:45 AM [facility] MED ONC FAST TRACK 2 SE[facility] Med Onc [facility]   9/27/2023 10:00 AM Mere Tinoco MD MED ONC [facility]

## 2023-05-02 RX ORDER — METOPROLOL SUCCINATE 25 MG/1
TABLET, EXTENDED RELEASE ORAL
Qty: 180 TABLET | Refills: 0 | Status: SHIPPED | OUTPATIENT
Start: 2023-05-02

## 2023-05-31 ENCOUNTER — OFFICE VISIT (OUTPATIENT)
Dept: ENDOCRINOLOGY | Age: 78
End: 2023-05-31
Payer: MEDICARE

## 2023-05-31 VITALS
BODY MASS INDEX: 30.36 KG/M2 | SYSTOLIC BLOOD PRESSURE: 138 MMHG | HEIGHT: 62 IN | WEIGHT: 165 LBS | HEART RATE: 67 BPM | DIASTOLIC BLOOD PRESSURE: 78 MMHG | OXYGEN SATURATION: 95 %

## 2023-05-31 DIAGNOSIS — E03.9 HYPOTHYROIDISM, UNSPECIFIED TYPE: ICD-10-CM

## 2023-05-31 DIAGNOSIS — C73 THYROID CANCER (HCC): Primary | ICD-10-CM

## 2023-05-31 DIAGNOSIS — E27.8 ADRENAL INCIDENTALOMA (HCC): ICD-10-CM

## 2023-05-31 DIAGNOSIS — C73 THYROID CANCER (HCC): ICD-10-CM

## 2023-05-31 DIAGNOSIS — E55.9 VITAMIN D DEFICIENCY: ICD-10-CM

## 2023-05-31 PROCEDURE — G8417 CALC BMI ABV UP PARAM F/U: HCPCS | Performed by: INTERNAL MEDICINE

## 2023-05-31 PROCEDURE — 3075F SYST BP GE 130 - 139MM HG: CPT | Performed by: INTERNAL MEDICINE

## 2023-05-31 PROCEDURE — 1123F ACP DISCUSS/DSCN MKR DOCD: CPT | Performed by: INTERNAL MEDICINE

## 2023-05-31 PROCEDURE — 3078F DIAST BP <80 MM HG: CPT | Performed by: INTERNAL MEDICINE

## 2023-05-31 PROCEDURE — 99214 OFFICE O/P EST MOD 30 MIN: CPT | Performed by: INTERNAL MEDICINE

## 2023-05-31 PROCEDURE — G8399 PT W/DXA RESULTS DOCUMENT: HCPCS | Performed by: INTERNAL MEDICINE

## 2023-05-31 PROCEDURE — G8427 DOCREV CUR MEDS BY ELIG CLIN: HCPCS | Performed by: INTERNAL MEDICINE

## 2023-05-31 PROCEDURE — 1090F PRES/ABSN URINE INCON ASSESS: CPT | Performed by: INTERNAL MEDICINE

## 2023-05-31 PROCEDURE — 1036F TOBACCO NON-USER: CPT | Performed by: INTERNAL MEDICINE

## 2023-05-31 NOTE — PROGRESS NOTES
Value Date/Time    CHOL 181 11/23/2020 12:00 PM    HDL 76 11/23/2020 12:00 PM    TRIG 99 11/23/2020 12:00 PM      Lab Results   Component Value Date/Time    PTH 16 03/03/2020 10:03 AM      Lab Results   Component Value Date/Time    VITD25 44 06/02/2022 09:38 AM      Component      Latest Ref Rng & Units 6/2/2022           9:38 AM   Thyroglobulin Ab      0.0 - 4.0 IU/mL <0.9   THYROGLOBULIN, SERUM OR PLASMA      1.3 - 31.8 ng/mL 1.9     ASSESSMENT & RECOMMENDATIONS   Candida Arceo, a 68 y.o.-old female seen in for the following issues     Papillary thyroid cancer/Hurtle cell carcinoma   S/p total thyroidectomy 2/20/2020, DAN ablation 7/2020 (105.3 mCi)   No clinical, biochemical or radiological evidence of recurrence   Will continue monitoring with US and Tg level     Postsurgical hypothyroidism   Currently on levothyroxine 112 mcg daily   Check TFT and adjust the dose   Goal to keep TSH 0.3-2     Rt adrenal enlargement   Adrenal mass is found in at least 3-5% of persons older than 50 years. CT scan 7/2019 --> The right adrenal is enlarged, with a globular appearance and a diameter of 2 cm. PET CT scan 8/15/2019 didn't show any abnormal uptake at adrenals which is in favor of benign etiology  Previous work up showed normal plasma metanephrines, and Hi/renin   1mg DST was in 8/2019 was 3.3 and 6/2021 2.7 (<5). Pt is no signs on cushing's   Ordered adrenal labs     I personally reviewed external notes from PCP and other patient's care team providers, and personally interpreted labs associated with the above diagnosis. I also ordered labs to further assess and manage the above addressed medical conditions    Return in about 1 year (around 5/31/2024) for hypothyroidism, thyroid cancer adrenal incidentaloma . The above issues were reviewed with the patient who understood and agreed with the plan. Thank you for allowing us to participate in the care of this patient.  Please do not hesitate to contact us with any

## 2023-06-01 ENCOUNTER — HOSPITAL ENCOUNTER (OUTPATIENT)
Age: 78
Discharge: HOME | End: 2023-06-01
Payer: MEDICARE

## 2023-06-01 LAB
ANION GAP SERPL CALCULATED.3IONS-SCNC: 14 MMOL/L (ref 7–16)
BUN SERPL-MCNC: 13 MG/DL (ref 6–23)
CALCIUM SERPL-MCNC: 8.6 MG/DL (ref 8.6–10.2)
CHLORIDE SERPL-SCNC: 100 MMOL/L (ref 98–107)
CO2 SERPL-SCNC: 29 MMOL/L (ref 22–29)
CREAT SERPL-MCNC: 0.6 MG/DL (ref 0.5–1)
GLUCOSE SERPL-MCNC: 107 MG/DL (ref 74–99)
POTASSIUM SERPL-SCNC: 3.3 MMOL/L (ref 3.5–5)
SODIUM SERPL-SCNC: 143 MMOL/L (ref 132–146)
T4 FREE SERPL-MCNC: 1.77 NG/DL (ref 0.93–1.7)
TSH SERPL-MCNC: 3.54 UIU/ML (ref 0.27–4.2)

## 2023-06-01 PROCEDURE — 84439 ASSAY OF FREE THYROXINE: CPT

## 2023-06-01 PROCEDURE — 80048 BASIC METABOLIC PNL TOTAL CA: CPT

## 2023-06-01 PROCEDURE — 83835 ASSAY OF METANEPHRINES: CPT

## 2023-06-01 PROCEDURE — 84443 ASSAY THYROID STIM HORMONE: CPT

## 2023-06-01 PROCEDURE — 86800 THYROGLOBULIN ANTIBODY: CPT

## 2023-06-01 PROCEDURE — 36415 COLL VENOUS BLD VENIPUNCTURE: CPT

## 2023-06-01 PROCEDURE — 84432 ASSAY OF THYROGLOBULIN: CPT

## 2023-06-03 LAB
THYROGLOB AB SERPL-ACNC: <0.9 IU/ML (ref 0–4)
THYROGLOB SERPL-MCNC: 7.5 NG/ML (ref 1.3–31.8)
THYROGLOB SERPL-MCNC: NORMAL NG/ML (ref 1.3–31.8)

## 2023-06-05 DIAGNOSIS — I10 ESSENTIAL HYPERTENSION: ICD-10-CM

## 2023-06-05 RX ORDER — ENALAPRIL MALEATE AND HYDROCHLOROTHIAZIDE 10; 25 MG/1; MG/1
TABLET ORAL
Qty: 90 TABLET | Refills: 0 | Status: SHIPPED | OUTPATIENT
Start: 2023-06-05

## 2023-06-05 RX ORDER — POTASSIUM CHLORIDE 750 MG/1
TABLET, FILM COATED, EXTENDED RELEASE ORAL
Qty: 270 TABLET | Refills: 0 | Status: SHIPPED | OUTPATIENT
Start: 2023-06-05

## 2023-06-05 NOTE — TELEPHONE ENCOUNTER
Last Appointment:  1/16/2023  Future Appointments   Date Time Provider Hany Gibbonsi   7/17/2023 10:00 AM DO GRAEME Grant PC [facility]   8/14/2023 10:00 AM DO Phillip Kirkpatrick ENT [facility]   9/27/2023  9:45 AM [facility] MED ONC FAST TRACK 2 [facility]Z Med Onc [facility]   9/27/2023 10:00 AM Rigoberto Lopez MD MED ONC [facility]   6/3/2024  9:15 AM Alex Minaya MD BDM [facility]    Electronically signed by Chicho Zamudio LPN on 8/7/96 at 5:89 AM EDT

## 2023-06-07 LAB
ANNOTATION COMMENT IMP: ABNORMAL
METANEPHS SERPL-SCNC: 0.14 NMOL/L (ref 0–0.49)
NORMETANEPHRINE SERPL-SCNC: 1.2 NMOL/L (ref 0–0.89)

## 2023-06-21 ENCOUNTER — HOSPITAL ENCOUNTER (OUTPATIENT)
Dept: ULTRASOUND IMAGING | Age: 78
Discharge: HOME | End: 2023-06-23
Payer: MEDICARE

## 2023-06-21 DIAGNOSIS — C73 THYROID CANCER (HCC): ICD-10-CM

## 2023-06-21 PROCEDURE — 76536 US EXAM OF HEAD AND NECK: CPT

## 2023-06-25 ENCOUNTER — TELEPHONE (OUTPATIENT)
Dept: ENDOCRINOLOGY | Age: 78
End: 2023-06-25

## 2023-06-25 DIAGNOSIS — C73 THYROID CANCER (HCC): Primary | ICD-10-CM

## 2023-07-03 DIAGNOSIS — E78.2 MIXED HYPERLIPIDEMIA: ICD-10-CM

## 2023-07-03 DIAGNOSIS — I10 ESSENTIAL HYPERTENSION: ICD-10-CM

## 2023-07-03 NOTE — TELEPHONE ENCOUNTER
Last Appointment:  1/16/2023  Future Appointments   Date Time Provider [address]   7/17/2023 10:00 AM DO GRAEME Burrell PC [facility]   8/14/2023 10:00 AM DO Phillip Carlin ENT [facility]   9/27/2023  9:45 AM [facility] MED ONC FAST TRACK 2 SEYZ Med Onc [facility]   9/27/2023 10:00 AM Brigette Vizcaino MD MED ONC [facility]   6/3/2024  9:15 AM Alex Boyce MD BDM ENDO [facility]

## 2023-07-05 RX ORDER — AMLODIPINE BESYLATE AND ATORVASTATIN CALCIUM 5; 10 MG/1; MG/1
TABLET, FILM COATED ORAL
Qty: 90 TABLET | Refills: 0 | Status: SHIPPED | OUTPATIENT
Start: 2023-07-05

## 2023-07-17 ENCOUNTER — OFFICE VISIT (OUTPATIENT)
Dept: FAMILY MEDICINE CLINIC | Age: 78
End: 2023-07-17
Payer: MEDICARE

## 2023-07-17 VITALS
DIASTOLIC BLOOD PRESSURE: 82 MMHG | WEIGHT: 165.6 LBS | TEMPERATURE: 96.9 F | BODY MASS INDEX: 30.47 KG/M2 | OXYGEN SATURATION: 92 % | SYSTOLIC BLOOD PRESSURE: 134 MMHG | HEIGHT: 62 IN | RESPIRATION RATE: 18 BRPM | HEART RATE: 72 BPM

## 2023-07-17 DIAGNOSIS — E78.2 MIXED HYPERLIPIDEMIA: ICD-10-CM

## 2023-07-17 DIAGNOSIS — C18.2 MALIGNANT NEOPLASM OF ASCENDING COLON (HCC): ICD-10-CM

## 2023-07-17 DIAGNOSIS — I10 ESSENTIAL HYPERTENSION: ICD-10-CM

## 2023-07-17 DIAGNOSIS — F41.9 ANXIETY: ICD-10-CM

## 2023-07-17 DIAGNOSIS — E03.9 ACQUIRED HYPOTHYROIDISM: ICD-10-CM

## 2023-07-17 DIAGNOSIS — C73 HURTHLE CELL CARCINOMA OF THYROID (HCC): Primary | ICD-10-CM

## 2023-07-17 PROCEDURE — 1123F ACP DISCUSS/DSCN MKR DOCD: CPT | Performed by: FAMILY MEDICINE

## 2023-07-17 PROCEDURE — G8427 DOCREV CUR MEDS BY ELIG CLIN: HCPCS | Performed by: FAMILY MEDICINE

## 2023-07-17 PROCEDURE — 3075F SYST BP GE 130 - 139MM HG: CPT | Performed by: FAMILY MEDICINE

## 2023-07-17 PROCEDURE — 1036F TOBACCO NON-USER: CPT | Performed by: FAMILY MEDICINE

## 2023-07-17 PROCEDURE — 1090F PRES/ABSN URINE INCON ASSESS: CPT | Performed by: FAMILY MEDICINE

## 2023-07-17 PROCEDURE — G8417 CALC BMI ABV UP PARAM F/U: HCPCS | Performed by: FAMILY MEDICINE

## 2023-07-17 PROCEDURE — 99214 OFFICE O/P EST MOD 30 MIN: CPT | Performed by: FAMILY MEDICINE

## 2023-07-17 PROCEDURE — G8399 PT W/DXA RESULTS DOCUMENT: HCPCS | Performed by: FAMILY MEDICINE

## 2023-07-17 PROCEDURE — 3079F DIAST BP 80-89 MM HG: CPT | Performed by: FAMILY MEDICINE

## 2023-07-17 RX ORDER — BUSPIRONE HYDROCHLORIDE 5 MG/1
5 TABLET ORAL 2 TIMES DAILY PRN
Qty: 90 TABLET | Refills: 1 | Status: SHIPPED | OUTPATIENT
Start: 2023-07-17

## 2023-07-17 SDOH — ECONOMIC STABILITY: FOOD INSECURITY: WITHIN THE PAST 12 MONTHS, YOU WORRIED THAT YOUR FOOD WOULD RUN OUT BEFORE YOU GOT MONEY TO BUY MORE.: NEVER TRUE

## 2023-07-17 SDOH — ECONOMIC STABILITY: FOOD INSECURITY: WITHIN THE PAST 12 MONTHS, THE FOOD YOU BOUGHT JUST DIDN'T LAST AND YOU DIDN'T HAVE MONEY TO GET MORE.: NEVER TRUE

## 2023-07-17 SDOH — ECONOMIC STABILITY: HOUSING INSECURITY
IN THE LAST 12 MONTHS, WAS THERE A TIME WHEN YOU DID NOT HAVE A STEADY PLACE TO SLEEP OR SLEPT IN A SHELTER (INCLUDING NOW)?: NO

## 2023-07-17 SDOH — ECONOMIC STABILITY: INCOME INSECURITY: HOW HARD IS IT FOR YOU TO PAY FOR THE VERY BASICS LIKE FOOD, HOUSING, MEDICAL CARE, AND HEATING?: NOT HARD AT ALL

## 2023-07-17 ASSESSMENT — ENCOUNTER SYMPTOMS
RHINORRHEA: 0
WHEEZING: 0
TROUBLE SWALLOWING: 0
RESPIRATORY NEGATIVE: 1
VOMITING: 0
ABDOMINAL PAIN: 0
EYE ITCHING: 0
COUGH: 0
CONSTIPATION: 0
BACK PAIN: 0
ANAL BLEEDING: 0
RECTAL PAIN: 0
VOICE CHANGE: 0
FACIAL SWELLING: 0
SINUS PRESSURE: 0
EYE REDNESS: 0
CHOKING: 0
SHORTNESS OF BREATH: 0
ABDOMINAL DISTENTION: 0
ALLERGIC/IMMUNOLOGIC NEGATIVE: 1
EYE PAIN: 0
EYE DISCHARGE: 0
SORE THROAT: 0
STRIDOR: 0
SINUS PAIN: 0
PHOTOPHOBIA: 0
CHEST TIGHTNESS: 0
EYES NEGATIVE: 1
NAUSEA: 0
DIARRHEA: 0
COLOR CHANGE: 0
BLOOD IN STOOL: 0

## 2023-07-17 NOTE — PROGRESS NOTES
Flu vaccine (1)    Depression Screen     DTaP/Tdap/Td vaccine (2 - Td or Tdap)    DEXA (modify frequency per FRAX score)     COVID-19 Vaccine     Hepatitis A vaccine     Hib vaccine     Meningococcal (ACWY) vaccine     A1C test (Diabetic or Prediabetic)     Breast cancer screen     Colonoscopy

## 2023-07-31 DIAGNOSIS — E03.9 HYPOTHYROIDISM, UNSPECIFIED TYPE: ICD-10-CM

## 2023-07-31 DIAGNOSIS — I10 ESSENTIAL HYPERTENSION: ICD-10-CM

## 2023-07-31 RX ORDER — LEVOTHYROXINE SODIUM 112 UG/1
TABLET ORAL
Qty: 90 TABLET | Refills: 3 | Status: SHIPPED | OUTPATIENT
Start: 2023-07-31

## 2023-07-31 RX ORDER — METOPROLOL SUCCINATE 25 MG/1
TABLET, EXTENDED RELEASE ORAL
Qty: 180 TABLET | Refills: 0 | Status: SHIPPED | OUTPATIENT
Start: 2023-07-31

## 2023-07-31 NOTE — TELEPHONE ENCOUNTER
Last Appointment:  7/17/2023  Future Appointments   Date Time Provider [address]   8/14/2023 10:00 AM Teresa Fair DO [place] ENT [facility]   8/28/2023 10:00 AM Priya Staley DO [facility]   9/27/2023  9:45 AM [facility] MED ONC FAST TRACK 2 [facility] Med Onc [facility]   9/27/2023 10:00 AM Garrett Raya MD MED ONC [facility]   6/3/2024  9:15 AM Alex Anne MD [facility] ENDO [facility]

## 2023-08-14 ENCOUNTER — OFFICE VISIT (OUTPATIENT)
Dept: ENT CLINIC | Age: 78
End: 2023-08-14
Payer: MEDICARE

## 2023-08-14 VITALS
DIASTOLIC BLOOD PRESSURE: 76 MMHG | BODY MASS INDEX: 30.36 KG/M2 | HEIGHT: 62 IN | WEIGHT: 165 LBS | HEART RATE: 71 BPM | SYSTOLIC BLOOD PRESSURE: 162 MMHG

## 2023-08-14 DIAGNOSIS — C73 PAPILLARY THYROID CARCINOMA (HCC): ICD-10-CM

## 2023-08-14 DIAGNOSIS — H61.001 CHONDRODERMATITIS NODULARIS HELICIS OF RIGHT EAR: Primary | ICD-10-CM

## 2023-08-14 PROCEDURE — 1123F ACP DISCUSS/DSCN MKR DOCD: CPT | Performed by: OTOLARYNGOLOGY

## 2023-08-14 PROCEDURE — G8417 CALC BMI ABV UP PARAM F/U: HCPCS | Performed by: OTOLARYNGOLOGY

## 2023-08-14 PROCEDURE — 3077F SYST BP >= 140 MM HG: CPT | Performed by: OTOLARYNGOLOGY

## 2023-08-14 PROCEDURE — 3078F DIAST BP <80 MM HG: CPT | Performed by: OTOLARYNGOLOGY

## 2023-08-14 PROCEDURE — 99213 OFFICE O/P EST LOW 20 MIN: CPT | Performed by: OTOLARYNGOLOGY

## 2023-08-14 PROCEDURE — 1090F PRES/ABSN URINE INCON ASSESS: CPT | Performed by: OTOLARYNGOLOGY

## 2023-08-14 PROCEDURE — G8427 DOCREV CUR MEDS BY ELIG CLIN: HCPCS | Performed by: OTOLARYNGOLOGY

## 2023-08-14 PROCEDURE — G8399 PT W/DXA RESULTS DOCUMENT: HCPCS | Performed by: OTOLARYNGOLOGY

## 2023-08-14 PROCEDURE — 1036F TOBACCO NON-USER: CPT | Performed by: OTOLARYNGOLOGY

## 2023-08-14 ASSESSMENT — ENCOUNTER SYMPTOMS
SORE THROAT: 0
TROUBLE SWALLOWING: 0
COUGH: 0
VOMITING: 0
SINUS PRESSURE: 0
SHORTNESS OF BREATH: 0

## 2023-08-14 NOTE — PROGRESS NOTES
[facility]  Dr. Angelito Edwards. Ms.Ed        Patient Name:  Hui Calvillo  :       CHIEF C/O:    Chief Complaint   Patient presents with    Follow-up     1 year thyroid labs       HISTORY OBTAINED FROM:  patient    HISTORY OF PRESENT ILLNESS:       Anushka Edgar is a 68y.o. year old female, here today for follow up of:       Patient is here if seen and examined for 2 complaints, first being she is a known history of papillary thyroid carcinoma, she underwent evaluation by endocrinology in , showing normal evaluations for TSH and T4, with undetectable thyroglobulin. She underwent a routine thyroid ultrasound to that which showed a 1.7 cm possible lymph node in the bed of the thyroid, although thyroglobulin levels were negative. She denies any hoarseness fever chills shortness of breath, she denies any palpable masses of the neck area. Breast with a longstanding history of right-sided chronic nodularis helices, she has been treated with topical antibiotic therapy for greater than 6 months with only moderate improvement. She denies any new hearing loss ear pain drainage fever chills shortness of breath or vertigo.          Past Medical History:   Diagnosis Date    Cancer ([address]) 2019    colon    Glaucoma     Hyperlipidemia     Hypertension     Hypothyroidism      Past Surgical History:   Procedure Laterality Date    CATARACT REMOVAL WITH IMPLANT Bilateral     CHOLECYSTECTOMY      COLONOSCOPY N/A 2019    COLONOSCOPY WITH BIOPSY performed by Fara Plasencia MD at [facility] 2020    COLONOSCOPY POSS BIOPSY OR POLYPECTOMY performed by Fara Plasencia MD at [facility] 1/10/2022    COLONOSCOPY performed by Fara Plasencia MD at [facility] ([facility])      SMALL INTESTINE SURGERY Right 2019    RIGHT HEMICOLECTOMY, LAPAROSCOPIC, ROBOTIC XI ASSISTED performed by Fara Plasencia MD at [facility]

## 2023-08-28 ENCOUNTER — OFFICE VISIT (OUTPATIENT)
Dept: FAMILY MEDICINE CLINIC | Age: 78
End: 2023-08-28
Payer: MEDICARE

## 2023-08-28 VITALS
RESPIRATION RATE: 18 BRPM | DIASTOLIC BLOOD PRESSURE: 84 MMHG | OXYGEN SATURATION: 96 % | WEIGHT: 165.6 LBS | HEIGHT: 62 IN | SYSTOLIC BLOOD PRESSURE: 138 MMHG | HEART RATE: 70 BPM | BODY MASS INDEX: 30.47 KG/M2 | TEMPERATURE: 97.5 F

## 2023-08-28 DIAGNOSIS — E03.9 HYPOTHYROIDISM, UNSPECIFIED TYPE: ICD-10-CM

## 2023-08-28 DIAGNOSIS — I10 ESSENTIAL HYPERTENSION: ICD-10-CM

## 2023-08-28 DIAGNOSIS — Z00.00 MEDICARE ANNUAL WELLNESS VISIT, SUBSEQUENT: Primary | ICD-10-CM

## 2023-08-28 DIAGNOSIS — F41.9 ANXIETY: ICD-10-CM

## 2023-08-28 DIAGNOSIS — C73 HURTHLE CELL CARCINOMA OF THYROID (HCC): ICD-10-CM

## 2023-08-28 DIAGNOSIS — C18.2 MALIGNANT NEOPLASM OF ASCENDING COLON (HCC): ICD-10-CM

## 2023-08-28 PROCEDURE — 1123F ACP DISCUSS/DSCN MKR DOCD: CPT | Performed by: FAMILY MEDICINE

## 2023-08-28 PROCEDURE — G0439 PPPS, SUBSEQ VISIT: HCPCS | Performed by: FAMILY MEDICINE

## 2023-08-28 PROCEDURE — 3078F DIAST BP <80 MM HG: CPT | Performed by: FAMILY MEDICINE

## 2023-08-28 PROCEDURE — 3074F SYST BP LT 130 MM HG: CPT | Performed by: FAMILY MEDICINE

## 2023-08-28 ASSESSMENT — PATIENT HEALTH QUESTIONNAIRE - PHQ9
SUM OF ALL RESPONSES TO PHQ QUESTIONS 1-9: 0
1. LITTLE INTEREST OR PLEASURE IN DOING THINGS: 0
SUM OF ALL RESPONSES TO PHQ9 QUESTIONS 1 & 2: 0
SUM OF ALL RESPONSES TO PHQ QUESTIONS 1-9: 0
2. FEELING DOWN, DEPRESSED OR HOPELESS: 0

## 2023-08-28 ASSESSMENT — LIFESTYLE VARIABLES
HOW MANY STANDARD DRINKS CONTAINING ALCOHOL DO YOU HAVE ON A TYPICAL DAY: PATIENT DOES NOT DRINK
HOW OFTEN DO YOU HAVE A DRINK CONTAINING ALCOHOL: NEVER

## 2023-08-30 PROBLEM — Z00.00 MEDICARE ANNUAL WELLNESS VISIT, SUBSEQUENT: Status: ACTIVE | Noted: 2023-08-30

## 2023-08-30 NOTE — PROGRESS NOTES
enalapril-hydroCHLOROthiazide (VASERETIC) 10-25 MG per tablet Take 1 tablet by mouth once daily Yes Carola P Catterlin, DO   potassium chloride (KLOR-CON) 10 MEQ extended release tablet TAKE 1 TABLET BY MOUTH THREE TIMES DAILY Yes Carola P Catterlin, DO   enalapril (VASOTEC) 10 MG tablet Take 1 tablet by mouth once daily Yes Carola P Catterlin, DO   montelukast (SINGULAIR) 10 MG tablet Take 1 tablet by mouth once daily Yes Carola P Catterlin, DO   mupirocin (BACTROBAN) 2 % ointment Apply topically 2 times daily to right ear for 4 weeks Yes Jose M Moreno,    Multiple Vitamins-Minerals (PRESERVISION AREDS PO) Take by mouth 2 times daily Yes Historical Provider, MD   Omega-3 Fatty Acids (FISH OIL) 500 MG CAPS Take 500 mg by mouth daily Yes Historical Provider, MD   Cyanocobalamin (B-12) 2500 MCG TABS Take 1 tablet by mouth three times a week Yes Historical Provider, MD   vitamin D (CHOLECALCIFEROL) 5000 UNITS CAPS capsule Take 1 capsule by mouth daily Yes Historical Provider, MD [place] (Including outside providers/suppliers regularly involved in providing care):   Patient Care Team:  Óscar Trivedi DO as PCP - General (Family Medicine)  Óscar Trivedi DO as PCP - Empaneled Provider  Jacobo Apgar, MD as Consulting Physician (Pulmonology)     Reviewed and updated this visit:  Tobacco  Allergies  Meds  Problems  Med Hx  Surg Hx  Soc Hx  Fam Hx

## 2023-09-04 DIAGNOSIS — I10 ESSENTIAL HYPERTENSION: ICD-10-CM

## 2023-09-04 DIAGNOSIS — J30.2 SEASONAL ALLERGIES: ICD-10-CM

## 2023-09-05 ENCOUNTER — HOSPITAL ENCOUNTER (OUTPATIENT)
Dept: ULTRASOUND IMAGING | Age: 78
Discharge: HOME | End: 2023-09-07
Payer: MEDICARE

## 2023-09-05 DIAGNOSIS — C73 THYROID CANCER (HCC): ICD-10-CM

## 2023-09-05 PROCEDURE — 76536 US EXAM OF HEAD AND NECK: CPT

## 2023-09-05 NOTE — TELEPHONE ENCOUNTER
Last Appointment:  8/28/2023  Future Appointments   Date Time Provider [address]   9/5/2023 11:00 AM [facility] 1 [facility] US/AUS [facility]   9/11/2023 11:00 AM Chuckie Omalley DO [facility]   9/27/2023  9:45 AM [facility] MED ONC FAST TRACK 2 [facility] Med Onc [facility]   9/27/2023 10:00 AM Jack Lugo MD MED ONC [facility]   2/19/2024 10:00 AM Chuckie Omalley DO [facility]   6/3/2024  9:15 AM Alex Phillips MD [facility]

## 2023-09-06 RX ORDER — MONTELUKAST SODIUM 10 MG/1
10 TABLET ORAL DAILY
Qty: 90 TABLET | Refills: 1 | Status: SHIPPED | OUTPATIENT
Start: 2023-09-06

## 2023-09-06 RX ORDER — POTASSIUM CHLORIDE 750 MG/1
TABLET, FILM COATED, EXTENDED RELEASE ORAL
Qty: 270 TABLET | Refills: 0 | Status: SHIPPED | OUTPATIENT
Start: 2023-09-06

## 2023-09-06 RX ORDER — ENALAPRIL MALEATE AND HYDROCHLOROTHIAZIDE 10; 25 MG/1; MG/1
1 TABLET ORAL DAILY
Qty: 90 TABLET | Refills: 0 | Status: SHIPPED | OUTPATIENT
Start: 2023-09-06

## 2023-09-10 ENCOUNTER — TELEPHONE (OUTPATIENT)
Dept: ENDOCRINOLOGY | Age: 78
End: 2023-09-10

## 2023-09-10 DIAGNOSIS — I10 ESSENTIAL HYPERTENSION: ICD-10-CM

## 2023-09-10 DIAGNOSIS — E03.9 HYPOTHYROIDISM, UNSPECIFIED TYPE: Primary | ICD-10-CM

## 2023-09-10 DIAGNOSIS — C73 THYROID CANCER (HCC): ICD-10-CM

## 2023-09-10 NOTE — TELEPHONE ENCOUNTER
Notify patient  I have reviewed thyroid ultrasound images myself. Great!, left-sided thyroid lesion is smaller on this ultrasound comparing with the previous study also it did not show any significant blood flow on this image.   I do recommend repeat thyroid ultrasound in 4-6 months

## 2023-09-11 ENCOUNTER — PROCEDURE VISIT (OUTPATIENT)
Dept: ENT CLINIC | Age: 78
End: 2023-09-11

## 2023-09-11 VITALS — WEIGHT: 165 LBS | HEIGHT: 62 IN | BODY MASS INDEX: 30.36 KG/M2

## 2023-09-11 DIAGNOSIS — H61.001 CHONDRODERMATITIS NODULARIS HELICIS OF RIGHT EAR: Primary | ICD-10-CM

## 2023-09-11 RX ORDER — ENALAPRIL MALEATE AND HYDROCHLOROTHIAZIDE 10; 25 MG/1; MG/1
1 TABLET ORAL DAILY
Qty: 90 TABLET | Refills: 0 | OUTPATIENT
Start: 2023-09-11

## 2023-09-14 NOTE — PROGRESS NOTES
Op Note    Pre op diagnosis:.  Right helical ear lesion    Post Op: Same    Procedure: Elliptical excision of a 5 mm ear lesion    Anesthesia: 2% lidocaine epinephrine for 2 cc    Surgeon: Frankie Ashton    Procedure Note: Patient was seen and examined, appropriate consent was obtained. The ear was cleaned with sterile alcohol and then injected with 2 cc of 2% lidocaine with epinephrine allowed to set for adequate hemostasis. Once adequate timeout surpassed, a 15 blade was used to make a small optical excision of what appears to be chronic nodularis helices. Portions of cartilage were also resected, and then undermining was undertaken in the posterior and anterior marginal edge of the ear itself. The excision was closed using 4-0 Prolene, and then topical antibiotic ointment was applied. Patient tolerated well. Complications: none    Blood Loss: Min.     Disposition: home

## 2023-09-15 ENCOUNTER — TELEPHONE (OUTPATIENT)
Dept: FAMILY MEDICINE CLINIC | Age: 78
End: 2023-09-15

## 2023-09-15 LAB — SURGICAL PATHOLOGY REPORT: NORMAL

## 2023-09-15 NOTE — TELEPHONE ENCOUNTER
Patient called and informed her pharmacy has both of her Enalapril scripts on back order and is unsure when they will receive them. Patient would like to know if something else could be sent in as she has been out and trying to get the prescriptions refilled for 3 days. Please advise.     Last seen 8/28/2023  Next appt Visit date not found    [facility]

## 2023-09-16 DIAGNOSIS — I10 ESSENTIAL HYPERTENSION: ICD-10-CM

## 2023-09-18 ENCOUNTER — OFFICE VISIT (OUTPATIENT)
Dept: ENT CLINIC | Age: 78
End: 2023-09-18

## 2023-09-18 VITALS — BODY MASS INDEX: 30.36 KG/M2 | WEIGHT: 165 LBS | HEIGHT: 62 IN

## 2023-09-18 DIAGNOSIS — H61.001 CHONDRODERMATITIS NODULARIS HELICIS OF RIGHT EAR: Primary | ICD-10-CM

## 2023-09-18 PROCEDURE — 99024 POSTOP FOLLOW-UP VISIT: CPT | Performed by: OTOLARYNGOLOGY

## 2023-09-19 RX ORDER — ENALAPRIL MALEATE AND HYDROCHLOROTHIAZIDE 10; 25 MG/1; MG/1
1 TABLET ORAL DAILY
Qty: 90 TABLET | Refills: 0 | OUTPATIENT
Start: 2023-09-19

## 2023-09-19 ASSESSMENT — ENCOUNTER SYMPTOMS
VOMITING: 0
COUGH: 0
SHORTNESS OF BREATH: 0

## 2023-09-19 NOTE — PROGRESS NOTES
Musculoskeletal:         General: Normal range of motion. Cervical back: Normal range of motion. Lymphadenopathy:      Cervical: No cervical adenopathy. Skin:     General: Skin is warm. Findings: No erythema. Neurological:      Mental Status: She is alert. Cranial Nerves: No cranial nerve deficit. 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 Dr. Maximo Grace Otolaryngology Residency  Associate Clinical Professor:  Harish Cedeno [facility]

## 2023-09-20 DIAGNOSIS — I10 ESSENTIAL HYPERTENSION: ICD-10-CM

## 2023-09-20 DIAGNOSIS — E78.2 MIXED HYPERLIPIDEMIA: ICD-10-CM

## 2023-09-20 RX ORDER — AMLODIPINE BESYLATE AND ATORVASTATIN CALCIUM 5; 10 MG/1; MG/1
1 TABLET, FILM COATED ORAL DAILY
Qty: 90 TABLET | Refills: 0 | Status: SHIPPED | OUTPATIENT
Start: 2023-09-20

## 2023-09-20 NOTE — TELEPHONE ENCOUNTER
Pt came into the office and states she is about to be out of her medication for her blood pressure and it has been 2 weeks now, pt states she needs a refill on her amlodipine and needs to know if she should be taking something else along with the the amlodipine since the hydrochlorothiazide is on back order. Please advise.      Electronically signed by Prabhakar Sheth, [facility] on 9/20/23 at 9:27 AM EDT

## 2023-09-21 DIAGNOSIS — E78.2 MIXED HYPERLIPIDEMIA: ICD-10-CM

## 2023-09-21 DIAGNOSIS — I10 ESSENTIAL HYPERTENSION: ICD-10-CM

## 2023-09-21 RX ORDER — AMLODIPINE BESYLATE AND ATORVASTATIN CALCIUM 5; 10 MG/1; MG/1
1 TABLET, FILM COATED ORAL DAILY
Qty: 90 TABLET | Refills: 0 | Status: CANCELLED | OUTPATIENT
Start: 2023-09-21

## 2023-09-21 NOTE — TELEPHONE ENCOUNTER
Patient called regarding on-going issues with Enalapril and Amlodipine. Patient has been out of Enalapril for 2 weeks, spoke with [facility]: finally have Enalapril 10 mg in stock but Enalapril-Hydrochlorothiazide 10-25 mg still on back order until further notice. I informed patient that they were able to fill script. Patient also informed she spoke with Alistair Bowser yesterday about being out of Amlodipine. Dr. Jeffrey People reordered. Patient is extremely concerned about what's happening with her BP and wants to know what she should do. Can/should another medication be ordered in place of the Enalapril 10-25 mg? Should she be taking the Enalapril 10 mg by itself? Please advise.      [facility]

## 2023-09-22 DIAGNOSIS — I10 ESSENTIAL HYPERTENSION: Primary | ICD-10-CM

## 2023-09-22 RX ORDER — HYDROCHLOROTHIAZIDE 25 MG/1
25 TABLET ORAL EVERY MORNING
Qty: 90 TABLET | Refills: 1 | Status: SHIPPED | OUTPATIENT
Start: 2023-09-22

## 2023-09-22 RX ORDER — LISINOPRIL 10 MG/1
10 TABLET ORAL DAILY
Qty: 90 TABLET | Refills: 1 | Status: SHIPPED
Start: 2023-09-22 | End: 2023-09-25 | Stop reason: DRUGHIGH

## 2023-09-22 RX ORDER — LISINOPRIL 20 MG/1
20 TABLET ORAL DAILY
Qty: 90 TABLET | Refills: 1 | Status: SHIPPED
Start: 2023-09-22 | End: 2023-09-22

## 2023-09-22 NOTE — TELEPHONE ENCOUNTER
Call pt and tell her 2 medications sent to pharmacy to replace the medications she could not get    recheck BP in 2 weeks

## 2023-09-25 ENCOUNTER — TELEPHONE (OUTPATIENT)
Dept: FAMILY MEDICINE CLINIC | Age: 78
End: 2023-09-25

## 2023-09-25 DIAGNOSIS — I10 ESSENTIAL HYPERTENSION: Primary | ICD-10-CM

## 2023-09-25 RX ORDER — LISINOPRIL 20 MG/1
20 TABLET ORAL DAILY
Qty: 90 TABLET | Refills: 1 | Status: SHIPPED | OUTPATIENT
Start: 2023-09-25

## 2023-09-25 NOTE — TELEPHONE ENCOUNTER
Pharmacy called asking if the pt should be on both the HCTZ and the Lisinopril? Send on 9.22.23  Please advise.   Last seen 8/28/2023  Next appt Visit date not found  Thomas Shelton

## 2023-09-25 NOTE — TELEPHONE ENCOUNTER
Pt is taking lisinopril 20 mg, HCTZ 25 mg, metoprolol 25 mg , and caduet 5-10    medication list is updated

## 2023-09-29 PROBLEM — Z00.00 MEDICARE ANNUAL WELLNESS VISIT, SUBSEQUENT: Status: RESOLVED | Noted: 2023-08-30 | Resolved: 2023-09-29

## 2023-10-02 DIAGNOSIS — I10 ESSENTIAL HYPERTENSION: ICD-10-CM

## 2023-10-02 DIAGNOSIS — E78.2 MIXED HYPERLIPIDEMIA: ICD-10-CM

## 2023-10-02 RX ORDER — AMLODIPINE BESYLATE AND ATORVASTATIN CALCIUM 5; 10 MG/1; MG/1
1 TABLET, FILM COATED ORAL DAILY
Qty: 90 TABLET | Refills: 0 | OUTPATIENT
Start: 2023-10-02

## 2023-10-11 DIAGNOSIS — F41.9 ANXIETY: ICD-10-CM

## 2023-10-11 RX ORDER — BUSPIRONE HYDROCHLORIDE 5 MG/1
5 TABLET ORAL 2 TIMES DAILY PRN
Qty: 90 TABLET | Refills: 0 | Status: SHIPPED | OUTPATIENT
Start: 2023-10-11

## 2023-10-11 NOTE — TELEPHONE ENCOUNTER
Last Appointment:  8/28/2023  Future Appointments   Date Time Provider [address]   11/8/2023 10:15 AM NIKHIL MED ONC FAST TRACK 1 YZ Med Onc  [place]   11/8/2023 10:30 AM ELIZA Saleh MED ONC [facility]   2/19/2024 10:00 AM DO Phillip Chamberlain ENT [facility]   6/3/2024  9:15 AM Nati Presley MD BDM ENDO [facility]

## 2023-10-26 DIAGNOSIS — I10 ESSENTIAL HYPERTENSION: ICD-10-CM

## 2023-10-27 DIAGNOSIS — I10 ESSENTIAL HYPERTENSION: ICD-10-CM

## 2023-10-27 RX ORDER — METOPROLOL SUCCINATE 25 MG/1
TABLET, EXTENDED RELEASE ORAL
Qty: 180 TABLET | Refills: 0 | Status: SHIPPED | OUTPATIENT
Start: 2023-10-27

## 2023-10-27 RX ORDER — METOPROLOL SUCCINATE 25 MG/1
TABLET, EXTENDED RELEASE ORAL
Qty: 180 TABLET | Refills: 0 | OUTPATIENT
Start: 2023-10-27

## 2023-10-27 NOTE — TELEPHONE ENCOUNTER
Last Appointment:  8/28/2023  Future Appointments   Date Time Provider [address]   11/8/2023 10:15 AM NIKHIL MED ONC FAST TRACK 1 YZ Med Onc  [place]   11/8/2023 10:30 AM ELIZA Saleh MED ONC [facility]   2/19/2024 10:00 AM DO Phillip Chamberlain ENT [facility]   6/3/2024  9:15 AM Nati Presley MD BDM ENDO [facility]

## 2023-11-03 DIAGNOSIS — C18.0 CECAL CANCER (HCC): Primary | ICD-10-CM

## 2023-11-03 DIAGNOSIS — C18.9 MALIGNANT NEOPLASM OF COLON, UNSPECIFIED PART OF COLON (HCC): ICD-10-CM

## 2023-11-07 ENCOUNTER — TELEPHONE (OUTPATIENT)
Dept: ONCOLOGY | Age: 78
End: 2023-11-07

## 2023-11-07 NOTE — TELEPHONE ENCOUNTER
Patient scheduled with Yanick HOLLY tomorrow, 11/08/23. Patient did not schedule CT Chest, Abdomen, Pelvis scans. Called patient / daughter, Angel Luis Rogers and gave Angel Luis Rogers information to get scans scheduled, then call our office to reschedule cancelled 11/08/23 appointment. Angel Luis Rogers verbalized her agreement.

## 2023-11-08 ENCOUNTER — HOSPITAL ENCOUNTER (OUTPATIENT)
Dept: INFUSION THERAPY | Age: 78
Discharge: HOME | End: 2023-11-08

## 2023-11-22 ENCOUNTER — HOSPITAL ENCOUNTER (OUTPATIENT)
Dept: CT IMAGING | Age: 78
Discharge: HOME | End: 2023-11-24
Attending: UROLOGY
Payer: MEDICARE

## 2023-11-22 ENCOUNTER — HOSPITAL ENCOUNTER (OUTPATIENT)
Age: 78
Discharge: HOME | End: 2023-11-24
Attending: UROLOGY
Payer: MEDICARE

## 2023-11-22 ENCOUNTER — HOSPITAL ENCOUNTER (OUTPATIENT)
Dept: ULTRASOUND IMAGING | Age: 78
Discharge: HOME | End: 2023-11-24
Attending: UROLOGY
Payer: MEDICARE

## 2023-11-22 ENCOUNTER — HOSPITAL ENCOUNTER (OUTPATIENT)
Age: 78
Discharge: HOME | End: 2023-11-22
Payer: MEDICARE

## 2023-11-22 DIAGNOSIS — C18.0 CECAL CANCER (HCC): ICD-10-CM

## 2023-11-22 DIAGNOSIS — C18.9 MALIGNANT NEOPLASM OF COLON, UNSPECIFIED PART OF COLON (HCC): ICD-10-CM

## 2023-11-22 DIAGNOSIS — D41.02 NEOPLASM OF UNCERTAIN BEHAVIOR OF LEFT KIDNEY: ICD-10-CM

## 2023-11-22 LAB
ALBUMIN SERPL-MCNC: 4.3 G/DL (ref 3.5–5.2)
ALP SERPL-CCNC: 104 U/L (ref 35–104)
ALT SERPL-CCNC: 14 U/L (ref 0–32)
ANION GAP SERPL CALCULATED.3IONS-SCNC: 12 MMOL/L (ref 7–16)
AST SERPL-CCNC: 18 U/L (ref 0–31)
BILIRUB SERPL-MCNC: 0.5 MG/DL (ref 0–1.2)
BUN SERPL-MCNC: 12 MG/DL (ref 6–23)
CALCIUM SERPL-MCNC: 8.5 MG/DL (ref 8.6–10.2)
CHLORIDE SERPL-SCNC: 101 MMOL/L (ref 98–107)
CO2 SERPL-SCNC: 29 MMOL/L (ref 22–29)
CREAT SERPL-MCNC: 0.6 MG/DL (ref 0.5–1)
GFR SERPL CREATININE-BSD FRML MDRD: >60 ML/MIN/1.73M2
GLUCOSE SERPL-MCNC: 110 MG/DL (ref 74–99)
POTASSIUM SERPL-SCNC: 3.7 MMOL/L (ref 3.5–5)
PROT SERPL-MCNC: 7.9 G/DL (ref 6.4–8.3)
SODIUM SERPL-SCNC: 142 MMOL/L (ref 132–146)

## 2023-11-22 PROCEDURE — 6360000004 HC RX CONTRAST MEDICATION: Performed by: RADIOLOGY

## 2023-11-22 PROCEDURE — 80053 COMPREHEN METABOLIC PANEL: CPT

## 2023-11-22 PROCEDURE — 76770 US EXAM ABDO BACK WALL COMP: CPT

## 2023-11-22 PROCEDURE — 71260 CT THORAX DX C+: CPT

## 2023-11-22 PROCEDURE — 74177 CT ABD & PELVIS W/CONTRAST: CPT

## 2023-11-22 PROCEDURE — 36415 COLL VENOUS BLD VENIPUNCTURE: CPT

## 2023-11-22 RX ADMIN — IOPAMIDOL 75 ML: 755 INJECTION, SOLUTION INTRAVENOUS at 12:53

## 2023-11-27 ENCOUNTER — TELEPHONE (OUTPATIENT)
Dept: ONCOLOGY | Age: 78
End: 2023-11-27

## 2023-11-27 ENCOUNTER — OFFICE VISIT (OUTPATIENT)
Dept: ONCOLOGY | Age: 78
End: 2023-11-27
Payer: MEDICARE

## 2023-11-27 ENCOUNTER — HOSPITAL ENCOUNTER (OUTPATIENT)
Dept: INFUSION THERAPY | Age: 78
Discharge: HOME | End: 2023-11-27
Payer: MEDICARE

## 2023-11-27 VITALS
SYSTOLIC BLOOD PRESSURE: 150 MMHG | BODY MASS INDEX: 29.61 KG/M2 | OXYGEN SATURATION: 95 % | HEART RATE: 80 BPM | HEIGHT: 62 IN | DIASTOLIC BLOOD PRESSURE: 80 MMHG | TEMPERATURE: 97.2 F | WEIGHT: 160.9 LBS

## 2023-11-27 DIAGNOSIS — C18.0 CECAL CANCER (HCC): ICD-10-CM

## 2023-11-27 DIAGNOSIS — F41.9 ANXIETY: ICD-10-CM

## 2023-11-27 DIAGNOSIS — C18.0 CECAL CANCER (HCC): Primary | ICD-10-CM

## 2023-11-27 LAB
ALBUMIN SERPL-MCNC: 4.3 G/DL (ref 3.5–5.2)
ALP SERPL-CCNC: 111 U/L (ref 35–104)
ALT SERPL-CCNC: 11 U/L (ref 0–32)
ANION GAP SERPL CALCULATED.3IONS-SCNC: 10 MMOL/L (ref 7–16)
AST SERPL-CCNC: 17 U/L (ref 0–31)
BASOPHILS # BLD: 0.03 K/UL (ref 0–0.2)
BASOPHILS NFR BLD: 0 % (ref 0–2)
BILIRUB SERPL-MCNC: 0.6 MG/DL (ref 0–1.2)
BUN SERPL-MCNC: 9 MG/DL (ref 6–23)
CALCIUM SERPL-MCNC: 8.7 MG/DL (ref 8.6–10.2)
CEA SERPL-MCNC: 0.9 NG/ML (ref 0–5.2)
CHLORIDE SERPL-SCNC: 99 MMOL/L (ref 98–107)
CO2 SERPL-SCNC: 30 MMOL/L (ref 22–29)
CREAT SERPL-MCNC: 0.6 MG/DL (ref 0.5–1)
EOSINOPHIL # BLD: 0.1 K/UL (ref 0.05–0.5)
EOSINOPHILS RELATIVE PERCENT: 1 % (ref 0–6)
ERYTHROCYTE [DISTWIDTH] IN BLOOD BY AUTOMATED COUNT: 13.5 % (ref 11.5–15)
GFR SERPL CREATININE-BSD FRML MDRD: >60 ML/MIN/1.73M2
GLUCOSE SERPL-MCNC: 90 MG/DL (ref 74–99)
HCT VFR BLD AUTO: 42.3 % (ref 34–48)
HGB BLD-MCNC: 14 G/DL (ref 11.5–15.5)
IMM GRANULOCYTES # BLD AUTO: 0.03 K/UL (ref 0–0.58)
IMM GRANULOCYTES NFR BLD: 0 % (ref 0–5)
LYMPHOCYTES NFR BLD: 1.41 K/UL (ref 1.5–4)
LYMPHOCYTES RELATIVE PERCENT: 20 % (ref 20–42)
MCH RBC QN AUTO: 28.6 PG (ref 26–35)
MCHC RBC AUTO-ENTMCNC: 33.1 G/DL (ref 32–34.5)
MCV RBC AUTO: 86.3 FL (ref 80–99.9)
MONOCYTES NFR BLD: 0.63 K/UL (ref 0.1–0.95)
MONOCYTES NFR BLD: 9 % (ref 2–12)
NEUTROPHILS NFR BLD: 69 % (ref 43–80)
NEUTS SEG NFR BLD: 4.81 K/UL (ref 1.8–7.3)
PLATELET # BLD AUTO: 231 K/UL (ref 130–450)
PMV BLD AUTO: 10.4 FL (ref 7–12)
POTASSIUM SERPL-SCNC: 3.2 MMOL/L (ref 3.5–5)
PROT SERPL-MCNC: 7.7 G/DL (ref 6.4–8.3)
RBC # BLD AUTO: 4.9 M/UL (ref 3.5–5.5)
SODIUM SERPL-SCNC: 139 MMOL/L (ref 132–146)
WBC OTHER # BLD: 7 K/UL (ref 4.5–11.5)

## 2023-11-27 PROCEDURE — 36415 COLL VENOUS BLD VENIPUNCTURE: CPT

## 2023-11-27 PROCEDURE — 1036F TOBACCO NON-USER: CPT | Performed by: CLINICAL NURSE SPECIALIST

## 2023-11-27 PROCEDURE — G8399 PT W/DXA RESULTS DOCUMENT: HCPCS | Performed by: CLINICAL NURSE SPECIALIST

## 2023-11-27 PROCEDURE — 1090F PRES/ABSN URINE INCON ASSESS: CPT | Performed by: CLINICAL NURSE SPECIALIST

## 2023-11-27 PROCEDURE — 82378 CARCINOEMBRYONIC ANTIGEN: CPT

## 2023-11-27 PROCEDURE — G8417 CALC BMI ABV UP PARAM F/U: HCPCS | Performed by: CLINICAL NURSE SPECIALIST

## 2023-11-27 PROCEDURE — 1123F ACP DISCUSS/DSCN MKR DOCD: CPT | Performed by: CLINICAL NURSE SPECIALIST

## 2023-11-27 PROCEDURE — G8427 DOCREV CUR MEDS BY ELIG CLIN: HCPCS | Performed by: CLINICAL NURSE SPECIALIST

## 2023-11-27 PROCEDURE — 80053 COMPREHEN METABOLIC PANEL: CPT

## 2023-11-27 PROCEDURE — 3077F SYST BP >= 140 MM HG: CPT | Performed by: CLINICAL NURSE SPECIALIST

## 2023-11-27 PROCEDURE — 99213 OFFICE O/P EST LOW 20 MIN: CPT | Performed by: CLINICAL NURSE SPECIALIST

## 2023-11-27 PROCEDURE — 85025 COMPLETE CBC W/AUTO DIFF WBC: CPT

## 2023-11-27 PROCEDURE — 3079F DIAST BP 80-89 MM HG: CPT | Performed by: CLINICAL NURSE SPECIALIST

## 2023-11-27 PROCEDURE — 99213 OFFICE O/P EST LOW 20 MIN: CPT

## 2023-11-27 PROCEDURE — G8484 FLU IMMUNIZE NO ADMIN: HCPCS | Performed by: CLINICAL NURSE SPECIALIST

## 2023-11-27 RX ORDER — BUSPIRONE HYDROCHLORIDE 5 MG/1
5 TABLET ORAL 2 TIMES DAILY PRN
Qty: 90 TABLET | Refills: 0 | Status: SHIPPED | OUTPATIENT
Start: 2023-11-27

## 2023-11-27 NOTE — TELEPHONE ENCOUNTER
Last seen 8/28/2023  Next appt 1/19/2024    Electronically signed by Shalonda Kelly, [address] [facility] [address] on 11/27/23 at 8:13 AM EST

## 2023-11-27 NOTE — PROGRESS NOTES
Patient provided with discharge instructions, received printed AVS.  All questions answered. Patient understands follow up plan of care.
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 1cm polyp was at the proximal transverse just passed the hepatic flexure  A. Mass, cecum, biopsy: Invasive moderately differentiated adenocarcinoma. B. Polyp, hepatic flexure of colon, biopsy: Tubular adenoma. CT chest 07/31/2019:   Solid pulmonary parenchymal nodule in the left lower lobe measures 12 x 11 mm, and lies just above diaphragm. CT abdomen/pelvis 07/31/2019: There is a cecal mural mass on the medial aspect of the cecum and proximal ascending colon with perpendicular diameter measurements of 6.2 x 5.7 x 2.8 cm. It appears to be confined to the bowel wall. Only small cecal mesenteric LN are identified. There is an enlarged right adrenal lesion measuring 2 cm diameter    CEA 3.8 on 08/05/2019. PET/CT scan 08/15/2019  No FDG avid uptake is identified which exceeds the threshold SUV in the left pulmonary nodule. Pulmonary team (Dr. Miguel Velazquez) consult appreciated; repeat CT chest in 3 months. No uptake in adrenal glands. Abnormal tracer uptake in the right thyroid which is focal. Thyroid U/S 08/21/2019 noted thyroid nodules. Endocrinology team consult (Dr. Irlanda Chambers) appreciated. Abnormal tracer uptake at the level the cecum which exceeds the threshold SUV. Right hemicolectomy on 08/29/2019. Procedure: Right hemicolectomy. Tumor site: Cecum. Tumor size: Greatest dimension is 6.5 cm; additional dimensions 4.5 x 1.8 cm. Macroscopic tumor perforation: Not identified. Histologic type: Adenocarcinoma  Histologic grade: G2 (moderately differentiated). Tumor extension: Tumor invades through the muscularis propria into pericolorectal tissue. Margin status: All margins are uninvolved by invasive carcinoma, high grade dysplasia, intramucosal adenocarcinoma and adenoma.      - Margins examined: Proximal, distal and radial/soft tissue surgical

## 2023-11-27 NOTE — TELEPHONE ENCOUNTER
Called patient to inform of potassium level, 3.2, and to let PCP know per Kacie. informed me she is taking 10mg 3x daily. Advised patient to take a daily calcium also per Karina Maravilla.

## 2023-12-07 DIAGNOSIS — I10 ESSENTIAL HYPERTENSION: ICD-10-CM

## 2023-12-07 NOTE — TELEPHONE ENCOUNTER
Last Appointment:  8/28/2023  Future Appointments   Date Time Provider [address]   1/19/2024  8:30 AM Bernardino Parker DO [facility]   2/19/2024 10:00 AM DO Phillip Chambers ENT [facility]   5/29/2024 10:30 AM NIKHIL MED ONC FAST TRACK 1 SEYZ Med Onc [facility]   5/29/2024 10:45 AM ELIZA Doan MED ONC [facility]   6/3/2024  9:15 AM Sharon Badillo MD BDM ENDO [facility]

## 2023-12-08 RX ORDER — POTASSIUM CHLORIDE 750 MG/1
TABLET, FILM COATED, EXTENDED RELEASE ORAL
Qty: 270 TABLET | Refills: 0 | Status: SHIPPED | OUTPATIENT
Start: 2023-12-08

## 2023-12-26 DIAGNOSIS — I10 ESSENTIAL HYPERTENSION: ICD-10-CM

## 2023-12-26 DIAGNOSIS — E78.2 MIXED HYPERLIPIDEMIA: ICD-10-CM

## 2023-12-27 RX ORDER — AMLODIPINE BESYLATE AND ATORVASTATIN CALCIUM 5; 10 MG/1; MG/1
1 TABLET, FILM COATED ORAL DAILY
Qty: 90 TABLET | Refills: 0 | OUTPATIENT
Start: 2023-12-27

## 2024-01-15 DIAGNOSIS — F41.9 ANXIETY: ICD-10-CM

## 2024-01-15 RX ORDER — BUSPIRONE HYDROCHLORIDE 5 MG/1
5 TABLET ORAL 2 TIMES DAILY PRN
Qty: 90 TABLET | Refills: 0 | Status: SHIPPED | OUTPATIENT
Start: 2024-01-15

## 2024-01-15 NOTE — TELEPHONE ENCOUNTER
Last Appointment:  8/28/2023  Future Appointments   Date Time Provider Department Center   1/19/2024  8:30 AM Carola Staley DO [facility]   2/1/2024 10:00 AM SE [facility]BILE [facility] RM 1 SEYZ MOBILE [facility] Rad/Car   2/19/2024 10:00 AM Jose M Moreno DO [place] [facility]   5/29/2024 10:30 AM SEYZ MED ONC FAST TRACK 1 SEYZ Med Onc [facility]   5/29/2024 10:45 AM Aracelis Childress APRN MED ONC [facility]   6/3/2024  9:15 AM Alex Ahuja MD BDM ENDO [facility]

## 2024-01-26 DIAGNOSIS — I10 ESSENTIAL HYPERTENSION: ICD-10-CM

## 2024-01-26 RX ORDER — METOPROLOL SUCCINATE 25 MG/1
TABLET, EXTENDED RELEASE ORAL
Qty: 180 TABLET | Refills: 0 | Status: SHIPPED | OUTPATIENT
Start: 2024-01-26

## 2024-01-26 NOTE — TELEPHONE ENCOUNTER
Last Appointment:  8/28/2023  Future Appointments   Date Time Provider Department Center   2/1/2024 10:00 AM SE MOBILE [facility] RM 1 SEYZ MOBILE SE Rad/Car   2/12/2024 10:00 AM Carola Staley,  [facility]   2/19/2024 10:00 AM Jose M Moreno, DO [place] [facility]   5/29/2024 10:30 AM SEYZ MED ONC FAST TRACK 1 SEYZ Med Onc [facility]   5/29/2024 10:45 AM Aracelis Childress APRN MED ONC [facility]   9/5/2024  9:15 AM Alex Ahuja MD BDM ENDO [facility]

## 2024-01-30 ENCOUNTER — TELEPHONE (OUTPATIENT)
Dept: FAMILY MEDICINE CLINIC | Age: 79
End: 2024-01-30

## 2024-01-30 DIAGNOSIS — Z12.31 SCREENING MAMMOGRAM FOR BREAST CANCER: Primary | ICD-10-CM

## 2024-02-01 ENCOUNTER — HOSPITAL ENCOUNTER (OUTPATIENT)
Dept: MAMMOGRAPHY | Age: 79
Discharge: HOME | End: 2024-02-03
Payer: MEDICARE

## 2024-02-01 DIAGNOSIS — Z12.31 SCREENING MAMMOGRAM FOR BREAST CANCER: ICD-10-CM

## 2024-02-01 PROCEDURE — 77063 BREAST TOMOSYNTHESIS BI: CPT

## 2024-02-05 DIAGNOSIS — R92.333 HETEROGENEOUSLY DENSE TISSUE OF BOTH BREASTS ON MAMMOGRAPHY: Primary | ICD-10-CM

## 2024-02-05 DIAGNOSIS — R92.8 OTHER ABNORMAL AND INCONCLUSIVE FINDINGS ON DIAGNOSTIC IMAGING OF BREAST: ICD-10-CM

## 2024-02-12 ENCOUNTER — OFFICE VISIT (OUTPATIENT)
Dept: FAMILY MEDICINE CLINIC | Age: 79
End: 2024-02-12
Payer: MEDICARE

## 2024-02-12 VITALS
TEMPERATURE: 97 F | DIASTOLIC BLOOD PRESSURE: 82 MMHG | WEIGHT: 162 LBS | HEART RATE: 74 BPM | SYSTOLIC BLOOD PRESSURE: 138 MMHG | OXYGEN SATURATION: 97 % | HEIGHT: 62 IN | RESPIRATION RATE: 18 BRPM | BODY MASS INDEX: 29.81 KG/M2

## 2024-02-12 DIAGNOSIS — E78.2 MIXED HYPERLIPIDEMIA: ICD-10-CM

## 2024-02-12 DIAGNOSIS — E03.9 HYPOTHYROIDISM, UNSPECIFIED TYPE: ICD-10-CM

## 2024-02-12 DIAGNOSIS — E27.8 ADRENAL INCIDENTALOMA (HCC): ICD-10-CM

## 2024-02-12 DIAGNOSIS — R53.83 FATIGUE, UNSPECIFIED TYPE: ICD-10-CM

## 2024-02-12 DIAGNOSIS — I10 ESSENTIAL HYPERTENSION: Primary | ICD-10-CM

## 2024-02-12 DIAGNOSIS — I10 ESSENTIAL HYPERTENSION: ICD-10-CM

## 2024-02-12 DIAGNOSIS — C18.0 CECAL CANCER (HCC): ICD-10-CM

## 2024-02-12 DIAGNOSIS — R73.01 IFG (IMPAIRED FASTING GLUCOSE): ICD-10-CM

## 2024-02-12 LAB
ABSOLUTE IMMATURE GRANULOCYTE: 0.03 K/UL (ref 0–0.58)
ALBUMIN SERPL-MCNC: 4.4 G/DL (ref 3.5–5.2)
ALP BLD-CCNC: 104 U/L (ref 35–104)
ALT SERPL-CCNC: 14 U/L (ref 0–32)
ANION GAP SERPL CALCULATED.3IONS-SCNC: 14 MMOL/L (ref 7–16)
AST SERPL-CCNC: 19 U/L (ref 0–31)
BASOPHILS ABSOLUTE: 0.05 K/UL (ref 0–0.2)
BASOPHILS RELATIVE PERCENT: 1 % (ref 0–2)
BILIRUB SERPL-MCNC: 0.6 MG/DL (ref 0–1.2)
BUN BLDV-MCNC: 12 MG/DL (ref 6–23)
CALCIUM SERPL-MCNC: 8.8 MG/DL (ref 8.6–10.2)
CHLORIDE BLD-SCNC: 100 MMOL/L (ref 98–107)
CHOLESTEROL: 211 MG/DL
CO2: 29 MMOL/L (ref 22–29)
CREAT SERPL-MCNC: 0.6 MG/DL (ref 0.5–1)
EOSINOPHILS ABSOLUTE: 0.17 K/UL (ref 0.05–0.5)
EOSINOPHILS RELATIVE PERCENT: 2 % (ref 0–6)
GFR SERPL CREATININE-BSD FRML MDRD: >60 ML/MIN/1.73M2
GLUCOSE BLD-MCNC: 87 MG/DL (ref 74–99)
HBA1C MFR BLD: 5.9 % (ref 4–5.6)
HCT VFR BLD CALC: 43.7 % (ref 34–48)
HDLC SERPL-MCNC: 60 MG/DL
HEMOGLOBIN: 14.4 G/DL (ref 11.5–15.5)
IMMATURE GRANULOCYTES: 0 % (ref 0–5)
LDL CHOLESTEROL: 123 MG/DL
LYMPHOCYTES ABSOLUTE: 1.51 K/UL (ref 1.5–4)
LYMPHOCYTES RELATIVE PERCENT: 20 % (ref 20–42)
MCH RBC QN AUTO: 29.1 PG (ref 26–35)
MCHC RBC AUTO-ENTMCNC: 33 G/DL (ref 32–34.5)
MCV RBC AUTO: 88.3 FL (ref 80–99.9)
MONOCYTES ABSOLUTE: 0.75 K/UL (ref 0.1–0.95)
MONOCYTES RELATIVE PERCENT: 10 % (ref 2–12)
NEUTROPHILS ABSOLUTE: 5.17 K/UL (ref 1.8–7.3)
NEUTROPHILS RELATIVE PERCENT: 67 % (ref 43–80)
PDW BLD-RTO: 13.5 % (ref 11.5–15)
PLATELET # BLD: 250 K/UL (ref 130–450)
PMV BLD AUTO: 11.2 FL (ref 7–12)
POTASSIUM SERPL-SCNC: 3.9 MMOL/L (ref 3.5–5)
RBC # BLD: 4.95 M/UL (ref 3.5–5.5)
SODIUM BLD-SCNC: 143 MMOL/L (ref 132–146)
TOTAL PROTEIN: 7.8 G/DL (ref 6.4–8.3)
TRIGL SERPL-MCNC: 140 MG/DL
TSH SERPL DL<=0.05 MIU/L-ACNC: 1.81 UIU/ML (ref 0.27–4.2)
VLDLC SERPL CALC-MCNC: 28 MG/DL
WBC # BLD: 7.7 K/UL (ref 4.5–11.5)

## 2024-02-12 PROCEDURE — G8484 FLU IMMUNIZE NO ADMIN: HCPCS | Performed by: FAMILY MEDICINE

## 2024-02-12 PROCEDURE — 99214 OFFICE O/P EST MOD 30 MIN: CPT | Performed by: FAMILY MEDICINE

## 2024-02-12 PROCEDURE — 3075F SYST BP GE 130 - 139MM HG: CPT | Performed by: FAMILY MEDICINE

## 2024-02-12 PROCEDURE — G8399 PT W/DXA RESULTS DOCUMENT: HCPCS | Performed by: FAMILY MEDICINE

## 2024-02-12 PROCEDURE — 1036F TOBACCO NON-USER: CPT | Performed by: FAMILY MEDICINE

## 2024-02-12 PROCEDURE — G8427 DOCREV CUR MEDS BY ELIG CLIN: HCPCS | Performed by: FAMILY MEDICINE

## 2024-02-12 PROCEDURE — G8417 CALC BMI ABV UP PARAM F/U: HCPCS | Performed by: FAMILY MEDICINE

## 2024-02-12 PROCEDURE — 3079F DIAST BP 80-89 MM HG: CPT | Performed by: FAMILY MEDICINE

## 2024-02-12 PROCEDURE — 36415 COLL VENOUS BLD VENIPUNCTURE: CPT | Performed by: FAMILY MEDICINE

## 2024-02-12 PROCEDURE — 1090F PRES/ABSN URINE INCON ASSESS: CPT | Performed by: FAMILY MEDICINE

## 2024-02-12 PROCEDURE — 1123F ACP DISCUSS/DSCN MKR DOCD: CPT | Performed by: FAMILY MEDICINE

## 2024-02-12 RX ORDER — PHENOL 1.4 %
1 AEROSOL, SPRAY (ML) MUCOUS MEMBRANE DAILY
COMMUNITY

## 2024-02-12 NOTE — PROGRESS NOTES
Venipuncture was obtained from left arm, with 1 attempt. Patient tolerated the procedure without complications or complaints.  Electronically signed by Donna Blela LPN on 2/12/24 at 10:27 AM EST   Last Appointment:  8/28/2023  Future Appointments   Date Time Provider Department Center   2/19/2024 10:00 AM Jose M Moreno DO [facility]   2/21/2024 11:00 AM [facility] RM 1 [facility] Radiolo   5/29/2024 10:30 AM SEYZ MED ONC FAST TRACK 1 SEYZ Med Onc [facility]   5/29/2024 10:45 AM Aracelis Childress APRN MED ONC [facility]   9/5/2024  9:15 AM Alex Ahuja MD BDM ENDO [facility]

## 2024-02-14 NOTE — PROGRESS NOTES
SUBJECTIVE  Mahsa Ceja is a 78 y.o. female.    HPI/Chief C/O:  Chief Complaint   Patient presents with    Hypertension     Pt here for a 6 month HTN check. BP at home 128-130/80s at home. Pt states she has been feeling well.     Allergies   Allergen Reactions    Prevnar 13 [Pneumococcal 13-Kassidy Conj Vacc] Swelling     Redness and swelling in arm at site     This 78 year old female presents for physical exam. Pt has hypertension, hyperlipidemia, hypothyroid, cecal cancer (HCC), and hurthle carcinoma thyroid (HCC). Pt denies chest pain and denies shortness of breath.   ROS:  Review of Systems   Constitutional:  Positive for activity change and fatigue. Negative for appetite change, chills, diaphoresis, fever and unexpected weight change.   HENT:  Negative for congestion, dental problem, drooling, ear discharge, ear pain, facial swelling, hearing loss, mouth sores, nosebleeds, postnasal drip, rhinorrhea, sinus pressure, sinus pain, sneezing, sore throat, tinnitus, trouble swallowing and voice change.    Eyes: Negative.  Negative for photophobia, pain, discharge, redness, itching and visual disturbance.   Respiratory: Negative.  Negative for cough, choking, chest tightness, shortness of breath, wheezing and stridor.    Cardiovascular:  Negative for chest pain, palpitations and leg swelling.   Gastrointestinal:  Negative for abdominal distention, abdominal pain, anal bleeding, blood in stool, constipation, diarrhea, nausea, rectal pain and vomiting.   Endocrine: Negative.  Negative for cold intolerance, heat intolerance, polydipsia, polyphagia and polyuria.   Genitourinary: Negative.  Negative for decreased urine volume, difficulty urinating, dysuria, flank pain, frequency, genital sores, hematuria, menstrual problem, pelvic pain and urgency.   Musculoskeletal:  Positive for arthralgias and myalgias. Negative for back pain, gait problem, joint swelling, neck pain and neck stiffness.   Skin: Negative.  Negative for

## 2024-02-19 ENCOUNTER — OFFICE VISIT (OUTPATIENT)
Dept: ENT CLINIC | Age: 79
End: 2024-02-19
Payer: MEDICARE

## 2024-02-19 VITALS
BODY MASS INDEX: 29.81 KG/M2 | WEIGHT: 162 LBS | HEIGHT: 62 IN | HEART RATE: 76 BPM | SYSTOLIC BLOOD PRESSURE: 176 MMHG | DIASTOLIC BLOOD PRESSURE: 80 MMHG

## 2024-02-19 DIAGNOSIS — C73 PAPILLARY THYROID CARCINOMA (HCC): Primary | ICD-10-CM

## 2024-02-19 DIAGNOSIS — H61.001 CHONDRODERMATITIS NODULARIS HELICIS OF RIGHT EAR: ICD-10-CM

## 2024-02-19 PROCEDURE — 1036F TOBACCO NON-USER: CPT | Performed by: OTOLARYNGOLOGY

## 2024-02-19 PROCEDURE — 1123F ACP DISCUSS/DSCN MKR DOCD: CPT | Performed by: OTOLARYNGOLOGY

## 2024-02-19 PROCEDURE — 99213 OFFICE O/P EST LOW 20 MIN: CPT | Performed by: OTOLARYNGOLOGY

## 2024-02-19 PROCEDURE — 3079F DIAST BP 80-89 MM HG: CPT | Performed by: OTOLARYNGOLOGY

## 2024-02-19 PROCEDURE — 3077F SYST BP >= 140 MM HG: CPT | Performed by: OTOLARYNGOLOGY

## 2024-02-19 PROCEDURE — G8484 FLU IMMUNIZE NO ADMIN: HCPCS | Performed by: OTOLARYNGOLOGY

## 2024-02-19 PROCEDURE — G8399 PT W/DXA RESULTS DOCUMENT: HCPCS | Performed by: OTOLARYNGOLOGY

## 2024-02-19 PROCEDURE — 1090F PRES/ABSN URINE INCON ASSESS: CPT | Performed by: OTOLARYNGOLOGY

## 2024-02-19 PROCEDURE — G8417 CALC BMI ABV UP PARAM F/U: HCPCS | Performed by: OTOLARYNGOLOGY

## 2024-02-19 PROCEDURE — G8427 DOCREV CUR MEDS BY ELIG CLIN: HCPCS | Performed by: OTOLARYNGOLOGY

## 2024-02-19 RX ORDER — TRIAMCINOLONE ACETONIDE 0.25 MG/G
CREAM TOPICAL
Qty: 22 G | Refills: 0 | Status: SHIPPED | OUTPATIENT
Start: 2024-02-19

## 2024-02-19 ASSESSMENT — ENCOUNTER SYMPTOMS
VOMITING: 0
SHORTNESS OF BREATH: 0
RHINORRHEA: 0
SORE THROAT: 0
VOICE CHANGE: 0
TROUBLE SWALLOWING: 0
COUGH: 0

## 2024-02-19 NOTE — PROGRESS NOTES
[facility]  SHENA PinonO. Ms.Ed        Patient Name:  Mahsa Ceja  :  1945     CHIEF C/O:    Chief Complaint   Patient presents with    Follow-up     6 mo thyroid       HISTORY OBTAINED FROM:  patient    HISTORY OF PRESENT ILLNESS:       Mahsa is a 78 y.o. year old female, here today for follow up of:       Here for follow up for hx papillary thyroid carcinoma.  Patient with no complaints of new hoarseness difficulty swallowing or neck mass.  She did undergo TSH but has limited thyroid laboratory workup including no thyroglobulin levels were conducted.  However she denies any other show she complains of hoarseness fever chills shortness of breath difficulty swallowing.      Patient also has a known history of costochondritis of the right auricle, status post resection with a small area of recurrence with intermittent episodes of irritation she is not currently using medical therapies in the area she notices most tender when she lies flat on the ear otherwise no other new complaints today.           Past Medical History:   Diagnosis Date    Cancer (HCC) 2019    colon    Glaucoma     Hyperlipidemia     Hypertension     Hypothyroidism      Past Surgical History:   Procedure Laterality Date    CATARACT REMOVAL WITH IMPLANT Bilateral     CHOLECYSTECTOMY      COLONOSCOPY N/A 2019    COLONOSCOPY WITH BIOPSY performed by Sekou Hector MD at [facility] ENDOSCOPY    COLONOSCOPY N/A 2020    COLONOSCOPY POSS BIOPSY OR POLYPECTOMY performed by Sekou Hector MD at [facility] ENDOSCOPY    COLONOSCOPY N/A 1/10/2022    COLONOSCOPY performed by Sekou Hector MD at [facility] ENDOSCOPY    HYSTERECTOMY (CERVIX STATUS UNKNOWN)      SMALL INTESTINE SURGERY Right 2019    RIGHT HEMICOLECTOMY, LAPAROSCOPIC, ROBOTIC XI ASSISTED performed by Sekou Hector MD at [facility] OR    THYROIDECTOMY Bilateral 2020    TOTAL THYROIDECTOMY WITH ANTERIOR NECK DISSECTION-NEEDS NERVE INTEGRITY MONITOR

## 2024-02-20 ENCOUNTER — HOSPITAL ENCOUNTER (OUTPATIENT)
Age: 79
Discharge: HOME | End: 2024-02-20
Payer: MEDICARE

## 2024-02-20 DIAGNOSIS — C73 PAPILLARY THYROID CARCINOMA (HCC): ICD-10-CM

## 2024-02-20 PROCEDURE — 84432 ASSAY OF THYROGLOBULIN: CPT

## 2024-02-20 PROCEDURE — 86800 THYROGLOBULIN ANTIBODY: CPT

## 2024-02-20 PROCEDURE — 36415 COLL VENOUS BLD VENIPUNCTURE: CPT

## 2024-02-21 ENCOUNTER — HOSPITAL ENCOUNTER (OUTPATIENT)
Dept: ULTRASOUND IMAGING | Age: 79
Discharge: HOME | End: 2024-02-21
Attending: FAMILY MEDICINE
Payer: MEDICARE

## 2024-02-21 DIAGNOSIS — R92.8 OTHER ABNORMAL AND INCONCLUSIVE FINDINGS ON DIAGNOSTIC IMAGING OF BREAST: ICD-10-CM

## 2024-02-21 DIAGNOSIS — R92.333 HETEROGENEOUSLY DENSE TISSUE OF BOTH BREASTS ON MAMMOGRAPHY: ICD-10-CM

## 2024-02-21 PROCEDURE — 76641 ULTRASOUND BREAST COMPLETE: CPT

## 2024-02-22 LAB
THYROGLOB AB SERPL-ACNC: <0.9 IU/ML (ref 0–4)
THYROGLOB SERPL-MCNC: 12.1 NG/ML (ref 1.3–31.8)
THYROGLOB SERPL-MCNC: NORMAL NG/ML (ref 1.3–31.8)

## 2024-03-01 DIAGNOSIS — F41.9 ANXIETY: ICD-10-CM

## 2024-03-01 DIAGNOSIS — I10 ESSENTIAL HYPERTENSION: ICD-10-CM

## 2024-03-01 RX ORDER — POTASSIUM CHLORIDE 750 MG/1
TABLET, FILM COATED, EXTENDED RELEASE ORAL
Qty: 270 TABLET | Refills: 0 | Status: SHIPPED | OUTPATIENT
Start: 2024-03-01

## 2024-03-01 RX ORDER — BUSPIRONE HYDROCHLORIDE 5 MG/1
5 TABLET ORAL 2 TIMES DAILY PRN
Qty: 90 TABLET | Refills: 0 | Status: SHIPPED | OUTPATIENT
Start: 2024-03-01

## 2024-03-01 NOTE — TELEPHONE ENCOUNTER
Last Appointment:  2/12/2024  Future Appointments   Date Time Provider Department Center   5/29/2024 10:30 AM SEYZ MED ONC FAST TRACK 1 SEYZ Med Onc [facility]   5/29/2024 10:45 AM Aracelis Childress APRN MED ONC [facility]   8/12/2024 10:00 AM Carola Staley DO [facility]   8/19/2024 10:30 AM Jose M Moreno DO [place] [facility]   9/5/2024  9:15 AM Alex Ahuja MD B[facility] ENDO [facility]

## 2024-03-04 DIAGNOSIS — J30.2 SEASONAL ALLERGIES: ICD-10-CM

## 2024-03-04 RX ORDER — MONTELUKAST SODIUM 10 MG/1
10 TABLET ORAL DAILY
Qty: 90 TABLET | Refills: 0 | Status: SHIPPED | OUTPATIENT
Start: 2024-03-04

## 2024-03-04 NOTE — TELEPHONE ENCOUNTER
Last Appointment:  2/12/2024  Future Appointments   Date Time Provider Department Center   5/29/2024 10:30 AM SEYZ MED ONC FAST TRACK 1 SEYZ Med Onc [facility]   5/29/2024 10:45 AM Aracelis Childress APRN MED ONC [facility]   8/12/2024 10:00 AM Carola Staley DO [facility]   8/19/2024 10:30 AM Jose M Moreno DO [place] [facility]   9/5/2024  9:15 AM Alex Ahuja MD BDM ENDO [facility]

## 2024-03-11 DIAGNOSIS — I10 ESSENTIAL HYPERTENSION: ICD-10-CM

## 2024-03-11 RX ORDER — HYDROCHLOROTHIAZIDE 25 MG/1
25 TABLET ORAL EVERY MORNING
Qty: 90 TABLET | Refills: 0 | Status: SHIPPED | OUTPATIENT
Start: 2024-03-11

## 2024-03-11 NOTE — TELEPHONE ENCOUNTER
Last seen 2/12/2024  Next appt 8/12/2024    Electronically signed by LETITIA MARMOLEJO MA on 3/11/24 at 9:40 AM EDT

## 2024-04-01 DIAGNOSIS — E78.2 MIXED HYPERLIPIDEMIA: ICD-10-CM

## 2024-04-01 DIAGNOSIS — I10 ESSENTIAL HYPERTENSION: ICD-10-CM

## 2024-04-01 RX ORDER — AMLODIPINE BESYLATE AND ATORVASTATIN CALCIUM 5; 10 MG/1; MG/1
1 TABLET, FILM COATED ORAL DAILY
Qty: 90 TABLET | Refills: 0 | Status: SHIPPED | OUTPATIENT
Start: 2024-04-01

## 2024-04-15 DIAGNOSIS — F41.9 ANXIETY: ICD-10-CM

## 2024-04-15 RX ORDER — BUSPIRONE HYDROCHLORIDE 5 MG/1
5 TABLET ORAL 2 TIMES DAILY PRN
Qty: 90 TABLET | Refills: 0 | Status: SHIPPED | OUTPATIENT
Start: 2024-04-15

## 2024-04-15 NOTE — TELEPHONE ENCOUNTER
Last Appointment:  2/12/2024  Future Appointments   Date Time Provider Department Center   5/29/2024 10:30 AM SEYZ MED ONC FAST TRACK 1 SEYZ Med Onc [facility]   5/29/2024 10:45 AM Aracelis Childress APRN MED ONC [facility]   8/12/2024 10:00 AM Carola Staley DO [facility]   8/19/2024 10:30 AM Jose M Moreno DO [place] [facility]   9/5/2024  9:15 AM Alex Ahuja MD BDM ENDO [facility]

## 2024-04-24 DIAGNOSIS — I10 ESSENTIAL HYPERTENSION: ICD-10-CM

## 2024-04-24 RX ORDER — METOPROLOL SUCCINATE 25 MG/1
TABLET, EXTENDED RELEASE ORAL
Qty: 180 TABLET | Refills: 0 | Status: SHIPPED | OUTPATIENT
Start: 2024-04-24

## 2024-04-24 NOTE — TELEPHONE ENCOUNTER
Last seen 2/12/2024  Next appt 8/12/2024    Electronically signed by LETITIA MARMOLEJO MA on 4/24/24 at 8:01 AM EDT

## 2024-04-29 DIAGNOSIS — I10 ESSENTIAL HYPERTENSION: ICD-10-CM

## 2024-04-29 RX ORDER — LISINOPRIL 20 MG/1
20 TABLET ORAL DAILY
Qty: 90 TABLET | Refills: 0 | Status: SHIPPED | OUTPATIENT
Start: 2024-04-29

## 2024-04-29 NOTE — TELEPHONE ENCOUNTER
Last Appointment:  2/12/2024  Future Appointments   Date Time Provider Department Center   5/29/2024 10:30 AM SEYZ MED ONC FAST TRACK 1 SEYZ Med Onc [facility]   5/29/2024 10:45 AM Aracelis Childress APRN MED ONC [facility]   8/12/2024 10:00 AM Carola Staley DO [facility]   8/19/2024 10:30 AM Jose M Moreno DO [place] [facility]   9/5/2024  9:15 AM Alex Ahuja MD BDM ENDO [facility]

## 2024-05-27 DIAGNOSIS — I10 ESSENTIAL HYPERTENSION: ICD-10-CM

## 2024-05-27 DIAGNOSIS — F41.9 ANXIETY: ICD-10-CM

## 2024-05-28 DIAGNOSIS — C18.0 CECAL CANCER (HCC): Primary | ICD-10-CM

## 2024-05-28 NOTE — PROGRESS NOTES
Department of [facility]  Attending Clinic Note    Reason for Visit: Follow-up on a patient with Cecal Cancer    PCP:  Carola Staley DO    History of Present Illness:  78 y.o. female, never smoker, hx of HTN, hyperlipidemia, hypothyroidism, who presented to see general surgery team for evaluation of anemia and diagnostic colonoscopy.    Colonoscopy on 07/29/2019:  The ileocecal valve was obscured by large cecal fungating mass, 5cm in diameter.   1cm polyp was at the proximal transverse just passed the hepatic flexure  A. Mass, cecum, biopsy: Invasive moderately differentiated  adenocarcinoma.  B. Polyp, hepatic flexure of colon, biopsy: Tubular adenoma.    CT chest 07/31/2019:   Solid pulmonary parenchymal nodule in the left lower lobe measures 12 x 11 mm, and lies just above diaphragm.     CT abdomen/pelvis 07/31/2019:  There is a cecal mural mass on the medial aspect of the cecum and proximal ascending colon with perpendicular diameter measurements of .2 x 5.7 x 2.8 cm. It appears to be confined to the bowel wall.  There is an enlarged right adrenal lesion measuring 2 cm diameter    CEA 3.8 on 08/05/2019.    PET/CT scan 08/15/2019  No FDG avid uptake is identified which exceeds the threshold SUV in the left pulmonary nodule.   Pulmonary team (Dr. Orellana) consult appreciated; repeat CT chest in 3 months.  No uptake in adrenal glands.  Abnormal tracer uptake in the right thyroid which is focal. Endocrinology team consult (Dr. Ahuja) appreciated. Thyroid U/S 08/21/2019 noted thyroid nodules.  Abnormal tracer uptake at the level the cecum which exceeds the threshold SUV.    Right hemicolectomy was performed on 08/29/2019.  Procedure: Right hemicolectomy.  Tumor site: Cecum.  Tumor size: Greatest dimension is 6.5 cm; additional dimensions 4.5 x 1.8 cm.  Macroscopic tumor perforation: Not identified.  Histologic type: Adenocarcinoma  Histologic grade: G2 (moderately differentiated).  Tumor extension:

## 2024-05-29 ENCOUNTER — OFFICE VISIT (OUTPATIENT)
Dept: ONCOLOGY | Age: 79
End: 2024-05-29
Payer: MEDICARE

## 2024-05-29 ENCOUNTER — HOSPITAL ENCOUNTER (OUTPATIENT)
Dept: INFUSION THERAPY | Age: 79
Discharge: HOME | End: 2024-05-29
Payer: MEDICARE

## 2024-05-29 VITALS
WEIGHT: 161.2 LBS | DIASTOLIC BLOOD PRESSURE: 73 MMHG | TEMPERATURE: 98.2 F | SYSTOLIC BLOOD PRESSURE: 186 MMHG | HEART RATE: 72 BPM | HEIGHT: 62 IN | BODY MASS INDEX: 29.66 KG/M2 | OXYGEN SATURATION: 94 %

## 2024-05-29 DIAGNOSIS — C18.0 CECAL CANCER (HCC): ICD-10-CM

## 2024-05-29 DIAGNOSIS — C18.0 CECAL CANCER (HCC): Primary | ICD-10-CM

## 2024-05-29 LAB
ALBUMIN SERPL-MCNC: 4.4 G/DL (ref 3.5–5.2)
ALP SERPL-CCNC: 106 U/L (ref 35–104)
ALT SERPL-CCNC: 13 U/L (ref 0–32)
ANION GAP SERPL CALCULATED.3IONS-SCNC: 15 MMOL/L (ref 7–16)
AST SERPL-CCNC: 17 U/L (ref 0–31)
BASOPHILS # BLD: 0.04 K/UL (ref 0–0.2)
BASOPHILS NFR BLD: 1 % (ref 0–2)
BILIRUB SERPL-MCNC: 0.6 MG/DL (ref 0–1.2)
BUN SERPL-MCNC: 11 MG/DL (ref 6–23)
CALCIUM SERPL-MCNC: 8.8 MG/DL (ref 8.6–10.2)
CEA SERPL-MCNC: 1.4 NG/ML (ref 0–5.2)
CHLORIDE SERPL-SCNC: 100 MMOL/L (ref 98–107)
CO2 SERPL-SCNC: 25 MMOL/L (ref 22–29)
CREAT SERPL-MCNC: 0.6 MG/DL (ref 0.5–1)
EOSINOPHIL # BLD: 0.09 K/UL (ref 0.05–0.5)
EOSINOPHILS RELATIVE PERCENT: 1 % (ref 0–6)
ERYTHROCYTE [DISTWIDTH] IN BLOOD BY AUTOMATED COUNT: 13.5 % (ref 11.5–15)
GFR, ESTIMATED: >90 ML/MIN/1.73M2
GLUCOSE SERPL-MCNC: 101 MG/DL (ref 74–99)
HCT VFR BLD AUTO: 45.6 % (ref 34–48)
HGB BLD-MCNC: 14.7 G/DL (ref 11.5–15.5)
IMM GRANULOCYTES # BLD AUTO: 0.04 K/UL (ref 0–0.58)
IMM GRANULOCYTES NFR BLD: 1 % (ref 0–5)
LYMPHOCYTES NFR BLD: 1.66 K/UL (ref 1.5–4)
LYMPHOCYTES RELATIVE PERCENT: 23 % (ref 20–42)
MCH RBC QN AUTO: 28.5 PG (ref 26–35)
MCHC RBC AUTO-ENTMCNC: 32.2 G/DL (ref 32–34.5)
MCV RBC AUTO: 88.5 FL (ref 80–99.9)
MONOCYTES NFR BLD: 0.64 K/UL (ref 0.1–0.95)
MONOCYTES NFR BLD: 9 % (ref 2–12)
NEUTROPHILS NFR BLD: 67 % (ref 43–80)
NEUTS SEG NFR BLD: 4.87 K/UL (ref 1.8–7.3)
PLATELET # BLD AUTO: 214 K/UL (ref 130–450)
PMV BLD AUTO: 11 FL (ref 7–12)
POTASSIUM SERPL-SCNC: 3.6 MMOL/L (ref 3.5–5)
PROT SERPL-MCNC: 7.9 G/DL (ref 6.4–8.3)
RBC # BLD AUTO: 5.15 M/UL (ref 3.5–5.5)
SODIUM SERPL-SCNC: 140 MMOL/L (ref 132–146)
WBC OTHER # BLD: 7.3 K/UL (ref 4.5–11.5)

## 2024-05-29 PROCEDURE — 36415 COLL VENOUS BLD VENIPUNCTURE: CPT

## 2024-05-29 PROCEDURE — 1036F TOBACCO NON-USER: CPT | Performed by: CLINICAL NURSE SPECIALIST

## 2024-05-29 PROCEDURE — 99212 OFFICE O/P EST SF 10 MIN: CPT

## 2024-05-29 PROCEDURE — 3077F SYST BP >= 140 MM HG: CPT | Performed by: CLINICAL NURSE SPECIALIST

## 2024-05-29 PROCEDURE — 3078F DIAST BP <80 MM HG: CPT | Performed by: CLINICAL NURSE SPECIALIST

## 2024-05-29 PROCEDURE — 1090F PRES/ABSN URINE INCON ASSESS: CPT | Performed by: CLINICAL NURSE SPECIALIST

## 2024-05-29 PROCEDURE — G8427 DOCREV CUR MEDS BY ELIG CLIN: HCPCS | Performed by: CLINICAL NURSE SPECIALIST

## 2024-05-29 PROCEDURE — G8399 PT W/DXA RESULTS DOCUMENT: HCPCS | Performed by: CLINICAL NURSE SPECIALIST

## 2024-05-29 PROCEDURE — G8417 CALC BMI ABV UP PARAM F/U: HCPCS | Performed by: CLINICAL NURSE SPECIALIST

## 2024-05-29 PROCEDURE — 85025 COMPLETE CBC W/AUTO DIFF WBC: CPT

## 2024-05-29 PROCEDURE — 80053 COMPREHEN METABOLIC PANEL: CPT

## 2024-05-29 PROCEDURE — 1123F ACP DISCUSS/DSCN MKR DOCD: CPT | Performed by: CLINICAL NURSE SPECIALIST

## 2024-05-29 PROCEDURE — 82378 CARCINOEMBRYONIC ANTIGEN: CPT

## 2024-05-29 PROCEDURE — 99213 OFFICE O/P EST LOW 20 MIN: CPT | Performed by: CLINICAL NURSE SPECIALIST

## 2024-05-29 RX ORDER — BUSPIRONE HYDROCHLORIDE 5 MG/1
5 TABLET ORAL 2 TIMES DAILY PRN
Qty: 90 TABLET | Refills: 0 | Status: SHIPPED | OUTPATIENT
Start: 2024-05-29

## 2024-05-29 RX ORDER — POTASSIUM CHLORIDE 750 MG/1
TABLET, FILM COATED, EXTENDED RELEASE ORAL
Qty: 270 TABLET | Refills: 0 | Status: SHIPPED | OUTPATIENT
Start: 2024-05-29

## 2024-06-03 DIAGNOSIS — I10 ESSENTIAL HYPERTENSION: ICD-10-CM

## 2024-06-03 DIAGNOSIS — J30.2 SEASONAL ALLERGIES: ICD-10-CM

## 2024-06-03 RX ORDER — HYDROCHLOROTHIAZIDE 25 MG/1
25 TABLET ORAL EVERY MORNING
Qty: 90 TABLET | Refills: 0 | Status: SHIPPED | OUTPATIENT
Start: 2024-06-03

## 2024-06-03 RX ORDER — MONTELUKAST SODIUM 10 MG/1
10 TABLET ORAL DAILY
Qty: 90 TABLET | Refills: 0 | Status: SHIPPED | OUTPATIENT
Start: 2024-06-03

## 2024-06-03 NOTE — TELEPHONE ENCOUNTER
Last seen 2/12/2024  Next appt 8/12/2024    Requested Prescriptions     Pending Prescriptions Disp Refills    hydroCHLOROthiazide (HYDRODIURIL) 25 MG tablet [Pharmacy Med Name: hydroCHLOROthiazide 25 MG Oral Tablet] 90 tablet 0     Sig: TAKE 1 TABLET BY MOUTH ONCE DAILY IN THE MORNING    montelukast (SINGULAIR) 10 MG tablet [Pharmacy Med Name: Montelukast Sodium 10 MG Oral Tablet] 90 tablet 0     Sig: Take 1 tablet by mouth once daily        Electronically signed by LETITIA MARMOLEJO MA on 6/3/24 at 8:59 AM EDT

## 2024-07-01 DIAGNOSIS — I10 ESSENTIAL HYPERTENSION: ICD-10-CM

## 2024-07-01 DIAGNOSIS — E78.2 MIXED HYPERLIPIDEMIA: ICD-10-CM

## 2024-07-01 NOTE — TELEPHONE ENCOUNTER
Last Appointment:  2/12/2024  Future Appointments   Date Time Provider Department Center   7/19/2024 11:00 AM [place] US 1 [facility]Z US/AUS [facility]   8/12/2024 10:00 AM Carola Staley,  [facility] PC [facility]   8/19/2024 10:30 AM Jose M Moreno DO [place] [facility]   9/5/2024  9:15 AM Alex Ahuja MD [facility] ENDO [facility]   12/4/2024 10:00 AM SEYZ MED ONC FAST TRACK 1 SEYZ Med Onc [facility]   12/4/2024 10:15 AM Aracelis Childress APRN MED ONC [facility]

## 2024-07-03 RX ORDER — AMLODIPINE BESYLATE AND ATORVASTATIN CALCIUM 5; 10 MG/1; MG/1
1 TABLET, FILM COATED ORAL DAILY
Qty: 90 TABLET | Refills: 0 | OUTPATIENT
Start: 2024-07-03

## 2024-07-11 DIAGNOSIS — F41.9 ANXIETY: ICD-10-CM

## 2024-07-11 NOTE — TELEPHONE ENCOUNTER
Last seen 2/12/2024  Next appt Visit date not found    Requested Prescriptions     Pending Prescriptions Disp Refills    busPIRone (BUSPAR) 5 MG tablet [Pharmacy Med Name: busPIRone HCl 5 MG Oral Tablet] 90 tablet 0     Sig: Take 1 tablet by mouth twice daily as needed for anxiety        Electronically signed by LETITIA MARMOLEJO MA on 7/11/24 at 8:58 AM EDT

## 2024-07-12 RX ORDER — BUSPIRONE HYDROCHLORIDE 5 MG/1
5 TABLET ORAL 2 TIMES DAILY PRN
Qty: 90 TABLET | Refills: 0 | Status: SHIPPED | OUTPATIENT
Start: 2024-07-12

## 2024-07-19 ENCOUNTER — HOSPITAL ENCOUNTER (OUTPATIENT)
Dept: ULTRASOUND IMAGING | Age: 79
End: 2024-07-19
Attending: OTOLARYNGOLOGY
Payer: MEDICARE

## 2024-07-19 ENCOUNTER — HOSPITAL ENCOUNTER (OUTPATIENT)
Age: 79
Discharge: HOME | End: 2024-07-19
Attending: OTOLARYNGOLOGY
Payer: MEDICARE

## 2024-07-19 DIAGNOSIS — C73 PAPILLARY THYROID CARCINOMA (HCC): ICD-10-CM

## 2024-07-19 DIAGNOSIS — E03.9 HYPOTHYROIDISM, UNSPECIFIED TYPE: ICD-10-CM

## 2024-07-19 DIAGNOSIS — I10 ESSENTIAL HYPERTENSION: ICD-10-CM

## 2024-07-19 LAB
ALBUMIN SERPL-MCNC: 4.4 G/DL (ref 3.5–5.2)
ALP SERPL-CCNC: 106 U/L (ref 35–104)
ALT SERPL-CCNC: 13 U/L (ref 0–32)
ANION GAP SERPL CALCULATED.3IONS-SCNC: 13 MMOL/L (ref 7–16)
AST SERPL-CCNC: 18 U/L (ref 0–31)
BASOPHILS # BLD: 0.03 K/UL (ref 0–0.2)
BASOPHILS NFR BLD: 0 % (ref 0–2)
BILIRUB SERPL-MCNC: 0.6 MG/DL (ref 0–1.2)
BUN SERPL-MCNC: 11 MG/DL (ref 6–23)
CALCIUM SERPL-MCNC: 8.9 MG/DL (ref 8.6–10.2)
CHLORIDE SERPL-SCNC: 97 MMOL/L (ref 98–107)
CO2 SERPL-SCNC: 30 MMOL/L (ref 22–29)
CREAT SERPL-MCNC: 0.7 MG/DL (ref 0.5–1)
EOSINOPHIL # BLD: 0.09 K/UL (ref 0.05–0.5)
EOSINOPHILS RELATIVE PERCENT: 1 % (ref 0–6)
ERYTHROCYTE [DISTWIDTH] IN BLOOD BY AUTOMATED COUNT: 13.3 % (ref 11.5–15)
GFR, ESTIMATED: 88 ML/MIN/1.73M2
GLUCOSE SERPL-MCNC: 97 MG/DL (ref 74–99)
HCT VFR BLD AUTO: 41.5 % (ref 34–48)
HGB BLD-MCNC: 13.9 G/DL (ref 11.5–15.5)
IMM GRANULOCYTES # BLD AUTO: 0.04 K/UL (ref 0–0.58)
IMM GRANULOCYTES NFR BLD: 1 % (ref 0–5)
LYMPHOCYTES NFR BLD: 1.57 K/UL (ref 1.5–4)
LYMPHOCYTES RELATIVE PERCENT: 21 % (ref 20–42)
MCH RBC QN AUTO: 28.8 PG (ref 26–35)
MCHC RBC AUTO-ENTMCNC: 33.5 G/DL (ref 32–34.5)
MCV RBC AUTO: 86.1 FL (ref 80–99.9)
MONOCYTES NFR BLD: 0.59 K/UL (ref 0.1–0.95)
MONOCYTES NFR BLD: 8 % (ref 2–12)
NEUTROPHILS NFR BLD: 69 % (ref 43–80)
NEUTS SEG NFR BLD: 5.11 K/UL (ref 1.8–7.3)
PLATELET # BLD AUTO: 219 K/UL (ref 130–450)
PMV BLD AUTO: 10.7 FL (ref 7–12)
POTASSIUM SERPL-SCNC: 3.4 MMOL/L (ref 3.5–5)
PROT SERPL-MCNC: 7.6 G/DL (ref 6.4–8.3)
RBC # BLD AUTO: 4.82 M/UL (ref 3.5–5.5)
SODIUM SERPL-SCNC: 140 MMOL/L (ref 132–146)
TSH SERPL DL<=0.05 MIU/L-ACNC: 3.79 UIU/ML (ref 0.27–4.2)
TSH SERPL DL<=0.05 MIU/L-ACNC: 3.8 UIU/ML (ref 0.27–4.2)
WBC OTHER # BLD: 7.4 K/UL (ref 4.5–11.5)

## 2024-07-19 PROCEDURE — 36415 COLL VENOUS BLD VENIPUNCTURE: CPT

## 2024-07-19 PROCEDURE — 86800 THYROGLOBULIN ANTIBODY: CPT

## 2024-07-19 PROCEDURE — 85025 COMPLETE CBC W/AUTO DIFF WBC: CPT

## 2024-07-19 PROCEDURE — 84443 ASSAY THYROID STIM HORMONE: CPT

## 2024-07-19 PROCEDURE — 76536 US EXAM OF HEAD AND NECK: CPT

## 2024-07-19 PROCEDURE — 80053 COMPREHEN METABOLIC PANEL: CPT

## 2024-07-22 DIAGNOSIS — I10 ESSENTIAL HYPERTENSION: ICD-10-CM

## 2024-07-22 LAB
THYROGLOB AB SERPL-ACNC: <0.9 IU/ML (ref 0–4)
THYROGLOB SERPL-MCNC: 22.6 NG/ML (ref 1.3–31.8)
THYROGLOB SERPL-MCNC: NORMAL NG/ML (ref 1.3–31.8)

## 2024-07-22 RX ORDER — METOPROLOL SUCCINATE 25 MG/1
TABLET, EXTENDED RELEASE ORAL
Qty: 180 TABLET | Refills: 0 | Status: SHIPPED | OUTPATIENT
Start: 2024-07-22

## 2024-07-22 NOTE — TELEPHONE ENCOUNTER
Last seen 2/12/2024  Next appt Visit date not found    Requested Prescriptions     Pending Prescriptions Disp Refills    metoprolol succinate (TOPROL XL) 25 MG extended release tablet [Pharmacy Med Name: Metoprolol Succinate ER 25 MG Oral Tablet Extended Release 24 Hour] 180 tablet 0     Sig: Take 1 tablet by mouth twice daily        Electronically signed by LETITIA MARMOLEJO MA on 7/22/24 at 8:54 AM EDT

## 2024-07-24 DIAGNOSIS — I10 ESSENTIAL HYPERTENSION: ICD-10-CM

## 2024-07-24 RX ORDER — LISINOPRIL 20 MG/1
20 TABLET ORAL DAILY
Qty: 90 TABLET | Refills: 0 | Status: SHIPPED | OUTPATIENT
Start: 2024-07-24

## 2024-07-24 NOTE — TELEPHONE ENCOUNTER
Last seen 2/12/2024  Next appt Visit date not found    Requested Prescriptions     Pending Prescriptions Disp Refills    lisinopril (PRINIVIL;ZESTRIL) 20 MG tablet 90 tablet 0     Sig: Take 1 tablet by mouth daily        Electronically signed by LETITIA MARMOLEJO MA on 7/24/24 at 1:41 PM EDT

## 2024-07-29 DIAGNOSIS — E03.9 HYPOTHYROIDISM, UNSPECIFIED TYPE: ICD-10-CM

## 2024-07-29 RX ORDER — LEVOTHYROXINE SODIUM 112 UG/1
TABLET ORAL
Qty: 90 TABLET | Refills: 0 | Status: SHIPPED | OUTPATIENT
Start: 2024-07-29

## 2024-08-19 ENCOUNTER — OFFICE VISIT (OUTPATIENT)
Dept: ENT CLINIC | Age: 79
End: 2024-08-19

## 2024-08-19 VITALS
BODY MASS INDEX: 30 KG/M2 | HEART RATE: 80 BPM | HEIGHT: 62 IN | SYSTOLIC BLOOD PRESSURE: 173 MMHG | WEIGHT: 163 LBS | DIASTOLIC BLOOD PRESSURE: 90 MMHG

## 2024-08-19 DIAGNOSIS — C73 PAPILLARY THYROID CARCINOMA (HCC): Primary | ICD-10-CM

## 2024-08-19 NOTE — PROGRESS NOTES
[facility]  Dr. Jose M Moreno, D.O. Ms.Ed        Patient Name:  Mahsa Ceja  :  1945     CHIEF C/O:    Chief Complaint   Patient presents with   • Follow-up      6 mo Papillary Thyroid CA US/Labs (in Epic)  Cerumen -right ear       HISTORY OBTAINED FROM:  patient    HISTORY OF PRESENT ILLNESS:       Mahsa is a 79 y.o. year old female, here today for follow up of:       Patient is here for 6-month follow-up for known history of papillary thyroid carcinoma complicated by history of ultrasound findings for concerning for possible retained thyroid, or recurrent thyroid, or areas of scar tissue.  She is a 1.7 cm area of central lobulated mass anterior trachea not consistently the same size over the past 1 year.  There have been no increasing levels of thyroglobulin activity.  She denies any changes in voice difficulty swallowing or noted neck masses.         Past Medical History:   Diagnosis Date   • Cancer (HCC) 2019    colon   • Glaucoma    • Hyperlipidemia    • Hypertension    • Hypothyroidism      Past Surgical History:   Procedure Laterality Date   • CATARACT REMOVAL WITH IMPLANT Bilateral    • CHOLECYSTECTOMY     • COLONOSCOPY N/A 2019    COLONOSCOPY WITH BIOPSY performed by Sekou Hector MD at [facility] ENDOSCOPY   • COLONOSCOPY N/A 2020    COLONOSCOPY POSS BIOPSY OR POLYPECTOMY performed by Sekou Hector MD at [facility] ENDOSCOPY   • COLONOSCOPY N/A 1/10/2022    COLONOSCOPY performed by Sekou Hector MD at [facility] ENDOSCOPY   • HYSTERECTOMY (CERVIX STATUS UNKNOWN)     • SMALL INTESTINE SURGERY Right 2019    RIGHT HEMICOLECTOMY, LAPAROSCOPIC, ROBOTIC XI ASSISTED performed by Sekou Hector MD at [facility] OR   • THYROIDECTOMY Bilateral 2020    TOTAL THYROIDECTOMY WITH ANTERIOR NECK DISSECTION-NEEDS NERVE INTEGRITY MONITOR performed by Jose M Moreno DO at [facility] OR       Current Outpatient Medications:   •  levothyroxine (SYNTHROID) 112 MCG tablet, Take 1 tablet by

## 2024-08-26 DIAGNOSIS — F41.9 ANXIETY: ICD-10-CM

## 2024-08-26 DIAGNOSIS — I10 ESSENTIAL HYPERTENSION: ICD-10-CM

## 2024-08-26 RX ORDER — BUSPIRONE HYDROCHLORIDE 5 MG/1
5 TABLET ORAL 2 TIMES DAILY PRN
Qty: 90 TABLET | Refills: 0 | Status: SHIPPED | OUTPATIENT
Start: 2024-08-26

## 2024-08-26 RX ORDER — POTASSIUM CHLORIDE 750 MG/1
TABLET, EXTENDED RELEASE ORAL
Qty: 270 TABLET | Refills: 0 | Status: SHIPPED | OUTPATIENT
Start: 2024-08-26

## 2024-08-26 NOTE — TELEPHONE ENCOUNTER
Last seen 2/12/2024  Next appt Visit date not found    Requested Prescriptions     Pending Prescriptions Disp Refills    busPIRone (BUSPAR) 5 MG tablet [Pharmacy Med Name: busPIRone HCl 5 MG Oral Tablet] 90 tablet 0     Sig: Take 1 tablet by mouth twice daily as needed for anxiety    potassium chloride (KLOR-CON) 10 MEQ extended release tablet [Pharmacy Med Name: Potassium Chloride ER 10 MEQ Oral Tablet Extended Release] 270 tablet 0     Sig: TAKE 1 TABLET BY MOUTH THREE TIMES DAILY        Electronically signed by LETITIA MARMOLEJO MA on 8/26/24 at 1:12 PM EDT

## 2024-08-28 ASSESSMENT — ENCOUNTER SYMPTOMS
SHORTNESS OF BREATH: 0
VOMITING: 0
COUGH: 0

## 2024-09-02 DIAGNOSIS — I10 ESSENTIAL HYPERTENSION: ICD-10-CM

## 2024-09-02 DIAGNOSIS — J30.2 SEASONAL ALLERGIES: ICD-10-CM

## 2024-09-03 RX ORDER — MONTELUKAST SODIUM 10 MG/1
10 TABLET ORAL DAILY
Qty: 30 TABLET | Refills: 0 | Status: SHIPPED | OUTPATIENT
Start: 2024-09-03

## 2024-09-03 RX ORDER — HYDROCHLOROTHIAZIDE 25 MG/1
25 TABLET ORAL EVERY MORNING
Qty: 30 TABLET | Refills: 0 | Status: SHIPPED | OUTPATIENT
Start: 2024-09-03

## 2024-09-03 NOTE — TELEPHONE ENCOUNTER
Last Appointment:  2/12/2024  Future Appointments   Date Time Provider Department Center   9/5/2024  9:15 AM Alex Ahuja MD BDM ENDO [facility]   12/4/2024 10:00 AM SEYZ MED ONC FAST TRACK 1 [facility]YZ Med Onc [facility]   12/4/2024 10:15 AM Aracelis Childress APRN MED ONC [facility]   8/18/2025 10:30 AM [facility]

## 2024-09-05 ENCOUNTER — OFFICE VISIT (OUTPATIENT)
Dept: ENDOCRINOLOGY | Age: 79
End: 2024-09-05
Payer: MEDICAID

## 2024-09-05 VITALS
SYSTOLIC BLOOD PRESSURE: 169 MMHG | OXYGEN SATURATION: 99 % | BODY MASS INDEX: 30.36 KG/M2 | HEART RATE: 78 BPM | DIASTOLIC BLOOD PRESSURE: 81 MMHG | HEIGHT: 62 IN | WEIGHT: 165 LBS

## 2024-09-05 DIAGNOSIS — C73 THYROID CANCER (HCC): Primary | ICD-10-CM

## 2024-09-05 DIAGNOSIS — E55.9 VITAMIN D DEFICIENCY: ICD-10-CM

## 2024-09-05 DIAGNOSIS — E27.8 ADRENAL INCIDENTALOMA (HCC): ICD-10-CM

## 2024-09-05 DIAGNOSIS — E03.9 HYPOTHYROIDISM, UNSPECIFIED TYPE: ICD-10-CM

## 2024-09-05 PROCEDURE — 1090F PRES/ABSN URINE INCON ASSESS: CPT | Performed by: INTERNAL MEDICINE

## 2024-09-05 PROCEDURE — G8417 CALC BMI ABV UP PARAM F/U: HCPCS | Performed by: INTERNAL MEDICINE

## 2024-09-05 PROCEDURE — 1036F TOBACCO NON-USER: CPT | Performed by: INTERNAL MEDICINE

## 2024-09-05 PROCEDURE — G8427 DOCREV CUR MEDS BY ELIG CLIN: HCPCS | Performed by: INTERNAL MEDICINE

## 2024-09-05 PROCEDURE — G8399 PT W/DXA RESULTS DOCUMENT: HCPCS | Performed by: INTERNAL MEDICINE

## 2024-09-05 PROCEDURE — 3079F DIAST BP 80-89 MM HG: CPT | Performed by: INTERNAL MEDICINE

## 2024-09-05 PROCEDURE — 1123F ACP DISCUSS/DSCN MKR DOCD: CPT | Performed by: INTERNAL MEDICINE

## 2024-09-05 PROCEDURE — 3077F SYST BP >= 140 MM HG: CPT | Performed by: INTERNAL MEDICINE

## 2024-09-05 PROCEDURE — 99214 OFFICE O/P EST MOD 30 MIN: CPT | Performed by: INTERNAL MEDICINE

## 2024-09-05 NOTE — PROGRESS NOTES
by mouth once daily 90 tablet 0    triamcinolone (KENALOG) 0.025 % cream Apply Topically to right ear QOD for 7-10 days 22 g 0    calcium carbonate 600 MG TABS tablet Take 1 tablet by mouth daily      mupirocin (BACTROBAN) 2 % ointment Apply topically 2 times daily to right ear for 4 weeks 1 g 1    Multiple Vitamins-Minerals (PRESERVISION AREDS PO) Take by mouth 2 times daily      Omega-3 Fatty Acids (FISH OIL) 500 MG CAPS Take 500 mg by mouth daily      Cyanocobalamin (B-12) 2500 MCG TABS Take 1 tablet by mouth three times a week      vitamin D (CHOLECALCIFEROL) 5000 UNITS CAPS capsule Take 1 capsule by mouth daily       No current facility-administered medications for this visit.        Review of Systems   Constitutional: No fever, no chills, no diaphoresis, no generalized weakness.   HEENT: No blurred vision, No sore throat, no ear pain, no hair loss   Neck: denied any neck swelling, difficulty swallowing,   Cadrdiopulomary: No CP, SOB or palpitation, No orthopnea or PND. No cough or wheezing.   GI: No N/V/D, no constipation, No abdominal pain, no melena or hematochezia   : Denied any dysuria, hematuria, flank pain, discharge, or incontinence.   Skin: denied any rash, striae ulcer, Hirsute, or hyperpigmentation.   MSK: denied any joint deformity, joint pain/swelling, muscle pain, or back pain.   Neuro: no numbess, no tingling, no weakness,     OBJECTIVE   BP (!) 169/81   Pulse 78   Ht 1.575 m (5' 2\")   Wt 74.8 kg (165 lb)   LMP  (LMP Unknown)   SpO2 99%   BMI 30.18 kg/m²    BP Readings from Last 4 Encounters:   09/05/24 (!) 169/81   08/19/24 (!) 173/90   05/29/24 (!) 186/73   02/19/24 (!) 176/80      Wt Readings from Last 6 Encounters:   09/05/24 74.8 kg (165 lb)   08/19/24 73.9 kg (163 lb)   05/29/24 73.1 kg (161 lb 3.2 oz)   02/19/24 73.5 kg (162 lb)   02/12/24 73.5 kg (162 lb)   11/27/23 73 kg (160 lb 14.4 oz)        Physical examination:   General: awake alert, oriented x3, no abnormal position or

## 2024-09-11 ENCOUNTER — TELEPHONE (OUTPATIENT)
Dept: ENDOCRINOLOGY | Age: 79
End: 2024-09-11

## 2024-09-11 DIAGNOSIS — C73 THYROID CANCER (HCC): Primary | ICD-10-CM

## 2024-09-19 ENCOUNTER — HOSPITAL ENCOUNTER (OUTPATIENT)
Dept: ULTRASOUND IMAGING | Age: 79
Discharge: HOME | End: 2024-09-19
Payer: MEDICARE

## 2024-09-19 DIAGNOSIS — C73 THYROID CANCER (HCC): ICD-10-CM

## 2024-09-19 PROCEDURE — 10005 FNA BX W/US GDN 1ST LES: CPT

## 2024-09-23 LAB — NON-GYN CYTOLOGY REPORT: NORMAL

## 2024-09-29 ENCOUNTER — TELEPHONE (OUTPATIENT)
Dept: ENDOCRINOLOGY | Age: 79
End: 2024-09-29

## 2024-09-29 NOTE — TELEPHONE ENCOUNTER
Notify patient,  Unfortunately, thyroid biopsy result was not conclusive, we are running further molecular testing. They should be back in 2-3 weeks      Visit note on Cherelle Cummins medical record #[id number]     This is Radha Cummins a 32 years old and has primary generalized epilepsy and has not had seizures for severa years now. She returns for a post delivery visit with the child which is rafy and they're healthy. The child born on February.    Throughout her pregnancy she had escalating doses of lamotrigine with the final dose prior to delivery at 1200 mg per day. Subsequent to that she is reduced  gradual as instructed she is nontender 600 mg per day. At the time of the 800 level was 13.6.     She did not have any seizures during the time of delivery after delivery or even during her pregnancy. She is taking folic acid a day. She takes lamotrigine 400 in the morning and 200 in the evening    The baby has been breast-fed almost since onset she was born and the pediatrician did liver studies of the baby and there were normal findings and also a low level Lamictal on the baby was possibly around 3 according to  Cherelle but she did not send a report.    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    She Is not started birth control pills. It is Important That We Talk about Interaction with bc pills..    Neuro exam-- She looks Very Good. No Signs of Toxicity.   Her Vital Signs Are Normal.    In Summary She Is down to the Dose Prior to Pregnancy of 600 Mg per Day of the Lamictal and we Need to Recheck Her Concentration    She Is Going to Take Birth Control Pills and a GYN Will Be Communicating with Her in the regarding the Changesthat May Occur with the Pill and Lamictal and the Possible Interaction.   Insofar As the Baby Is Her Choice Whether to Gradually Stop the Breast-Feeding to get the Medication of the babys systems.. I Favor that but Obviously  Now It Is No Harm to the Child Especially with Normal Liver Function Tests Reported to Me.     We'll Check a Concentration Today Lamictal on Her. She Needs an EEG as she Has Not Had One since December of Last Year and Will See Her Again in 3 Months.  She Needs to Communicate Better with Me through My Chart As I Was Unaware of Some of This Issues on Her Visit Today.   I Emphasized she Need to Do That   Thank You   Maribell

## 2024-09-30 DIAGNOSIS — E78.2 MIXED HYPERLIPIDEMIA: ICD-10-CM

## 2024-09-30 DIAGNOSIS — I10 ESSENTIAL HYPERTENSION: ICD-10-CM

## 2024-09-30 RX ORDER — AMLODIPINE BESYLATE AND ATORVASTATIN CALCIUM 5; 10 MG/1; MG/1
1 TABLET, FILM COATED ORAL DAILY
Qty: 90 TABLET | Refills: 0 | Status: SHIPPED | OUTPATIENT
Start: 2024-09-30

## 2024-09-30 RX ORDER — HYDROCHLOROTHIAZIDE 25 MG/1
25 TABLET ORAL EVERY MORNING
Qty: 30 TABLET | Refills: 0 | Status: SHIPPED | OUTPATIENT
Start: 2024-09-30

## 2024-09-30 NOTE — TELEPHONE ENCOUNTER
RX REQUEST patient was seen by a np at [facility] and will be returning under your care, patient needs refills. I called kristie and requested refills for the patient to be sent to get her to her appt with you and they state they will send them.

## 2024-10-07 DIAGNOSIS — J30.2 SEASONAL ALLERGIES: ICD-10-CM

## 2024-10-07 RX ORDER — MONTELUKAST SODIUM 10 MG/1
10 TABLET ORAL DAILY
Qty: 30 TABLET | Refills: 0 | Status: SHIPPED | OUTPATIENT
Start: 2024-10-07

## 2024-10-07 NOTE — TELEPHONE ENCOUNTER
Last seen 2/12/2024  Next appt 11/4/2024    Requested Prescriptions     Pending Prescriptions Disp Refills    montelukast (SINGULAIR) 10 MG tablet [Pharmacy Med Name: Montelukast Sodium 10 MG Oral Tablet] 30 tablet 0     Sig: Take 1 tablet by mouth once daily    Electronically signed by LETITIA MARMOLEJO MA on 10/7/24 at 10:31 AM EDT

## 2024-10-09 ENCOUNTER — TELEPHONE (OUTPATIENT)
Dept: ENDOCRINOLOGY | Age: 79
End: 2024-10-09

## 2024-10-09 DIAGNOSIS — C73 THYROID CANCER (HCC): Primary | ICD-10-CM

## 2024-10-09 DIAGNOSIS — E04.2 MULTINODULAR GOITER: ICD-10-CM

## 2024-10-15 DIAGNOSIS — F41.9 ANXIETY: ICD-10-CM

## 2024-10-15 NOTE — TELEPHONE ENCOUNTER
Last Appointment:  2/12/2024  Future Appointments   Date Time Provider Department Center   11/4/2024  9:30 AM Carola Staley DO [facility]   12/4/2024 10:00 AM [facility] ONC FAST TRACK 1 [facility] Med Onc [facility]   12/4/2024 10:15 AM Aracelis Childress APRN MED ONC [facility]   3/10/2025 10:00 AM Alex Ahuja MD BD ENDO [facility]   8/18/2025 10:30 [facility]

## 2024-10-16 RX ORDER — BUSPIRONE HYDROCHLORIDE 5 MG/1
5 TABLET ORAL 2 TIMES DAILY PRN
Qty: 90 TABLET | Refills: 0 | Status: SHIPPED | OUTPATIENT
Start: 2024-10-16

## 2024-10-22 NOTE — TELEPHONE ENCOUNTER
Please add this patient to my schedule in a week or 2 to discuss the recent FNA result and go over the next management plan.    Her result was suspicious and I would like to discuss it in person when her

## 2024-10-23 DIAGNOSIS — I10 ESSENTIAL HYPERTENSION: ICD-10-CM

## 2024-10-23 RX ORDER — LISINOPRIL 20 MG/1
20 TABLET ORAL DAILY
Qty: 90 TABLET | Refills: 0 | Status: SHIPPED | OUTPATIENT
Start: 2024-10-23

## 2024-10-23 RX ORDER — METOPROLOL SUCCINATE 25 MG/1
TABLET, EXTENDED RELEASE ORAL
Qty: 180 TABLET | Refills: 0 | Status: SHIPPED | OUTPATIENT
Start: 2024-10-23

## 2024-10-23 NOTE — TELEPHONE ENCOUNTER
Last Appointment:  2/12/2024  Future Appointments   Date Time Provider Department Center   10/29/2024  1:00 PM Alex Ahuja MD [facility] ENDO [facility]   11/4/2024  9:30 AM Carola Staley DO [facility] ECC DEP   12/4/2024 10:00 AM [facility] MED ONC FAST TRACK 1 [facility] Med Onc [facility]   12/4/2024 10:15 AM Aracelis Childress APRN MED ONC [facility]   3/10/2025 10:00 AM Alex Ahuja MD [facility] ENDO [facility]   8/18/2025 10:30 [facility]

## 2024-10-29 ENCOUNTER — HOSPITAL ENCOUNTER (OUTPATIENT)
Age: 79
Discharge: HOME | End: 2024-10-29
Payer: MEDICARE

## 2024-10-29 ENCOUNTER — OFFICE VISIT (OUTPATIENT)
Dept: ENDOCRINOLOGY | Age: 79
End: 2024-10-29

## 2024-10-29 VITALS — BODY MASS INDEX: 30 KG/M2 | HEIGHT: 62 IN | OXYGEN SATURATION: 99 % | RESPIRATION RATE: 18 BRPM | WEIGHT: 163 LBS

## 2024-10-29 DIAGNOSIS — E03.9 HYPOTHYROIDISM, UNSPECIFIED TYPE: ICD-10-CM

## 2024-10-29 DIAGNOSIS — C73 THYROID CANCER (HCC): ICD-10-CM

## 2024-10-29 DIAGNOSIS — E27.8 ADRENAL INCIDENTALOMA (HCC): ICD-10-CM

## 2024-10-29 DIAGNOSIS — C73 THYROID CANCER (HCC): Primary | ICD-10-CM

## 2024-10-29 PROCEDURE — 86800 THYROGLOBULIN ANTIBODY: CPT

## 2024-10-29 PROCEDURE — 36415 COLL VENOUS BLD VENIPUNCTURE: CPT

## 2024-10-29 PROCEDURE — 84439 ASSAY OF FREE THYROXINE: CPT

## 2024-10-29 PROCEDURE — 84443 ASSAY THYROID STIM HORMONE: CPT

## 2024-10-29 RX ORDER — LEVOTHYROXINE SODIUM 112 UG/1
TABLET ORAL
Qty: 90 TABLET | Refills: 2 | Status: SHIPPED | OUTPATIENT
Start: 2024-10-29

## 2024-10-29 NOTE — PROGRESS NOTES
thyroid.  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  The approximate width of tissue in each block is 4 mm. The overall tumor size cannot be accurately assessed due to the presence of tumor in nonsequential sections but may be considerably larger than what is measured on 1 slide     7/10/2020 - received 105.3 mCi DAN ablation without complications     7/17/2020 - posttherapy scan negative for mets   8/24/220 --> Tg 0.5, Tg Ab negative   2/2021 - Tg-Ab negatives, Tg- 0.4  Thyroid S 8/24/2020 --> negative for recurrence   Most recent thyroid ultrasound July 20, 2024 was negative for any evidence of recurrence    Pt currently on levothyroxine 112 mcg daily. Patient takes levothyroxine in the morning at empty stomach, wait one hour before eating , avoid multivitamins containing calcium  or iron with it.  Lab Results   Component Value Date/Time    TSH 3.79 07/19/2024 11:05 AM    T4FREE 1.77 (H) 06/01/2023 10:07 AM     Mahsa Ceja denies any new lumps, bumps in her neck, change in her voice, or shortness of breath. No family history of thyroid cancer. No prior history of radiation to head or neck region.    PAST MEDICAL HISTORY   Past Medical History:   Diagnosis Date    Cancer (HCC) 07/29/2019    colon    Glaucoma     Hyperlipidemia     Hypertension     Hypothyroidism       PAST SURGICAL HISTORY   Past Surgical History:   Procedure Laterality Date    CATARACT REMOVAL WITH IMPLANT Bilateral     CHOLECYSTECTOMY      COLONOSCOPY N/A 7/29/2019    COLONOSCOPY WITH BIOPSY performed by Sekou Hector MD at [facility]COPY    COLONOSCOPY N/A 9/28/2020

## 2024-10-30 LAB
T4 FREE SERPL-MCNC: 1.8 NG/DL (ref 0.9–1.7)
TSH SERPL DL<=0.05 MIU/L-ACNC: 1.73 UIU/ML (ref 0.27–4.2)

## 2024-11-01 PROBLEM — Z12.31 ENCOUNTER FOR SCREENING MAMMOGRAM FOR HIGH-RISK PATIENT: Status: RESOLVED | Noted: 2021-11-15 | Resolved: 2024-11-01

## 2024-11-01 LAB
THYROGLOB AB SERPL-ACNC: <0.9 IU/ML (ref 0–4)
THYROGLOB SERPL-MCNC: 30.4 NG/ML (ref 1.3–31.8)
THYROGLOB SERPL-MCNC: NORMAL NG/ML (ref 1.3–31.8)

## 2024-11-04 ENCOUNTER — OFFICE VISIT (OUTPATIENT)
Dept: FAMILY MEDICINE CLINIC | Age: 79
End: 2024-11-04

## 2024-11-04 VITALS
BODY MASS INDEX: 30.36 KG/M2 | WEIGHT: 165 LBS | HEIGHT: 62 IN | RESPIRATION RATE: 18 BRPM | TEMPERATURE: 97.6 F | HEART RATE: 75 BPM | DIASTOLIC BLOOD PRESSURE: 82 MMHG | SYSTOLIC BLOOD PRESSURE: 130 MMHG | OXYGEN SATURATION: 96 %

## 2024-11-04 DIAGNOSIS — E78.2 MIXED HYPERLIPIDEMIA: ICD-10-CM

## 2024-11-04 DIAGNOSIS — E27.8 ADRENAL INCIDENTALOMA (HCC): ICD-10-CM

## 2024-11-04 DIAGNOSIS — C73 HURTHLE CELL CARCINOMA OF THYROID (HCC): ICD-10-CM

## 2024-11-04 DIAGNOSIS — F41.9 ANXIETY: ICD-10-CM

## 2024-11-04 DIAGNOSIS — E89.0 S/P COMPLETE THYROIDECTOMY: ICD-10-CM

## 2024-11-04 DIAGNOSIS — I10 ESSENTIAL HYPERTENSION: ICD-10-CM

## 2024-11-04 DIAGNOSIS — Z23 IMMUNIZATION DUE: ICD-10-CM

## 2024-11-04 DIAGNOSIS — C18.2 MALIGNANT NEOPLASM OF ASCENDING COLON (HCC): ICD-10-CM

## 2024-11-04 DIAGNOSIS — J30.2 SEASONAL ALLERGIES: ICD-10-CM

## 2024-11-04 DIAGNOSIS — Z00.00 MEDICARE ANNUAL WELLNESS VISIT, SUBSEQUENT: Primary | ICD-10-CM

## 2024-11-04 DIAGNOSIS — C18.0 CECAL CANCER (HCC): ICD-10-CM

## 2024-11-04 DIAGNOSIS — E89.0 POSTOPERATIVE HYPOTHYROIDISM: ICD-10-CM

## 2024-11-04 RX ORDER — METOPROLOL SUCCINATE 25 MG/1
25 TABLET, EXTENDED RELEASE ORAL 2 TIMES DAILY
Qty: 180 TABLET | Refills: 0 | Status: CANCELLED | OUTPATIENT
Start: 2024-11-04

## 2024-11-04 RX ORDER — HYDROCHLOROTHIAZIDE 25 MG/1
25 TABLET ORAL EVERY MORNING
Qty: 90 TABLET | Refills: 1 | Status: SHIPPED | OUTPATIENT
Start: 2024-11-04

## 2024-11-04 RX ORDER — LISINOPRIL 20 MG/1
20 TABLET ORAL DAILY
Qty: 90 TABLET | Refills: 0 | Status: CANCELLED | OUTPATIENT
Start: 2024-11-04

## 2024-11-04 RX ORDER — AMLODIPINE BESYLATE AND ATORVASTATIN CALCIUM 5; 10 MG/1; MG/1
1 TABLET, FILM COATED ORAL DAILY
Qty: 90 TABLET | Refills: 0 | Status: CANCELLED | OUTPATIENT
Start: 2024-11-04

## 2024-11-04 RX ORDER — POTASSIUM CHLORIDE 750 MG/1
10 TABLET, EXTENDED RELEASE ORAL 3 TIMES DAILY
Qty: 270 TABLET | Refills: 0 | Status: CANCELLED | OUTPATIENT
Start: 2024-11-04

## 2024-11-04 RX ORDER — BUSPIRONE HYDROCHLORIDE 5 MG/1
5 TABLET ORAL 2 TIMES DAILY PRN
Qty: 90 TABLET | Refills: 0 | Status: CANCELLED | OUTPATIENT
Start: 2024-11-04

## 2024-11-04 RX ORDER — MONTELUKAST SODIUM 10 MG/1
10 TABLET ORAL DAILY
Qty: 90 TABLET | Refills: 1 | Status: SHIPPED | OUTPATIENT
Start: 2024-11-04

## 2024-11-04 SDOH — ECONOMIC STABILITY: FOOD INSECURITY: WITHIN THE PAST 12 MONTHS, YOU WORRIED THAT YOUR FOOD WOULD RUN OUT BEFORE YOU GOT MONEY TO BUY MORE.: SOMETIMES TRUE

## 2024-11-04 SDOH — ECONOMIC STABILITY: FOOD INSECURITY: WITHIN THE PAST 12 MONTHS, THE FOOD YOU BOUGHT JUST DIDN'T LAST AND YOU DIDN'T HAVE MONEY TO GET MORE.: NEVER TRUE

## 2024-11-04 SDOH — ECONOMIC STABILITY: INCOME INSECURITY: HOW HARD IS IT FOR YOU TO PAY FOR THE VERY BASICS LIKE FOOD, HOUSING, MEDICAL CARE, AND HEATING?: SOMEWHAT HARD

## 2024-11-04 ASSESSMENT — PATIENT HEALTH QUESTIONNAIRE - PHQ9
1. LITTLE INTEREST OR PLEASURE IN DOING THINGS: NOT AT ALL
SUM OF ALL RESPONSES TO PHQ QUESTIONS 1-9: 0
SUM OF ALL RESPONSES TO PHQ QUESTIONS 1-9: 0
SUM OF ALL RESPONSES TO PHQ9 QUESTIONS 1 & 2: 0
2. FEELING DOWN, DEPRESSED OR HOPELESS: NOT AT ALL
SUM OF ALL RESPONSES TO PHQ QUESTIONS 1-9: 0
SUM OF ALL RESPONSES TO PHQ QUESTIONS 1-9: 0

## 2024-11-06 PROBLEM — Z23 IMMUNIZATION DUE: Status: ACTIVE | Noted: 2024-11-06

## 2024-11-06 PROBLEM — Z00.00 MEDICARE ANNUAL WELLNESS VISIT, SUBSEQUENT: Status: ACTIVE | Noted: 2024-11-06

## 2024-11-06 NOTE — PROGRESS NOTES
exercise at this level?: 10 min    Interventions:  Pt instructed on exercise.     Poor Eating Habits/Diet:  Do you eat balanced/healthy meals regularly?: (!) No    Interventions:  Pt instructed on healthy diet.     Abnormal BMI (obese):  Body mass index is 30.17 kg/m². (!) Abnormal    Interventions:  Patient has morbid obesity. Patient instructed on low calorie, healthy diet.           Dentist Screen:  Have you seen the dentist within the past year?: (!) No    Intervention:  Pt instructed yearly dental exams.     Hearing Screen:  Do you or your family notice any trouble with your hearing that hasn't been managed with hearing aids?: (!) Yes    Interventions:  instructions    Vision Screen:  Do you have difficulty driving, watching TV, or doing any of your daily activities because of your eyesight?: (!) Yes  Have you had an eye exam within the past year?: Yes    Interventions:    Pt instructed on yearly eye exams.                     Objective   Vitals:    11/04/24 0947   BP: 130/82   Site: Left Upper Arm   Position: Sitting   Pulse: 75   Resp: 18   Temp: 97.6 °F (36.4 °C)   TempSrc: Temporal   SpO2: 96%   Weight: 74.8 kg (165 lb)   Height: 1.575 m (5' 2.01\")      Body mass index is 30.17 kg/m².        General Appearance: alert and oriented to person, place and time, well-developed and well-nourished, in no acute distress  Skin: warm and dry, no rash or erythema  Head: normocephalic and atraumatic  Eyes: pupils equal, round, and reactive to light, extraocular eye movements intact, conjunctivae normal  ENT: tympanic membrane, external ear and ear canal normal bilaterally, oropharynx clear and moist with normal mucous membranes  Neck: neck supple and non tender   Pulmonary/Chest: clear to auscultation bilaterally- no wheezes, rales or rhonchi, normal air movement, no respiratory distress  Cardiovascular: normal rate and regular rhythm  Abdomen: soft, non-tender, non-distended, normal bowel sounds, no masses or

## 2024-11-06 NOTE — PATIENT INSTRUCTIONS
products. This includes smoking and vaping. If you need help quitting, talk to your doctor about stop-smoking programs and medicines. These can increase your chances of quitting for good. Quitting is one of the most important things you can do to protect your heart. It is never too late to quit. Try to avoid secondhand smoke too.     Stay at a weight that's healthy for you. Talk to your doctor if you need help losing weight.     Try to get 7 to 9 hours of sleep each night.     Limit alcohol to 2 drinks a day for men and 1 drink a day for women. Too much alcohol can cause health problems.     Manage other health problems such as diabetes, high blood pressure, and high cholesterol. If you think you may have a problem with alcohol or drug use, talk to your doctor.   Medicines    Take your medicines exactly as prescribed. Call your doctor if you think you are having a problem with your medicine.     If your doctor recommends aspirin, take the amount directed each day. Make sure you take aspirin and not another kind of pain reliever, such as acetaminophen (Tylenol).   When should you call for help?   Call 911 if you have symptoms of a heart attack. These may include:    Chest pain or pressure, or a strange feeling in the chest.     Sweating.     Shortness of breath.     Pain, pressure, or a strange feeling in the back, neck, jaw, or upper belly or in one or both shoulders or arms.     Lightheadedness or sudden weakness.     A fast or irregular heartbeat.   After you call 911, the  may tell you to chew 1 adult-strength or 2 to 4 low-dose aspirin. Wait for an ambulance. Do not try to drive yourself.  Watch closely for changes in your health, and be sure to contact your doctor if you have any problems.  Where can you learn more?  Go to https://www.healthwise.net/patientEd and enter F075 to learn more about \"A Healthy Heart: Care Instructions.\"  Current as of: June 24, 2023  Content Version: 14.2  © 2024 [employer]

## 2024-11-19 DIAGNOSIS — C18.0 CECAL CANCER (HCC): Primary | ICD-10-CM

## 2024-11-26 DIAGNOSIS — C18.0 CECAL CANCER (HCC): Primary | ICD-10-CM

## 2024-11-29 DIAGNOSIS — I10 ESSENTIAL HYPERTENSION: ICD-10-CM

## 2024-11-29 DIAGNOSIS — F41.9 ANXIETY: ICD-10-CM

## 2024-11-30 RX ORDER — BUSPIRONE HYDROCHLORIDE 5 MG/1
5 TABLET ORAL 2 TIMES DAILY PRN
Qty: 90 TABLET | Refills: 0 | Status: SHIPPED | OUTPATIENT
Start: 2024-11-30

## 2024-11-30 RX ORDER — POTASSIUM CHLORIDE 750 MG/1
TABLET, EXTENDED RELEASE ORAL
Qty: 270 TABLET | Refills: 0 | Status: SHIPPED | OUTPATIENT
Start: 2024-11-30

## 2024-12-03 DIAGNOSIS — C18.0 CECAL CANCER (HCC): Primary | ICD-10-CM

## 2024-12-04 ENCOUNTER — HOSPITAL ENCOUNTER (OUTPATIENT)
Dept: INFUSION THERAPY | Age: 79
End: 2024-12-04

## 2024-12-06 PROBLEM — Z00.00 MEDICARE ANNUAL WELLNESS VISIT, SUBSEQUENT: Status: RESOLVED | Noted: 2024-11-06 | Resolved: 2024-12-06

## 2024-12-16 ENCOUNTER — HOSPITAL ENCOUNTER (OUTPATIENT)
Dept: CT IMAGING | Age: 79
Discharge: HOME | End: 2024-12-18
Payer: MEDICARE

## 2024-12-16 ENCOUNTER — HOSPITAL ENCOUNTER (OUTPATIENT)
Age: 79
Discharge: HOME | End: 2024-12-16
Payer: MEDICARE

## 2024-12-16 DIAGNOSIS — C18.0 CECAL CANCER (HCC): ICD-10-CM

## 2024-12-16 LAB
ALBUMIN SERPL-MCNC: 4 G/DL (ref 3.5–5.2)
ALP SERPL-CCNC: 98 U/L (ref 35–104)
ALT SERPL-CCNC: 10 U/L (ref 0–32)
ANION GAP SERPL CALCULATED.3IONS-SCNC: 11 MMOL/L (ref 7–16)
AST SERPL-CCNC: 15 U/L (ref 0–31)
BILIRUB SERPL-MCNC: 0.5 MG/DL (ref 0–1.2)
BUN SERPL-MCNC: 12 MG/DL (ref 6–23)
CALCIUM SERPL-MCNC: 8.4 MG/DL (ref 8.6–10.2)
CHLORIDE SERPL-SCNC: 102 MMOL/L (ref 98–107)
CO2 SERPL-SCNC: 30 MMOL/L (ref 22–29)
CREAT SERPL-MCNC: 0.7 MG/DL (ref 0.5–1)
GFR, ESTIMATED: 89 ML/MIN/1.73M2
GLUCOSE SERPL-MCNC: 102 MG/DL (ref 74–99)
POTASSIUM SERPL-SCNC: 3.4 MMOL/L (ref 3.5–5)
PROT SERPL-MCNC: 7.2 G/DL (ref 6.4–8.3)
SODIUM SERPL-SCNC: 143 MMOL/L (ref 132–146)

## 2024-12-16 PROCEDURE — 36415 COLL VENOUS BLD VENIPUNCTURE: CPT

## 2024-12-16 PROCEDURE — 80053 COMPREHEN METABOLIC PANEL: CPT

## 2024-12-16 PROCEDURE — 74177 CT ABD & PELVIS W/CONTRAST: CPT

## 2024-12-16 PROCEDURE — 6360000004 HC RX CONTRAST MEDICATION: Performed by: RADIOLOGY

## 2024-12-16 PROCEDURE — 71260 CT THORAX DX C+: CPT

## 2024-12-16 RX ORDER — IOPAMIDOL 755 MG/ML
75 INJECTION, SOLUTION INTRAVASCULAR
Status: COMPLETED | OUTPATIENT
Start: 2024-12-16 | End: 2024-12-16

## 2024-12-16 RX ORDER — IOPAMIDOL 755 MG/ML
18 INJECTION, SOLUTION INTRAVASCULAR
Status: COMPLETED | OUTPATIENT
Start: 2024-12-16 | End: 2024-12-16

## 2024-12-16 RX ADMIN — IOPAMIDOL 18 ML: 755 INJECTION, SOLUTION INTRAVENOUS at 12:26

## 2024-12-16 RX ADMIN — IOPAMIDOL 75 ML: 755 INJECTION, SOLUTION INTRAVENOUS at 12:27

## 2024-12-23 ENCOUNTER — HOSPITAL ENCOUNTER (OUTPATIENT)
Dept: INFUSION THERAPY | Age: 79
Discharge: HOME | End: 2024-12-23
Payer: MEDICARE

## 2024-12-23 ENCOUNTER — OFFICE VISIT (OUTPATIENT)
Dept: ONCOLOGY | Age: 79
End: 2024-12-23
Payer: MEDICARE

## 2024-12-23 VITALS
HEIGHT: 62 IN | BODY MASS INDEX: 30 KG/M2 | DIASTOLIC BLOOD PRESSURE: 88 MMHG | SYSTOLIC BLOOD PRESSURE: 195 MMHG | WEIGHT: 163 LBS | HEART RATE: 75 BPM | TEMPERATURE: 97.2 F | OXYGEN SATURATION: 94 %

## 2024-12-23 DIAGNOSIS — C18.0 CECAL CANCER (HCC): Primary | ICD-10-CM

## 2024-12-23 DIAGNOSIS — C18.0 CECAL CANCER (HCC): ICD-10-CM

## 2024-12-23 LAB
ALBUMIN SERPL-MCNC: 4.3 G/DL (ref 3.5–5.2)
ALP SERPL-CCNC: 100 U/L (ref 35–104)
ALT SERPL-CCNC: 16 U/L (ref 0–32)
ANION GAP SERPL CALCULATED.3IONS-SCNC: 10 MMOL/L (ref 7–16)
AST SERPL-CCNC: 27 U/L (ref 0–31)
BASOPHILS # BLD: 0.05 K/UL (ref 0–0.2)
BASOPHILS NFR BLD: 1 % (ref 0–2)
BILIRUB SERPL-MCNC: 0.5 MG/DL (ref 0–1.2)
BUN SERPL-MCNC: 13 MG/DL (ref 6–23)
CALCIUM SERPL-MCNC: 8.6 MG/DL (ref 8.6–10.2)
CEA SERPL-MCNC: 1.1 NG/ML (ref 0–5.2)
CHLORIDE SERPL-SCNC: 98 MMOL/L (ref 98–107)
CO2 SERPL-SCNC: 30 MMOL/L (ref 22–29)
CREAT SERPL-MCNC: 0.6 MG/DL (ref 0.5–1)
EOSINOPHIL # BLD: 0.2 K/UL (ref 0.05–0.5)
EOSINOPHILS RELATIVE PERCENT: 3 % (ref 0–6)
ERYTHROCYTE [DISTWIDTH] IN BLOOD BY AUTOMATED COUNT: 13.2 % (ref 11.5–15)
GFR, ESTIMATED: >90 ML/MIN/1.73M2
GLUCOSE SERPL-MCNC: 103 MG/DL (ref 74–99)
HCT VFR BLD AUTO: 42.9 % (ref 34–48)
HGB BLD-MCNC: 14.1 G/DL (ref 11.5–15.5)
IMM GRANULOCYTES # BLD AUTO: <0.03 K/UL (ref 0–0.58)
IMM GRANULOCYTES NFR BLD: 0 % (ref 0–5)
LYMPHOCYTES NFR BLD: 1.46 K/UL (ref 1.5–4)
LYMPHOCYTES RELATIVE PERCENT: 19 % (ref 20–42)
MCH RBC QN AUTO: 29.1 PG (ref 26–35)
MCHC RBC AUTO-ENTMCNC: 32.9 G/DL (ref 32–34.5)
MCV RBC AUTO: 88.5 FL (ref 80–99.9)
MONOCYTES NFR BLD: 0.72 K/UL (ref 0.1–0.95)
MONOCYTES NFR BLD: 9 % (ref 2–12)
NEUTROPHILS NFR BLD: 68 % (ref 43–80)
NEUTS SEG NFR BLD: 5.3 K/UL (ref 1.8–7.3)
PLATELET # BLD AUTO: 243 K/UL (ref 130–450)
PMV BLD AUTO: 11.2 FL (ref 7–12)
POTASSIUM SERPL-SCNC: 4.2 MMOL/L (ref 3.5–5)
PROT SERPL-MCNC: 7.8 G/DL (ref 6.4–8.3)
RBC # BLD AUTO: 4.85 M/UL (ref 3.5–5.5)
SODIUM SERPL-SCNC: 138 MMOL/L (ref 132–146)
WBC OTHER # BLD: 7.8 K/UL (ref 4.5–11.5)

## 2024-12-23 PROCEDURE — 99213 OFFICE O/P EST LOW 20 MIN: CPT

## 2024-12-23 PROCEDURE — 1123F ACP DISCUSS/DSCN MKR DOCD: CPT | Performed by: CLINICAL NURSE SPECIALIST

## 2024-12-23 PROCEDURE — 99213 OFFICE O/P EST LOW 20 MIN: CPT | Performed by: CLINICAL NURSE SPECIALIST

## 2024-12-23 PROCEDURE — 80053 COMPREHEN METABOLIC PANEL: CPT

## 2024-12-23 PROCEDURE — 1159F MED LIST DOCD IN RCRD: CPT | Performed by: CLINICAL NURSE SPECIALIST

## 2024-12-23 PROCEDURE — 85025 COMPLETE CBC W/AUTO DIFF WBC: CPT

## 2024-12-23 PROCEDURE — 36415 COLL VENOUS BLD VENIPUNCTURE: CPT

## 2024-12-23 PROCEDURE — 3079F DIAST BP 80-89 MM HG: CPT | Performed by: CLINICAL NURSE SPECIALIST

## 2024-12-23 PROCEDURE — 82378 CARCINOEMBRYONIC ANTIGEN: CPT

## 2024-12-23 PROCEDURE — 3077F SYST BP >= 140 MM HG: CPT | Performed by: CLINICAL NURSE SPECIALIST

## 2024-12-23 PROCEDURE — 1160F RVW MEDS BY RX/DR IN RCRD: CPT | Performed by: CLINICAL NURSE SPECIALIST

## 2024-12-23 NOTE — PROGRESS NOTES
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 female, never smoker, hx of HTN, hyperlipidemia, hypothyroidism, who presented to see general surgery team for evaluation of anemia and diagnostic colonoscopy.    Colonoscopy on 07/29/2019:  The ileocecal valve was obscured by large cecal fungating mass, 5cm in diameter.   1cm polyp was at the proximal transverse just passed the hepatic flexure  A. Mass, cecum, biopsy: Invasive moderately differentiated  adenocarcinoma.  B. Polyp, hepatic flexure of colon, biopsy: Tubular adenoma.    CT chest 07/31/2019:   Solid pulmonary parenchymal nodule in the left lower lobe measures 12 x 11 mm, and lies just above diaphragm.     CT abdomen/pelvis 07/31/2019:  There is a cecal mural mass on the medial aspect of the cecum and proximal ascending colon with perpendicular diameter measurements of .2 x 5.7 x 2.8 cm. It appears to be confined to the bowel wall.  There is an enlarged right adrenal lesion measuring 2 cm diameter    CEA 3.8 on 08/05/2019.    PET/CT scan 08/15/2019  No FDG avid uptake is identified which exceeds the threshold SUV in the left pulmonary nodule.   Pulmonary team (Dr. Orellana) consult appreciated; repeat CT chest in 3 months.  No uptake in adrenal glands.  Abnormal tracer uptake in the right thyroid which is focal. Endocrinology team consult (Dr. Ahuja) appreciated. Thyroid U/S 08/21/2019 noted thyroid nodules.  Abnormal tracer uptake at the level the cecum which exceeds the threshold SUV.    Right hemicolectomy was performed on 08/29/2019.  Procedure: Right hemicolectomy.  Tumor site: Cecum.  Tumor size: Greatest dimension is 6.5 cm; additional dimensions 4.5 x 1.8 cm.  Macroscopic tumor perforation: Not identified.  Histologic type: Adenocarcinoma  Histologic grade: G2 (moderately differentiated).  Tumor extension:

## 2025-01-13 DIAGNOSIS — I10 ESSENTIAL HYPERTENSION: ICD-10-CM

## 2025-01-13 RX ORDER — LISINOPRIL 20 MG/1
20 TABLET ORAL DAILY
Qty: 90 TABLET | Refills: 0 | Status: SHIPPED | OUTPATIENT
Start: 2025-01-13

## 2025-01-13 NOTE — TELEPHONE ENCOUNTER
Last Appointment:  11/4/2024  Future Appointments   Date Time Provider Department Center   1/29/2025 10:00 AM [place] [facility]   3/10/2025 10:00 AM Alex Ahuja MD BDM ENDO [facility]   5/5/2025 10:30 AM Catterlin, Carola P, DO [facility] E[facility] DEP   8/18/2025 10:30 AM Jose M Moreno, DO [place] ENT [facility]   11/5/2025 10:00 AM Catterlin, Carola P, DO [facility] ECC DEP   11/24/2025  9:00 AM [facility] [place] 1 [facility] CT/AUS [facility]   12/29/2025  9:30 AM [facility] MED ONC FAST TRACK 1 [facility] Med Onc . [facility]   12/29/2025  9:45 AM Aracelis Childress APRN MED ONC [facility]

## 2025-01-21 DIAGNOSIS — I10 ESSENTIAL HYPERTENSION: ICD-10-CM

## 2025-01-21 DIAGNOSIS — F41.9 ANXIETY: ICD-10-CM

## 2025-01-21 NOTE — TELEPHONE ENCOUNTER
Last seen 11/4/2024  Next appt 5/5/2025    Requested Prescriptions     Pending Prescriptions Disp Refills    busPIRone (BUSPAR) 5 MG tablet [Pharmacy Med Name: busPIRone HCl 5 MG Oral Tablet] 90 tablet 0     Sig: Take 1 tablet by mouth twice daily as needed for anxiety    metoprolol succinate (TOPROL XL) 25 MG extended release tablet [Pharmacy Med Name: Metoprolol Succinate ER 25 MG Oral Tablet Extended Release 24 Hour] 180 tablet 0     Sig: Take 1 tablet by mouth twice daily    Electronically signed by LETITIA MARMOLEJO MA on 1/21/25 at 9:42 AM EST

## 2025-01-22 RX ORDER — BUSPIRONE HYDROCHLORIDE 5 MG/1
5 TABLET ORAL 2 TIMES DAILY PRN
Qty: 90 TABLET | Refills: 0 | Status: SHIPPED | OUTPATIENT
Start: 2025-01-22

## 2025-01-22 RX ORDER — METOPROLOL SUCCINATE 25 MG/1
TABLET, EXTENDED RELEASE ORAL
Qty: 180 TABLET | Refills: 0 | Status: SHIPPED | OUTPATIENT
Start: 2025-01-22

## 2025-01-29 ENCOUNTER — HOSPITAL ENCOUNTER (OUTPATIENT)
Dept: ULTRASOUND IMAGING | Age: 80
Discharge: HOME | End: 2025-01-31
Payer: MEDICARE

## 2025-01-29 DIAGNOSIS — E04.2 MULTINODULAR GOITER: ICD-10-CM

## 2025-01-29 DIAGNOSIS — C73 THYROID CANCER (HCC): ICD-10-CM

## 2025-01-29 PROCEDURE — 76536 US EXAM OF HEAD AND NECK: CPT

## 2025-02-06 ENCOUNTER — HOSPITAL ENCOUNTER (OUTPATIENT)
Dept: MAMMOGRAPHY | Age: 80
Discharge: HOME | End: 2025-02-08
Payer: MEDICARE

## 2025-02-06 VITALS — BODY MASS INDEX: 28.35 KG/M2 | WEIGHT: 160 LBS | HEIGHT: 63 IN

## 2025-02-06 DIAGNOSIS — Z12.31 ENCOUNTER FOR SCREENING MAMMOGRAM FOR MALIGNANT NEOPLASM OF BREAST: ICD-10-CM

## 2025-02-06 PROCEDURE — 77063 BREAST TOMOSYNTHESIS BI: CPT

## 2025-02-12 ENCOUNTER — CLINICAL DOCUMENTATION (OUTPATIENT)
Dept: GENERAL RADIOLOGY | Age: 80
End: 2025-02-12

## 2025-02-12 NOTE — PROGRESS NOTES
Patient viewed clinical report for mammogram on My Chart.  Report sent to Dr. Staley, per patient request on Authorization to Release the results of a mammogram form.

## 2025-03-03 DIAGNOSIS — I10 ESSENTIAL HYPERTENSION: ICD-10-CM

## 2025-03-03 RX ORDER — POTASSIUM CHLORIDE 750 MG/1
TABLET, EXTENDED RELEASE ORAL
Qty: 270 TABLET | Refills: 0 | Status: SHIPPED | OUTPATIENT
Start: 2025-03-03

## 2025-03-03 NOTE — TELEPHONE ENCOUNTER
Last seen 11/4/2024  Next appt 5/5/2025    Requested Prescriptions     Pending Prescriptions Disp Refills    potassium chloride (KLOR-CON) 10 MEQ extended release tablet [Pharmacy Med Name: Potassium Chloride ER 10 MEQ Oral Tablet Extended Release] 270 tablet 0     Sig: TAKE 1 TABLET BY MOUTH THREE TIMES DAILY    Electronically signed by LETITIA MARMOLEJO MA on 3/3/25 at 8:06 AM EST

## 2025-03-10 ENCOUNTER — OFFICE VISIT (OUTPATIENT)
Dept: ENDOCRINOLOGY | Age: 80
End: 2025-03-10
Payer: MEDICARE

## 2025-03-10 VITALS
HEART RATE: 74 BPM | HEIGHT: 62 IN | TEMPERATURE: 97.7 F | DIASTOLIC BLOOD PRESSURE: 82 MMHG | RESPIRATION RATE: 18 BRPM | WEIGHT: 162 LBS | SYSTOLIC BLOOD PRESSURE: 189 MMHG | OXYGEN SATURATION: 99 % | BODY MASS INDEX: 29.81 KG/M2

## 2025-03-10 DIAGNOSIS — E03.9 HYPOTHYROIDISM, UNSPECIFIED TYPE: ICD-10-CM

## 2025-03-10 DIAGNOSIS — E27.8 ADRENAL INCIDENTALOMA: ICD-10-CM

## 2025-03-10 DIAGNOSIS — E55.9 VITAMIN D DEFICIENCY: ICD-10-CM

## 2025-03-10 DIAGNOSIS — C73 THYROID CANCER (HCC): Primary | ICD-10-CM

## 2025-03-10 DIAGNOSIS — F41.9 ANXIETY: ICD-10-CM

## 2025-03-10 PROCEDURE — 1123F ACP DISCUSS/DSCN MKR DOCD: CPT | Performed by: INTERNAL MEDICINE

## 2025-03-10 PROCEDURE — 1159F MED LIST DOCD IN RCRD: CPT | Performed by: INTERNAL MEDICINE

## 2025-03-10 PROCEDURE — 99214 OFFICE O/P EST MOD 30 MIN: CPT | Performed by: INTERNAL MEDICINE

## 2025-03-10 PROCEDURE — G2211 COMPLEX E/M VISIT ADD ON: HCPCS | Performed by: INTERNAL MEDICINE

## 2025-03-10 PROCEDURE — 3079F DIAST BP 80-89 MM HG: CPT | Performed by: INTERNAL MEDICINE

## 2025-03-10 PROCEDURE — 3077F SYST BP >= 140 MM HG: CPT | Performed by: INTERNAL MEDICINE

## 2025-03-10 RX ORDER — BUSPIRONE HYDROCHLORIDE 5 MG/1
5 TABLET ORAL 2 TIMES DAILY PRN
Qty: 90 TABLET | Refills: 0 | Status: SHIPPED | OUTPATIENT
Start: 2025-03-10

## 2025-03-10 NOTE — TELEPHONE ENCOUNTER
Last seen 11/4/2024  Next appt 5/5/2025    Requested Prescriptions     Pending Prescriptions Disp Refills    busPIRone (BUSPAR) 5 MG tablet [Pharmacy Med Name: busPIRone HCl 5 MG Oral Tablet] 90 tablet 0     Sig: Take 1 tablet by mouth twice daily as needed for anxiety    Electronically signed by LETITIA MARMOLEJO MA on 3/10/25 at 9:12 AM EDT

## 2025-03-10 NOTE — PROGRESS NOTES
thyroid.  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  The approximate width of tissue in each block is 4 mm. The overall tumor size cannot be accurately assessed due to the presence of tumor in nonsequential sections but may be considerably larger than what is measured on 1 slide     7/10/2020 - received 105.3 mCi DAN ablation without complications     7/17/2020 - posttherapy scan negative for mets   8/24/220 --> Tg 0.5, Tg Ab negative   2/2021 - Tg-Ab negatives, Tg- 0.4  Thyroid S 8/24/2020 --> negative for recurrence   Most recent thyroid ultrasound July 20, 2024 was negative for any evidence of recurrence    Thyroid ultrasound in July 2024 showed 1.7 cm lobular hypoechoic area in the left thyroid bed region.  This lesion has increased vascularity.    FNA to this 1.7 cm hypoechoic lesion was done in September 2024 and the result was inconclusive for malignancy.  Follicular neoplasm, Hurthle cell oncocytic cell type.    Afirma testing was suspicious for malignancy    Thyroglobulin level in October 29, 2024 was 30.4 up from 22.6 up from 12.6  Component      Latest Ref Rng 7/19/2024 10/29/2024   Thyroglobulin Ab      0.0 - 4.0 IU/mL <0.9  <0.9    Thyroglobulin      1.3 - 31.8 ng/mL 22.6  30.4        Pt currently on levothyroxine 112 mcg daily. Patient takes levothyroxine in the morning at empty stomach, wait one hour before eating , avoid multivitamins containing calcium  or iron with it.  Lab Results   Component Value Date/Time    TSH 1.73 10/29/2024 03:16 PM    T4FREE 1.8 (H) 10/29/2024 03:16 PM     Mahsa Ceja denies any new lumps, bumps in her neck,

## 2025-03-26 DIAGNOSIS — I10 ESSENTIAL HYPERTENSION: ICD-10-CM

## 2025-03-26 DIAGNOSIS — E78.2 MIXED HYPERLIPIDEMIA: ICD-10-CM

## 2025-03-26 RX ORDER — AMLODIPINE BESYLATE AND ATORVASTATIN CALCIUM 5; 10 MG/1; MG/1
1 TABLET, FILM COATED ORAL DAILY
Qty: 90 TABLET | Refills: 1 | Status: SHIPPED | OUTPATIENT
Start: 2025-03-26

## 2025-03-26 NOTE — TELEPHONE ENCOUNTER
Last Appointment:  11/4/2024  Future Appointments   Date Time Provider Department Center   3/28/2025  8:30 AM SE PET ROOM [facility]   5/5/2025 10:30 AM Carola Staley, DO [facility] ECC DEP   8/5/2025 10:45 AM Alex Ahuja MD BDM ENDO [facility]   8/18/2025 10:30 AM Jose M Moreno DO [place] [facility]   11/5/2025 10:00 AM Carola Staley, DO [facility] ECC DEP   11/24/2025  9:00 AM [facility] [place] 1 [facility] CT/AUS [facility]   12/29/2025  9:30 AM [facility] MED ONC FAST TRACK 1 [facility] Med Onc [facility]   12/29/2025  9:45 AM Aracelis Childress APRN MED ONC [facility]

## 2025-03-28 ENCOUNTER — HOSPITAL ENCOUNTER (OUTPATIENT)
Dept: NUCLEAR MEDICINE | Age: 80
Discharge: HOME | End: 2025-03-30
Payer: MEDICARE

## 2025-03-28 DIAGNOSIS — C73 THYROID CANCER (HCC): ICD-10-CM

## 2025-03-28 PROCEDURE — 3430000000 HC RX DIAGNOSTIC RADIOPHARMACEUTICAL: Performed by: RADIOLOGY

## 2025-03-28 PROCEDURE — 78815 PET IMAGE W/CT SKULL-THIGH: CPT

## 2025-03-28 PROCEDURE — A9609 HC RX DIAGNOSTIC RADIOPHARMACEUTICAL: HCPCS | Performed by: RADIOLOGY

## 2025-03-28 RX ORDER — FLUDEOXYGLUCOSE F 18 200 MCI/ML
12.2 INJECTION, SOLUTION INTRAVENOUS ONCE
Status: COMPLETED | OUTPATIENT
Start: 2025-03-28 | End: 2025-03-28

## 2025-03-28 RX ADMIN — FLUDEOXYGLUCOSE F 18 12.2 MILLICURIE: 200 INJECTION, SOLUTION INTRAVENOUS at 09:04

## 2025-03-31 ENCOUNTER — RESULTS FOLLOW-UP (OUTPATIENT)
Dept: ENDOCRINOLOGY | Age: 80
End: 2025-03-31

## 2025-03-31 NOTE — TELEPHONE ENCOUNTER
Notify patient  Excellent, the PET/CT scan was negative for any abnormal activities.  Will discuss this in details at your next office visit

## 2025-04-21 DIAGNOSIS — I10 ESSENTIAL HYPERTENSION: ICD-10-CM

## 2025-04-21 RX ORDER — LISINOPRIL 20 MG/1
20 TABLET ORAL DAILY
Qty: 90 TABLET | Refills: 1 | Status: SHIPPED | OUTPATIENT
Start: 2025-04-21

## 2025-04-21 NOTE — TELEPHONE ENCOUNTER
Last Appointment:  11/4/2024  Future Appointments   Date Time Provider Department Center   5/5/2025 10:30 AM Carola Staley, DO [facility] ECC DEP   8/5/2025 10:45 AM Alex Ahuja MD BD ENDO [facility]   8/18/2025 10:30 AM Jose M Moreno DO [place] [facility]   11/5/2025 10:00 AM Carola Staley,  [facility] ECC DEP   11/24/2025  9:00 AM [facility] [place] 1 [facility] CT/AUS [facility]   12/29/2025  9:30 AM [facility] MED ONC FAST TRACK 1 [facility] Med Onc [facility]   12/29/2025  9:45 AM Aracelis Childress APRN MED ONC [facility]

## 2025-04-23 DIAGNOSIS — F41.9 ANXIETY: ICD-10-CM

## 2025-04-23 DIAGNOSIS — I10 ESSENTIAL HYPERTENSION: ICD-10-CM

## 2025-04-23 RX ORDER — BUSPIRONE HYDROCHLORIDE 5 MG/1
5 TABLET ORAL 2 TIMES DAILY PRN
Qty: 90 TABLET | Refills: 0 | Status: SHIPPED | OUTPATIENT
Start: 2025-04-23

## 2025-04-23 RX ORDER — METOPROLOL SUCCINATE 25 MG/1
25 TABLET, EXTENDED RELEASE ORAL 2 TIMES DAILY
Qty: 180 TABLET | Refills: 0 | Status: SHIPPED | OUTPATIENT
Start: 2025-04-23

## 2025-04-23 NOTE — TELEPHONE ENCOUNTER
Last Appointment:  11/4/2024  Future Appointments   Date Time Provider Department Center   5/5/2025 10:30 AM Carola Staley, DO [facility] ECC DEP   8/5/2025 10:45 AM Alex Ahuja MD BD ENDO [facility]   8/18/2025 10:30 AM Jose M Moreno DO [place] [facility]   11/5/2025 10:00 AM Carola Staley,  [facility] ECC DEP   11/24/2025  9:00 AM [facility] [place] 1 [facility] CT/AUS [facility]   12/29/2025  9:30 AM [facility] MED ONC FAST TRACK 1 [facility] Med Onc [facility]   12/29/2025  9:45 AM Aracelis Childress APRN MED ONC [facility]

## 2025-05-05 ENCOUNTER — OFFICE VISIT (OUTPATIENT)
Dept: FAMILY MEDICINE CLINIC | Age: 80
End: 2025-05-05

## 2025-05-05 VITALS
BODY MASS INDEX: 30 KG/M2 | TEMPERATURE: 97 F | OXYGEN SATURATION: 95 % | DIASTOLIC BLOOD PRESSURE: 84 MMHG | HEIGHT: 62 IN | RESPIRATION RATE: 18 BRPM | HEART RATE: 64 BPM | WEIGHT: 163 LBS | SYSTOLIC BLOOD PRESSURE: 134 MMHG

## 2025-05-05 DIAGNOSIS — C18.2 MALIGNANT NEOPLASM OF ASCENDING COLON (HCC): ICD-10-CM

## 2025-05-05 DIAGNOSIS — I10 ESSENTIAL HYPERTENSION: ICD-10-CM

## 2025-05-05 DIAGNOSIS — J30.2 SEASONAL ALLERGIES: ICD-10-CM

## 2025-05-05 DIAGNOSIS — R73.01 IFG (IMPAIRED FASTING GLUCOSE): Primary | ICD-10-CM

## 2025-05-05 DIAGNOSIS — C18.0 CECAL CANCER (HCC): ICD-10-CM

## 2025-05-05 DIAGNOSIS — C73 HURTHLE CELL CARCINOMA OF THYROID (HCC): ICD-10-CM

## 2025-05-05 DIAGNOSIS — I10 PRIMARY HYPERTENSION: ICD-10-CM

## 2025-05-05 LAB — HBA1C MFR BLD: 5.9 %

## 2025-05-05 RX ORDER — MONTELUKAST SODIUM 10 MG/1
10 TABLET ORAL DAILY
Qty: 90 TABLET | Refills: 0 | Status: SHIPPED | OUTPATIENT
Start: 2025-05-05

## 2025-05-05 RX ORDER — HYDROCHLOROTHIAZIDE 25 MG/1
25 TABLET ORAL EVERY MORNING
Qty: 90 TABLET | Refills: 0 | Status: SHIPPED | OUTPATIENT
Start: 2025-05-05

## 2025-05-05 SDOH — ECONOMIC STABILITY: FOOD INSECURITY: WITHIN THE PAST 12 MONTHS, THE FOOD YOU BOUGHT JUST DIDN'T LAST AND YOU DIDN'T HAVE MONEY TO GET MORE.: NEVER TRUE

## 2025-05-05 SDOH — ECONOMIC STABILITY: FOOD INSECURITY: WITHIN THE PAST 12 MONTHS, YOU WORRIED THAT YOUR FOOD WOULD RUN OUT BEFORE YOU GOT MONEY TO BUY MORE.: NEVER TRUE

## 2025-05-05 ASSESSMENT — PATIENT HEALTH QUESTIONNAIRE - PHQ9
SUM OF ALL RESPONSES TO PHQ QUESTIONS 1-9: 0
SUM OF ALL RESPONSES TO PHQ QUESTIONS 1-9: 0
1. LITTLE INTEREST OR PLEASURE IN DOING THINGS: NOT AT ALL
2. FEELING DOWN, DEPRESSED OR HOPELESS: NOT AT ALL
SUM OF ALL RESPONSES TO PHQ QUESTIONS 1-9: 0
SUM OF ALL RESPONSES TO PHQ QUESTIONS 1-9: 0

## 2025-05-09 PROBLEM — I10 PRIMARY HYPERTENSION: Status: ACTIVE | Noted: 2025-05-09

## 2025-05-09 ASSESSMENT — ENCOUNTER SYMPTOMS
SORE THROAT: 0
CHOKING: 0
CHEST TIGHTNESS: 0
FACIAL SWELLING: 0
PHOTOPHOBIA: 0
VOICE CHANGE: 0
BACK PAIN: 0
DIARRHEA: 0
STRIDOR: 0
EYE PAIN: 0
SINUS PAIN: 0
ABDOMINAL PAIN: 0
NAUSEA: 0
COUGH: 0
BLOOD IN STOOL: 0
EYES NEGATIVE: 1
EYE REDNESS: 0
WHEEZING: 0
TROUBLE SWALLOWING: 0
EYE DISCHARGE: 0
RHINORRHEA: 0
RESPIRATORY NEGATIVE: 1
VOMITING: 0
COLOR CHANGE: 0
RECTAL PAIN: 0
CONSTIPATION: 0
SINUS PRESSURE: 0
EYE ITCHING: 0
ALLERGIC/IMMUNOLOGIC NEGATIVE: 1
SHORTNESS OF BREATH: 0
ABDOMINAL DISTENTION: 0
ANAL BLEEDING: 0

## 2025-05-09 NOTE — PROGRESS NOTES
SUBJECTIVE  Mahsa Ceja is a 79 y.o. female.    HPI/Chief C/O:  Chief Complaint   Patient presents with    Hypertension     Pt here for a 6 month check.    Pt states she has been feeling pretty well.    Pt just had a PET scan done in March.     Allergies   Allergen Reactions    Prevnar 13 [Pneumococcal 13-Kassidy Conj Vacc] Swelling     Redness and swelling in arm at site     This 79 year old female presents for physical exam. Pt has hypertension, hyperlipidemia, cecal cancer (HCC), and hurthle cell carcinoma of thyroid. A1C is 5.9. Pt denies chest pain and denies shortness of breath.     ROS:  Review of Systems   Constitutional:  Positive for activity change and fatigue. Negative for appetite change, chills, diaphoresis, fever and unexpected weight change.   HENT:  Negative for congestion, dental problem, drooling, ear discharge, ear pain, facial swelling, hearing loss, mouth sores, nosebleeds, postnasal drip, rhinorrhea, sinus pressure, sinus pain, sneezing, sore throat, tinnitus, trouble swallowing and voice change.    Eyes: Negative.  Negative for photophobia, pain, discharge, redness, itching and visual disturbance.   Respiratory: Negative.  Negative for cough, choking, chest tightness, shortness of breath, wheezing and stridor.    Cardiovascular:  Negative for chest pain, palpitations and leg swelling.   Gastrointestinal:  Negative for abdominal distention, abdominal pain, anal bleeding, blood in stool, constipation, diarrhea, nausea, rectal pain and vomiting.   Endocrine: Negative.  Negative for cold intolerance, heat intolerance, polydipsia, polyphagia and polyuria.   Genitourinary: Negative.  Negative for decreased urine volume, difficulty urinating, dysuria, flank pain, frequency, genital sores, hematuria, menstrual problem, pelvic pain and urgency.   Musculoskeletal:  Positive for arthralgias and myalgias. Negative for back pain, gait problem, joint swelling, neck pain and neck stiffness.   Skin:

## 2025-06-02 DIAGNOSIS — I10 ESSENTIAL HYPERTENSION: ICD-10-CM

## 2025-06-02 RX ORDER — POTASSIUM CHLORIDE 750 MG/1
10 TABLET, EXTENDED RELEASE ORAL 3 TIMES DAILY
Qty: 270 TABLET | Refills: 1 | Status: SHIPPED | OUTPATIENT
Start: 2025-06-02

## 2025-06-02 NOTE — TELEPHONE ENCOUNTER
Last Appointment:  5/5/2025  Future Appointments   Date Time Provider Department Center   8/5/2025 10:45 AM Alex Ahuja MD [facility] ENDO [facility]   8/18/2025 10:30 AM Jose M Moreno DO [place] [facility]   11/5/2025  1:30 PM Carola Staley DO [facility] ECC [facility]   11/24/2025  9:00 AM [facility] CT 1 SEYZ CT/AUS [facility]   12/29/2025  9:30 AM [facility] MED ONC FAST TRACK 1 SE[facility]Z Med Onc [facility]   12/29/2025  9:45 AM Aracelis Childress APRN MED ONC [facility]

## 2025-06-09 DIAGNOSIS — F41.9 ANXIETY: ICD-10-CM

## 2025-06-09 RX ORDER — BUSPIRONE HYDROCHLORIDE 5 MG/1
5 TABLET ORAL 2 TIMES DAILY PRN
Qty: 90 TABLET | Refills: 1 | Status: SHIPPED | OUTPATIENT
Start: 2025-06-09

## 2025-07-16 DIAGNOSIS — I10 ESSENTIAL HYPERTENSION: ICD-10-CM

## 2025-07-16 RX ORDER — METOPROLOL SUCCINATE 25 MG/1
25 TABLET, EXTENDED RELEASE ORAL 2 TIMES DAILY
Qty: 180 TABLET | Refills: 0 | Status: SHIPPED | OUTPATIENT
Start: 2025-07-16

## 2025-07-16 NOTE — TELEPHONE ENCOUNTER
Last seen 5/5/2025  Next appt 11/5/2025    Requested Prescriptions     Pending Prescriptions Disp Refills    metoprolol succinate (TOPROL XL) 25 MG extended release tablet [Pharmacy Med Name: Metoprolol Succinate ER 25 MG Oral Tablet Extended Release 24 Hour] 180 tablet 0     Sig: Take 1 tablet by mouth twice daily    Electronically signed by LETITIA MARMOLEJO MA on 7/16/25 at 12:53 PM EDT

## 2025-07-21 DIAGNOSIS — E03.9 HYPOTHYROIDISM, UNSPECIFIED TYPE: ICD-10-CM

## 2025-07-21 RX ORDER — LEVOTHYROXINE SODIUM 112 UG/1
112 TABLET ORAL DAILY
Qty: 90 TABLET | Refills: 0 | Status: SHIPPED | OUTPATIENT
Start: 2025-07-21

## 2025-07-28 DIAGNOSIS — J30.2 SEASONAL ALLERGIES: ICD-10-CM

## 2025-07-28 RX ORDER — MONTELUKAST SODIUM 10 MG/1
10 TABLET ORAL DAILY
Qty: 90 TABLET | Refills: 0 | Status: SHIPPED | OUTPATIENT
Start: 2025-07-28

## 2025-07-28 NOTE — TELEPHONE ENCOUNTER
Last seen 5/5/2025  Next appt 11/5/2025    Requested Prescriptions     Pending Prescriptions Disp Refills    montelukast (SINGULAIR) 10 MG tablet [Pharmacy Med Name: Montelukast Sodium 10 MG Oral Tablet] 90 tablet 0     Sig: Take 1 tablet by mouth once daily    Electronically signed by LETITIA MARMOLEJO MA on 7/28/25 at 8:36 AM EDT

## 2025-08-05 ENCOUNTER — OFFICE VISIT (OUTPATIENT)
Dept: ENDOCRINOLOGY | Age: 80
End: 2025-08-05
Payer: MEDICARE

## 2025-08-05 VITALS
RESPIRATION RATE: 18 BRPM | BODY MASS INDEX: 29.44 KG/M2 | WEIGHT: 160 LBS | DIASTOLIC BLOOD PRESSURE: 70 MMHG | HEIGHT: 62 IN | HEART RATE: 76 BPM | TEMPERATURE: 97.7 F | SYSTOLIC BLOOD PRESSURE: 174 MMHG | OXYGEN SATURATION: 94 %

## 2025-08-05 DIAGNOSIS — E27.8 ADRENAL INCIDENTALOMA: ICD-10-CM

## 2025-08-05 DIAGNOSIS — C73 THYROID CANCER (HCC): ICD-10-CM

## 2025-08-05 DIAGNOSIS — E03.9 HYPOTHYROIDISM, UNSPECIFIED TYPE: Primary | ICD-10-CM

## 2025-08-05 DIAGNOSIS — E55.9 VITAMIN D DEFICIENCY: ICD-10-CM

## 2025-08-05 PROCEDURE — 3078F DIAST BP <80 MM HG: CPT | Performed by: INTERNAL MEDICINE

## 2025-08-05 PROCEDURE — 1123F ACP DISCUSS/DSCN MKR DOCD: CPT | Performed by: INTERNAL MEDICINE

## 2025-08-05 PROCEDURE — 3077F SYST BP >= 140 MM HG: CPT | Performed by: INTERNAL MEDICINE

## 2025-08-05 PROCEDURE — 99214 OFFICE O/P EST MOD 30 MIN: CPT | Performed by: INTERNAL MEDICINE

## 2025-08-05 PROCEDURE — 1159F MED LIST DOCD IN RCRD: CPT | Performed by: INTERNAL MEDICINE

## 2025-08-05 PROCEDURE — G2211 COMPLEX E/M VISIT ADD ON: HCPCS | Performed by: INTERNAL MEDICINE

## 2025-08-09 DIAGNOSIS — I10 ESSENTIAL HYPERTENSION: ICD-10-CM

## 2025-08-11 RX ORDER — HYDROCHLOROTHIAZIDE 25 MG/1
25 TABLET ORAL EVERY MORNING
Qty: 90 TABLET | Refills: 0 | Status: SHIPPED | OUTPATIENT
Start: 2025-08-11

## 2025-08-18 ENCOUNTER — HOSPITAL ENCOUNTER (OUTPATIENT)
Dept: LAB | Age: 80
Discharge: HOME | End: 2025-08-18
Payer: MEDICARE

## 2025-08-18 ENCOUNTER — OFFICE VISIT (OUTPATIENT)
Dept: ENT CLINIC | Age: 80
End: 2025-08-18
Payer: MEDICARE

## 2025-08-18 VITALS
SYSTOLIC BLOOD PRESSURE: 182 MMHG | DIASTOLIC BLOOD PRESSURE: 80 MMHG | TEMPERATURE: 98 F | OXYGEN SATURATION: 98 % | BODY MASS INDEX: 29.44 KG/M2 | HEIGHT: 62 IN | WEIGHT: 160 LBS | HEART RATE: 87 BPM

## 2025-08-18 DIAGNOSIS — C73 PAPILLARY THYROID CARCINOMA (HCC): Primary | ICD-10-CM

## 2025-08-18 DIAGNOSIS — H61.23 BILATERAL IMPACTED CERUMEN: ICD-10-CM

## 2025-08-18 DIAGNOSIS — C73 PAPILLARY THYROID CARCINOMA (HCC): ICD-10-CM

## 2025-08-18 DIAGNOSIS — H61.001 CHONDRODERMATITIS NODULARIS HELICIS OF RIGHT EAR: ICD-10-CM

## 2025-08-18 LAB
T4 FREE SERPL-MCNC: 1.7 NG/DL (ref 0.9–1.7)
TSH SERPL DL<=0.05 MIU/L-ACNC: 0.89 UIU/ML (ref 0.27–4.2)

## 2025-08-18 PROCEDURE — 69210 REMOVE IMPACTED EAR WAX UNI: CPT | Performed by: OTOLARYNGOLOGY

## 2025-08-18 PROCEDURE — 99214 OFFICE O/P EST MOD 30 MIN: CPT | Performed by: OTOLARYNGOLOGY

## 2025-08-18 PROCEDURE — 3079F DIAST BP 80-89 MM HG: CPT | Performed by: OTOLARYNGOLOGY

## 2025-08-18 PROCEDURE — 1159F MED LIST DOCD IN RCRD: CPT | Performed by: OTOLARYNGOLOGY

## 2025-08-18 PROCEDURE — 84439 ASSAY OF FREE THYROXINE: CPT

## 2025-08-18 PROCEDURE — 84443 ASSAY THYROID STIM HORMONE: CPT

## 2025-08-18 PROCEDURE — 36415 COLL VENOUS BLD VENIPUNCTURE: CPT

## 2025-08-18 PROCEDURE — 86800 THYROGLOBULIN ANTIBODY: CPT

## 2025-08-18 PROCEDURE — 1123F ACP DISCUSS/DSCN MKR DOCD: CPT | Performed by: OTOLARYNGOLOGY

## 2025-08-18 PROCEDURE — 84432 ASSAY OF THYROGLOBULIN: CPT

## 2025-08-18 PROCEDURE — 3077F SYST BP >= 140 MM HG: CPT | Performed by: OTOLARYNGOLOGY

## 2025-08-18 RX ORDER — MUPIROCIN 2 %
OINTMENT (GRAM) TOPICAL
Qty: 22 G | Refills: 3 | Status: SHIPPED | OUTPATIENT
Start: 2025-08-18

## 2025-08-18 ASSESSMENT — ENCOUNTER SYMPTOMS
RHINORRHEA: 0
VOICE CHANGE: 0
TROUBLE SWALLOWING: 0

## 2025-08-21 LAB
THYROGLOB AB SERPL-ACNC: <1.5 IU/ML (ref 0–4)
THYROGLOB SERPL-MCNC: 76.1 NG/ML (ref 1.3–31.8)
THYROGLOB SERPL-MCNC: ABNORMAL NG/ML (ref 1.3–31.8)

## 2025-09-03 ENCOUNTER — HOSPITAL ENCOUNTER (OUTPATIENT)
Dept: ULTRASOUND IMAGING | Age: 80
Discharge: HOME | End: 2025-09-05
Payer: MEDICARE

## 2025-09-03 DIAGNOSIS — C73 THYROID CANCER (HCC): ICD-10-CM

## 2025-09-03 DIAGNOSIS — F41.9 ANXIETY: ICD-10-CM

## 2025-09-03 PROCEDURE — 76536 US EXAM OF HEAD AND NECK: CPT

## 2025-09-03 RX ORDER — BUSPIRONE HYDROCHLORIDE 5 MG/1
5 TABLET ORAL 2 TIMES DAILY PRN
Qty: 90 TABLET | Refills: 0 | Status: SHIPPED | OUTPATIENT
Start: 2025-09-03

## (undated) DEVICE — MEDI-VAC NON-CONDUCTIVE SUCTION TUBING: Brand: CARDINAL HEALTH

## (undated) DEVICE — TIP-UP FENESTRATED GRASPER: Brand: ENDOWRIST

## (undated) DEVICE — CORD,CAUTERY,BIPOLAR,STERILE: Brand: MEDLINE

## (undated) DEVICE — SYRINGE MED 10ML TRNSLUC BRL PLUNG BLK MRK POLYPR CTRL

## (undated) DEVICE — GOWN ISOLATN REG YEL M WT MULTIPLY SIDETIE LEV 2

## (undated) DEVICE — KIT SURG W7XL11IN 2 PKT UNTREATED NA

## (undated) DEVICE — 6 X 9  1.75MIL 4-WALL LABGUARD: Brand: MINIGRIP COMMERCIAL LLC

## (undated) DEVICE — Device

## (undated) DEVICE — KIT SURG PREP POVIDONE IOD PRESATURATED PAINT WET FOR UNIV

## (undated) DEVICE — Device: Brand: INSTRUSAFE

## (undated) DEVICE — ENDOTRACH TUBE 8229507 CONT EMG 7MM ROHS: Brand: NIM CONTACT®

## (undated) DEVICE — KENDALL 450 SERIES MONITORING FOAM ELECTRODE - RECTANGULAR SHAPE ( 3/PK): Brand: KENDALL

## (undated) DEVICE — MASK,FACE,MAXFLUIDPROTECT,SHIELD/ERLPS: Brand: MEDLINE

## (undated) DEVICE — VESSEL SEALER EXTEND: Brand: ENDOWRIST

## (undated) DEVICE — CONTAINER SPEC COLL 960ML POLYPR TRIANG GRAD INTAKE/OUTPUT

## (undated) DEVICE — SYRINGE MED 20ML STD CLR PLAS LUERLOCK TIP N CTRL DISP

## (undated) DEVICE — SOLUTION IV IRRIG WATER 1000ML POUR BRL 2F7114

## (undated) DEVICE — HYDROPHILIC COATED RED RUBBER URETHRAL CATHETER, SMOOTH ROUNDED TIP, 20 FR (6.7 MM): Brand: DOVER

## (undated) DEVICE — SPONGE,PEANUT,XRAY,ST,SM,3/8",5/CARD: Brand: MEDLINE INDUSTRIES, INC.

## (undated) DEVICE — STANDARD HYPODERMIC NEEDLE,ALUMINUM HUB: Brand: MONOJECT

## (undated) DEVICE — KIT BEDSIDE REVITAL OX 500ML

## (undated) DEVICE — LUBRICANT SURG JELLY ST BACTER TUBE 4.25OZ

## (undated) DEVICE — BASIC SINGLE BASIN 1-LF: Brand: MEDLINE INDUSTRIES, INC.

## (undated) DEVICE — ARM DRAPE

## (undated) DEVICE — SPONGE,LAP,4"X18",XR,ST,5/PK,40PK/CS: Brand: MEDLINE INDUSTRIES, INC.

## (undated) DEVICE — INSUFFLATION NEEDLE TO ESTABLISH PNEUMOPERITONEUM.: Brand: INSUFFLATION NEEDLE

## (undated) DEVICE — AGENT HEMSTAT 3GM PURIFIED PLNT STARCH PWD ABSRB ARISTA AH

## (undated) DEVICE — GAUZE,SPONGE,4"X4",16PLY,XRAY,STRL,LF: Brand: MEDLINE

## (undated) DEVICE — STAPLER SHEATH: Brand: ENDOWRIST

## (undated) DEVICE — SYRINGE MED 10ML LUERLOCK TIP W/O SFTY DISP

## (undated) DEVICE — BLADELESS OBTURATOR: Brand: WECK VISTA

## (undated) DEVICE — DRAIN SURG 15FR L3/16IN DIA4.7MM SIL CHN RND FULL FLUT TRCR

## (undated) DEVICE — SHEARS ENDOSCP L9CM CRV HARM FOCS +

## (undated) DEVICE — VALVE SUCTION AIR H2O HYDR H2O JET CONN STRL ORCA POD + DISP

## (undated) DEVICE — TOTAL TRAY, 16FR 10ML SIL FOLEY, URN: Brand: MEDLINE

## (undated) DEVICE — MEGA SUTURECUT ND: Brand: ENDOWRIST

## (undated) DEVICE — CANNULA SEAL

## (undated) DEVICE — PATIENT RETURN ELECTRODE, SINGLE-USE, CONTACT QUALITY MONITORING, ADULT, WITH 9FT CORD, FOR PATIENTS WEIGING OVER 33LBS. (15KG): Brand: MEGADYNE

## (undated) DEVICE — STAPLER-OBTURATOR DAVINCI XI 12MM

## (undated) DEVICE — SPONGE GZ 4IN 4IN 4 PLY N WVN AVANT

## (undated) DEVICE — SET INSTR DISSECT ENT

## (undated) DEVICE — GLOVE ORANGE PI 7 1/2   MSG9075

## (undated) DEVICE — Device: Brand: DEFENDO VALVE AND CONNECTOR KIT

## (undated) DEVICE — STAPLER 60: Brand: SUREFORM

## (undated) DEVICE — COVER,LIGHT HANDLE,FLX,1/PK: Brand: MEDLINE INDUSTRIES, INC.

## (undated) DEVICE — TUBING, SUCTION, 1/4" X 10', STRAIGHT: Brand: MEDLINE

## (undated) DEVICE — SET MAJOR INSTR HOUSE

## (undated) DEVICE — STAPLER 60 RELOAD GREEN: Brand: SUREFORM

## (undated) DEVICE — SMALL GRASPING RETRACTOR: Brand: ENDOWRIST

## (undated) DEVICE — COLUMN DRAPE

## (undated) DEVICE — MASTISOL ADHESIVE LIQ 2/3ML

## (undated) DEVICE — PACK SURG LAP CHOLE CUSTOM

## (undated) DEVICE — PLUMEPORT LAPAROSCOPIC SMOKE FILTRATION DEVICE: Brand: PLUMEPORT ACTIV

## (undated) DEVICE — STRIP,CLOSURE,WOUND,MEDI-STRIP,1/2X4: Brand: MEDLINE

## (undated) DEVICE — CAMERA STRYKER 1488 HD GEN

## (undated) DEVICE — SHEET DRAPE FULL 70X100

## (undated) DEVICE — Z INACTIVE USE 2660664 SOLUTION IRRIG 3000ML 0.9% SOD CHL USP UROMATIC PLAS CONT

## (undated) DEVICE — 40586 ADVANCED TRENDELENBURG POSITIONING KIT: Brand: 40586 ADVANCED TRENDELENBURG POSITIONING KIT

## (undated) DEVICE — HOOK RETRCT 12MM S STL BLNT E STAY LONE STAR

## (undated) DEVICE — STAPLER 60 RELOAD BLUE: Brand: SUREFORM

## (undated) DEVICE — TOWEL,OR,DSP,ST,BLUE,STD,6/PK,12PK/CS: Brand: MEDLINE

## (undated) DEVICE — ANTI-FOG SOLUTION WITH FOAM PAD: Brand: DEVON

## (undated) DEVICE — SYRINGE 20ML LL S/C 50

## (undated) DEVICE — READY WET SKIN SCRUB TRAY-LF: Brand: MEDLINE INDUSTRIES, INC.

## (undated) DEVICE — SURGICAL PROCEDURE PACK BASIC

## (undated) DEVICE — DOUBLE BASIN SET: Brand: MEDLINE INDUSTRIES, INC.

## (undated) DEVICE — SURGICAL PROCEDURE PACK EENT CUST

## (undated) DEVICE — CADIERE FORCEPS: Brand: ENDOWRIST

## (undated) DEVICE — GOWN,SIRUS,FABRNF,XL,20/CS: Brand: MEDLINE

## (undated) DEVICE — SET ENDO INSTR RED YEL LAPAROSCOPIC

## (undated) DEVICE — SCOPE DAVINCI XI 30 DEG W/CORD

## (undated) DEVICE — MAGNETIC INSTR DRAPE 20X16: Brand: MEDLINE INDUSTRIES, INC.

## (undated) DEVICE — PROBE 8225101 5PK STD PRASS FL TIP ROHS

## (undated) DEVICE — STANDARD HYPODERMIC NEEDLE,POLYPROPYLENE HUB: Brand: MONOJECT

## (undated) DEVICE — SET ENDO INSTR LAPAROSCOPIC STGENLAP

## (undated) DEVICE — SUTURE ABSRB L6IN L37MM 0 GS-21 GRN 1/2 CIR TAPR PNT NDL VLOCL0306

## (undated) DEVICE — SET INSTRUMENT MINOR PLASTICS

## (undated) DEVICE — GLOVE SURG SZ 75 L12IN FNGR THK94MIL TRNSLUC YEL LTX

## (undated) DEVICE — DRESSING FOAM W22XL25CM FILVE LAYR FOAM DP DEF SAFETAC

## (undated) DEVICE — E-Z CLEAN, NON-STICK, PTFE COATED, ELECTROSURGICAL BLADE ELECTRODE, MODIFIED EXTENDED INSULATION, 2.5 INCH (6.35 CM): Brand: MEGADYNE

## (undated) DEVICE — SUCTION IRRIGATOR: Brand: ENDOWRIST

## (undated) DEVICE — SOLUTION IV IRRIG POUR BRL 0.9% SODIUM CHL 2F7124

## (undated) DEVICE — CHLORAPREP 26ML ORANGE

## (undated) DEVICE — REDUCER: Brand: ENDOWRIST

## (undated) DEVICE — SET INST DAVINCI XI ACCESSORIES

## (undated) DEVICE — INTENDED FOR TISSUE SEPARATION, AND OTHER PROCEDURES THAT REQUIRE A SHARP SURGICAL BLADE TO PUNCTURE OR CUT.: Brand: BARD-PARKER ® STAINLESS STEEL BLADES